# Patient Record
Sex: MALE | Race: WHITE | Employment: OTHER | URBAN - NONMETROPOLITAN AREA
[De-identification: names, ages, dates, MRNs, and addresses within clinical notes are randomized per-mention and may not be internally consistent; named-entity substitution may affect disease eponyms.]

---

## 2017-01-11 ENCOUNTER — OFFICE VISIT (OUTPATIENT)
Dept: FAMILY MEDICINE | Facility: OTHER | Age: 57
End: 2017-01-11
Payer: COMMERCIAL

## 2017-01-11 VITALS
HEART RATE: 80 BPM | SYSTOLIC BLOOD PRESSURE: 136 MMHG | TEMPERATURE: 96.9 F | HEIGHT: 68 IN | RESPIRATION RATE: 12 BRPM | WEIGHT: 227.7 LBS | OXYGEN SATURATION: 96 % | DIASTOLIC BLOOD PRESSURE: 84 MMHG | BODY MASS INDEX: 34.51 KG/M2

## 2017-01-11 DIAGNOSIS — E78.5 HYPERLIPIDEMIA LDL GOAL <130: Primary | ICD-10-CM

## 2017-01-11 LAB
CHOLEST SERPL-MCNC: 173 MG/DL
HDLC SERPL-MCNC: 36 MG/DL
LDLC SERPL CALC-MCNC: 100 MG/DL
NONHDLC SERPL-MCNC: 137 MG/DL
TRIGL SERPL-MCNC: 185 MG/DL

## 2017-01-11 PROCEDURE — 99213 OFFICE O/P EST LOW 20 MIN: CPT | Performed by: FAMILY MEDICINE

## 2017-01-11 PROCEDURE — 36415 COLL VENOUS BLD VENIPUNCTURE: CPT | Performed by: FAMILY MEDICINE

## 2017-01-11 PROCEDURE — 80061 LIPID PANEL: CPT | Performed by: FAMILY MEDICINE

## 2017-01-11 ASSESSMENT — PAIN SCALES - GENERAL: PAINLEVEL: NO PAIN (0)

## 2017-01-11 NOTE — NURSING NOTE
"Chief Complaint   Patient presents with     Recheck Medication     with labs       Initial /84 mmHg  Pulse 80  Temp(Src) 96.9  F (36.1  C) (Tympanic)  Resp 12  Ht 5' 8\" (1.727 m)  Wt 227 lb 11.2 oz (103.284 kg)  BMI 34.63 kg/m2  SpO2 96% Estimated body mass index is 34.63 kg/(m^2) as calculated from the following:    Height as of this encounter: 5' 8\" (1.727 m).    Weight as of this encounter: 227 lb 11.2 oz (103.284 kg).  BP completed using cuff size: large  Health Maintenance Due   Topic Date Due     HEPATITIS C SCREENING  09/28/1977       Ora Clark MA     1/11/2017    "

## 2017-01-11 NOTE — PROGRESS NOTES
SUBJECTIVE:                                                    Wenceslao Vasquez is a 56 year old male who presents to clinic today for the following health issues:      Medication Followup of labs    Taking Medication as prescribed: not applicable    Side Effects:  None    Medication Helping Symptoms:  not applicable       Hyperlipidemia Follow-Up, he tapered and stopped his statin 2-3 months ago      Rate your low fat/cholesterol diet?: fair    Taking statin?  No    Other lipid medications/supplements?:  none     Hypertension Follow-up, he tapered and stopped his ACEI 2-3 months ago      Outpatient blood pressures are not being checked.    Low Salt Diet: not monitoring salt       Medications Discontinued During This Encounter   Medication Reason     lisinopril (PRINIVIL,ZESTRIL) 10 MG tablet Stopped by Patient     simvastatin (ZOCOR) 20 MG tablet Stopped by Patient       Problem list and histories reviewed & adjusted, as indicated.  Additional history: as documented    Patient Active Problem List   Diagnosis     CHELSY (obstructive sleep apnea)     Hyperlipidemia LDL goal <130     Sciatica     Low back pain     Stenosis of lumbosacral spine     Advanced directives, counseling/discussion     Hypertension goal BP (blood pressure) < 140/90     Spinal stenosis in cervical region     Cervical spondylosis without myelopathy     Pneumonia     Past Surgical History   Procedure Laterality Date     Hc revise median n/carpal tunnel surg  1993     right     C nonspecific procedure       lumbar disc surgery     C nonspecific procedure       hammer toe surgery right foot     Dil urethra stric,male,subseq       C nonspecific procedure       nasal surgery     Hc removal heel spur, calcaneus  2/2005     Colonoscopy  06/03/09     Laparoscopic cholecystectomy  3/11/2013     Procedure: LAPAROSCOPIC CHOLECYSTECTOMY;  laparoscopic cholecystectomy;  Surgeon: Clinton Whittaker MD;  Location: [facility] OR     Cervical diskectomy  8/8/13      [facility]       Social History   Substance Use Topics     Smoking status: Never Smoker      Smokeless tobacco: Never Used     Alcohol Use: Yes      Comment: occasional     Family History   Problem Relation Age of Onset     Alcohol/Drug Brother      Prostate Cancer Brother      Arthritis Mother      CANCER Mother      Kidney - Stage 3     C.A.D. No family hx of      DIABETES No family hx of      HEART DISEASE Maternal Grandmother      HEART DISEASE Paternal Grandmother      Anesthesia Reaction No family hx of      Cancer - colorectal No family hx of          Current Outpatient Prescriptions   Medication Sig Dispense Refill     aspirin 81 MG tablet Take 1 tablet by mouth daily.       Multiple Vitamin (MULTI VITAMIN MENS PO) Take 1 tablet by mouth daily.       naproxen (NAPROSYN) 500 MG tablet Take 1 tablet (500 mg) by mouth 2 times daily (with meals) 30 tablet 1     Allergies   Allergen Reactions     Dust Mites Hives     Morphine Nausea and Vomiting     Recent Labs   Lab Test  07/21/16   0822  03/25/16   1405  05/18/15   1216  11/18/14   0935   02/22/13   1935   12/10/10   1113  05/12/10   0946   LDL  90   --   47  49   < >   --    < >   --   117   HDL  36*   --   35*  41   < >   --    < >   --   33*   TRIG  139   --   202*  140   < >   --    < >   --   148   ALT   --    --    --    --    --   45   --   33   --    CR   --   0.75  1.03   --    < >  0.93   < >   --   0.86   GFRESTIMATED   --   >90  Non  GFR Calc    75   --    < >  85   < >   --   >90   GFRESTBLACK   --   >90   GFR Calc    >90   GFR Calc     --    < >  >90   < >   --   >90   POTASSIUM   --   3.9  3.8   --    < >  3.8   < >   --   4.2   TSH   --    --    --    --    --    --    --    --   0.71    < > = values in this interval not displayed.      BP Readings from Last 3 Encounters:   01/11/17 136/84   10/19/16 134/78   07/21/16 118/70    Wt Readings from Last 3 Encounters:   01/11/17 227 lb 11.2 oz  "(103.284 kg)   10/19/16 230 lb (104.327 kg)   07/21/16 222 lb 14.4 oz (101.107 kg)                  Labs reviewed in EPIC  Problem list, Medication list, Allergies, and Medical/Social/Surgical histories reviewed in [facility] and updated as appropriate.    ROS:  Constitutional, HEENT, cardiovascular, pulmonary, gi and gu systems are negative, except as otherwise noted.    OBJECTIVE:                                                    /84 mmHg  Pulse 80  Temp(Src) 96.9  F (36.1  C) (Tympanic)  Resp 12  Ht 5' 8\" (1.727 m)  Wt 227 lb 11.2 oz (103.284 kg)  BMI 34.63 kg/m2  SpO2 96%  Body mass index is 34.63 kg/(m^2).  GENERAL: healthy, alert and no distress  NECK: no adenopathy, no asymmetry, masses, or scars and thyroid normal to palpation  RESP: lungs clear to auscultation - no rales, rhonchi or wheezes  CV: regular rate and rhythm, normal S1 S2, no S3 or S4, no murmur, click or rub, no peripheral edema and peripheral pulses strong  ABDOMEN: soft, nontender, no hepatosplenomegaly, no masses and bowel sounds normal    Diagnostic Test Results:  No results found for this or any previous visit (from the past 24 hour(s)).     ASSESSMENT/PLAN:                                                    Hyperlipidemia; Unknown control off statin   Plan:  Labs:   Lipid  The following changes are made - If LDL>190 will restart statin    Hypertension; controlled   Associated with the following complications:    None   Plan:  No changes in the patient's current treatment plan          SELF MONITORING:       - Please check blood pressure readings several times a month  Work on weight loss  Regular exercise    Christopher Arreola MD  [facility]    "

## 2017-01-22 ENCOUNTER — MYC MEDICAL ADVICE (OUTPATIENT)
Dept: FAMILY MEDICINE | Facility: OTHER | Age: 57
End: 2017-01-22

## 2017-01-22 DIAGNOSIS — Z01.00 VISION SCREEN WITHOUT ABNORMAL FINDINGS: Primary | ICD-10-CM

## 2017-03-22 ENCOUNTER — OFFICE VISIT (OUTPATIENT)
Dept: FAMILY MEDICINE | Facility: OTHER | Age: 57
End: 2017-03-22
Payer: COMMERCIAL

## 2017-03-22 ENCOUNTER — RADIANT APPOINTMENT (OUTPATIENT)
Dept: GENERAL RADIOLOGY | Facility: OTHER | Age: 57
End: 2017-03-22
Attending: PHYSICIAN ASSISTANT
Payer: COMMERCIAL

## 2017-03-22 VITALS
TEMPERATURE: 97.6 F | WEIGHT: 231.9 LBS | DIASTOLIC BLOOD PRESSURE: 80 MMHG | RESPIRATION RATE: 16 BRPM | SYSTOLIC BLOOD PRESSURE: 128 MMHG | BODY MASS INDEX: 35.26 KG/M2 | HEART RATE: 68 BPM | OXYGEN SATURATION: 96 %

## 2017-03-22 DIAGNOSIS — M25.532 LEFT WRIST PAIN: Primary | ICD-10-CM

## 2017-03-22 DIAGNOSIS — M25.532 LEFT WRIST PAIN: ICD-10-CM

## 2017-03-22 PROCEDURE — 73110 X-RAY EXAM OF WRIST: CPT | Mod: LT

## 2017-03-22 PROCEDURE — 99213 OFFICE O/P EST LOW 20 MIN: CPT | Performed by: PHYSICIAN ASSISTANT

## 2017-03-22 ASSESSMENT — PAIN SCALES - GENERAL: PAINLEVEL: MODERATE PAIN (5)

## 2017-03-22 NOTE — MR AVS SNAPSHOT
After Visit Summary   3/22/2017    Wenceslao Vasquez    MRN: [id number]           Patient Information     Date Of Birth          1960        Visit Information        Provider Department      3/22/2017 9:40 AM Thompson Crane PA-C [facility]        Today's Diagnoses     Left wrist pain    -  1       Follow-ups after your visit        Who to contact     If you have questions or need follow up information about today's clinic visit or your schedule please contact [facility] directly at [phone].  Normal or non-critical lab and imaging results will be communicated to you by Shop[employer]ially[employer], letter or phone within 4 business days after the clinic has received the results. If you do not hear from us within 7 days, please contact the clinic through Inn[employer] or phone. If you have a critical or abnormal lab result, we will notify you by phone as soon as possible.  Submit refill requests through [employer] or call your pharmacy and they will forward the refill request to us. Please allow 3 business days for your refill to be completed.          Additional Information About Your Visit        MyChart Information     [employer] gives you secure access to your electronic health record. If you see a primary care provider, you can also send messages to your care team and make appointments. If you have questions, please call your primary care clinic.  If you do not have a primary care provider, please call [phone] and they will assist you.        Care EveryWhere ID     This is your Care EveryWhere ID. This could be used by other organizations to access your [facility] medical records  [id number]        Your Vitals Were     Pulse Temperature Respirations Pulse Oximetry BMI (Body Mass Index)       68 97.6  F (36.4  C) (Oral) 16 96% 35.26 kg/m2        Blood Pressure from Last 3 Encounters:   03/22/17 128/80   01/11/17 136/84   10/19/16 134/78    Weight from Last 3 Encounters:   03/22/17 231 lb 14.4 oz  (105.2 kg)   01/11/17 227 lb 11.2 oz (103.3 kg)   10/19/16 230 lb (104.3 kg)               Primary Care Provider Office Phone # Fax #    Christopher Arreola [phone]       [facility] [address] [facility] [address]        Thank you!     Thank you for choosing [facility]  for your care. Our goal is always to provide you with excellent care. Hearing back from our patients is one way we can continue to improve our services. Please take a few minutes to complete the written survey that you may receive in the mail after your visit with us. Thank you!             Your Updated Medication List - Protect others around you: Learn how to safely use, store and throw away your medicines at www.disposemymeds.org.          This list is accurate as of: 3/22/17 10:23 AM.  Always use your most recent med list.                   Brand Name Dispense Instructions for use    aspirin 81 MG tablet      Take 1 tablet by mouth daily.       MULTI VITAMIN MENS PO      Take 1 tablet by mouth daily.

## 2017-03-22 NOTE — NURSING NOTE
"Chief Complaint   Patient presents with     Fall     fell in shower at home on 3/17/2017       Initial /80 (BP Location: Right arm, Patient Position: Chair, Cuff Size: Adult Large)  Pulse 68  Temp 97.6  F (36.4  C) (Oral)  Resp 16  Wt 231 lb 14.4 oz (105.2 kg)  SpO2 96%  BMI 35.26 kg/m2 Estimated body mass index is 35.26 kg/(m^2) as calculated from the following:    Height as of 1/11/17: 5' 8\" (1.727 m).    Weight as of this encounter: 231 lb 14.4 oz (105.2 kg).  Medication Reconciliation: complete     Michelle TELLO LPN      "

## 2017-03-22 NOTE — PROGRESS NOTES
SUBJECTIVE:                                                    Wenceslao Vasquez is a 56 year old male who presents to clinic today for the following health issues:      Concern - fell in his  Shower at home and injury to left wrist     Onset: happened on 3/17/2017    Description:   Fell in the shower and still has pain andswelling    Intensity: moderate    Progression of Symptoms:  same    Accompanying Signs & Symptoms:  Pain with ROM of the left wrist       Previous history of similar problem:   denies    Precipitating factors:   Worsened by: movement    Alleviating factors:  Improved by: keeping the wrist still.       Therapies Tried and outcome: OTC's not helping.    Problem list and histories reviewed & adjusted, as indicated.  Additional history: as documented    Patient Active Problem List   Diagnosis     CHELSY (obstructive sleep apnea)     Hyperlipidemia LDL goal <130     Sciatica     Low back pain     Stenosis of lumbosacral spine     Advanced directives, counseling/discussion     Hypertension goal BP (blood pressure) < 140/90     Spinal stenosis in cervical region     Cervical spondylosis without myelopathy     Pneumonia     Past Surgical History:   Procedure Laterality Date     C NONSPECIFIC PROCEDURE      lumbar disc surgery     C NONSPECIFIC PROCEDURE      hammer toe surgery right foot     C NONSPECIFIC PROCEDURE      nasal surgery     CERVICAL DISKECTOMY  8/8/13    [facility]     COLONOSCOPY  06/03/09     DIL URETHRA STRIC,MALE,SUBSEQ       HC REMOVAL HEEL SPUR, CALCANEUS  2/2005     HC REVISE MEDIAN N/CARPAL TUNNEL SURG  1993    right     LAPAROSCOPIC CHOLECYSTECTOMY  3/11/2013    Procedure: LAPAROSCOPIC CHOLECYSTECTOMY;  laparoscopic cholecystectomy;  Surgeon: Clinton Whittaker MD;  Location:  OR       Social History   Substance Use Topics     Smoking status: Never Smoker     Smokeless tobacco: Never Used     Alcohol use Yes      Comment: occasional     Family History   Problem Relation Age of  Onset     Alcohol/Drug Brother      Prostate Cancer Brother      Arthritis Mother      CANCER Mother      Kidney - Stage 3     HEART DISEASE Maternal Grandmother      HEART DISEASE Paternal Grandmother      C.A.D. No family hx of      DIABETES No family hx of      Anesthesia Reaction No family hx of      Cancer - colorectal No family hx of            Reviewed and updated as needed this visit by clinical staff  Tobacco  Allergies  Meds  Med Hx  Surg Hx  Fam Hx  Soc Hx      Reviewed and updated as needed this visit by Provider         ROS:  Constitutional, HEENT, cardiovascular, pulmonary, gi and gu systems are negative, except as otherwise noted.    OBJECTIVE:                                                    /80 (BP Location: Right arm, Patient Position: Chair, Cuff Size: Adult Large)  Pulse 68  Temp 97.6  F (36.4  C) (Oral)  Resp 16  Wt 231 lb 14.4 oz (105.2 kg)  SpO2 96%  BMI 35.26 kg/m2  Body mass index is 35.26 kg/(m^2).  GENERAL: healthy, alert and no distress  MS: pain over the distal radius  SKIN: no suspicious lesions or rashes  NEURO: Normal strength and tone, mentation intact and speech normal  PSYCH: mentation appears normal, affect normal/bright    Diagnostic Test Results:  X-ray: essentially negative for pathology today to my eye.  It will be overread by radiology.       ASSESSMENT/PLAN:                                                    1. Left wrist pain  Noted - sprain noted.  - XR Wrist Left G/E 3 Views; Future  Thompson Rivers PA-C  [facility]

## 2017-07-03 ENCOUNTER — OFFICE VISIT (OUTPATIENT)
Dept: FAMILY MEDICINE | Facility: OTHER | Age: 57
End: 2017-07-03
Payer: COMMERCIAL

## 2017-07-03 VITALS
HEART RATE: 82 BPM | RESPIRATION RATE: 12 BRPM | BODY MASS INDEX: 34.53 KG/M2 | WEIGHT: 227.1 LBS | SYSTOLIC BLOOD PRESSURE: 136 MMHG | DIASTOLIC BLOOD PRESSURE: 84 MMHG | OXYGEN SATURATION: 97 % | TEMPERATURE: 98 F

## 2017-07-03 DIAGNOSIS — E66.01 MORBID OBESITY DUE TO EXCESS CALORIES (H): ICD-10-CM

## 2017-07-03 DIAGNOSIS — Z11.59 NEED FOR HEPATITIS C SCREENING TEST: ICD-10-CM

## 2017-07-03 DIAGNOSIS — Z00.01 ENCOUNTER FOR ROUTINE ADULT MEDICAL EXAM WITH ABNORMAL FINDINGS: Primary | ICD-10-CM

## 2017-07-03 DIAGNOSIS — Z12.5 SPECIAL SCREENING FOR MALIGNANT NEOPLASM OF PROSTATE: ICD-10-CM

## 2017-07-03 DIAGNOSIS — R73.09 ELEVATED GLUCOSE: ICD-10-CM

## 2017-07-03 DIAGNOSIS — M17.12 PRIMARY OSTEOARTHRITIS OF LEFT KNEE: ICD-10-CM

## 2017-07-03 DIAGNOSIS — N52.9 VASCULOGENIC ERECTILE DYSFUNCTION, UNSPECIFIED VASCULOGENIC ERECTILE DYSFUNCTION TYPE: ICD-10-CM

## 2017-07-03 LAB
ANION GAP SERPL CALCULATED.3IONS-SCNC: 4 MMOL/L (ref 3–14)
BUN SERPL-MCNC: 19 MG/DL (ref 7–30)
CALCIUM SERPL-MCNC: 8.3 MG/DL (ref 8.5–10.1)
CHLORIDE SERPL-SCNC: 107 MMOL/L (ref 94–109)
CO2 SERPL-SCNC: 29 MMOL/L (ref 20–32)
CREAT SERPL-MCNC: 0.74 MG/DL (ref 0.66–1.25)
GFR SERPL CREATININE-BSD FRML MDRD: ABNORMAL ML/MIN/1.7M2
GLUCOSE SERPL-MCNC: 99 MG/DL (ref 70–99)
HCV AB SERPL QL IA: NORMAL
POTASSIUM SERPL-SCNC: 3.9 MMOL/L (ref 3.4–5.3)
PSA SERPL-ACNC: 2.47 UG/L (ref 0–4)
SODIUM SERPL-SCNC: 140 MMOL/L (ref 133–144)

## 2017-07-03 PROCEDURE — 36415 COLL VENOUS BLD VENIPUNCTURE: CPT | Performed by: FAMILY MEDICINE

## 2017-07-03 PROCEDURE — 86803 HEPATITIS C AB TEST: CPT | Performed by: FAMILY MEDICINE

## 2017-07-03 PROCEDURE — G0103 PSA SCREENING: HCPCS | Performed by: FAMILY MEDICINE

## 2017-07-03 PROCEDURE — 99396 PREV VISIT EST AGE 40-64: CPT | Performed by: FAMILY MEDICINE

## 2017-07-03 PROCEDURE — 80048 BASIC METABOLIC PNL TOTAL CA: CPT | Performed by: FAMILY MEDICINE

## 2017-07-03 PROCEDURE — 99214 OFFICE O/P EST MOD 30 MIN: CPT | Mod: 25 | Performed by: FAMILY MEDICINE

## 2017-07-03 RX ORDER — SILDENAFIL 100 MG/1
50-100 TABLET, FILM COATED ORAL DAILY PRN
Qty: 6 TABLET | Refills: 1 | Status: SHIPPED | OUTPATIENT
Start: 2017-07-03 | End: 2017-10-27

## 2017-07-03 ASSESSMENT — PAIN SCALES - GENERAL: PAINLEVEL: NO PAIN (0)

## 2017-07-03 NOTE — PROGRESS NOTES
SUBJECTIVE:   Wenceslao Vasquez is a 56 year old male who presents for Preventive Visit.  Are you in the first 12 months of your Medicare Part B coverage?  No    Healthy Habits:    Do you get at least three servings of calcium containing foods daily (dairy, green leafy vegetables, etc.)? yes    Amount of exercise or daily activities, outside of work: 7 day(s) per week    Problems taking medications regularly not applicable    Medication side effects: No    Have you had an eye exam in the past two years? yes    Do you see a dentist twice per year? yes    Do you have sleep apnea, excessive snoring or daytime drowsiness?yes    COGNITIVE SCREEN  1) Repeat 3 items (Banana, Sunrise, Chair)    2) Clock draw: ABNORMAL   3) 3 item recall: Recalls 1 object   Results: ABNORMAL clock, 1-2 items recalled: PROBABLE COGNITIVE IMPAIRMENT, **INFORM PROVIDER**    Mini-CogTM Copyright S Morenita. Licensed by the author for use in [facility]; reprinted with permission (kei@[facility]). All rights reserved.            Musculoskeletal problem/pain      Duration: 9 months    Description  Location: Left knee pain    Intensity:  moderate    Accompanying signs and symptoms: swelling    History  Previous similar problem: YES  Previous evaluation:  none    Precipitating or alleviating factors:  Trauma or overuse: YES- jumped off trailer in August 2016. Intermittent pain which is getting worse.  Aggravating factors include: standing, walking and overuse    Therapies tried and outcome: acetaminophen and NSAID -      Complaining of gradual development of poor erectile function over the last few years.  This has become progressively worse and has begun to effect sexual function and relationship. No pain or injuries.  He does not use nitrate or alpha blocker  medication.  He denies injuries.    Reviewed and updated as needed this visit by clinical staff  Tobacco  Allergies  Meds  Med Hx  Surg Hx  Fam Hx  Soc Hx        Reviewed and  updated as needed this visit by Provider        Social History   Substance Use Topics     Smoking status: Never Smoker     Smokeless tobacco: Never Used     Alcohol use Yes      Comment: occasional       The patient does not drink >3 drinks per day nor >7 drinks per week.    Today's PHQ-2 Score:   PHQ-2 ( 1999 Pfizer) 7/3/2017 7/21/2016   Q1: Little interest or pleasure in doing things 0 0   Q2: Feeling down, depressed or hopeless 0 0   PHQ-2 Score 0 0   Q1: Little interest or pleasure in doing things - -   Q2: Feeling down, depressed or hopeless - -   PHQ-2 Score - -       Do you feel safe in your environment - Yes    Do you have a Health Care Directive?: Yes: Advance Directive has been received and scanned.    Current providers sharing in care for this patient include:   Patient Care Team:  Christopher Arreola MD as PCP - General      Hearing impairment: No    Ability to successfully perform activities of daily living: Yes, no assistance needed     Fall risk:  Fallen 2 or more times in the past year?: No  Any fall with injury in the past year?: Yes  Timed Up and Go Test (>13.5 is fall risk; contact physician) : 9    Home safety:  none identified      The following health maintenance items are reviewed in Epic and correct as of today:  Health Maintenance   Topic Date Due     HEPATITIS C SCREENING  09/28/1977     ADVANCE DIRECTIVE PLANNING Q5 YRS  03/15/2017     INFLUENZA VACCINE (SYSTEM ASSIGNED)  09/01/2017     LIPID MONITORING Q1 YEAR  01/11/2018     COLONOSCOPY Q5 YR  07/14/2020     TETANUS IMMUNIZATION (SYSTEM ASSIGNED)  05/14/2024     Labs reviewed in EPIC  BP Readings from Last 3 Encounters:   07/03/17 136/84   03/22/17 128/80   01/11/17 136/84    Wt Readings from Last 3 Encounters:   07/03/17 227 lb 1.6 oz (103 kg)   03/22/17 231 lb 14.4 oz (105.2 kg)   01/11/17 227 lb 11.2 oz (103.3 kg)                  Patient Active Problem List   Diagnosis     CHELSY (obstructive sleep apnea)     Hyperlipidemia LDL goal <130      Sciatica     Low back pain     Stenosis of lumbosacral spine     Advanced directives, counseling/discussion     Hypertension goal BP (blood pressure) < 140/90     Spinal stenosis in cervical region     Cervical spondylosis without myelopathy     Pneumonia     Past Surgical History:   Procedure Laterality Date     C NONSPECIFIC PROCEDURE      lumbar disc surgery     C NONSPECIFIC PROCEDURE      hammer toe surgery right foot     C NONSPECIFIC PROCEDURE      nasal surgery     CERVICAL DISKECTOMY  8/8/13    [facility] [place]     COLONOSCOPY  06/03/09     DIL URETHRA STRIC,MALE,SUBSEQ       HC REMOVAL HEEL SPUR, CALCANEUS  2/2005     HC REVISE MEDIAN N/CARPAL TUNNEL SURG  1993    right     LAPAROSCOPIC CHOLECYSTECTOMY  3/11/2013    Procedure: LAPAROSCOPIC CHOLECYSTECTOMY;  laparoscopic cholecystectomy;  Surgeon: Clinton Whittaker MD;  Location: [facility] OR       Social History   Substance Use Topics     Smoking status: Never Smoker     Smokeless tobacco: Never Used     Alcohol use Yes      Comment: occasional     Family History   Problem Relation Age of Onset     Alcohol/Drug Brother      Prostate Cancer Brother      Arthritis Mother      CANCER Mother      Kidney - Stage 3     HEART DISEASE Maternal Grandmother      HEART DISEASE Paternal Grandmother      C.A.D. No family hx of      DIABETES No family hx of      Anesthesia Reaction No family hx of      Cancer - colorectal No family hx of      Colon Cancer No family hx of          Current Outpatient Prescriptions   Medication Sig Dispense Refill     sildenafil (VIAGRA) 100 MG cap/tab Take 0.5-1 tablets ( mg) by mouth daily as needed for erectile dysfunction Take 30 min to 4 hours before intercourse.  Never use with nitroglycerin, terazosin or doxazosin. 6 tablet 1     Multiple Vitamin (MULTI VITAMIN MENS PO) Take 1 tablet by mouth daily.       aspirin 81 MG tablet Take 1 tablet by mouth daily.       Allergies   Allergen Reactions     Dust Mites Hives     Morphine  "Nausea and Vomiting     Recent Labs   Lab Test  01/11/17   0855  07/21/16   0822  03/25/16   1405  05/18/15   1216   02/22/13   1935   12/10/10   1113  05/12/10   0946   LDL  100*  90   --   47   < >   --    < >   --   117   HDL  36*  36*   --   35*   < >   --    < >   --   33*   TRIG  185*  139   --   202*   < >   --    < >   --   148   ALT   --    --    --    --    --   45   --   33   --    CR   --    --   0.75  1.03   < >  0.93   < >   --   0.86   GFRESTIMATED   --    --   >90  Non  GFR Calc    75   < >  85   < >   --   >90   GFRESTBLACK   --    --   >90   GFR Calc    >90   GFR Calc     < >  >90   < >   --   >90   POTASSIUM   --    --   3.9  3.8   < >  3.8   < >   --   4.2   TSH   --    --    --    --    --    --    --    --   0.71    < > = values in this interval not displayed.          ROS:  C: NEGATIVE for fever, chills, change in weight  E/M: NEGATIVE for ear, mouth and throat problems  R: NEGATIVE for significant cough or SOB  CV: NEGATIVE for chest pain, palpitations or peripheral edema  : positive for erectile dysfunction for 3 years  MUSCULOSKELETAL: POSITIVE  for joint pain left knee  ROS otherwise negative    OBJECTIVE:   /84 (BP Location: Right arm, Patient Position: Chair, Cuff Size: Adult Large)  Pulse 82  Temp 98  F (36.7  C) (Temporal)  Resp 12  Wt 227 lb 1.6 oz (103 kg)  SpO2 97%  BMI 34.53 kg/m2 Estimated body mass index is 34.53 kg/(m^2) as calculated from the following:    Height as of 1/11/17: 5' 8\" (1.727 m).    Weight as of this encounter: 227 lb 1.6 oz (103 kg).  EXAM:   GENERAL: healthy, alert, no distress and obese  EYES: Eyes grossly normal to inspection, PERRL and conjunctivae and sclerae normal  HENT: ear canals and TM's normal, nose and mouth without ulcers or lesions  NECK: no adenopathy, no asymmetry, masses, or scars and thyroid normal to palpation  RESP: lungs clear to auscultation - no rales, rhonchi or wheezes  CV: " "regular rate and rhythm, normal S1 S2, no S3 or S4, no murmur, click or rub, no peripheral edema and peripheral pulses strong  ABDOMEN: soft, nontender, no hepatosplenomegaly, no masses and bowel sounds normal  MS: Knee exam: left positive for moderate crepitations, some mild tenderness medial femoral condyl and pain on range of motion, no effusion is present, no pseudolaxity noted.  SKIN: no suspicious lesions or rashes  NEURO: Normal strength and tone, mentation intact and speech normal  PSYCH: mentation appears normal, affect normal/bright    ASSESSMENT / PLAN:       ICD-10-CM    1. Encounter for routine adult medical exam with abnormal findings Z00.01    2. Special screening for malignant neoplasm of prostate Z12.5 PSA, screen   3. Elevated glucose R73.09 Basic metabolic panel   4. Vasculogenic erectile dysfunction, unspecified vasculogenic erectile dysfunction type N52.9 sildenafil (VIAGRA) 100 MG cap/tab     OFFICE/OUTPT VISIT,EST,LEVL III   5. Primary osteoarthritis of left knee M17.12 ORTHOPEDICS ADULT REFERRAL     OFFICE/OUTPT VISIT,EST,LEVL III   6. Morbid obesity due to excess calories (H) E66.01    7. Need for hepatitis C screening test Z11.59 Hepatitis C Screen Reflex to HCV RNA Quant and Genotype       End of Life Planning:  Patient currently has an advanced directive: Yes.  Practitioner is supportive of decision.    COUNSELING:  Reviewed preventive health counseling, as reflected in patient instructions       Regular exercise       Healthy diet/nutrition       Hepatitis C screening       Prostate cancer screening    BP Screening:   Last 3 BP Readings:    BP Readings from Last 3 Encounters:   07/03/17 136/84   03/22/17 128/80   01/11/17 136/84       The following was recommended to the patient:  Re-screen BP within a year and recommended lifestyle modifications    Estimated body mass index is 34.53 kg/(m^2) as calculated from the following:    Height as of 1/11/17: 5' 8\" (1.727 m).    Weight as of this " encounter: 227 lb 1.6 oz (103 kg).  Weight management plan: Discussed healthy diet and exercise guidelines and patient will follow up in 6 months in clinic to re-evaluate.   reports that he has never smoked. He has never used smokeless tobacco.      Appropriate preventive services were discussed with this patient, including applicable screening as appropriate for cardiovascular disease, diabetes, osteopenia/osteoporosis, and glaucoma.  As appropriate for age/gender, discussed screening for colorectal cancer, prostate cancer, breast cancer, and cervical cancer. Checklist reviewing preventive services available has been given to the patient.    Reviewed patients plan of care and provided an AVS. The Basic Care Plan (routine screening as documented in Health Maintenance) for Wenceslao meets the Care Plan requirement. This Care Plan has been established and reviewed with the Patient.    Counseling Resources:  ATP IV Guidelines  Pooled Cohorts Equation Calculator  Breast Cancer Risk Calculator  FRAX Risk Assessment  ICSI Preventive Guidelines  Dietary Guidelines for Americans, 2010  USDA's MyPlate  ASA Prophylaxis  Lung CA Screening    Christopher Arreola MD  [facility]

## 2017-07-03 NOTE — MR AVS SNAPSHOT
After Visit Summary   7/3/2017    Wenceslao Vasquez    MRN: [id number]           Patient Information     Date Of Birth          1960        Visit Information        Provider Department      7/3/2017 8:40 AM Christopher Arreola MD [facility]        Today's Diagnoses     Encounter for routine adult medical exam with abnormal findings    -  1    Need for hepatitis C screening test        Special screening for malignant neoplasm of prostate        Elevated glucose        Vasculogenic erectile dysfunction, unspecified vasculogenic erectile dysfunction type        Primary osteoarthritis of left knee          Care Instructions      Preventive Health Recommendations:       Male Ages 65 and over    Yearly exam:             See your health care provider every year in order to  o   Review health changes.   o   Discuss preventive care.    o   Review your medicines if your doctor has prescribed any.    Talk with your health care provider about whether you should have a test to screen for prostate cancer (PSA).    Every 3 years, have a diabetes test (fasting glucose). If you are at risk for diabetes, you should have this test more often.    Every 5 years, have a cholesterol test. Have this test more often if you are at risk for high cholesterol or heart disease.     Every 10 years, have a colonoscopy. Or, have a yearly FIT test (stool test). These exams will check for colon cancer.    Talk to with your health care provider about screening for Abdominal Aortic Aneurysm if you have a family history of AAA or have a history of smoking.  Shots:     Get a flu shot each year.     Get a tetanus shot every 10 years.     Talk to your doctor about your pneumonia vaccines. There are now two you should receive - Pneumovax (PPSV 23) and Prevnar (PCV 13).    Talk to your doctor about a shingles vaccine.     Talk to your doctor about the hepatitis B vaccine.  Nutrition:     Eat at least 5 servings of fruits and vegetables  each day.     Eat whole-grain bread, whole-wheat pasta and brown rice instead of white grains and rice.     Talk to your doctor about Calcium and Vitamin D.   Lifestyle    Exercise for at least 150 minutes a week (30 minutes a day, 5 days a week). This will help you control your weight and prevent disease.     Limit alcohol to one drink per day.     No smoking.     Wear sunscreen to prevent skin cancer.     See your dentist every six months for an exam and cleaning.     See your eye doctor every 1 to 2 years to screen for conditions such as glaucoma, macular degeneration and cataracts.          Follow-ups after your visit        Additional Services     ORTHOPEDICS ADULT REFERRAL       Your provider has referred you to: FM: [facility] [phone]   [email or web address]    Please be aware that coverage of these services is subject to the terms and limitations of your health insurance plan.  Call member services at your health plan with any benefit or coverage questions.      Please bring the following to your appointment:    >>   Any x-rays, CTs or MRIs which have been performed.  Contact the facility where they were done to arrange for  prior to your scheduled appointment.    >>   List of current medications   >>   This referral request   >>   Any documents/labs given to you for this referral                  Who to contact     If you have questions or need follow up information about today's clinic visit or your schedule please contact [facility] directly at [phone].  Normal or non-critical lab and imaging results will be communicated to you by MyChart, letter or phone within 4 business days after the clinic has received the results. If you do not hear from us within 7 days, please contact the clinic through MyChart or phone. If you have a critical or abnormal lab result, we will notify you by phone as soon as  possible.  Submit refill requests through Adiana or call your pharmacy and they will forward the refill request to us. Please allow 3 business days for your refill to be completed.          Additional Information About Your Visit        Adiana Information     Adiana gives you secure access to your electronic health record. If you see a primary care provider, you can also send messages to your care team and make appointments. If you have questions, please call your primary care clinic.  If you do not have a primary care provider, please call [phone] and they will assist you.        Care EveryWhere ID     This is your Care EveryWhere ID. This could be used by other organizations to access your [place] medical records  [id number]        Your Vitals Were     Pulse Temperature Respirations Pulse Oximetry BMI (Body Mass Index)       82 98  F (36.7  C) (Temporal) 12 97% 34.53 kg/m2        Blood Pressure from Last 3 Encounters:   07/03/17 136/84   03/22/17 128/80   01/11/17 136/84    Weight from Last 3 Encounters:   07/03/17 227 lb 1.6 oz (103 kg)   03/22/17 231 lb 14.4 oz (105.2 kg)   01/11/17 227 lb 11.2 oz (103.3 kg)              We Performed the Following     Basic metabolic panel     Hepatitis C Screen Reflex to HCV RNA Quant and Genotype     ORTHOPEDICS ADULT REFERRAL     PSA, screen          Today's Medication Changes          These changes are accurate as of: 7/3/17  9:52 AM.  If you have any questions, ask your nurse or doctor.               Start taking these medicines.        Dose/Directions    sildenafil 100 MG cap/tab   Commonly known as:  VIAGRA   Used for:  Vasculogenic erectile dysfunction, unspecified vasculogenic erectile dysfunction type   Started by:  Christopher Arreola MD        Dose:   mg   Take 0.5-1 tablets ( mg) by mouth daily as needed for erectile dysfunction Take 30 min to 4 hours before intercourse.  Never use with nitroglycerin, terazosin or doxazosin.   Quantity:  6 tablet    Refills:  1            Where to get your medicines      Some of these will need a paper prescription and others can be bought over the counter.  Ask your nurse if you have questions.     Bring a paper prescription for each of these medications     sildenafil 100 MG cap/tab                Primary Care Provider Office Phone # Fax #    Christopher Arreola [phone]       [facility] [address] [facility] [address]        Equal Access to Services     BAUTISTA LU : Hadii aad ku hadasho Soomaali, waaxda luqadaha, qaybta kaalmada adeegyada, waxay vikiin hayduken lin mullinsjuanaann moseley. So [facility] [phone].    ATENCIÓN: Si gaviota al, tiene a copeland disposición servicios gratuitos de asistencia lingüística. Llame al [phone].    We comply with applicable federal civil rights laws and [place] laws. We do not discriminate on the basis of race, color, national origin, age, disability sex, sexual orientation or gender identity.            Thank you!     Thank you for choosing [facility]  for your care. Our goal is always to provide you with excellent care. Hearing back from our patients is one way we can continue to improve our services. Please take a few minutes to complete the written survey that you may receive in the mail after your visit with us. Thank you!             Your Updated Medication List - Protect others around you: Learn how to safely use, store and throw away your medicines at www.disposemymeds.org.          This list is accurate as of: 7/3/17  9:52 AM.  Always use your most recent med list.                   Brand Name Dispense Instructions for use Diagnosis    aspirin 81 MG tablet      Take 1 tablet by mouth daily.    Low back pain, S/P lumbar discectomy       MULTI VITAMIN MENS PO      Take 1 tablet by mouth daily.    Low back pain, S/P lumbar discectomy       sildenafil 100 MG cap/tab    VIAGRA    6 tablet    Take 0.5-1 tablets ( mg) by mouth daily as needed  for erectile dysfunction Take 30 min to 4 hours before intercourse.  Never use with nitroglycerin, terazosin or doxazosin.    Vasculogenic erectile dysfunction, unspecified vasculogenic erectile dysfunction type

## 2017-07-11 ENCOUNTER — RADIANT APPOINTMENT (OUTPATIENT)
Dept: GENERAL RADIOLOGY | Facility: CLINIC | Age: 57
End: 2017-07-11
Attending: ORTHOPAEDIC SURGERY
Payer: COMMERCIAL

## 2017-07-11 ENCOUNTER — OFFICE VISIT (OUTPATIENT)
Dept: ORTHOPEDICS | Facility: CLINIC | Age: 57
End: 2017-07-11
Payer: COMMERCIAL

## 2017-07-11 VITALS — TEMPERATURE: 98.3 F | BODY MASS INDEX: 34.4 KG/M2 | HEIGHT: 68 IN | WEIGHT: 227 LBS

## 2017-07-11 DIAGNOSIS — M17.12 ARTHRITIS OF KNEE, LEFT: ICD-10-CM

## 2017-07-11 DIAGNOSIS — M25.562 LEFT KNEE PAIN, UNSPECIFIED CHRONICITY: Primary | ICD-10-CM

## 2017-07-11 DIAGNOSIS — M25.562 LEFT KNEE PAIN, UNSPECIFIED CHRONICITY: ICD-10-CM

## 2017-07-11 PROCEDURE — 20610 DRAIN/INJ JOINT/BURSA W/O US: CPT | Mod: LT | Performed by: ORTHOPAEDIC SURGERY

## 2017-07-11 PROCEDURE — 73564 X-RAY EXAM KNEE 4 OR MORE: CPT | Mod: TC

## 2017-07-11 PROCEDURE — 99214 OFFICE O/P EST MOD 30 MIN: CPT | Mod: 25 | Performed by: ORTHOPAEDIC SURGERY

## 2017-07-11 RX ORDER — BETAMETHASONE SODIUM PHOSPHATE AND BETAMETHASONE ACETATE 3; 3 MG/ML; MG/ML
12 INJECTION, SUSPENSION INTRA-ARTICULAR; INTRALESIONAL; INTRAMUSCULAR; SOFT TISSUE ONCE
Qty: 2 ML | Refills: 0 | COMMUNITY
Start: 2017-07-11 | End: 2017-10-27

## 2017-07-11 ASSESSMENT — PAIN SCALES - GENERAL: PAINLEVEL: NO PAIN (0)

## 2017-07-11 NOTE — NURSING NOTE
"Chief Complaint   Patient presents with     RECHECK     NEW ISSUE: Left knee pain.  Onset 10/2016.  Fell during deer hunting.         Initial Temp 98.3  F (36.8  C) (Temporal)  Ht 1.727 m (5' 8\")  Wt 103 kg (227 lb)  BMI 34.52 kg/m2 Estimated body mass index is 34.52 kg/(m^2) as calculated from the following:    Height as of this encounter: 1.727 m (5' 8\").    Weight as of this encounter: 103 kg (227 lb).  Medication Reconciliation: complete   JO De La Paz  "

## 2017-07-11 NOTE — MR AVS SNAPSHOT
"              After Visit Summary   7/11/2017    Wenceslao Vasquez    MRN: [id number]           Patient Information     Date Of Birth          1960        Visit Information        Provider Department      7/11/2017 1:50 PM Morris Abebe MD [facility]        Today's Diagnoses     Left knee pain, unspecified chronicity    -  1    Arthritis of knee, left           Follow-ups after your visit        Who to contact     If you have questions or need follow up information about today's clinic visit or your schedule please contact [facility] directly at [phone].  Normal or non-critical lab and imaging results will be communicated to you by [employer]hart, letter or phone within 4 business days after the clinic has received the results. If you do not hear from us within 7 days, please contact the clinic through [employer] or phone. If you have a critical or abnormal lab result, we will notify you by phone as soon as possible.  Submit refill requests through iFood or call your pharmacy and they will forward the refill request to us. Please allow 3 business days for your refill to be completed.          Additional Information About Your Visit        MyChart Information     iFood gives you secure access to your electronic health record. If you see a primary care provider, you can also send messages to your care team and make appointments. If you have questions, please call your primary care clinic.  If you do not have a primary care provider, please call [phone] and they will assist you.        Care EveryWhere ID     This is your Care EveryWhere ID. This could be used by other organizations to access your [place] medical records  [id number]        Your Vitals Were     Temperature Height BMI (Body Mass Index)             98.3  F (36.8  C) (Temporal) 1.727 m (5' 8\") 34.52 kg/m2          Blood Pressure from Last 3 Encounters:   07/03/17 136/84   03/22/17 128/80   01/11/17 136/84    " Weight from Last 3 Encounters:   07/11/17 103 kg (227 lb)   07/03/17 103 kg (227 lb 1.6 oz)   03/22/17 105.2 kg (231 lb 14.4 oz)              We Performed the Following     HC ARTHROCENTESIS ASPIR&/INJ MAJOR JT/BURSA W/US        Primary Care Provider Office Phone # Fax #    Christopher Arreola [phone]       [facility] [address] [facility] [address]        Equal Access to Services     BAUTISTA LU : Hadii aad ku hadasho Soomaali, waaxda luqadaha, qaybta kaalmada adeegyada, waxay idiin hayduken lin moseley. So [facility] [phone].    ATENCIÓN: Si habla español, tiene a copeland disposición servicios gratuitos de asistencia lingüística. Llame al [phone].    We comply with applicable federal civil rights laws and [place] laws. We do not discriminate on the basis of race, color, national origin, age, disability sex, sexual orientation or gender identity.            Thank you!     Thank you for choosing [facility]  for your care. Our goal is always to provide you with excellent care. Hearing back from our patients is one way we can continue to improve our services. Please take a few minutes to complete the written survey that you may receive in the mail after your visit with us. Thank you!             Your Updated Medication List - Protect others around you: Learn how to safely use, store and throw away your medicines at www.disposemymeds.org.          This list is accurate as of: 7/11/17  2:39 PM.  Always use your most recent med list.                   Brand Name Dispense Instructions for use Diagnosis    aspirin 81 MG tablet      Take 1 tablet by mouth daily.    Low back pain, S/P lumbar discectomy       MULTI VITAMIN MENS PO      Take 1 tablet by mouth daily.    Low back pain, S/P lumbar discectomy       sildenafil 100 MG cap/tab    VIAGRA    6 tablet    Take 0.5-1 tablets ( mg) by mouth daily as needed for erectile dysfunction Take 30 min to 4 hours before  intercourse.  Never use with nitroglycerin, terazosin or doxazosin.    Vasculogenic erectile dysfunction, unspecified vasculogenic erectile dysfunction type
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
## 2017-09-26 ENCOUNTER — ALLIED HEALTH/NURSE VISIT (OUTPATIENT)
Dept: FAMILY MEDICINE | Facility: OTHER | Age: 57
End: 2017-09-26
Payer: COMMERCIAL

## 2017-09-26 DIAGNOSIS — Z23 NEED FOR PROPHYLACTIC VACCINATION AND INOCULATION AGAINST INFLUENZA: Primary | ICD-10-CM

## 2017-09-26 PROCEDURE — G0008 ADMIN INFLUENZA VIRUS VAC: HCPCS

## 2017-09-26 PROCEDURE — 99207 ZZC NO CHARGE NURSE ONLY: CPT

## 2017-09-26 PROCEDURE — 90686 IIV4 VACC NO PRSV 0.5 ML IM: CPT

## 2017-09-26 NOTE — MR AVS SNAPSHOT
After Visit Summary   9/26/2017    Wenceslao Vasquez    MRN: [id number]           Patient Information     Date Of Birth          1960        Visit Information        Provider Department      9/26/2017 10:45 AM ASHLEY SINGH NURSE, [facility]        Today's Diagnoses     Need for prophylactic vaccination and inoculation against influenza    -  1       Follow-ups after your visit        Who to contact     If you have questions or need follow up information about today's clinic visit or your schedule please contact [facility] directly at [phone].  Normal or non-critical lab and imaging results will be communicated to you by Projectioneeringhart, letter or phone within 4 business days after the clinic has received the results. If you do not hear from us within 7 days, please contact the clinic through burrp!t or phone. If you have a critical or abnormal lab result, we will notify you by phone as soon as possible.  Submit refill requests through [employer] or call your pharmacy and they will forward the refill request to us. Please allow 3 business days for your refill to be completed.          Additional Information About Your Visit        MyChart Information     [employer] gives you secure access to your electronic health record. If you see a primary care provider, you can also send messages to your care team and make appointments. If you have questions, please call your primary care clinic.  If you do not have a primary care provider, please call [phone] and they will assist you.        Care EveryWhere ID     This is your Care EveryWhere ID. This could be used by other organizations to access your [facility] medical records  [id number]         Blood Pressure from Last 3 Encounters:   07/03/17 136/84   03/22/17 128/80   01/11/17 136/84    Weight from Last 3 Encounters:   07/11/17 227 lb (103 kg)   07/03/17 227 lb 1.6 oz (103 kg)   03/22/17 231 lb 14.4 oz (105.2 kg)              We  Performed the Following     ADMIN INFLUENZA (For MEDICARE Patients ONLY) []     FLU VAC, SPLIT VIRUS IM > 3 YO (QUADRIVALENT) [92955]        Primary Care Provider Office Phone # Fax #    Christopher Arreola [phone]       [address] [facility] [address]        Equal Access to Services     BAUTISTA LU : Hadii aad ku hadasho Soomaali, waaxda luqadaha, qaybta kaalmada adecorinneyada, waxxin mullinsjuanaann moseley. So [facility] [phone].    ATENCIÓN: Si habla español, tiene a copeland disposición servicios gratuitos de asistencia lingüística. Llame al [phone].    We comply with applicable federal civil rights laws and [place] laws. We do not discriminate on the basis of race, color, national origin, age, disability sex, sexual orientation or gender identity.            Thank you!     Thank you for choosing [facility]  for your care. Our goal is always to provide you with excellent care. Hearing back from our patients is one way we can continue to improve our services. Please take a few minutes to complete the written survey that you may receive in the mail after your visit with us. Thank you!             Your Updated Medication List - Protect others around you: Learn how to safely use, store and throw away your medicines at www.disposemymeds.org.          This list is accurate as of: 9/26/17 10:56 AM.  Always use your most recent med list.                   Brand Name Dispense Instructions for use Diagnosis    aspirin 81 MG tablet      Take 1 tablet by mouth daily.    Low back pain, S/P lumbar discectomy       betamethasone acet & sod phos 6 (3-3) MG/ML Susp injection    CELESTONE    2 mL    2 mLs (12 mg) by INTRA-ARTICULAR route once    Left knee pain, unspecified chronicity, Arthritis of knee, left       MULTI VITAMIN MENS PO      Take 1 tablet by mouth daily.    Low back pain, S/P lumbar discectomy       sildenafil 100 MG tablet    VIAGRA    6 tablet    Take 0.5-1 tablets  ( mg) by mouth daily as needed for erectile dysfunction Take 30 min to 4 hours before intercourse.  Never use with nitroglycerin, terazosin or doxazosin.    Vasculogenic erectile dysfunction, unspecified vasculogenic erectile dysfunction type

## 2017-09-26 NOTE — PROGRESS NOTES
Injectable Influenza Immunization Documentation    1.  Is the person to be vaccinated sick today?   No    2. Does the person to be vaccinated have an allergy to a component   of the vaccine?   No    3. Has the person to be vaccinated ever had a serious reaction   to influenza vaccine in the past?   No    4. Has the person to be vaccinated ever had Guillain-Barré syndrome?   No    Prior to injection verified patient identity using patient's name and date of birth.    Form completed by Ora Clark MA     9/26/2017

## 2017-10-27 ENCOUNTER — APPOINTMENT (OUTPATIENT)
Dept: GENERAL RADIOLOGY | Facility: CLINIC | Age: 57
End: 2017-10-27
Attending: FAMILY MEDICINE
Payer: COMMERCIAL

## 2017-10-27 ENCOUNTER — HOSPITAL ENCOUNTER (OUTPATIENT)
Facility: CLINIC | Age: 57
Setting detail: OBSERVATION
Discharge: HOME | End: 2017-10-28
Attending: FAMILY MEDICINE | Admitting: FAMILY MEDICINE
Payer: COMMERCIAL

## 2017-10-27 DIAGNOSIS — R07.9 CHEST PAIN, UNSPECIFIED TYPE: Primary | ICD-10-CM

## 2017-10-27 DIAGNOSIS — R55 SYNCOPE AND COLLAPSE: ICD-10-CM

## 2017-10-27 LAB
ALBUMIN SERPL-MCNC: 3.9 G/DL (ref 3.4–5)
ALP SERPL-CCNC: 86 U/L (ref 40–150)
ALT SERPL W P-5'-P-CCNC: 37 U/L (ref 0–70)
ANION GAP SERPL CALCULATED.3IONS-SCNC: 11 MMOL/L (ref 3–14)
APTT PPP: 31 SEC (ref 22–37)
AST SERPL W P-5'-P-CCNC: 18 U/L (ref 0–45)
BASOPHILS # BLD AUTO: 0 10E9/L (ref 0–0.2)
BASOPHILS NFR BLD AUTO: 0.2 %
BILIRUB SERPL-MCNC: 0.2 MG/DL (ref 0.2–1.3)
BUN SERPL-MCNC: 21 MG/DL (ref 7–30)
CALCIUM SERPL-MCNC: 8.5 MG/DL (ref 8.5–10.1)
CHLORIDE SERPL-SCNC: 107 MMOL/L (ref 94–109)
CO2 SERPL-SCNC: 23 MMOL/L (ref 20–32)
CREAT SERPL-MCNC: 0.74 MG/DL (ref 0.66–1.25)
D DIMER PPP FEU-MCNC: 0.3 UG/ML FEU (ref 0–0.5)
DIFFERENTIAL METHOD BLD: ABNORMAL
EOSINOPHIL # BLD AUTO: 0.1 10E9/L (ref 0–0.7)
EOSINOPHIL NFR BLD AUTO: 1.6 %
ERYTHROCYTE [DISTWIDTH] IN BLOOD BY AUTOMATED COUNT: 13.1 % (ref 10–15)
GFR SERPL CREATININE-BSD FRML MDRD: >90 ML/MIN/1.7M2
GLUCOSE BLDC GLUCOMTR-MCNC: 135 MG/DL (ref 70–99)
GLUCOSE SERPL-MCNC: 138 MG/DL (ref 70–99)
HCT VFR BLD AUTO: 41.6 % (ref 40–53)
HGB BLD-MCNC: 13.7 G/DL (ref 13.3–17.7)
IMM GRANULOCYTES # BLD: 0 10E9/L (ref 0–0.4)
IMM GRANULOCYTES NFR BLD: 0 %
INR PPP: 1 (ref 0.86–1.14)
LYMPHOCYTES # BLD AUTO: 1.6 10E9/L (ref 0.8–5.3)
LYMPHOCYTES NFR BLD AUTO: 31.6 %
MCH RBC QN AUTO: 33.3 PG (ref 26.5–33)
MCHC RBC AUTO-ENTMCNC: 32.9 G/DL (ref 31.5–36.5)
MCV RBC AUTO: 101 FL (ref 78–100)
MONOCYTES # BLD AUTO: 0.4 10E9/L (ref 0–1.3)
MONOCYTES NFR BLD AUTO: 7.7 %
NEUTROPHILS # BLD AUTO: 2.9 10E9/L (ref 1.6–8.3)
NEUTROPHILS NFR BLD AUTO: 58.9 %
PLATELET # BLD AUTO: 174 10E9/L (ref 150–450)
POTASSIUM SERPL-SCNC: 3.8 MMOL/L (ref 3.4–5.3)
PROT SERPL-MCNC: 7.3 G/DL (ref 6.8–8.8)
RBC # BLD AUTO: 4.12 10E12/L (ref 4.4–5.9)
SODIUM SERPL-SCNC: 141 MMOL/L (ref 133–144)
TROPONIN I SERPL-MCNC: <0.015 UG/L (ref 0–0.04)
TROPONIN I SERPL-MCNC: <0.015 UG/L (ref 0–0.04)
WBC # BLD AUTO: 4.9 10E9/L (ref 4–11)

## 2017-10-27 PROCEDURE — 00000146 ZZHCL STATISTIC GLUCOSE BY METER IP

## 2017-10-27 PROCEDURE — 85379 FIBRIN DEGRADATION QUANT: CPT | Performed by: FAMILY MEDICINE

## 2017-10-27 PROCEDURE — 84484 ASSAY OF TROPONIN QUANT: CPT | Performed by: FAMILY MEDICINE

## 2017-10-27 PROCEDURE — 93010 ELECTROCARDIOGRAM REPORT: CPT | Mod: Z6 | Performed by: FAMILY MEDICINE

## 2017-10-27 PROCEDURE — 99285 EMERGENCY DEPT VISIT HI MDM: CPT | Mod: 25

## 2017-10-27 PROCEDURE — 99285 EMERGENCY DEPT VISIT HI MDM: CPT | Mod: 25 | Performed by: FAMILY MEDICINE

## 2017-10-27 PROCEDURE — 25000132 ZZH RX MED GY IP 250 OP 250 PS 637: Performed by: FAMILY MEDICINE

## 2017-10-27 PROCEDURE — 99207 ZZC CDG-MDM COMPONENT: MEETS MODERATE - UP CODED: CPT | Performed by: FAMILY MEDICINE

## 2017-10-27 PROCEDURE — 96360 HYDRATION IV INFUSION INIT: CPT

## 2017-10-27 PROCEDURE — 93005 ELECTROCARDIOGRAM TRACING: CPT

## 2017-10-27 PROCEDURE — 71010 XR CHEST PORT 1 VW: CPT | Mod: TC

## 2017-10-27 PROCEDURE — 25000128 H RX IP 250 OP 636: Performed by: FAMILY MEDICINE

## 2017-10-27 PROCEDURE — 85730 THROMBOPLASTIN TIME PARTIAL: CPT | Performed by: FAMILY MEDICINE

## 2017-10-27 PROCEDURE — 99220 ZZC INITIAL OBSERVATION CARE,LEVL III: CPT | Performed by: FAMILY MEDICINE

## 2017-10-27 PROCEDURE — 85025 COMPLETE CBC W/AUTO DIFF WBC: CPT | Performed by: FAMILY MEDICINE

## 2017-10-27 PROCEDURE — 80053 COMPREHEN METABOLIC PANEL: CPT | Performed by: FAMILY MEDICINE

## 2017-10-27 PROCEDURE — 85610 PROTHROMBIN TIME: CPT | Performed by: FAMILY MEDICINE

## 2017-10-27 RX ORDER — ASPIRIN 81 MG/1
324 TABLET, CHEWABLE ORAL ONCE
Status: COMPLETED | OUTPATIENT
Start: 2017-10-27 | End: 2017-10-27

## 2017-10-27 RX ORDER — SODIUM CHLORIDE 9 MG/ML
1000 INJECTION, SOLUTION INTRAVENOUS CONTINUOUS
Status: DISCONTINUED | OUTPATIENT
Start: 2017-10-27 | End: 2017-10-28

## 2017-10-27 RX ADMIN — ASPIRIN 81 MG 324 MG: 81 TABLET ORAL at 20:07

## 2017-10-27 RX ADMIN — SODIUM CHLORIDE 1000 ML: 9 INJECTION, SOLUTION INTRAVENOUS at 20:07

## 2017-10-27 ASSESSMENT — ENCOUNTER SYMPTOMS
SHORTNESS OF BREATH: 1
DIZZINESS: 1

## 2017-10-27 NOTE — IP AVS SNAPSHOT
[address] [facility]    [address] [facility]     [place] [address]    Phone:  [phone]                                       After Visit Summary   10/27/2017    Wenceslao Vasquez    MRN: [id number]           After Visit Summary Signature Page     I have received my discharge instructions, and my questions have been answered. I have discussed any challenges I see with this plan with the nurse or doctor.    ..........................................................................................................................................  Patient/Patient Representative Signature      ..........................................................................................................................................  Patient Representative Print Name and Relationship to Patient    ..................................................               ................................................  Date                                            Time    ..........................................................................................................................................  Reviewed by Signature/Title    ...................................................              ..............................................  Date                                                            Time

## 2017-10-27 NOTE — IP AVS SNAPSHOT
MRN:[id number]                      After Visit Summary   10/27/2017    Wenceslao Vasquez    MRN: [id number]           Thank you!     Thank you for choosing [facility] for your care. Our goal is always to provide you with excellent care. Hearing back from our patients is one way we can continue to improve our services. Please take a few minutes to complete the written survey that you may receive in the mail after you visit with us. Thank you!        Patient Information     Date Of Birth          1960        About your hospital stay     You were admitted on:  October 28, 2017 You last received care in the:  [address] [facility]    You were discharged on:  October 28, 2017        Reason for your hospital stay       Active Problems:    Syncope    Exertional chest pain                    Who to Call     For medical emergencies, please call 911.  For non-urgent questions about your medical care, please call your primary care provider or clinic, [phone]          Attending Provider     Provider Specialty    Carlito Gallagher MD Emergency Medicine    [place], Devonte Larry MD Family Practice       Primary Care Provider Office Phone # Fax #    Christopher Arreola [phone]      After Care Instructions     Activity       Your activity upon discharge: activity as tolerated            Diet       Follow this diet upon discharge: Orders Placed This Encounter      Combination Diet Regular Diet Adult; No Caffeine Diet                    Follow-up Appointments     Follow-up and recommended labs and tests       Follow up with primary care provider, Christopher Arreola, within 2-3 wks, for hospital follow- up. The following labs/tests are recommended: review holter results.                  Future tests that were ordered for you     Zio Patch Holter                 Further instructions from your care team       Call [phone] on Monday, October 30th to be scheduled for the  "cardiac (or Holter) monitor.     Unable to make an appointment with Dr. Arreola today. Somebody from his office will call you Monday to set up an appointment.     Pending Results     No orders found for last 3 day(s).            Statement of Approval     Ordered          10/28/17 1204  I have reviewed and agree with all the recommendations and orders detailed in this document.  EFFECTIVE NOW     Approved and electronically signed by:  Bryant Olmstead MD             Admission Information     Date & Time Provider Department Dept. Phone    10/27/2017 Devonte Hanley MD [address] [facility] [phone]      Your Vitals Were     Blood Pressure Pulse Temperature Respirations Height Weight    143/74 71 98  F (36.7  C) (Oral) 20 1.727 m (5' 8\") 100.6 kg (221 lb 12.5 oz)    Pulse Oximetry BMI (Body Mass Index)                95% 33.72 kg/m2          MyChart Information     [employer] gives you secure access to your electronic health record. If you see a primary care provider, you can also send messages to your care team and make appointments. If you have questions, please call your primary care clinic.  If you do not have a primary care provider, please call [phone] and they will assist you.        Care EveryWhere ID     This is your Care EveryWhere ID. This could be used by other organizations to access your [facility] medical records  [id number]        Equal Access to Services     BAUTISTA LU : Hadii ab coles Soandrew, waaxda luqadaha, qaybta kaalmada jimmy, srini moseley. So [facility] [phone].    ATENCIÓN: Si habla español, tiene a copeland disposición servicios gratuitos de asistencia lingüística. Llame al [phone].    We comply with applicable federal civil rights laws and [place] laws. We do not discriminate on the basis of race, color, national origin, age, disability, sex, sexual orientation, or gender identity.               Review of your " medicines      CONTINUE these medicines which have NOT CHANGED        Dose / Directions    MULTI VITAMIN MENS PO   Used for:  Low back pain, S/P lumbar discectomy        Dose:  1 tablet   Take 1 tablet by mouth daily.   Refills:  0                Protect others around you: Learn how to safely use, store and throw away your medicines at www.disposemymeds.org.             Medication List: This is a list of all your medications and when to take them. Check marks below indicate your daily home schedule. Keep this list as a reference.      Medications           Morning Afternoon Evening Bedtime As Needed    MULTI VITAMIN MENS PO   Take 1 tablet by mouth daily.   Next Dose Due:  None given in hospital, return to home routine.

## 2017-10-28 VITALS
HEIGHT: 68 IN | OXYGEN SATURATION: 95 % | DIASTOLIC BLOOD PRESSURE: 74 MMHG | HEART RATE: 71 BPM | TEMPERATURE: 98 F | RESPIRATION RATE: 20 BRPM | BODY MASS INDEX: 33.61 KG/M2 | WEIGHT: 221.78 LBS | SYSTOLIC BLOOD PRESSURE: 143 MMHG

## 2017-10-28 LAB
TROPONIN I SERPL-MCNC: <0.015 UG/L (ref 0–0.04)
TROPONIN I SERPL-MCNC: <0.015 UG/L (ref 0–0.04)

## 2017-10-28 PROCEDURE — 36415 COLL VENOUS BLD VENIPUNCTURE: CPT | Performed by: FAMILY MEDICINE

## 2017-10-28 PROCEDURE — 25000132 ZZH RX MED GY IP 250 OP 250 PS 637: Performed by: FAMILY MEDICINE

## 2017-10-28 PROCEDURE — G0378 HOSPITAL OBSERVATION PER HR: HCPCS

## 2017-10-28 PROCEDURE — 99217 ZZC OBSERVATION CARE DISCHARGE: CPT | Performed by: FAMILY MEDICINE

## 2017-10-28 PROCEDURE — 84484 ASSAY OF TROPONIN QUANT: CPT | Performed by: FAMILY MEDICINE

## 2017-10-28 RX ORDER — NITROGLYCERIN 0.4 MG/1
0.4 TABLET SUBLINGUAL EVERY 5 MIN PRN
Status: DISCONTINUED | OUTPATIENT
Start: 2017-10-28 | End: 2017-10-28 | Stop reason: HOSPADM

## 2017-10-28 RX ORDER — NALOXONE HYDROCHLORIDE 0.4 MG/ML
.1-.4 INJECTION, SOLUTION INTRAMUSCULAR; INTRAVENOUS; SUBCUTANEOUS
Status: DISCONTINUED | OUTPATIENT
Start: 2017-10-28 | End: 2017-10-28 | Stop reason: HOSPADM

## 2017-10-28 RX ORDER — ACETAMINOPHEN 325 MG/1
650 TABLET ORAL EVERY 4 HOURS PRN
Status: DISCONTINUED | OUTPATIENT
Start: 2017-10-28 | End: 2017-10-28 | Stop reason: HOSPADM

## 2017-10-28 RX ORDER — LIDOCAINE 40 MG/G
CREAM TOPICAL
Status: DISCONTINUED | OUTPATIENT
Start: 2017-10-28 | End: 2017-10-28 | Stop reason: HOSPADM

## 2017-10-28 RX ORDER — ASPIRIN 81 MG/1
81 TABLET ORAL DAILY
Status: DISCONTINUED | OUTPATIENT
Start: 2017-10-28 | End: 2017-10-28 | Stop reason: HOSPADM

## 2017-10-28 RX ORDER — ACETAMINOPHEN 650 MG/1
650 SUPPOSITORY RECTAL EVERY 4 HOURS PRN
Status: DISCONTINUED | OUTPATIENT
Start: 2017-10-28 | End: 2017-10-28 | Stop reason: HOSPADM

## 2017-10-28 RX ORDER — ALUMINA, MAGNESIA, AND SIMETHICONE 2400; 2400; 240 MG/30ML; MG/30ML; MG/30ML
15-30 SUSPENSION ORAL EVERY 4 HOURS PRN
Status: DISCONTINUED | OUTPATIENT
Start: 2017-10-28 | End: 2017-10-28 | Stop reason: HOSPADM

## 2017-10-28 RX ADMIN — ALUMINUM HYDROXIDE, MAGNESIUM HYDROXIDE, AND DIMETHICONE 30 ML: 400; 400; 40 SUSPENSION ORAL at 09:09

## 2017-10-28 RX ADMIN — ASPIRIN 81 MG: 81 TABLET, COATED ORAL at 09:09

## 2017-10-28 NOTE — PROGRESS NOTES
S-(situation): Patient discharged to home via wheelchair with wife    B-(background): Observation goals met yes    A-(assessment): patient ambulated in halls with stand-by assist and tolerated well. Has had no further syncope symptoms. Reported some left chest pain this morning after eating breakfast. Slightly improved after taking Mylanta. EKG unchanged. Did not worsen with activity. Telemetry has read sinus rhythm in the 60-70's.     R-(recommendations): Discharge instructions reviewed with patient. Listed belongings gathered and returned to patient.yes  Patient Education resolved: Yes  New medications-Pt. Has been educated about reason of use and side effects NA  Home and hospital acquired medications returned to patient NA  Medication Bin checked and emptied on discharge Yes

## 2017-10-28 NOTE — ED NOTES
Passed out after walking from garage, states he was feeling dizzy.  Once in the house he became diaphoretic and fell face first into the table.

## 2017-10-28 NOTE — ED NOTES
ED Nursing criteria listed below was addressed during verbal handoff:     Abnormal vitals: Yes  Abnormal results: Yes  Med Reconciliation completed: Yes  Meds given in ED: Yes  Any Overdue Meds: No  Core Measures: No  Isolation: No  Special needs: Yes  Skin assessment: Yes    Observation Patient  Education provided: Yes

## 2017-10-28 NOTE — PLAN OF CARE
Problem: Patient Care Overview  Goal: Plan of Care/Patient Progress Review  Patient has not had any dizziness or chest pain since admission. Blood pressure elevated (179/95) but other vitals stable. Troponins all negative. Telemetry has been SB with rates in the upper 40's-low 50's during the night and 60's to 70's while awake.

## 2017-10-28 NOTE — DISCHARGE INSTRUCTIONS
Call [phone] on Monday, October 30th to be scheduled for the cardiac (or Holter) monitor.     Unable to make an appointment with Dr. Arreola today. Somebody from his office will call you Monday to set up an appointment.

## 2017-10-28 NOTE — PROGRESS NOTES
S-(situation): Patient registered to Observation. Patient arrived to room 268 via cart from ED    B-(background): Syncopal episode at home resulting in fall without injury    A-(assessment): Pt is alert and oriented. Slow to respond to questions but answers appropriately. Denies any feelings of dizziness while ambulating. Denies chest pain. Tele applied. SB with rates in the low 50's.     R-(recommendations): Orders and observation goals reviewed with patient
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
## 2017-10-28 NOTE — DISCHARGE SUMMARY
[facility]    Discharge Summary  Hospitalist    Date of Admission:  10/27/2017  Date of Discharge:  10/28/2017  Discharging Provider: Bryant Olmstead      Discharge Diagnoses     Syncope    Exertional chest pain    * No resolved hospital problems. *      History of Present Illness   Wenceslao Vasquez is an 57 year old male  who presented with:    Copied from admission H&P:   Wenceslao Vasquez is a 57 year old male who presents with a syncopal episode that occurred at home. His walking into the garage when he felt shaky , broken out in a sweat  and felt somewhat dizzy. He said in a chair and passed out. Family bound him somewhere between 2 to 10 minutes later and he was diaphoretic. At that time denied being nauseated or vomiting. Family described him as being quite pale and fed him some food and brought him to the ER. He denied any chest pressure, chest pain. Although does have a history over the past months of having some exertional chest tightness which relieves with rest. He said no prior history of heart problems, diabetes, seizures. Evaluation in the emergency department is showing normal vital signs, normal lab work with no acute changes on his EKG and chest x-ray and d-dimer being negative. This point is not clear what caused the single episode with the differential being quite large. This would include possibly a vagel episode, seizure, tachy arrhythmia, hypoglycemia. With the episodes of exertional chest discomfort consider also some type of cardiac event although EKG and initial troponin state are normal. Patient will be registered observation to continue monitoring on telemetry. Will complete serial troponin studies. If negative, can likely go home with outpatient stress testing.    Hospital Course   Wenceslao Vasquez was admitted on 10/27/2017. He was monitored by telemetry which was unremarkable. He had no recurrence of his symptoms. Troponins negative. After listening to his story I  "think it consistent with vasovagal syncope. He had a very thorough evaluation the ED which included chest x-ray, electrolytes, kidney function, d-dimer, troponins, EKG. We discussed the benign nature of this. May been triggered by a missed meal. I would also like to evaluate for arrhythmia, and I ordered a 14 day monitor to be set up early next week and then follow up with his primary to review results. That said, I think it much less likely. Certainly has symptoms of diaphoresis, nausea, pale in appearance, preceding a syncopal event is consistent with vasovagal. I will leave it to his primary to decide if he would like to repeat the stress testing, but at this time I don't see that as necessary.    Bryant Olmstead    Significant Results and Procedures   No procedures performed during this admission    Pending Results     Unresulted Labs Ordered in the Past 30 Days of this Admission     No orders found for last 61 day(s).             Primary Care Physician   Christopher Arreola    Physical Exam   221 lbs 12.52 oz  /74  Pulse 71  Temp 98  F (36.7  C) (Oral)  Resp 20  Ht 1.727 m (5' 8\")  Wt 100.6 kg (221 lb 12.5 oz)  SpO2 95%  BMI 33.72 kg/m2    Well-appearing. Heart regular. Lungs clear and unlabored. Extremities without edema. Neurologically intact.    Discharge Disposition   Discharged to home  Condition at discharge: Stable    Consultations This Hospital Stay   None       Time Spent on this Encounter   I, Bryant Olmstead, personally saw the patient today and spent less than or equal to 30 minutes discharging this patient.    Discharge Orders     Zio Patch Holter     Reason for your hospital stay   Active Problems:   Syncope   Exertional chest pain       Follow-up and recommended labs and tests   Follow up with primary care provider, Christopher Arreola, within 2-3 wks, for hospital follow- up. The following labs/tests are recommended: review holter results.     Activity   Your activity upon discharge: activity " as tolerated     Diet   Follow this diet upon discharge: Orders Placed This Encounter     Combination Diet Regular Diet Adult; No Caffeine Diet         Discharge Medications   Current Discharge Medication List      CONTINUE these medications which have NOT CHANGED    Details   Multiple Vitamin (MULTI VITAMIN MENS PO) Take 1 tablet by mouth daily.    Associated Diagnoses: Low back pain; S/P lumbar discectomy           Allergies   Allergies   Allergen Reactions     Dust Mites Hives     Morphine Nausea and Vomiting     Data   Most Recent 3 CBC's:  Recent Labs   Lab Test  10/27/17   1950  03/25/16   1405  09/13/13   1417   WBC  4.9  7.7  9.5   HGB  13.7  14.9  13.4   MCV  101*  100  101*   PLT  174  180  196      Most Recent 3 BMP's:  Recent Labs   Lab Test  10/27/17   1950  07/03/17   0949  03/25/16   1405   NA  141  140  140   POTASSIUM  3.8  3.9  3.9   CHLORIDE  107  107  107   CO2  23  29  23   BUN  21  19  20   CR  0.74  0.74  0.75   ANIONGAP  11  4  10   BETY  8.5  8.3*  9.0   GLC  138*  99  126*     Most Recent 2 LFT's:  Recent Labs   Lab Test  10/27/17   1950  02/22/13   1935   AST  18  34   ALT  37  45   ALKPHOS  86  54   BILITOTAL  0.2  0.5     Most Recent INR's and Anticoagulation Dosing History:  Anticoagulation Dose History     Recent Dosing and Labs Latest Ref Rng & Units 8/5/2013 10/27/2017    INR 0.86 - 1.14 1.07 1.00        Most Recent 3 Troponin's:  Recent Labs   Lab Test  10/28/17   0600  10/28/17   0200  10/27/17   2208   TROPI  <0.015  <0.015  <0.015     Most Recent Cholesterol Panel:  Recent Labs   Lab Test  01/11/17   0855   CHOL  173   LDL  100*   HDL  36*   TRIG  185*     Most Recent 6 Bacteria Isolates From Any Culture (See EPIC Reports for Culture Details):  Recent Labs   Lab Test  09/02/13   0815  09/02/13   0600  09/02/13   0550   CULT  Normal janet  No growth after 6 days  No growth after 6 days     Most Recent TSH, T4 and A1c Labs:  Recent Labs   Lab Test  05/12/10   0946   TSH  0.71

## 2017-10-31 ENCOUNTER — HOSPITAL ENCOUNTER (OUTPATIENT)
Dept: CARDIOLOGY | Facility: CLINIC | Age: 57
Discharge: HOME | End: 2017-10-31
Attending: FAMILY MEDICINE | Admitting: FAMILY MEDICINE
Payer: COMMERCIAL

## 2017-10-31 DIAGNOSIS — R55 SYNCOPE AND COLLAPSE: ICD-10-CM

## 2017-10-31 PROCEDURE — 0298T ZZC EXT ECG > 48HR TO 21 DAY REVIEW AND INTERPRETATN: CPT | Performed by: INTERNAL MEDICINE

## 2017-10-31 PROCEDURE — 0296T ZIO PATCH HOLTER: CPT | Performed by: FAMILY MEDICINE

## 2017-11-09 ENCOUNTER — OFFICE VISIT (OUTPATIENT)
Dept: FAMILY MEDICINE | Facility: OTHER | Age: 57
End: 2017-11-09
Payer: COMMERCIAL

## 2017-11-09 VITALS
BODY MASS INDEX: 35.25 KG/M2 | TEMPERATURE: 98.6 F | WEIGHT: 231.8 LBS | DIASTOLIC BLOOD PRESSURE: 84 MMHG | HEART RATE: 88 BPM | RESPIRATION RATE: 20 BRPM | SYSTOLIC BLOOD PRESSURE: 143 MMHG | OXYGEN SATURATION: 97 %

## 2017-11-09 DIAGNOSIS — R55 SYNCOPE, UNSPECIFIED SYNCOPE TYPE: Primary | ICD-10-CM

## 2017-11-09 LAB — TSH SERPL DL<=0.005 MIU/L-ACNC: 0.7 MU/L (ref 0.4–4)

## 2017-11-09 PROCEDURE — 36415 COLL VENOUS BLD VENIPUNCTURE: CPT | Performed by: FAMILY MEDICINE

## 2017-11-09 PROCEDURE — 84443 ASSAY THYROID STIM HORMONE: CPT | Performed by: FAMILY MEDICINE

## 2017-11-09 PROCEDURE — 99214 OFFICE O/P EST MOD 30 MIN: CPT | Performed by: FAMILY MEDICINE

## 2017-11-09 ASSESSMENT — PAIN SCALES - GENERAL: PAINLEVEL: NO PAIN (0)

## 2017-11-09 NOTE — NURSING NOTE
"Chief Complaint   Patient presents with     RECHECK       Initial /84 (BP Location: Right arm, Patient Position: Chair, Cuff Size: Adult Large)  Pulse 88  Temp 98.6  F (37  C) (Temporal)  Resp 20  Wt 231 lb 12.8 oz (105.1 kg)  SpO2 97%  BMI 35.25 kg/m2 Estimated body mass index is 35.25 kg/(m^2) as calculated from the following:    Height as of 10/28/17: 5' 8\" (1.727 m).    Weight as of this encounter: 231 lb 12.8 oz (105.1 kg).  Medication Reconciliation: complete     Jalyn Mckinney MA 11/9/2017  4:31 PM          "

## 2017-11-09 NOTE — PROGRESS NOTES
SUBJECTIVE:   Wenceslao Vasquez is a 57 year old male who presents to clinic today for the following health issues:            Hospital Follow-up Visit:    Hospital/Nursing Home/IP Rehab Facility: [facility]  Date of Admission: 10/27/2017  Date of Discharge: 10/28/2017  Reason(s) for Admission: Syncope            Problems taking medications regularly:  None       Medication changes since discharge: None       Problems adhering to non-medication therapy:  None    Summary of hospitalization:  [facility] discharge summary reviewed  Diagnostic Tests/Treatments reviewed.  Follow up needed: Currently wearing Zio Patch for another week.  Other Healthcare Providers Involved in Patient s Care:         None  Update since discharge: stable. No recurrent syncopal episode    Post Discharge Medication Reconciliation: discharge medications reconciled, continue medications without change.  Plan of care communicated with patient            Problem list and histories reviewed & adjusted, as indicated.  Additional history: as documented    Patient Active Problem List   Diagnosis     CHELSY (obstructive sleep apnea)     Hyperlipidemia LDL goal <130     Sciatica     Low back pain     Stenosis of lumbosacral spine     Advanced directives, counseling/discussion     Hypertension goal BP (blood pressure) < 140/90     Spinal stenosis in cervical region     Cervical spondylosis without myelopathy     Pneumonia     Morbid obesity due to excess calories (H)     Syncope     Exertional chest pain     Past Surgical History:   Procedure Laterality Date     C NONSPECIFIC PROCEDURE      lumbar disc surgery     C NONSPECIFIC PROCEDURE      hammer toe surgery right foot     C NONSPECIFIC PROCEDURE      nasal surgery     CERVICAL DISKECTOMY  8/8/13    [facility]     COLONOSCOPY  06/03/09     DIL URETHRA STRIC,MALE,SUBSEQ       HC REMOVAL HEEL SPUR, CALCANEUS  2/2005     HC REVISE MEDIAN N/CARPAL TUNNEL SURG  1993    right      LAPAROSCOPIC CHOLECYSTECTOMY  3/11/2013    Procedure: LAPAROSCOPIC CHOLECYSTECTOMY;  laparoscopic cholecystectomy;  Surgeon: Clinton Whittaker MD;  Location: [facility] OR       Social History   Substance Use Topics     Smoking status: Never Smoker     Smokeless tobacco: Never Used     Alcohol use Yes      Comment: occasional     Family History   Problem Relation Age of Onset     Alcohol/Drug Brother      Prostate Cancer Brother      Arthritis Mother      CANCER Mother      Kidney - Stage 3     HEART DISEASE Maternal Grandmother      HEART DISEASE Paternal Grandmother      C.A.D. No family hx of      DIABETES No family hx of      Anesthesia Reaction No family hx of      Cancer - colorectal No family hx of      Colon Cancer No family hx of          Current Outpatient Prescriptions   Medication Sig Dispense Refill     Multiple Vitamin (MULTI VITAMIN MENS PO) Take 1 tablet by mouth daily.       Allergies   Allergen Reactions     Dust Mites Hives     Morphine Nausea and Vomiting     Recent Labs   Lab Test  10/27/17   1950  07/03/17   0949  01/11/17   0855  07/21/16   0822   05/18/15   1216   02/22/13   1935   12/10/10   1113  05/12/10   0946   LDL   --    --   100*  90   --   47   < >   --    < >   --   117   HDL   --    --   36*  36*   --   35*   < >   --    < >   --   33*   TRIG   --    --   185*  139   --   202*   < >   --    < >   --   148   ALT  37   --    --    --    --    --    --   45   --   33   --    CR  0.74  0.74   --    --    < >  1.03   < >  0.93   < >   --   0.86   GFRESTIMATED  >90  >90  Non African American GFR Calc     --    --    < >  75   < >  85   < >   --   >90   GFRESTBLACK  >90  >90  African American GFR Calc     --    --    < >  >90   GFR Calc     < >  >90   < >   --   >90   POTASSIUM  3.8  3.9   --    --    < >  3.8   < >  3.8   < >   --   4.2   TSH   --    --    --    --    --    --    --    --    --    --   0.71    < > = values in this interval not displayed.      BP Readings from  Last 3 Encounters:   17 143/84   10/28/17 143/74   17 136/84    Wt Readings from Last 3 Encounters:   17 231 lb 12.8 oz (105.1 kg)   10/28/17 221 lb 12.5 oz (100.6 kg)   17 227 lb (103 kg)                  Results for orders placed or performed during the hospital encounter of 10/31/17   Zio Patch Holter    Narrative    [facility]  [address] [place] [id number]  [phone]  10/31/2017      Patient:  Wenceslao Caceres  Chart: [id number]  :  1960  Age:  57 year old  Sex:  male       Procedure:  ZioPatch Monitor.        Technician performing hook-up:  Marquita Brennan           Prior NM Stress test 2014 10:44 AM ,WENCESLAO CACERES  53 years  Male. 1960        Indication/Clinical History: Chest pain     Impression     I    Exercise Test       1.  The patient exercised to a adequate cardiac workload with                  Average functional aerobic capacity demonstrated.       2. The patient experienced no chest pain during the test.       3. EKG report done under separate cover.     II    Myocardial Perfusion Scintigraphy  1.  Myocardial perfusion imaging using single isotope technique  demonstrated no evidence for ischemia.   2. Gated images demonstrated mild global hypokinesis.  The left  ventricular systolic function is calculated at 47%.  3. No prior study is available for comparison      Reviewed and updated as needed this visit by clinical staffTobacco  Allergies  Meds  Problems  Med Hx  Surg Hx  Fam Hx  Soc Hx        Reviewed and updated as needed this visit by Provider  Allergies  Meds  Problems         ROS:  C: NEGATIVE for fever, chills, change in weight  E/M: NEGATIVE for ear, mouth and throat problems  R: NEGATIVE for significant cough or SOB  CV: NEGATIVE for chest pain, palpitations or peripheral edema  ROS otherwise negative    OBJECTIVE:     /84 (BP Location: Right arm, Patient Position: Chair, Cuff  Size: Adult Large)  Pulse 88  Temp 98.6  F (37  C) (Temporal)  Resp 20  Wt 231 lb 12.8 oz (105.1 kg)  SpO2 97%  BMI 35.25 kg/m2  Body mass index is 35.25 kg/(m^2).  GENERAL: healthy, alert and no distress  NECK: no adenopathy, no asymmetry, masses, or scars and thyroid normal to palpation  RESP: lungs clear to auscultation - no rales, rhonchi or wheezes  CV: regular rate and rhythm, normal S1 S2, no S3 or S4, no murmur, click or rub, no peripheral edema and peripheral pulses strong  ABDOMEN: soft, nontender, no hepatosplenomegaly, no masses and bowel sounds normal  MS: no gross musculoskeletal defects noted, no edema    Diagnostic Test Results:  No results found for this or any previous visit (from the past 24 hour(s)).    ASSESSMENT/PLAN:       1. Syncope, unspecified syncope type  Isolated syncopal episode with overnight observation. Non diagnostic work up to date. Felt most likely vasovagal.  Currently wearing Zio Patch. Thyroid function not checked in ED. Will check TSH and await Zio Patch. Will then consider stress ECHO or Lexiscan stress test.    - TSH with free T4 reflex    FUTURE APPOINTMENTS:       - Follow-up visit in 1 month pending lab and Holter  SELF MONITORING:       - Please check blood pressure readings daily  Work on weight loss  Regular exercise    Christopher Arreola MD  [facility]

## 2017-11-09 NOTE — MR AVS SNAPSHOT
After Visit Summary   11/9/2017    Wenceslao Vasquez    MRN: [id number]           Patient Information     Date Of Birth          1960        Visit Information        Provider Department      11/9/2017 4:10 PM Christopher Arreola MD [facility]        Today's Diagnoses     Syncope, unspecified syncope type    -  1       Follow-ups after your visit        Who to contact     If you have questions or need follow up information about today's clinic visit or your schedule please contact [facility] directly at [phone].  Normal or non-critical lab and imaging results will be communicated to you by MyChart, letter or phone within 4 business days after the clinic has received the results. If you do not hear from us within 7 days, please contact the clinic through Definicaret or phone. If you have a critical or abnormal lab result, we will notify you by phone as soon as possible.  Submit refill requests through Pinguo or call your pharmacy and they will forward the refill request to us. Please allow 3 business days for your refill to be completed.          Additional Information About Your Visit        MyChart Information     Pinguo gives you secure access to your electronic health record. If you see a primary care provider, you can also send messages to your care team and make appointments. If you have questions, please call your primary care clinic.  If you do not have a primary care provider, please call [phone] and they will assist you.        Care EveryWhere ID     This is your Care EveryWhere ID. This could be used by other organizations to access your [facility] medical records  [id number]        Your Vitals Were     Pulse Temperature Respirations Pulse Oximetry BMI (Body Mass Index)       88 98.6  F (37  C) (Temporal) 20 97% 35.25 kg/m2        Blood Pressure from Last 3 Encounters:   11/09/17 143/84   10/28/17 143/74   07/03/17 136/84    Weight from Last 3 Encounters:    11/09/17 231 lb 12.8 oz (105.1 kg)   10/28/17 221 lb 12.5 oz (100.6 kg)   07/11/17 227 lb (103 kg)              We Performed the Following     TSH with free T4 reflex        Primary Care Provider Office Phone # Fax #    Christopher Arreola [phone]       [address] [facility] [address]        Equal Access to Services     BAUTISTA LU : Hadii aad ku hadasho Soomaali, waaxda luqadaha, qaybta kaalmada adeegyada, waxay idiin hayaan adeeg kharash lakassandran loreeli. So [facility] [phone].    ATENCIÓN: Si habla esprosalio, tiene a copeland disposición servicios gratuitos de asistencia lingüística. Llame al [phone].    We comply with applicable federal civil rights laws and [place] laws. We do not discriminate on the basis of race, color, national origin, age, disability, sex, sexual orientation, or gender identity.            Thank you!     Thank you for choosing [facility]  for your care. Our goal is always to provide you with excellent care. Hearing back from our patients is one way we can continue to improve our services. Please take a few minutes to complete the written survey that you may receive in the mail after your visit with us. Thank you!             Your Updated Medication List - Protect others around you: Learn how to safely use, store and throw away your medicines at www.disposemymeds.org.          This list is accurate as of: 11/9/17  5:50 PM.  Always use your most recent med list.                   Brand Name Dispense Instructions for use Diagnosis    MULTI VITAMIN MENS PO      Take 1 tablet by mouth daily.    Low back pain, S/P lumbar discectomy

## 2017-12-13 ENCOUNTER — TELEPHONE (OUTPATIENT)
Dept: FAMILY MEDICINE | Facility: OTHER | Age: 57
End: 2017-12-13

## 2017-12-13 NOTE — TELEPHONE ENCOUNTER
Reason for Call:  Other call back    Detailed comments: patient is returning a call from Dr. Parikh regarding a heart monitor. Please advise     Phone Number Patient can be reached at: Home number on file [phone] (home)    Best Time: any    Can we leave a detailed message on this number? YES    Call taken on 12/13/2017 at 4:25 PM by Sherine Jacobo

## 2017-12-27 ENCOUNTER — OFFICE VISIT (OUTPATIENT)
Dept: FAMILY MEDICINE | Facility: OTHER | Age: 57
End: 2017-12-27
Payer: COMMERCIAL

## 2017-12-27 VITALS
WEIGHT: 230 LBS | OXYGEN SATURATION: 96 % | SYSTOLIC BLOOD PRESSURE: 138 MMHG | DIASTOLIC BLOOD PRESSURE: 82 MMHG | RESPIRATION RATE: 16 BRPM | TEMPERATURE: 98 F | BODY MASS INDEX: 34.97 KG/M2 | HEART RATE: 80 BPM

## 2017-12-27 DIAGNOSIS — M54.50 ACUTE RIGHT-SIDED LOW BACK PAIN WITHOUT SCIATICA: Primary | ICD-10-CM

## 2017-12-27 DIAGNOSIS — M48.07 STENOSIS OF LUMBOSACRAL SPINE: ICD-10-CM

## 2017-12-27 PROCEDURE — 99213 OFFICE O/P EST LOW 20 MIN: CPT | Performed by: FAMILY MEDICINE

## 2017-12-27 ASSESSMENT — PAIN SCALES - GENERAL: PAINLEVEL: NO PAIN (0)

## 2017-12-27 NOTE — NURSING NOTE
"Chief Complaint   Patient presents with     Back Pain     3 weeks       Initial /82 (BP Location: Left arm, Patient Position: Chair, Cuff Size: Adult Large)  Pulse 80  Temp 98  F (36.7  C) (Oral)  Resp 16  Wt 230 lb (104.3 kg)  SpO2 96%  BMI 34.97 kg/m2 Estimated body mass index is 34.97 kg/(m^2) as calculated from the following:    Height as of 10/28/17: 5' 8\" (1.727 m).    Weight as of this encounter: 230 lb (104.3 kg).  Medication Reconciliation: complete     Michelle TELLO LPN      "

## 2017-12-27 NOTE — PROGRESS NOTES
SUBJECTIVE:   Wenceslao Vasquez is a 57 year old male who presents to clinic today for the following health issues:      Back Pain       Duration: 3 weeks        Specific cause: none    Description:   Location of pain: middle of back right  Character of pain: sharp and burning  Pain radiation:left leg pain  New numbness or weakness in legs, not attributed to pain:  no     Intensity: Currently 8/10    History:   Pain interferes with job: No  History of back problems: previous spinal stenosis -  Any previous MRI or X-rays: Yes- at [facility].  Date years ago  Sees a specialist for back pain:  No  Therapies tried without relief: none    Alleviating factors:   Improved by: walking      Precipitating factors:  Worsened by: Lying Flat    Functional and Psychosocial Screen (Izabela STarT Back):      Most recent score:    IZABELA START BACK TOTAL SCORE 12/27/2017   Total Score (all 9) 6       He does not have pain at this time. All symptoms resolved after 3 weeks. He has known multilevel CTL DDD with prior lumbar and cervical injections. He has no prior history of kidney stones. No rash noted during the episode of right flank pain.        Accompanying Signs & Symptoms:  Risk of Fracture:  Recent history of trauma or blunt force  Risk of Cauda Equina:  None  Risk of Infection:  None  Risk of Cancer:  None  Risk of Ankylosing Spondylitis:  Onset at age <35, male, AND morning back stiffness. no           Problem list and histories reviewed & adjusted, as indicated.  Additional history: We also reviewed his ZioPatch Holter results. Isolated PVC and asymptomatic brief episodes of SVT. He has had no recurrent episodes since ED visit 2 months ago.    Patient Active Problem List   Diagnosis     CHELSY (obstructive sleep apnea)     Hyperlipidemia LDL goal <130     Sciatica     Low back pain     Stenosis of lumbosacral spine     Advanced directives, counseling/discussion     Hypertension goal BP (blood pressure) < 140/90     Spinal stenosis in  cervical region     Cervical spondylosis without myelopathy     Pneumonia     Morbid obesity due to excess calories (H)     Syncope     Exertional chest pain     Past Surgical History:   Procedure Laterality Date     C NONSPECIFIC PROCEDURE      lumbar disc surgery     C NONSPECIFIC PROCEDURE      hammer toe surgery right foot     C NONSPECIFIC PROCEDURE      nasal surgery     CERVICAL DISKECTOMY  8/8/13    [facility]     COLONOSCOPY  06/03/09     DIL URETHRA STRIC,MALE,SUBSEQ       HC REMOVAL HEEL SPUR, CALCANEUS  2/2005     HC REVISE MEDIAN N/CARPAL TUNNEL SURG  1993    right     LAPAROSCOPIC CHOLECYSTECTOMY  3/11/2013    Procedure: LAPAROSCOPIC CHOLECYSTECTOMY;  laparoscopic cholecystectomy;  Surgeon: Clinton Whittaker MD;  Location: [facility] OR       Social History   Substance Use Topics     Smoking status: Never Smoker     Smokeless tobacco: Never Used     Alcohol use Yes      Comment: occasional     Family History   Problem Relation Age of Onset     Alcohol/Drug Brother      Prostate Cancer Brother      Arthritis Mother      CANCER Mother      Kidney - Stage 3     HEART DISEASE Maternal Grandmother      HEART DISEASE Paternal Grandmother      C.A.D. No family hx of      DIABETES No family hx of      Anesthesia Reaction No family hx of      Cancer - colorectal No family hx of      Colon Cancer No family hx of          Current Outpatient Prescriptions   Medication Sig Dispense Refill     Multiple Vitamin (MULTI VITAMIN MENS PO) Take 1 tablet by mouth daily.       Allergies   Allergen Reactions     Dust Mites Hives     Morphine Nausea and Vomiting     Recent Labs   Lab Test  11/09/17   1709  10/27/17   1950  07/03/17   0949  01/11/17   0855  07/21/16   0822   05/18/15   1216   02/22/13   1935   12/10/10   1113  05/12/10   0946   LDL   --    --    --   100*  90   --   47   < >   --    < >   --   117   HDL   --    --    --   36*  36*   --   35*   < >   --    < >   --   33*   TRIG   --    --    --   185*  139    --   202*   < >   --    < >   --   148   ALT   --   37   --    --    --    --    --    --   45   --   33   --    CR   --   0.74  0.74   --    --    < >  1.03   < >  0.93   < >   --   0.86   GFRESTIMATED   --   >90  >90  Non African American GFR Calc     --    --    < >  75   < >  85   < >   --   >90   GFRESTBLACK   --   >90  >90  African American GFR Calc     --    --    < >  >90   GFR Calc     < >  >90   < >   --   >90   POTASSIUM   --   3.8  3.9   --    --    < >  3.8   < >  3.8   < >   --   4.2   TSH  0.70   --    --    --    --    --    --    --    --    --    --   0.71    < > = values in this interval not displayed.      BP Readings from Last 3 Encounters:   12/27/17 138/82   11/09/17 143/84   10/28/17 143/74    Wt Readings from Last 3 Encounters:   12/27/17 230 lb (104.3 kg)   11/09/17 231 lb 12.8 oz (105.1 kg)   10/28/17 221 lb 12.5 oz (100.6 kg)                          Reviewed and updated as needed this visit by clinical staffTobacco  Allergies  Meds  Problems  Med Hx  Surg Hx  Fam Hx  Soc Hx        Reviewed and updated as needed this visit by Provider  Allergies  Meds  Problems         ROS:  C: NEGATIVE for fever, chills, change in weight  E/M: NEGATIVE for ear, mouth and throat problems  R: NEGATIVE for significant cough or SOB  CV: NEGATIVE for chest pain, palpitations or peripheral edema  MUSCULOSKELETAL: NEGATIVE for back pain at this time.  ROS otherwise negative    OBJECTIVE:     /82 (BP Location: Left arm, Patient Position: Chair, Cuff Size: Adult Large)  Pulse 80  Temp 98  F (36.7  C) (Oral)  Resp 16  Wt 230 lb (104.3 kg)  SpO2 96%  BMI 34.97 kg/m2  Body mass index is 34.97 kg/(m^2).  GENERAL: alert, no distress and over weight  NECK: no adenopathy, no asymmetry, masses, or scars and thyroid normal to palpation  RESP: lungs clear to auscultation - no rales, rhonchi or wheezes  CV: regular rate and rhythm, normal S1 S2, no S3 or S4, no murmur, click or rub, no  peripheral edema and peripheral pulses strong  ABDOMEN: soft, nontender, no hepatosplenomegaly, no masses and bowel sounds normal  MS: no gross musculoskeletal defects noted, no edema  SKIN: no suspicious lesions or rashes  BACK: no CVA tenderness, no paralumbar tenderness    Diagnostic Test Results:  none     ASSESSMENT/PLAN:     1. Acute right-sided low back pain without sciatica  Acute self limited and now resolved episode 1 month ago with known lumbar spinal stenosis. Possibly due to kidney stone but no prior history and nothing on CT abdomen 1 years ago. Possible shingles but no subsequent rash.       When You Have Low Back Pain    Caring for Your Back  You are not alone.    Low back pain is very common. Nearly half of all adults have low back pain in any given year. The good news is that back pain is rarely a danger to your health. Most people can manage their back pain on their own and about half of them start feeling better within 2 weeks. In 9 out of 10 cases, low back pain goes away or no longer limits daily activity within 6 weeks.     Your outlook is good!    Your symptoms tell us that your low back pain is most likely not a danger to you. Most of the time we do not know the exact cause of low back pain, even if you see a doctor or have an MRI. However, treatment can still work without knowing the cause of the pain. Less than 1 in 100 people need surgery for their back pain.     What can I do about my low back pain?     There are three things you can do to ease low back pain and help it go away.    Use heat or cold packs.    Take medicine as directed.    Use positions, movements and exercises.     Using heat or cold packs    Try cold packs or gentle heat to ease your pain. Use whichever gives you the most relief. Apply the cold pack or heat for 15 minutes at a time, as often as needed.    Taking medicine      If your doctor has prescribed medicine, be sure to follow the directions.    If you take  over-the-counter medicine, read and follow the directions.    Talk to your doctor if you have any questions.     Using positions, movements and exercises    Research tells us that moving your joints and muscles can help you recover from back pain. Such activity should be simple and gentle. Use the positions in the photos as well as walking to help relieve your pain. Try taking a short walk every 3 to 4 hours during the day. Walk for a few minutes inside your home or take longer walks outside, on a treadmill or at a mall. Slowly increase the amount of time you walk. Expect discomfort when you begin, but it should lessen as your back starts to heal. When your back feels better, walk daily to keep your back and body healthy.    Finding a comfortable position    When your back pain is new, certain positions will ease your pain. Gently try each of the positions below until you find one that is helpful. Once you find a position of comfort, use it as often as you like when you are resting. You will recover faster if you combine rest with activity.         Lie on your back with your legs bent. You can do this by placing a pillow under your knees. Or you may lie on the floor and rest your lower legs on the seat of a chair.       Lie on your side with your knees bent, and place a pillow between your knees.       Lie on your stomach over pillows.      When should I call my doctor?    Your back pain should improve over the first couple of weeks. As it improves, you should be able to return to your normal activities. But call your doctor if:    You have a sudden change in your ability to control your bladder or bowels.    You feel tingling in your groin or legs.    The pain spreads down your leg and into your foot.    Your toes, feet or leg muscles feel weak.    You feel generally unwell or sick.    Your pain does not get better or gets worse.      If you are deaf or hard of hearing, please let us know. We provide many free  services including sign language interpreters,oral interpreters, TTYs, telephone amplifiers, note takers and written materials.    For informational purposes only. Not to replace the advice of your health care provider. Copyright   2013 [facility]. All rights reserved. Manga Corta [id number] - Rev 06/14.    2. Stenosis of lumbosacral spine  As above. If recurrent symptoms then consider additional PT after evaluation for other potential causes for unilateral back pain.      FUTURE APPOINTMENTS:       - Follow-up visit in 6 months as needed.  Work on weight loss  Regular exercise    Christopher Arreola MD  [facility]

## 2017-12-27 NOTE — MR AVS SNAPSHOT
After Visit Summary   12/27/2017    Wenceslao Vasquez    MRN: [id number]           Patient Information     Date Of Birth          1960        Visit Information        Provider Department      12/27/2017 10:20 AM Christopher Arreola MD [facility]        Today's Diagnoses     Acute right-sided low back pain without sciatica    -  1    Stenosis of lumbosacral spine           Follow-ups after your visit        Who to contact     If you have questions or need follow up information about today's clinic visit or your schedule please contact [facility] directly at [phone].  Normal or non-critical lab and imaging results will be communicated to you by [employer], letter or phone within 4 business days after the clinic has received the results. If you do not hear from us within 7 days, please contact the clinic through [employer] or phone. If you have a critical or abnormal lab result, we will notify you by phone as soon as possible.  Submit refill requests through [employer] or call your pharmacy and they will forward the refill request to us. Please allow 3 business days for your refill to be completed.          Additional Information About Your Visit        MyChart Information     [employer] gives you secure access to your electronic health record. If you see a primary care provider, you can also send messages to your care team and make appointments. If you have questions, please call your primary care clinic.  If you do not have a primary care provider, please call [phone] and they will assist you.        Care EveryWhere ID     This is your Care EveryWhere ID. This could be used by other organizations to access your [place] medical records  [id number]        Your Vitals Were     Pulse Temperature Respirations Pulse Oximetry BMI (Body Mass Index)       80 98  F (36.7  C) (Oral) 16 96% 34.97 kg/m2        Blood Pressure from Last 3 Encounters:   12/27/17 138/82   11/09/17 143/84    10/28/17 143/74    Weight from Last 3 Encounters:   12/27/17 230 lb (104.3 kg)   11/09/17 231 lb 12.8 oz (105.1 kg)   10/28/17 221 lb 12.5 oz (100.6 kg)              Today, you had the following     No orders found for display       Primary Care Provider Office Phone # Fax #    Christopher Arreola [phone]       [address] [facility] [address]        Equal Access to Services     BAUTISTA LU : Hadii aad ku hadasho Soomaali, waaxda luqadaha, qaybta kaalmada adeegyada, waxay idiin hayaan adeeg nabor moseley. So [facility] [phone].    ATENCIÓN: Si habla español, tiene a copeland disposición servicios gratuitos de asistencia lingüística. [facility] [phone].    We comply with applicable federal civil rights laws and [place] laws. We do not discriminate on the basis of race, color, national origin, age, disability, sex, sexual orientation, or gender identity.            Thank you!     Thank you for choosing [facility]  for your care. Our goal is always to provide you with excellent care. Hearing back from our patients is one way we can continue to improve our services. Please take a few minutes to complete the written survey that you may receive in the mail after your visit with us. Thank you!             Your Updated Medication List - Protect others around you: Learn how to safely use, store and throw away your medicines at www.disposemymeds.org.          This list is accurate as of: 12/27/17  1:32 PM.  Always use your most recent med list.                   Brand Name Dispense Instructions for use Diagnosis    MULTI VITAMIN MENS PO      Take 1 tablet by mouth daily.    Low back pain, S/P lumbar discectomy

## 2018-01-03 ENCOUNTER — TELEPHONE (OUTPATIENT)
Dept: FAMILY MEDICINE | Facility: OTHER | Age: 58
End: 2018-01-03

## 2018-01-03 DIAGNOSIS — J10.1 INFLUENZA A: Primary | ICD-10-CM

## 2018-01-03 RX ORDER — OSELTAMIVIR PHOSPHATE 75 MG/1
75 CAPSULE ORAL 2 TIMES DAILY
Qty: 10 CAPSULE | Refills: 0 | Status: SHIPPED | OUTPATIENT
Start: 2018-01-03 | End: 2018-01-08

## 2018-01-08 ENCOUNTER — HOSPITAL ENCOUNTER (EMERGENCY)
Facility: CLINIC | Age: 58
Discharge: HOME | End: 2018-01-08
Attending: FAMILY MEDICINE | Admitting: FAMILY MEDICINE
Payer: COMMERCIAL

## 2018-01-08 ENCOUNTER — HOSPITAL ENCOUNTER (EMERGENCY)
Facility: CLINIC | Age: 58
Discharge: HOME | End: 2018-01-08
Attending: NURSE PRACTITIONER | Admitting: NURSE PRACTITIONER
Payer: COMMERCIAL

## 2018-01-08 ENCOUNTER — APPOINTMENT (OUTPATIENT)
Dept: GENERAL RADIOLOGY | Facility: CLINIC | Age: 58
End: 2018-01-08
Attending: NURSE PRACTITIONER
Payer: COMMERCIAL

## 2018-01-08 ENCOUNTER — APPOINTMENT (OUTPATIENT)
Dept: GENERAL RADIOLOGY | Facility: CLINIC | Age: 58
End: 2018-01-08
Attending: FAMILY MEDICINE
Payer: COMMERCIAL

## 2018-01-08 VITALS
RESPIRATION RATE: 16 BRPM | OXYGEN SATURATION: 96 % | HEIGHT: 69 IN | HEART RATE: 100 BPM | SYSTOLIC BLOOD PRESSURE: 119 MMHG | TEMPERATURE: 99.2 F | WEIGHT: 220 LBS | DIASTOLIC BLOOD PRESSURE: 68 MMHG | BODY MASS INDEX: 32.58 KG/M2

## 2018-01-08 VITALS
HEART RATE: 96 BPM | SYSTOLIC BLOOD PRESSURE: 133 MMHG | OXYGEN SATURATION: 95 % | TEMPERATURE: 97.2 F | RESPIRATION RATE: 16 BRPM | DIASTOLIC BLOOD PRESSURE: 61 MMHG

## 2018-01-08 DIAGNOSIS — J11.1 INFLUENZA-LIKE ILLNESS: ICD-10-CM

## 2018-01-08 LAB
ALBUMIN SERPL-MCNC: 3.4 G/DL (ref 3.4–5)
ALBUMIN SERPL-MCNC: 3.8 G/DL (ref 3.4–5)
ALBUMIN UR-MCNC: NEGATIVE MG/DL
ALP SERPL-CCNC: 66 U/L (ref 40–150)
ALP SERPL-CCNC: 77 U/L (ref 40–150)
ALT SERPL W P-5'-P-CCNC: 31 U/L (ref 0–70)
ALT SERPL W P-5'-P-CCNC: 35 U/L (ref 0–70)
ANION GAP SERPL CALCULATED.3IONS-SCNC: 10 MMOL/L (ref 3–14)
ANION GAP SERPL CALCULATED.3IONS-SCNC: 8 MMOL/L (ref 3–14)
APPEARANCE UR: ABNORMAL
AST SERPL W P-5'-P-CCNC: 16 U/L (ref 0–45)
AST SERPL W P-5'-P-CCNC: 20 U/L (ref 0–45)
BASE DEFICIT BLDV-SCNC: 1.8 MMOL/L
BASOPHILS # BLD AUTO: 0 10E9/L (ref 0–0.2)
BASOPHILS # BLD AUTO: 0 10E9/L (ref 0–0.2)
BASOPHILS NFR BLD AUTO: 0.1 %
BASOPHILS NFR BLD AUTO: 0.2 %
BILIRUB SERPL-MCNC: 0.3 MG/DL (ref 0.2–1.3)
BILIRUB SERPL-MCNC: 0.4 MG/DL (ref 0.2–1.3)
BILIRUB UR QL STRIP: NEGATIVE
BUN SERPL-MCNC: 12 MG/DL (ref 7–30)
BUN SERPL-MCNC: 15 MG/DL (ref 7–30)
CALCIUM SERPL-MCNC: 8.1 MG/DL (ref 8.5–10.1)
CALCIUM SERPL-MCNC: 8.4 MG/DL (ref 8.5–10.1)
CHLORIDE SERPL-SCNC: 110 MMOL/L (ref 94–109)
CHLORIDE SERPL-SCNC: 111 MMOL/L (ref 94–109)
CO2 SERPL-SCNC: 22 MMOL/L (ref 20–32)
CO2 SERPL-SCNC: 23 MMOL/L (ref 20–32)
COLOR UR AUTO: YELLOW
CREAT SERPL-MCNC: 0.69 MG/DL (ref 0.66–1.25)
CREAT SERPL-MCNC: 0.74 MG/DL (ref 0.66–1.25)
CRP SERPL-MCNC: 5.5 MG/L (ref 0–8)
DIFFERENTIAL METHOD BLD: ABNORMAL
DIFFERENTIAL METHOD BLD: ABNORMAL
EOSINOPHIL # BLD AUTO: 0 10E9/L (ref 0–0.7)
EOSINOPHIL # BLD AUTO: 0.2 10E9/L (ref 0–0.7)
EOSINOPHIL NFR BLD AUTO: 0.3 %
EOSINOPHIL NFR BLD AUTO: 4 %
ERYTHROCYTE [DISTWIDTH] IN BLOOD BY AUTOMATED COUNT: 13 % (ref 10–15)
ERYTHROCYTE [DISTWIDTH] IN BLOOD BY AUTOMATED COUNT: 13.2 % (ref 10–15)
FLUAV+FLUBV AG SPEC QL: NEGATIVE
FLUAV+FLUBV AG SPEC QL: NEGATIVE
GFR SERPL CREATININE-BSD FRML MDRD: >90 ML/MIN/1.7M2
GFR SERPL CREATININE-BSD FRML MDRD: >90 ML/MIN/1.7M2
GLUCOSE SERPL-MCNC: 112 MG/DL (ref 70–99)
GLUCOSE SERPL-MCNC: 95 MG/DL (ref 70–99)
GLUCOSE UR STRIP-MCNC: NEGATIVE MG/DL
HCO3 BLDV-SCNC: 22 MMOL/L (ref 21–28)
HCT VFR BLD AUTO: 38.2 % (ref 40–53)
HCT VFR BLD AUTO: 40.3 % (ref 40–53)
HGB BLD-MCNC: 13 G/DL (ref 13.3–17.7)
HGB BLD-MCNC: 14.2 G/DL (ref 13.3–17.7)
HGB UR QL STRIP: NEGATIVE
IMM GRANULOCYTES # BLD: 0 10E9/L (ref 0–0.4)
IMM GRANULOCYTES NFR BLD: 0.2 %
KETONES UR STRIP-MCNC: NEGATIVE MG/DL
LACTATE BLD-SCNC: 2.7 MMOL/L (ref 0.7–2)
LACTATE BLD-SCNC: 3.3 MMOL/L (ref 0.7–2)
LEUKOCYTE ESTERASE UR QL STRIP: NEGATIVE
LYMPHOCYTES # BLD AUTO: 1.2 10E9/L (ref 0.8–5.3)
LYMPHOCYTES # BLD AUTO: 1.7 10E9/L (ref 0.8–5.3)
LYMPHOCYTES NFR BLD AUTO: 12.2 %
LYMPHOCYTES NFR BLD AUTO: 40.3 %
MCH RBC QN AUTO: 34.8 PG (ref 26.5–33)
MCH RBC QN AUTO: 35.1 PG (ref 26.5–33)
MCHC RBC AUTO-ENTMCNC: 34 G/DL (ref 31.5–36.5)
MCHC RBC AUTO-ENTMCNC: 35.2 G/DL (ref 31.5–36.5)
MCV RBC AUTO: 100 FL (ref 78–100)
MCV RBC AUTO: 102 FL (ref 78–100)
MONOCYTES # BLD AUTO: 0.3 10E9/L (ref 0–1.3)
MONOCYTES # BLD AUTO: 0.6 10E9/L (ref 0–1.3)
MONOCYTES NFR BLD AUTO: 5.9 %
MONOCYTES NFR BLD AUTO: 8 %
MUCOUS THREADS #/AREA URNS LPF: PRESENT /LPF
NEUTROPHILS # BLD AUTO: 2 10E9/L (ref 1.6–8.3)
NEUTROPHILS # BLD AUTO: 8.2 10E9/L (ref 1.6–8.3)
NEUTROPHILS NFR BLD AUTO: 47.3 %
NEUTROPHILS NFR BLD AUTO: 81.5 %
NITRATE UR QL: NEGATIVE
NT-PROBNP SERPL-MCNC: 19 PG/ML (ref 0–900)
O2/TOTAL GAS SETTING VFR VENT: 21 %
PCO2 BLDV: 34 MM HG (ref 40–50)
PH BLDV: 7.42 PH (ref 7.32–7.43)
PH UR STRIP: 5 PH (ref 5–7)
PLATELET # BLD AUTO: 175 10E9/L (ref 150–450)
PLATELET # BLD AUTO: 177 10E9/L (ref 150–450)
PO2 BLDV: 46 MM HG (ref 25–47)
POTASSIUM SERPL-SCNC: 3.4 MMOL/L (ref 3.4–5.3)
POTASSIUM SERPL-SCNC: 3.5 MMOL/L (ref 3.4–5.3)
PROT SERPL-MCNC: 6.5 G/DL (ref 6.8–8.8)
PROT SERPL-MCNC: 7.4 G/DL (ref 6.8–8.8)
RBC # BLD AUTO: 3.74 10E12/L (ref 4.4–5.9)
RBC # BLD AUTO: 4.04 10E12/L (ref 4.4–5.9)
RBC #/AREA URNS AUTO: <1 /HPF (ref 0–2)
SODIUM SERPL-SCNC: 142 MMOL/L (ref 133–144)
SODIUM SERPL-SCNC: 142 MMOL/L (ref 133–144)
SOURCE: ABNORMAL
SP GR UR STRIP: 1.02 (ref 1–1.03)
SPECIMEN SOURCE: NORMAL
SQUAMOUS #/AREA URNS AUTO: <1 /HPF (ref 0–1)
TROPONIN I SERPL-MCNC: <0.015 UG/L (ref 0–0.04)
UROBILINOGEN UR STRIP-MCNC: 0 MG/DL (ref 0–2)
WBC # BLD AUTO: 10.1 10E9/L (ref 4–11)
WBC # BLD AUTO: 4.2 10E9/L (ref 4–11)
WBC #/AREA URNS AUTO: 1 /HPF (ref 0–2)

## 2018-01-08 PROCEDURE — 85025 COMPLETE CBC W/AUTO DIFF WBC: CPT | Performed by: NURSE PRACTITIONER

## 2018-01-08 PROCEDURE — 94640 AIRWAY INHALATION TREATMENT: CPT

## 2018-01-08 PROCEDURE — 80053 COMPREHEN METABOLIC PANEL: CPT | Performed by: NURSE PRACTITIONER

## 2018-01-08 PROCEDURE — 83605 ASSAY OF LACTIC ACID: CPT | Performed by: NURSE PRACTITIONER

## 2018-01-08 PROCEDURE — 94640 AIRWAY INHALATION TREATMENT: CPT | Performed by: NURSE PRACTITIONER

## 2018-01-08 PROCEDURE — 99284 EMERGENCY DEPT VISIT MOD MDM: CPT | Mod: 25 | Performed by: NURSE PRACTITIONER

## 2018-01-08 PROCEDURE — 81001 URINALYSIS AUTO W/SCOPE: CPT | Performed by: NURSE PRACTITIONER

## 2018-01-08 PROCEDURE — 71046 X-RAY EXAM CHEST 2 VIEWS: CPT | Mod: 77,TC

## 2018-01-08 PROCEDURE — 25000128 H RX IP 250 OP 636: Performed by: NURSE PRACTITIONER

## 2018-01-08 PROCEDURE — 96374 THER/PROPH/DIAG INJ IV PUSH: CPT | Performed by: NURSE PRACTITIONER

## 2018-01-08 PROCEDURE — 96361 HYDRATE IV INFUSION ADD-ON: CPT | Performed by: NURSE PRACTITIONER

## 2018-01-08 PROCEDURE — 25000125 ZZHC RX 250: Performed by: NURSE PRACTITIONER

## 2018-01-08 PROCEDURE — 40000275 ZZH STATISTIC RCP TIME EA 10 MIN

## 2018-01-08 PROCEDURE — 86140 C-REACTIVE PROTEIN: CPT | Performed by: NURSE PRACTITIONER

## 2018-01-08 PROCEDURE — 99284 EMERGENCY DEPT VISIT MOD MDM: CPT | Mod: Z6 | Performed by: FAMILY MEDICINE

## 2018-01-08 PROCEDURE — 99285 EMERGENCY DEPT VISIT HI MDM: CPT | Mod: Z6 | Performed by: NURSE PRACTITIONER

## 2018-01-08 PROCEDURE — 36415 COLL VENOUS BLD VENIPUNCTURE: CPT | Performed by: NURSE PRACTITIONER

## 2018-01-08 RX ORDER — IPRATROPIUM BROMIDE AND ALBUTEROL SULFATE 2.5; .5 MG/3ML; MG/3ML
3 SOLUTION RESPIRATORY (INHALATION) ONCE
Status: COMPLETED | OUTPATIENT
Start: 2018-01-08 | End: 2018-01-08

## 2018-01-08 RX ORDER — KETOROLAC TROMETHAMINE 30 MG/ML
30 INJECTION, SOLUTION INTRAMUSCULAR; INTRAVENOUS ONCE
Status: COMPLETED | OUTPATIENT
Start: 2018-01-08 | End: 2018-01-08

## 2018-01-08 RX ORDER — IPRATROPIUM BROMIDE AND ALBUTEROL SULFATE 2.5; .5 MG/3ML; MG/3ML
3 SOLUTION RESPIRATORY (INHALATION)
Status: DISCONTINUED | OUTPATIENT
Start: 2018-01-08 | End: 2018-01-08 | Stop reason: HOSPADM

## 2018-01-08 RX ORDER — CODEINE PHOSPHATE AND GUAIFENESIN 10; 100 MG/5ML; MG/5ML
1 SOLUTION ORAL EVERY 4 HOURS PRN
Qty: 120 ML | Refills: 0 | Status: SHIPPED | OUTPATIENT
Start: 2018-01-08 | End: 2018-03-05

## 2018-01-08 RX ORDER — OSELTAMIVIR PHOSPHATE 75 MG/1
75 CAPSULE ORAL 2 TIMES DAILY
Qty: 10 CAPSULE | Refills: 0 | Status: SHIPPED | OUTPATIENT
Start: 2018-01-08 | End: 2018-01-13

## 2018-01-08 RX ORDER — SODIUM CHLORIDE 9 MG/ML
1000 INJECTION, SOLUTION INTRAVENOUS CONTINUOUS
Status: DISCONTINUED | OUTPATIENT
Start: 2018-01-08 | End: 2018-01-08 | Stop reason: HOSPADM

## 2018-01-08 RX ORDER — ACETAMINOPHEN 325 MG/1
975 TABLET ORAL ONCE
Status: COMPLETED | OUTPATIENT
Start: 2018-01-08 | End: 2018-01-08

## 2018-01-08 RX ORDER — ALBUTEROL SULFATE 90 UG/1
2 AEROSOL, METERED RESPIRATORY (INHALATION) EVERY 4 HOURS PRN
Qty: 1 INHALER | Refills: 0 | Status: SHIPPED | OUTPATIENT
Start: 2018-01-08 | End: 2018-03-05

## 2018-01-08 RX ADMIN — ACETAMINOPHEN 975 MG: 325 TABLET ORAL at 05:38

## 2018-01-08 RX ADMIN — SODIUM CHLORIDE 1000 ML: 9 INJECTION, SOLUTION INTRAVENOUS at 13:44

## 2018-01-08 RX ADMIN — KETOROLAC TROMETHAMINE 30 MG: 30 INJECTION, SOLUTION INTRAMUSCULAR at 13:44

## 2018-01-08 RX ADMIN — SODIUM CHLORIDE 1000 ML: 9 INJECTION, SOLUTION INTRAVENOUS at 15:11

## 2018-01-08 RX ADMIN — SODIUM CHLORIDE 1000 ML: 9 INJECTION, SOLUTION INTRAVENOUS at 05:56

## 2018-01-08 RX ADMIN — IPRATROPIUM BROMIDE AND ALBUTEROL SULFATE 3 ML: .5; 3 SOLUTION RESPIRATORY (INHALATION) at 06:13

## 2018-01-08 RX ADMIN — IPRATROPIUM BROMIDE AND ALBUTEROL SULFATE 3 ML: .5; 3 SOLUTION RESPIRATORY (INHALATION) at 05:38

## 2018-01-08 RX ADMIN — IPRATROPIUM BROMIDE AND ALBUTEROL SULFATE 3 ML: .5; 3 SOLUTION RESPIRATORY (INHALATION) at 13:44

## 2018-01-08 RX ADMIN — IPRATROPIUM BROMIDE AND ALBUTEROL SULFATE 3 ML: .5; 3 SOLUTION RESPIRATORY (INHALATION) at 06:34

## 2018-01-08 RX ADMIN — IPRATROPIUM BROMIDE AND ALBUTEROL SULFATE 3 ML: .5; 3 SOLUTION RESPIRATORY (INHALATION) at 15:30

## 2018-01-08 ASSESSMENT — ENCOUNTER SYMPTOMS
VOMITING: 0
COUGH: 1
CHEST TIGHTNESS: 1
DIARRHEA: 0
SHORTNESS OF BREATH: 1

## 2018-01-08 NOTE — DISCHARGE INSTRUCTIONS
Influenza (Adult)    Influenza is also called the flu. It is a viral illness that affects the air passages of your lungs. It is different from the common cold. The flu can easily be passed from one to person to another. It may be spread through the air by coughing and sneezing. Or it can be spread by touching the sick person and then touching your own eyes, nose, or mouth.  The flu starts 1 to 3 days after you are exposed to the flu virus. It may last for 1 to 2 weeks but many people feel tired or fatigued for many weeks afterward. You usually don t need to take antibiotics unless you have a complication. This might be an ear or sinus infection or pneumonia.  Symptoms of the flu may be mild or severe. They can include extreme tiredness (wanting to stay in bed all day), chills, fevers, muscle aches, soreness with eye movement, headache, and a dry, hacking cough.  Home care  Follow these guidelines when caring for yourself at home:    Avoid being around cigarette smoke, whether yours or other people s.    Acetaminophen or ibuprofen will help ease your fever, muscle aches, and headache. Don t give aspirin to anyone younger than 18 who has the flu. Aspirin can harm the liver.    Nausea and loss of appetite are common with the flu. Eat light meals. Drink 6 to 8 glasses of liquids every day. Good choices are water, sport drinks, soft drinks without caffeine, juices, tea, and soup. Extra fluids will also help loosen secretions in your nose and lungs.    Over-the-counter cold medicines will not make the flu go away faster. But the medicines may help with coughing, sore throat, and congestion in your nose and sinuses. Don t use a decongestant if you have high blood pressure.    Stay home until your fever has been gone for at least 24 hours without using medicine to reduce fever.  Follow-up care  Follow up with your healthcare provider, or as advised, if you are not getting better over the next week.  If you are age 65 or  older, talk with your provider about getting a pneumococcal vaccine every 5 years. You should also get this vaccine if you have chronic asthma or COPD. All adults should get a flu vaccine every fall. Ask your provider about this.  When to seek medical advice  Call your healthcare provider right away if any of these occur:    Cough with lots of colored mucus (sputum) or blood in your mucus    Chest pain, shortness of breath, wheezing, or trouble breathing    Severe headache, or face, neck, or ear pain    New rash with fever    Fever of 100.4 F (38 C) or higher, or as directed by your healthcare provider    Confusion, behavior change, or seizure    Severe weakness or dizziness    You get a new fever or cough after getting better for a few days  Date Last Reviewed: 1/1/2017 2000-2017 [employer]. [address] [place] [address], [place] [address]. All rights reserved. This information is not intended as a substitute for professional medical care. Always follow your healthcare professional's instructions.

## 2018-01-08 NOTE — ED AVS SNAPSHOT
[facility] Emergency Department    [address] [place] [address]    Phone:  [phone]    Fax:  [phone]                                       Wenceslao Vasquez   MRN: [id number]    Department:  [facility] Emergency Department   Date of Visit:  1/8/2018           After Visit Summary Signature Page     I have received my discharge instructions, and my questions have been answered. I have discussed any challenges I see with this plan with the nurse or doctor.    ..........................................................................................................................................  Patient/Patient Representative Signature      ..........................................................................................................................................  Patient Representative Print Name and Relationship to Patient    ..................................................               ................................................  Date                                            Time    ..........................................................................................................................................  Reviewed by Signature/Title    ...................................................              ..............................................  Date                                                            Time
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
## 2018-01-08 NOTE — ED NOTES
CAPRI around 0800 this am from ED. Diagnosed with influenza and had three nebs. States after going home felt like his lungs were fillling up.

## 2018-01-08 NOTE — ED AVS SNAPSHOT
[facility] Emergency Department    [address]     [place] [address]    Phone:  [phone]    Fax:  [phone]                                       Wenceslao Vasquez   MRN: [id number]    Department:  [facility] Emergency Department   Date of Visit:  1/8/2018           Patient Information     Date Of Birth          1960        Your diagnoses for this visit were:     Influenza-like illness        You were seen by Katy Villeda APRN CNP.      Follow-up Information     Follow up with Christopher Arreola MD In 1 week.    Specialty:  Family Practice    Contact information:    [address] [facility] [address]  [phone]          Follow up with [facility] Emergency Department.    Specialty:  EMERGENCY MEDICINE    Why:  If symptoms worsen    Contact information:    [address]   [place] [address] [phone]    Additional information:    From [address]: Exit at [address] on south side of [place]. Turn right on [address]. Turn left at stoplight on [address]. [facility] will be in view two blocks ahead        Discharge Instructions         Influenza (Adult)    Influenza is also called the flu. It is a viral illness that affects the air passages of your lungs. It is different from the common cold. The flu can easily be passed from one to person to another. It may be spread through the air by coughing and sneezing. Or it can be spread by touching the sick person and then touching your own eyes, nose, or mouth.  The flu starts 1 to 3 days after you are exposed to the flu virus. It may last for 1 to 2 weeks but many people feel tired or fatigued for many weeks afterward. You usually don t need to take antibiotics unless you have a complication. This might be an ear or sinus infection or pneumonia.  Symptoms of the flu may be mild or severe. They can include extreme tiredness (wanting to stay in bed all day), chills, fevers, muscle aches,  soreness with eye movement, headache, and a dry, hacking cough.  Home care  Follow these guidelines when caring for yourself at home:    Avoid being around cigarette smoke, whether yours or other people s.    Acetaminophen or ibuprofen will help ease your fever, muscle aches, and headache. Don t give aspirin to anyone younger than 18 who has the flu. Aspirin can harm the liver.    Nausea and loss of appetite are common with the flu. Eat light meals. Drink 6 to 8 glasses of liquids every day. Good choices are water, sport drinks, soft drinks without caffeine, juices, tea, and soup. Extra fluids will also help loosen secretions in your nose and lungs.    Over-the-counter cold medicines will not make the flu go away faster. But the medicines may help with coughing, sore throat, and congestion in your nose and sinuses. Don t use a decongestant if you have high blood pressure.    Stay home until your fever has been gone for at least 24 hours without using medicine to reduce fever.  Follow-up care  Follow up with your healthcare provider, or as advised, if you are not getting better over the next week.  If you are age 65 or older, talk with your provider about getting a pneumococcal vaccine every 5 years. You should also get this vaccine if you have chronic asthma or COPD. All adults should get a flu vaccine every fall. Ask your provider about this.  When to seek medical advice  Call your healthcare provider right away if any of these occur:    Cough with lots of colored mucus (sputum) or blood in your mucus    Chest pain, shortness of breath, wheezing, or trouble breathing    Severe headache, or face, neck, or ear pain    New rash with fever    Fever of 100.4 F (38 C) or higher, or as directed by your healthcare provider    Confusion, behavior change, or seizure    Severe weakness or dizziness    You get a new fever or cough after getting better for a few days  Date Last Reviewed: 1/1/2017 2000-2017 [employer]. [address] [place] [address]. All rights reserved. This information is not intended as a substitute for professional medical care. Always follow your healthcare professional's instructions.          Future Appointments        Provider Department Dept Phone Center    1/12/2018 1:00 PM ANNEL Rivera CNP [facility] [phone] [facility]      24 Hour Appointment Hotline       To make an appointment at any [facility], call [phone]). If you don't have a family doctor or clinic, we will help you find one. [facility] are conveniently located to serve the needs of you and your family.             Review of your medicines      Our records show that you are taking the medicines listed below. If these are incorrect, please call your family doctor or clinic.        Dose / Directions Last dose taken    albuterol 108 (90 BASE) MCG/ACT Inhaler   Commonly known as:  PROAIR HFA   Dose:  2 puff   Quantity:  1 Inhaler        Inhale 2 puffs into the lungs every 4 hours as needed for shortness of breath / dyspnea   Refills:  0        guaiFENesin-codeine 100-10 MG/5ML Soln solution   Commonly known as:  ROBITUSSIN AC   Dose:  1 tsp.   Quantity:  120 mL        Take 5 mLs by mouth every 4 hours as needed for cough   Refills:  0        MULTI VITAMIN MENS PO   Dose:  1 tablet        Take 1 tablet by mouth daily.   Refills:  0        oseltamivir 75 MG capsule   Commonly known as:  TAMIFLU   Dose:  75 mg   Quantity:  10 capsule        Take 1 capsule (75 mg) by mouth 2 times daily for 5 days   Refills:  0                Prescriptions were sent or printed at these locations (1 Prescription)                   [facility] [place], [place] - [address]    [address] , [facility] [address]    Telephone:  [phone]   Fax:  [phone]   Hours:                  E-Prescribed (1 of 1)         oseltamivir (TAMIFLU) 75 MG capsule                 Procedures and tests performed during your visit     Procedure/Test Number of Times Performed    CBC with platelets differential 1    CRP inflammation 1    Comprehensive metabolic panel 1    Lactic acid whole blood 2    Peripheral IV catheter 1    UA with Microscopic 1    XR Chest 2 Views 1      Orders Needing Specimen Collection     None      Pending Results     No orders found from 1/6/2018 to 1/9/2018.            Pending Culture Results     No orders found from 1/6/2018 to 1/9/2018.            Pending Results Instructions     If you had any lab results that were not finalized at the time of your Discharge, you can call the ED Lab Result RN at [phone]. You will be contacted by this team for any positive Lab results or changes in treatment. The nurses are available 7 days a week from 10A to 6:30P.  You can leave a message 24 hours per day and they will return your call.        Thank you for choosing [facility]       Thank you for choosing [facility] for your care. Our goal is always to provide you with excellent care. Hearing back from our patients is one way we can continue to improve our services. Please take a few minutes to complete the written survey that you may receive in the mail after you visit with us. Thank you!        [employer] Information     [employer] gives you secure access to your electronic health record. If you see a primary care provider, you can also send messages to your care team and make appointments. If you have questions, please call your primary care clinic.  If you do not have a primary care provider, please call [phone] and they will assist you.        Care EveryWhere ID     This is your Care EveryWhere ID. This could be used by other organizations to access your [facility] medical records  [id number]        Equal Access to Services     [facility] ALY : Riana Fountain, selma medeiros, srini sandoval. So [facility]  [phone].    ATENCIÓN: Si habla español, tiene a copeland disposición servicios gratuitos de asistencia lingüística. Llame al [phone].    We comply with applicable federal civil rights laws and [place] laws. We do not discriminate on the basis of race, color, national origin, age, disability, sex, sexual orientation, or gender identity.            After Visit Summary       This is your record. Keep this with you and show to your community pharmacist(s) and doctor(s) at your next visit.

## 2018-01-08 NOTE — ED AVS SNAPSHOT
[facility] Emergency Department    [address] [place] [address]    Phone:  [phone]    Fax:  [phone]                                       Wenceslao Vasquez   MRN: [id number]    Department:  [facility] Emergency Department   Date of Visit:  1/8/2018           Patient Information     Date Of Birth          1960        Your diagnoses for this visit were:     Influenza-like illness        You were seen by Abhay Hilario MD.      Follow-up Information     Follow up with Christopher Arreola MD. Schedule an appointment as soon as possible for a visit in 3 days.    Specialty:  Family Practice    Why:  If not improving.    Contact information:    [address] [facility] [address] [phone]        Discharge References/Attachments     (ADULT), INFLUENZA (ENGLISH)    COLDS, ADULT SELF-CARE FOR (ENGLISH)      24 Hour Appointment Hotline       To make an appointment at any [facility] clinic, call [phone]). If you don't have a family doctor or clinic, we will help you find one. [facility] clinics are conveniently located to serve the needs of you and your family.             Review of your medicines      START taking        Dose / Directions Last dose taken    albuterol 108 (90 BASE) MCG/ACT Inhaler   Commonly known as:  PROAIR HFA   Dose:  2 puff   Quantity:  1 Inhaler        Inhale 2 puffs into the lungs every 4 hours as needed for shortness of breath / dyspnea   Refills:  0        guaiFENesin-codeine 100-10 MG/5ML Soln solution   Commonly known as:  ROBITUSSIN AC   Dose:  1 tsp.   Quantity:  120 mL        Take 5 mLs by mouth every 4 hours as needed for cough   Refills:  0          Our records show that you are taking the medicines listed below. If these are incorrect, please call your family doctor or clinic.        Dose / Directions Last dose taken    MULTI VITAMIN MENS PO   Dose:  1 tablet        Take 1 tablet by mouth daily.   Refills:  0        oseltamivir  75 MG capsule   Commonly known as:  TAMIFLU   Dose:  75 mg   Quantity:  10 capsule        Take 1 capsule (75 mg) by mouth 2 times daily   Refills:  0                Prescriptions were sent or printed at these locations (2 Prescriptions)                   [facility]   [address] [address]    Telephone:  [phone]   Fax:  [phone]   Hours:                  Printed at Department/Unit printer (1 of 2)         guaiFENesin-codeine (ROBITUSSIN AC) 100-10 MG/5ML SOLN solution                 These medications are not ready yet because we are checking if your insurance will help you pay for them. Call your pharmacy to confirm that your medication is ready for pickup. It may take up to 24 hours for them to receive the prescription. If the prescription is not ready within 3 business days, please contact your clinic or your provider (1 of 2)         albuterol (PROAIR HFA) 108 (90 BASE) MCG/ACT Inhaler                Procedures and tests performed during your visit     Blood gas venous    CBC with platelets differential    Comprehensive metabolic panel    Draw and hold blood cultures    EKG 12-lead, tracing only    Influenza A/B antigen    Nt probnp inpatient (BNP)    Peripheral IV catheter    Troponin I    XR Chest 2 Views      Orders Needing Specimen Collection     None      Pending Results     No orders found from 1/6/2018 to 1/9/2018.            Pending Culture Results     No orders found from 1/6/2018 to 1/9/2018.            Pending Results Instructions     If you had any lab results that were not finalized at the time of your Discharge, you can call the ED Lab Result RN at [phone]. You will be contacted by this team for any positive Lab results or changes in treatment. The nurses are available 7 days a week from 10A to 6:30P.  You can leave a message 24 hours per day and they will return your call.        Thank you for choosing [facility]       Thank you for  choosing [facility] for your care. Our goal is always to provide you with excellent care. Hearing back from our patients is one way we can continue to improve our services. Please take a few minutes to complete the written survey that you may receive in the mail after you visit with us. Thank you!        [employer] Information     [employer] gives you secure access to your electronic health record. If you see a primary care provider, you can also send messages to your care team and make appointments. If you have questions, please call your primary care clinic.  If you do not have a primary care provider, please call [phone] and they will assist you.        Care EveryWhere ID     This is your Care EveryWhere ID. This could be used by other organizations to access your [facility] medical records  [id number]        Equal Access to Services     [employer] [facility] : Riana Fountain, selma medeiros, leda marquez, srini moseley. So [facility] [phone].    ATENCIÓN: Si habla español, tiene a copeland disposición servicios gratuitos de asistencia lingüística. Llame al [phone].    We comply with applicable federal civil rights laws and [place] laws. We do not discriminate on the basis of race, color, national origin, age, disability, sex, sexual orientation, or gender identity.            After Visit Summary       This is your record. Keep this with you and show to your community pharmacist(s) and doctor(s) at your next visit.

## 2018-01-08 NOTE — ED AVS SNAPSHOT
[facility] Emergency Department    [address] [place] [address]    Phone:  [phone]    Fax:  [phone]                                       Wenceslao Vasquez   MRN: [id number]    Department:  [facility] Emergency Department   Date of Visit:  1/8/2018           After Visit Summary Signature Page     I have received my discharge instructions, and my questions have been answered. I have discussed any challenges I see with this plan with the nurse or doctor.    ..........................................................................................................................................  Patient/Patient Representative Signature      ..........................................................................................................................................  Patient Representative Print Name and Relationship to Patient    ..................................................               ................................................  Date                                            Time    ..........................................................................................................................................  Reviewed by Signature/Title    ...................................................              ..............................................  Date                                                            Time
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
## 2018-02-27 ENCOUNTER — TELEPHONE (OUTPATIENT)
Dept: FAMILY MEDICINE | Facility: OTHER | Age: 58
End: 2018-02-27

## 2018-02-27 NOTE — TELEPHONE ENCOUNTER
Please call patient to triage symptoms.  Please give low back care instructions. If red flag symptoms then OK for workin  Only on Monday or Thursday. If no red flags then OK for next available   .  Electronically signed by Christopher Arreola MD

## 2018-02-27 NOTE — TELEPHONE ENCOUNTER
Reason for Call:  Same Day Appointment, Requested Provider:  Christopher Arreola M.D.    PCP: Christopher Arreola    Reason for visit: back pain, right side, describes as a stabbing pain.     Duration of symptoms: about 3 weeks.  Concerned is may be a disc issue.     Have you been treated for this in the past? Yes    Additional comments: patient asks if Dr Arreola can see him soon, on a Monday or a Thursday. Is aware Dr Arreola is out today.     Can we leave a detailed message on this number? YES    Phone number patient can be reached at: Home number on file [phone] (home)    Best Time: any    Call taken on 2/27/2018 at 10:03 AM by Magalie Ghotra

## 2018-02-28 NOTE — TELEPHONE ENCOUNTER
Wenceslao Vasquez is a 57 year old male who calls back about concerns of back pain.  Pt states that he has had this burning pain along the right side of his lower spine for the past 3 weeks.  Pt is concerned that he might have a slipped disc, as he notices the pain worsen with twisting, bending, walking and lifting.  States that he has also noticed that he is urinating more frequently.  Denies any radiating pain, numbness/tingling/weakness in his legs, loss of bowel/bladder control, dysuria, blood in urine, dizziness, abdominal fullness, nausea/vomiting, diarrhea, fever, constipation, rash or any chronic diseases like diabetes.  Pt is wondering if he can schedule an appt.    NURSING ASSESSMENT:  Description:  Back pain  Onset/duration:  3 weeks  Precip. factors:  NA  Associated symptoms:  See above   Improves/worsens symptoms:  No improvment  Pain scale (0-10)   5/10  LMP/preg/breast feeding:  N/A  Last exam/Treatment:  12/27/18  Allergies:   Allergies   Allergen Reactions     Dust Mites Hives     Morphine Nausea and Vomiting     NURSING PLAN: Nursing advice to patient see below    RECOMMENDED DISPOSITION:  Per provider's instruction below, pt was scheduled for Dr. Sanchez appt on Monday.  Advised pt if he begins to experience new/worsening symptoms to call clinic back.  Discuss OTC medication, ice and heat.  Pt verbalizes understanding and agrees to plan.  Will comply with recommendation: Yes  If further questions/concerns or if symptoms do not improve, worsen or new symptoms develop, call your PCP or [facility] as soon as possible.      Guideline used: Back Pain  Telephone Triage Protocols for Nurses, Fifth Edition, Nereida Peterson RN

## 2018-02-28 NOTE — TELEPHONE ENCOUNTER
Telephone call attempted to pt, no answer.  Left message for pt to call clinic back.    Ace Peterson RN, BSN

## 2018-03-05 ENCOUNTER — OFFICE VISIT (OUTPATIENT)
Dept: FAMILY MEDICINE | Facility: OTHER | Age: 58
End: 2018-03-05
Payer: COMMERCIAL

## 2018-03-05 VITALS
TEMPERATURE: 98.9 F | OXYGEN SATURATION: 96 % | SYSTOLIC BLOOD PRESSURE: 140 MMHG | DIASTOLIC BLOOD PRESSURE: 82 MMHG | WEIGHT: 229.9 LBS | BODY MASS INDEX: 33.95 KG/M2 | RESPIRATION RATE: 16 BRPM | HEART RATE: 79 BPM

## 2018-03-05 DIAGNOSIS — S39.012A STRAIN OF LUMBAR REGION, INITIAL ENCOUNTER: Primary | ICD-10-CM

## 2018-03-05 PROCEDURE — 99213 OFFICE O/P EST LOW 20 MIN: CPT | Performed by: FAMILY MEDICINE

## 2018-03-05 RX ORDER — MELOXICAM 7.5 MG/1
7.5 TABLET ORAL DAILY
Qty: 30 TABLET | Refills: 1 | Status: SHIPPED | OUTPATIENT
Start: 2018-03-05 | End: 2018-07-16

## 2018-03-05 RX ORDER — METHOCARBAMOL 750 MG/1
750 TABLET, FILM COATED ORAL AT BEDTIME
Qty: 60 TABLET | Refills: 1 | Status: SHIPPED | OUTPATIENT
Start: 2018-03-05 | End: 2018-07-16

## 2018-03-05 ASSESSMENT — PAIN SCALES - GENERAL: PAINLEVEL: NO PAIN (0)

## 2018-03-05 NOTE — PROGRESS NOTES
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    Description:   Location of pain: low back center and off to the  right  Character of pain: stabbing and burning  Pain radiation:none  New numbness or weakness in legs, not attributed to pain:  no     Intensity: Currently 0/10, At its worst 5/10    History:   Pain interferes with job: Not applicable,   History of back problems: previous herniated disc lumbar disc in half  Any previous MRI or X-rays: None  Sees a specialist for back pain:  No  Therapies tried without relief: none    Alleviating factors:   Improved by: acetaminophen (Tylenol)  Little relief.     Precipitating factors:  Worsened by: Lifting, Bending and Sitting    Functional and Psychosocial Screen (Izabela STarT Back):      Not performed today            Accompanying Signs & Symptoms:  Risk of Fracture:  None  Risk of Cauda Equina:  None  Risk of Infection:  None  Risk of Cancer:  None  Risk of Ankylosing Spondylitis:  Onset at age <35, male, AND morning back stiffness. no           Problem list and histories reviewed & adjusted, as indicated.  Additional history: patient with prior history of lumbar laminectomy and cervical diskectomy.      Patient Active Problem List   Diagnosis     CHELSY (obstructive sleep apnea)     Hyperlipidemia LDL goal <130     Sciatica     Low back pain     Stenosis of lumbosacral spine     Advanced directives, counseling/discussion     Hypertension goal BP (blood pressure) < 140/90     Spinal stenosis in cervical region     Cervical spondylosis without myelopathy     Pneumonia     Morbid obesity due to excess calories (H)     Syncope     Exertional chest pain     Past Surgical History:   Procedure Laterality Date     C NONSPECIFIC PROCEDURE      lumbar disc surgery     C  NONSPECIFIC PROCEDURE      hammer toe surgery right foot     C NONSPECIFIC PROCEDURE      nasal surgery     CERVICAL DISKECTOMY  8/8/13    [facility]     COLONOSCOPY  06/03/09     DIL URETHRA STRIC,MALE,SUBSEQ       HC REMOVAL HEEL SPUR, CALCANEUS  2/2005     HC REVISE MEDIAN N/CARPAL TUNNEL SURG  1993    right     HEAD & NECK SURGERY  2013    Plate in neck     LAPAROSCOPIC CHOLECYSTECTOMY  3/11/2013    Procedure: LAPAROSCOPIC CHOLECYSTECTOMY;  laparoscopic cholecystectomy;  Surgeon: Clinton Whittaker MD;  Location: [facility] OR       Social History   Substance Use Topics     Smoking status: Never Smoker     Smokeless tobacco: Never Used     Alcohol use Yes      Comment: occasional     Family History   Problem Relation Age of Onset     Alcohol/Drug Brother      Prostate Cancer Brother      Arthritis Mother      CANCER Mother      Kidney - Stage 3     HEART DISEASE Maternal Grandmother      HEART DISEASE Paternal Grandmother      C.A.D. No family hx of      DIABETES No family hx of      Anesthesia Reaction No family hx of      Cancer - colorectal No family hx of      Colon Cancer No family hx of          Current Outpatient Prescriptions   Medication Sig Dispense Refill     Multiple Vitamin (MULTI VITAMIN MENS PO) Take 1 tablet by mouth daily.       Allergies   Allergen Reactions     Dust Mites Hives     Morphine Nausea and Vomiting     Recent Labs   Lab Test  01/08/18   1505  01/08/18   0530  11/09/17   1709  10/27/17   1950   01/11/17   0855  07/21/16   0822   05/18/15   1216   05/12/10   0946   LDL   --    --    --    --    --   100*  90   --   47   < >  117   HDL   --    --    --    --    --   36*  36*   --   35*   < >  33*   TRIG   --    --    --    --    --   185*  139   --   202*   < >  148   ALT  31  35   --   37   --    --    --    --    --    < >   --    CR  0.69  0.74   --   0.74   < >   --    --    < >  1.03   < >  0.86   GFRESTIMATED  >90  >90   --   >90   < >   --    --    < >  75   < >  >90    GFRESTBLACK  >90  >90   --   >90   < >   --    --    < >  >90   GFR Calc     < >  >90   POTASSIUM  3.4  3.5   --   3.8   < >   --    --    < >  3.8   < >  4.2   TSH   --    --   0.70   --    --    --    --    --    --    --   0.71    < > = values in this interval not displayed.      BP Readings from Last 3 Encounters:   03/05/18 140/82   01/08/18 119/68   01/08/18 133/61    Wt Readings from Last 3 Encounters:   03/05/18 229 lb 14.4 oz (104.3 kg)   01/08/18 220 lb (99.8 kg)   12/27/17 230 lb (104.3 kg)                  Labs reviewed in EPIC    Reviewed and updated as needed this visit by clinical staff  Tobacco  Allergies  Meds  Med Hx  Surg Hx  Fam Hx  Soc Hx      Reviewed and updated as needed this visit by Provider  Meds         ROS:  CONSTITUTIONAL: NEGATIVE for fever, chills, change in weight  ENT/MOUTH: NEGATIVE for ear, mouth and throat problems  RESP: NEGATIVE for significant cough or SOB  CV: NEGATIVE for chest pain, palpitations or peripheral edema  MUSCULOSKELETAL: POSITIVE  for back pain acute thoracolumbar and NEGATIVE for neck pain, paresthesias and radicular pain   ROS otherwise negative    OBJECTIVE:     /82 (BP Location: Right arm, Patient Position: Chair, Cuff Size: Adult Large)  Pulse 79  Temp 98.9  F (37.2  C) (Temporal)  Resp 16  Wt 229 lb 14.4 oz (104.3 kg)  SpO2 96%  BMI 33.95 kg/m2  Body mass index is 33.95 kg/(m^2).  GENERAL: alert, no distress and over weight  Comprehensive back pain exam:  Tenderness of right thoracolumbar paraspinal, Pain limits the following motions: flexion, extension, bilateral rotation and side glide, Lower extremity strength functional and equal on both sides, Lower extremity reflexes within normal limits bilaterally, Lower extremity sensation normal and equal on both sides and Straight leg raise negative bilaterally    Diagnostic Test Results:  none     ASSESSMENT/PLAN:     1. Strain of lumbar region, initial encounter  Acute  muscular strain without lumbar radiculitis. When You Have Low Back Pain    Caring for Your Back  You are not alone.    Low back pain is very common. Nearly half of all adults have low back pain in any given year. The good news is that back pain is rarely a danger to your health. Most people can manage their back pain on their own and about half of them start feeling better within 2 weeks. In 9 out of 10 cases, low back pain goes away or no longer limits daily activity within 6 weeks.     Your outlook is good!    Your symptoms tell us that your low back pain is most likely not a danger to you. Most of the time we do not know the exact cause of low back pain, even if you see a doctor or have an MRI. However, treatment can still work without knowing the cause of the pain. Less than 1 in 100 people need surgery for their back pain.     What can I do about my low back pain?     There are three things you can do to ease low back pain and help it go away.    Use heat or cold packs.    Take medicine as directed.    Use positions, movements and exercises.     Using heat or cold packs    Try cold packs or gentle heat to ease your pain. Use whichever gives you the most relief. Apply the cold pack or heat for 15 minutes at a time, as often as needed.    Taking medicine      If your doctor has prescribed medicine, be sure to follow the directions.    If you take over-the-counter medicine, read and follow the directions.    Talk to your doctor if you have any questions.     Using positions, movements and exercises    Research tells us that moving your joints and muscles can help you recover from back pain. Such activity should be simple and gentle. Use the positions in the photos as well as walking to help relieve your pain. Try taking a short walk every 3 to 4 hours during the day. Walk for a few minutes inside your home or take longer walks outside, on a treadmill or at a mall. Slowly increase the amount of time you walk.  Expect discomfort when you begin, but it should lessen as your back starts to heal. When your back feels better, walk daily to keep your back and body healthy.    Finding a comfortable position    When your back pain is new, certain positions will ease your pain. Gently try each of the positions below until you find one that is helpful. Once you find a position of comfort, use it as often as you like when you are resting. You will recover faster if you combine rest with activity.         Lie on your back with your legs bent. You can do this by placing a pillow under your knees. Or you may lie on the floor and rest your lower legs on the seat of a chair.       Lie on your side with your knees bent, and place a pillow between your knees.       Lie on your stomach over pillows.      When should I call my doctor?    Your back pain should improve over the first couple of weeks. As it improves, you should be able to return to your normal activities. But call your doctor if:    You have a sudden change in your ability to control your bladder or bowels.    You feel tingling in your groin or legs.    The pain spreads down your leg and into your foot.    Your toes, feet or leg muscles feel weak.    You feel generally unwell or sick.    Your pain does not get better or gets worse.      If you are deaf or hard of hearing, please let us know. We provide many free services including sign language interpreters,oral interpreters, TTYs, telephone amplifiers, note takers and written materials.    For informational purposes only. Not to replace the advice of your health care provider. Copyright   2013 [employer]. All rights reserved. [employer] [id number] - Rev 06/14.   Will refer to PT. Start Ice or heat. Renal function normal so will add Mobic once daily he can take with Tylenol. Start Robaxin at bedtime.   - PHYSICAL THERAPY REFERRAL  - methocarbamol (ROBAXIN) 750 MG tablet; Take 1 tablet (750 mg) by mouth At Bedtime And  "3 times daily as needed.  Dispense: 60 tablet; Refill: 1  - meloxicam (MOBIC) 7.5 MG tablet; Take 1 tablet (7.5 mg) by mouth daily  Dispense: 30 tablet; Refill: 1    FUTURE APPOINTMENTS:       - Follow-up visit in 1 month if not improved.   BP Screening:   Last 3 BP Readings:    BP Readings from Last 3 Encounters:   03/05/18 140/82   01/08/18 119/68   01/08/18 133/61       The following was recommended to the patient:  Re-screen within 4 weeks and recommend lifestyle modifications  BMI:   Estimated body mass index is 33.95 kg/(m^2) as calculated from the following:    Height as of 1/8/18: 5' 9\" (1.753 m).    Weight as of this encounter: 229 lb 14.4 oz (104.3 kg).   Weight management plan: Discussed healthy diet and exercise guidelines and patient will follow up in 1 month in clinic to re-evaluate.      Christopher Arreola MD  [facility]  "

## 2018-03-05 NOTE — PATIENT INSTRUCTIONS
Back Sprain or Strain    Injury to the muscles (strain) or ligaments (sprain) around the spine can be troubling. Injury may occur after a sudden forceful twisting or bending force such as in a car accident, after a simple awkward movement, or after lifting something heavy with poor body positioning. In any case, muscle spasm is often present and adds to the pain.  Thankfully, most people feel better in 1 to 2 weeks, and most of the rest in 1 to 2 months. Most people can remain active. Unless you had a forceful or traumatic physical injury such as a car accident or fall, X-rays may not be ordered for the first evaluation of a back sprain or strain. If pain continues and does not respond to medical treatment, your healthcare provider may then order X-rays and other tests.  Home care  The following guidelines will help you care for your injury at home:    When in bed, try to find a comfortable position. A firm mattress is best. Try lying flat on your back with pillows under your knees. You can also try lying on your side with your knees bent up toward your chest and a pillow between your knees.    Don't sit for long periods. Try not to take long car rides or take other trips that have you sitting for a long time. This puts more stress on the lower back than standing or walking.    During the first 24 to 72 hours after an injury or flare-up, apply an ice pack to the painful area for 20 minutes. Then remove it for 20 minutes. Do this for 60 to 90 minutes, or several times a day. This will reduce swelling and pain. Be sure to wrap the ice pack in a thin towel or plastic to protect your skin.    You can start with ice, then switch to heat. Heat from a hot shower, hot bath, or heating pad reduces pain and works well for muscle spasms. Put heat on the painful area for 20 minutes, then remove for 20 minutes. Do this for 60 to 90 minutes, or several times a day. Do not use a heating pad while sleeping. It can burn the skin.     You can alternate the ice and heat. Talk with your healthcare provider to find out the best treatment or therapy for your back pain.    Therapeutic massage will help relax the back muscles without stretching them.    Be aware of safe lifting methods. Do not lift anything over 15 pounds until all of the pain is gone.  Medicines  Talk to your healthcare provider before using medicines, especially if you have other health problems or are taking other medicines.    You may use acetaminophen or ibuprofen to control pain, unless another pain medicine was prescribed. If you have chronic conditions like diabetes, liver or kidney disease, stomach ulcers, or gastrointestinal bleeding, or are taking blood-thinner medicines, talk with your doctor before taking any medicines.    Be careful if you are given prescription medicines, narcotics, or medicine for muscle spasm. They can cause drowsiness, and affect your coordination, reflexes, and judgment. Do not drive or operate heavy machinery when taking these types of medicines. Only take pain medicine as prescribed by your healthcare provider.  Follow-up care  Follow up with your healthcare provider, or as advised. You may need physical therapy or more tests if your symptoms get worse.  If you had X-rays your healthcare provider may be checking for any broken bones, breaks, or fractures. Bruises and sprains can sometimes hurt as much as a fracture. These injuries can take time to heal completely. If your symptoms don t improve or they get worse, talk with your healthcare provider. You may need a repeat X-ray or other tests.  Call 911  Call for emergency care if any of the following occur:    Trouble breathing    Confused    Very drowsy or trouble awakening    Fainting or loss of consciousness    Rapid or very slow heart rate    Loss of bowel or bladder control  When to seek medical advice  Call your healthcare provider right away if any of the following occur:    Pain gets worse  or spreads to your arms or legs    Weakness or numbness in one or both arms or legs    Numbness in the groin or genital area  Date Last Reviewed: 6/1/2016 2000-2017 [employer]. [address] [place] [address], [place] [address]. All rights reserved. This information is not intended as a substitute for professional medical care. Always follow your healthcare professional's instructions.    When You Have Low Back Pain    Caring for Your Back  You are not alone.    Low back pain is very common. Nearly half of all adults have low back pain in any given year. The good news is that back pain is rarely a danger to your health. Most people can manage their back pain on their own and about half of them start feeling better within 2 weeks. In 9 out of 10 cases, low back pain goes away or no longer limits daily activity within 6 weeks.     Your outlook is good!    Your symptoms tell us that your low back pain is most likely not a danger to you. Most of the time we do not know the exact cause of low back pain, even if you see a doctor or have an MRI. However, treatment can still work without knowing the cause of the pain. Less than 1 in 100 people need surgery for their back pain.     What can I do about my low back pain?     There are three things you can do to ease low back pain and help it go away.    Use heat or cold packs.    Take medicine as directed.    Use positions, movements and exercises.     Using heat or cold packs    Try cold packs or gentle heat to ease your pain. Use whichever gives you the most relief. Apply the cold pack or heat for 15 minutes at a time, as often as needed.    Taking medicine      If your doctor has prescribed medicine, be sure to follow the directions.    If you take over-the-counter medicine, read and follow the directions.    Talk to your doctor if you have any questions.     Using positions, movements and exercises    Research tells us that moving your joints and muscles can help  you recover from back pain. Such activity should be simple and gentle. Use the positions in the photos as well as walking to help relieve your pain. Try taking a short walk every 3 to 4 hours during the day. Walk for a few minutes inside your home or take longer walks outside, on a treadmill or at a mall. Slowly increase the amount of time you walk. Expect discomfort when you begin, but it should lessen as your back starts to heal. When your back feels better, walk daily to keep your back and body healthy.    Finding a comfortable position    When your back pain is new, certain positions will ease your pain. Gently try each of the positions below until you find one that is helpful. Once you find a position of comfort, use it as often as you like when you are resting. You will recover faster if you combine rest with activity.         Lie on your back with your legs bent. You can do this by placing a pillow under your knees. Or you may lie on the floor and rest your lower legs on the seat of a chair.       Lie on your side with your knees bent, and place a pillow between your knees.       Lie on your stomach over pillows.      When should I call my doctor?    Your back pain should improve over the first couple of weeks. As it improves, you should be able to return to your normal activities. But call your doctor if:    You have a sudden change in your ability to control your bladder or bowels.    You feel tingling in your groin or legs.    The pain spreads down your leg and into your foot.    Your toes, feet or leg muscles feel weak.    You feel generally unwell or sick.    Your pain does not get better or gets worse.      If you are deaf or hard of hearing, please let us know. We provide many free services including sign language interpreters,oral interpreters, TTYs, telephone amplifiers, note takers and written materials.    For informational purposes only. Not to replace the advice of your health care provider.  Copyright   2013 [facility]. All rights reserved. [employer] [id number]   Rev 06/14.

## 2018-03-05 NOTE — NURSING NOTE
"Chief Complaint   Patient presents with     Back Pain       Initial /82 (BP Location: Right arm, Patient Position: Chair, Cuff Size: Adult Large)  Pulse 79  Temp 98.9  F (37.2  C) (Temporal)  Resp 16  Wt 229 lb 14.4 oz (104.3 kg)  SpO2 96%  BMI 33.95 kg/m2 Estimated body mass index is 33.95 kg/(m^2) as calculated from the following:    Height as of 1/8/18: 5' 9\" (1.753 m).    Weight as of this encounter: 229 lb 14.4 oz (104.3 kg).  Medication Reconciliation: complete     Jalyn Mckinney MA 3/5/2018  4:35 PM          "

## 2018-03-05 NOTE — MR AVS SNAPSHOT
After Visit Summary   3/5/2018    Wenceslao Vasquez    MRN: [id number]           Patient Information     Date Of Birth          1960        Visit Information        Provider Department      3/5/2018 4:10 PM Christopher Arreola MD [facility]        Today's Diagnoses     Strain of lumbar region, initial encounter    -  1      Care Instructions      Back Sprain or Strain    Injury to the muscles (strain) or ligaments (sprain) around the spine can be troubling. Injury may occur after a sudden forceful twisting or bending force such as in a car accident, after a simple awkward movement, or after lifting something heavy with poor body positioning. In any case, muscle spasm is often present and adds to the pain.  Thankfully, most people feel better in 1 to 2 weeks, and most of the rest in 1 to 2 months. Most people can remain active. Unless you had a forceful or traumatic physical injury such as a car accident or fall, X-rays may not be ordered for the first evaluation of a back sprain or strain. If pain continues and does not respond to medical treatment, your healthcare provider may then order X-rays and other tests.  Home care  The following guidelines will help you care for your injury at home:    When in bed, try to find a comfortable position. A firm mattress is best. Try lying flat on your back with pillows under your knees. You can also try lying on your side with your knees bent up toward your chest and a pillow between your knees.    Don't sit for long periods. Try not to take long car rides or take other trips that have you sitting for a long time. This puts more stress on the lower back than standing or walking.    During the first 24 to 72 hours after an injury or flare-up, apply an ice pack to the painful area for 20 minutes. Then remove it for 20 minutes. Do this for 60 to 90 minutes, or several times a day. This will reduce swelling and pain. Be sure to wrap the ice pack in a thin  towel or plastic to protect your skin.    You can start with ice, then switch to heat. Heat from a hot shower, hot bath, or heating pad reduces pain and works well for muscle spasms. Put heat on the painful area for 20 minutes, then remove for 20 minutes. Do this for 60 to 90 minutes, or several times a day. Do not use a heating pad while sleeping. It can burn the skin.    You can alternate the ice and heat. Talk with your healthcare provider to find out the best treatment or therapy for your back pain.    Therapeutic massage will help relax the back muscles without stretching them.    Be aware of safe lifting methods. Do not lift anything over 15 pounds until all of the pain is gone.  Medicines  Talk to your healthcare provider before using medicines, especially if you have other health problems or are taking other medicines.    You may use acetaminophen or ibuprofen to control pain, unless another pain medicine was prescribed. If you have chronic conditions like diabetes, liver or kidney disease, stomach ulcers, or gastrointestinal bleeding, or are taking blood-thinner medicines, talk with your doctor before taking any medicines.    Be careful if you are given prescription medicines, narcotics, or medicine for muscle spasm. They can cause drowsiness, and affect your coordination, reflexes, and judgment. Do not drive or operate heavy machinery when taking these types of medicines. Only take pain medicine as prescribed by your healthcare provider.  Follow-up care  Follow up with your healthcare provider, or as advised. You may need physical therapy or more tests if your symptoms get worse.  If you had X-rays your healthcare provider may be checking for any broken bones, breaks, or fractures. Bruises and sprains can sometimes hurt as much as a fracture. These injuries can take time to heal completely. If your symptoms don t improve or they get worse, talk with your healthcare provider. You may need a repeat X-ray or  other tests.  Call 911  Call for emergency care if any of the following occur:    Trouble breathing    Confused    Very drowsy or trouble awakening    Fainting or loss of consciousness    Rapid or very slow heart rate    Loss of bowel or bladder control  When to seek medical advice  Call your healthcare provider right away if any of the following occur:    Pain gets worse or spreads to your arms or legs    Weakness or numbness in one or both arms or legs    Numbness in the groin or genital area  Date Last Reviewed: 6/1/2016 2000-2017 [employer]. [address] [place] [address]. All rights reserved. This information is not intended as a substitute for professional medical care. Always follow your healthcare professional's instructions.    When You Have Low Back Pain    Caring for Your Back  You are not alone.    Low back pain is very common. Nearly half of all adults have low back pain in any given year. The good news is that back pain is rarely a danger to your health. Most people can manage their back pain on their own and about half of them start feeling better within 2 weeks. In 9 out of 10 cases, low back pain goes away or no longer limits daily activity within 6 weeks.     Your outlook is good!    Your symptoms tell us that your low back pain is most likely not a danger to you. Most of the time we do not know the exact cause of low back pain, even if you see a doctor or have an MRI. However, treatment can still work without knowing the cause of the pain. Less than 1 in 100 people need surgery for their back pain.     What can I do about my low back pain?     There are three things you can do to ease low back pain and help it go away.    Use heat or cold packs.    Take medicine as directed.    Use positions, movements and exercises.     Using heat or cold packs    Try cold packs or gentle heat to ease your pain. Use whichever gives you the most relief. Apply the cold pack or heat for  15 minutes at a time, as often as needed.    Taking medicine      If your doctor has prescribed medicine, be sure to follow the directions.    If you take over-the-counter medicine, read and follow the directions.    Talk to your doctor if you have any questions.     Using positions, movements and exercises    Research tells us that moving your joints and muscles can help you recover from back pain. Such activity should be simple and gentle. Use the positions in the photos as well as walking to help relieve your pain. Try taking a short walk every 3 to 4 hours during the day. Walk for a few minutes inside your home or take longer walks outside, on a treadmill or at a mall. Slowly increase the amount of time you walk. Expect discomfort when you begin, but it should lessen as your back starts to heal. When your back feels better, walk daily to keep your back and body healthy.    Finding a comfortable position    When your back pain is new, certain positions will ease your pain. Gently try each of the positions below until you find one that is helpful. Once you find a position of comfort, use it as often as you like when you are resting. You will recover faster if you combine rest with activity.         Lie on your back with your legs bent. You can do this by placing a pillow under your knees. Or you may lie on the floor and rest your lower legs on the seat of a chair.       Lie on your side with your knees bent, and place a pillow between your knees.       Lie on your stomach over pillows.      When should I call my doctor?    Your back pain should improve over the first couple of weeks. As it improves, you should be able to return to your normal activities. But call your doctor if:    You have a sudden change in your ability to control your bladder or bowels.    You feel tingling in your groin or legs.    The pain spreads down your leg and into your foot.    Your toes, feet or leg muscles feel weak.    You feel  generally unwell or sick.    Your pain does not get better or gets worse.      If you are deaf or hard of hearing, please let us know. We provide many free services including sign language interpreters,oral interpreters, TTYs, telephone amplifiers, note takers and written materials.    For informational purposes only. Not to replace the advice of your health care provider. Copyright   2013 [facility]. All rights reserved. [employer] [id number] - Rev 06/14.                Follow-ups after your visit        Additional Services     PHYSICAL THERAPY REFERRAL       *This order will print in the [facility] Central Scheduling Office*    [facility] provides Physical Therapy evaluation and treatment and many specialty services across the [facility] system.  If requesting a specialty program, please choose from the list below.    Call one number to schedule at any [facility] location   [phone].    Treatment: Evaluation & Treatment  Special Instructions/Modalities:   Special Programs: None    Please be aware that coverage of these services is subject to the terms and limitations of your health insurance plan.  Call member services at your health plan with any benefit or coverage questions.      **Note to Provider** To refer patients to therapy outside of the location list, change the order class to External Referral in the order composer.                  Follow-up notes from your care team     Return in about 1 month (around 4/5/2018), or if symptoms worsen or fail to improve.      Who to contact     If you have questions or need follow up information about today's clinic visit or your schedule please contact [facility] directly at [phone].  Normal or non-critical lab and imaging results will be communicated to you by MyChart, letter or phone within 4 business days after the clinic has received the results. If you do not hear  from us within 7 days, please contact the clinic through [employer] or phone. If you have a critical or abnormal lab result, we will notify you by phone as soon as possible.  Submit refill requests through [employer] or call your pharmacy and they will forward the refill request to us. Please allow 3 business days for your refill to be completed.          Additional Information About Your Visit        [facility] Information     [employer] gives you secure access to your electronic health record. If you see a primary care provider, you can also send messages to your care team and make appointments. If you have questions, please call your primary care clinic.  If you do not have a primary care provider, please call [phone] and they will assist you.        Care EveryWhere ID     This is your Care EveryWhere ID. This could be used by other organizations to access your [place] medical records  [id number]        Your Vitals Were     Pulse Temperature Respirations Pulse Oximetry BMI (Body Mass Index)       79 98.9  F (37.2  C) (Temporal) 16 96% 33.95 kg/m2        Blood Pressure from Last 3 Encounters:   03/05/18 140/82   01/08/18 119/68   01/08/18 133/61    Weight from Last 3 Encounters:   03/05/18 229 lb 14.4 oz (104.3 kg)   01/08/18 220 lb (99.8 kg)   12/27/17 230 lb (104.3 kg)              We Performed the Following     PHYSICAL THERAPY REFERRAL          Today's Medication Changes          These changes are accurate as of 3/5/18  4:54 PM.  If you have any questions, ask your nurse or doctor.               Start taking these medicines.        Dose/Directions    meloxicam 7.5 MG tablet   Commonly known as:  MOBIC   Used for:  Strain of lumbar region, initial encounter   Started by:  Christopher Arreola MD        Dose:  7.5 mg   Take 1 tablet (7.5 mg) by mouth daily   Quantity:  30 tablet   Refills:  1       methocarbamol 750 MG tablet   Commonly known as:  ROBAXIN   Used for:  Strain of lumbar region, initial encounter   Started  by:  Christopher Arreola MD        Dose:  750 mg   Take 1 tablet (750 mg) by mouth At Bedtime And 3 times daily as needed.   Quantity:  60 tablet   Refills:  1            Where to get your medicines      These medications were sent to [facility] - [place], [place] - [address] [place] [address]  [address] [place] [address]    Hours:  M-F 8:30-6:30; Sat 9-4; closed Sunday Phone:  [phone]     meloxicam 7.5 MG tablet    methocarbamol 750 MG tablet                Primary Care Provider Office Phone # Fax #    Christopher Arreola [phone]       [address] [facility] [address]        Equal Access to Services     BAUTISTA LU : Riana bacono Soandrew, waaxda mala, qaybta kaalmada linyada, srini moseley. So [facility] [phone].    ATENCIÓN: Si habla español, tiene a copeland disposición servicios gratuitos de asistencia lingüística. Llame al [phone].    We comply with applicable federal civil rights laws and [place] laws. We do not discriminate on the basis of race, color, national origin, age, disability, sex, sexual orientation, or gender identity.            Thank you!     Thank you for choosing [facility]  for your care. Our goal is always to provide you with excellent care. Hearing back from our patients is one way we can continue to improve our services. Please take a few minutes to complete the written survey that you may receive in the mail after your visit with us. Thank you!             Your Updated Medication List - Protect others around you: Learn how to safely use, store and throw away your medicines at www.disposemymeds.org.          This list is accurate as of 3/5/18  4:54 PM.  Always use your most recent med list.                   Brand Name Dispense Instructions for use Diagnosis    meloxicam 7.5 MG tablet    MOBIC    30 tablet    Take 1 tablet (7.5 mg) by mouth daily    Strain of lumbar region, initial encounter       methocarbamol 750 MG  tablet    ROBAXIN    60 tablet    Take 1 tablet (750 mg) by mouth At Bedtime And 3 times daily as needed.    Strain of lumbar region, initial encounter       MULTI VITAMIN MENS PO      Take 1 tablet by mouth daily.    Low back pain, S/P lumbar discectomy

## 2018-04-25 ENCOUNTER — TELEPHONE (OUTPATIENT)
Dept: FAMILY MEDICINE | Facility: OTHER | Age: 58
End: 2018-04-25

## 2018-04-25 NOTE — TELEPHONE ENCOUNTER
"Wenceslao Vasquez is a 57 year old male who calls with concerns of headaches.    NURSING ASSESSMENT:  Description:  The patient calls today with complaints of daily headaches.  He states that the headache is present when he wakes up in the morning and will not subside until he has tylenol. Pain level varies from mild to severe throughout the day.  The headaches vary from the back of his head to behind his eyes. He states that these headaches have been going on for 3 weeks. Denies confusion or speech concerns but has noticed some changes in his visual clarity.  He feels it has been a gradual decline in his ability to see distance clearly.      He has noticed chronic sinus congestion (does have allergies), and some slight SOB with exertion.  He feels \"run down\" and feels he has never fully regained his strength since he had influenza in January.  He also notes that he has been having to get up to urinate multiple times throughout the night and this has been getting increasingly worse of the past 6 months. Does also note some slight burning, denies odor.      He has been using OTC tylenol extra strength (1,000 mg total daily).  He is otherwise only on a multivitamin at this time.     Last glucose was 95 on 1/8/18     BP Readings from Last 6 Encounters:   03/05/18 140/82   01/08/18 119/68   01/08/18 133/61   12/27/17 138/82   11/09/17 143/84   10/28/17 143/74       Last exam/Treatment:  3/5/18    Allergies:   Allergies   Allergen Reactions     Dust Mites Hives     Morphine Nausea and Vomiting         NURSING PLAN: Routed to provider Yes    RECOMMENDED DISPOSITION:  Routing to provider for appointment work-in for sometime in the next 3-5 days.  He is not available on Friday but could do any day next week.     Will comply with recommendation: Yes  If further questions/concerns or if symptoms do not improve, worsen or new symptoms develop, call your PCP or [facility] as soon as possible.      Guideline " used:  Telephone Triage Protocols for Nurses, 5th Edition, Nereida Graf, RN

## 2018-04-25 NOTE — TELEPHONE ENCOUNTER
OK caleb weinstein Dr. Only visit next week.. Please call patient  to schedule time.  Electronically signed by Christopher Arreola MD

## 2018-04-25 NOTE — TELEPHONE ENCOUNTER
I called to see if he can come in for Dr. Arreola open appointment this morning.  I have attempted to contact this patient by phone with the following results: left message to return my call on answering machine.  Ora Eric MA     4/25/2018

## 2018-04-25 NOTE — TELEPHONE ENCOUNTER
Patient is calling back. Please call him back when you are able.  Thank you,  Jamee Arreola   for [facility]

## 2018-04-25 NOTE — TELEPHONE ENCOUNTER
Reason for Call:  Same Day Appointment, Requested Provider:  Christopher Arreola M.D.    PCP: Christopher Arreola    Reason for visit: wakes up with headaches & not feeling right due to them.  Also wakes up 2 times a night to go to the bathroom    Duration of symptoms: approximately 1 month    Have you been treated for this in the past? No    Additional comments: first available is not for about a month out, cannot wait that long.  Work on Fridays and any other days will work.    Can we leave a detailed message on this number? YES    Phone number patient can be reached at: Home number on file [phone] (home)    Best Time:     Call taken on 4/25/2018 at 9:21 AM by Ekaterina Jacques

## 2018-04-30 ENCOUNTER — OFFICE VISIT (OUTPATIENT)
Dept: FAMILY MEDICINE | Facility: OTHER | Age: 58
End: 2018-04-30
Payer: COMMERCIAL

## 2018-04-30 VITALS
DIASTOLIC BLOOD PRESSURE: 78 MMHG | BODY MASS INDEX: 34.52 KG/M2 | OXYGEN SATURATION: 96 % | TEMPERATURE: 97.4 F | SYSTOLIC BLOOD PRESSURE: 134 MMHG | HEIGHT: 69 IN | HEART RATE: 92 BPM | WEIGHT: 233.1 LBS | RESPIRATION RATE: 16 BRPM

## 2018-04-30 DIAGNOSIS — M47.812 CERVICAL SPONDYLOSIS WITHOUT MYELOPATHY: ICD-10-CM

## 2018-04-30 DIAGNOSIS — N40.1 BENIGN PROSTATIC HYPERPLASIA WITH NOCTURIA: ICD-10-CM

## 2018-04-30 DIAGNOSIS — R35.1 BENIGN PROSTATIC HYPERPLASIA WITH NOCTURIA: ICD-10-CM

## 2018-04-30 DIAGNOSIS — J30.1 ACUTE SEASONAL ALLERGIC RHINITIS DUE TO POLLEN: ICD-10-CM

## 2018-04-30 DIAGNOSIS — R35.1 NOCTURIA: Primary | ICD-10-CM

## 2018-04-30 LAB
ALBUMIN UR-MCNC: NEGATIVE MG/DL
APPEARANCE UR: CLEAR
BILIRUB UR QL STRIP: NEGATIVE
COLOR UR AUTO: YELLOW
GLUCOSE UR STRIP-MCNC: NEGATIVE MG/DL
HBA1C MFR BLD: 5.3 % (ref 0–6.4)
HGB UR QL STRIP: ABNORMAL
HYALINE CASTS #/AREA URNS LPF: ABNORMAL /LPF
KETONES UR STRIP-MCNC: NEGATIVE MG/DL
LEUKOCYTE ESTERASE UR QL STRIP: NEGATIVE
MUCOUS THREADS #/AREA URNS LPF: PRESENT /LPF
NITRATE UR QL: NEGATIVE
PH UR STRIP: 6 PH (ref 5–7)
PSA SERPL-ACNC: 2.81 UG/L (ref 0–4)
RBC #/AREA URNS AUTO: ABNORMAL /HPF
SOURCE: ABNORMAL
SP GR UR STRIP: 1.02 (ref 1–1.03)
UROBILINOGEN UR STRIP-ACNC: 0.2 EU/DL (ref 0.2–1)
WBC #/AREA URNS AUTO: ABNORMAL /HPF

## 2018-04-30 PROCEDURE — 36415 COLL VENOUS BLD VENIPUNCTURE: CPT | Performed by: FAMILY MEDICINE

## 2018-04-30 PROCEDURE — 81001 URINALYSIS AUTO W/SCOPE: CPT | Performed by: FAMILY MEDICINE

## 2018-04-30 PROCEDURE — 99214 OFFICE O/P EST MOD 30 MIN: CPT | Performed by: FAMILY MEDICINE

## 2018-04-30 PROCEDURE — G0103 PSA SCREENING: HCPCS | Performed by: FAMILY MEDICINE

## 2018-04-30 PROCEDURE — 83036 HEMOGLOBIN GLYCOSYLATED A1C: CPT | Performed by: FAMILY MEDICINE

## 2018-04-30 RX ORDER — CETIRIZINE HYDROCHLORIDE 10 MG/1
10 TABLET ORAL EVERY EVENING
Qty: 30 TABLET | Refills: 1 | COMMUNITY
Start: 2018-04-30 | End: 2018-07-16

## 2018-04-30 ASSESSMENT — PAIN SCALES - GENERAL: PAINLEVEL: NO PAIN (0)

## 2018-04-30 NOTE — Clinical Note
Please call patient to schedule appointment with Urology, Dr. Moore. Electronically signed by Christopher Arreola MD

## 2018-04-30 NOTE — MR AVS SNAPSHOT
After Visit Summary   4/30/2018    Wenceslao Vasquez    MRN: [id number]           Patient Information     Date Of Birth          1960        Visit Information        Provider Department      4/30/2018 1:10 PM Christopher Arreola MD [facility]        Today's Diagnoses     Nocturia    -  1    Benign prostatic hyperplasia with nocturia        Acute seasonal allergic rhinitis due to pollen           Follow-ups after your visit        Your next 10 appointments already scheduled     Jun 07, 2018  8:20 AM CDT   PHYSICAL with Christopher Arreola MD   [facility] ([facility])    [address] [facility] [address] [phone]              Who to contact     If you have questions or need follow up information about today's clinic visit or your schedule please contact [facility] directly at [phone].  Normal or non-critical lab and imaging results will be communicated to you by MyChart, letter or phone within 4 business days after the clinic has received the results. If you do not hear from us within 7 days, please contact the clinic through MyChart or phone. If you have a critical or abnormal lab result, we will notify you by phone as soon as possible.  Submit refill requests through [employer] or call your pharmacy and they will forward the refill request to us. Please allow 3 business days for your refill to be completed.          Additional Information About Your Visit        MyChart Information     [employer] gives you secure access to your electronic health record. If you see a primary care provider, you can also send messages to your care team and make appointments. If you have questions, please call your primary care clinic.  If you do not have a primary care provider, please call [phone] and they will assist you.        Care EveryWhere ID     This is your Care EveryWhere ID. This could be used by other organizations to access your [facility]  "records  [id number]        Your Vitals Were     Pulse Temperature Respirations Height Pulse Oximetry BMI (Body Mass Index)    92 97.4  F (36.3  C) (Tympanic) 16 5' 9\" (1.753 m) 96% 34.42 kg/m2       Blood Pressure from Last 3 Encounters:   04/30/18 134/78   03/05/18 140/82   01/08/18 119/68    Weight from Last 3 Encounters:   04/30/18 233 lb 1.6 oz (105.7 kg)   03/05/18 229 lb 14.4 oz (104.3 kg)   01/08/18 220 lb (99.8 kg)              We Performed the Following     *UA reflex to Microscopic and Culture ([place] [facility] (except [place] and [place])     Hemoglobin A1c     PSA, screen     Urine Microscopic          Today's Medication Changes          These changes are accurate as of 4/30/18  1:47 PM.  If you have any questions, ask your nurse or doctor.               Start taking these medicines.        Dose/Directions    cetirizine 10 MG tablet   Commonly known as:  zyrTEC   Used for:  Acute seasonal allergic rhinitis due to pollen   Started by:  Christopher Arreola MD        Dose:  10 mg   Take 1 tablet (10 mg) by mouth every evening   Quantity:  30 tablet   Refills:  1            Where to get your medicines      Some of these will need a paper prescription and others can be bought over the counter.  Ask your nurse if you have questions.     You don't need a prescription for these medications     cetirizine 10 MG tablet                Primary Care Provider Office Phone # Fax #    Christopher Arreola [phone]       [address] [place] [address]        Equal Access to Services     BAUTISTA LU AH: Hadsadaf coles Soandrew, waaxda luqadaha, qaybta kaalmada adeegyada, srini idiin hayaan adecorinne moseley. So [facility] [phone].    ATENCIÓN: Si habla español, tiene a copeland disposición servicios gratuitos de asistencia lingüística. Llame al [phone].    We comply with applicable federal civil rights laws and [place] laws. We do not discriminate on the basis of race, color, national " origin, age, disability, sex, sexual orientation, or gender identity.            Thank you!     Thank you for choosing [facility]  for your care. Our goal is always to provide you with excellent care. Hearing back from our patients is one way we can continue to improve our services. Please take a few minutes to complete the written survey that you may receive in the mail after your visit with us. Thank you!             Your Updated Medication List - Protect others around you: Learn how to safely use, store and throw away your medicines at www.disposemymeds.org.          This list is accurate as of 4/30/18  1:47 PM.  Always use your most recent med list.                   Brand Name Dispense Instructions for use Diagnosis    cetirizine 10 MG tablet    zyrTEC    30 tablet    Take 1 tablet (10 mg) by mouth every evening    Acute seasonal allergic rhinitis due to pollen       meloxicam 7.5 MG tablet    MOBIC    30 tablet    Take 1 tablet (7.5 mg) by mouth daily    Strain of lumbar region, initial encounter       methocarbamol 750 MG tablet    ROBAXIN    60 tablet    Take 1 tablet (750 mg) by mouth At Bedtime And 3 times daily as needed.    Strain of lumbar region, initial encounter       MULTI VITAMIN MENS PO      Take 1 tablet by mouth daily.    Low back pain, S/P lumbar discectomy

## 2018-04-30 NOTE — NURSING NOTE
"Chief Complaint   Patient presents with     Headache     1 month     Urinary Problem     waking at night to urinate       Initial /78 (BP Location: Right arm, Patient Position: Chair, Cuff Size: Adult Regular)  Pulse 92  Temp 97.4  F (36.3  C) (Tympanic)  Resp 16  Ht 5' 9\" (1.753 m)  Wt 233 lb 1.6 oz (105.7 kg)  SpO2 96%  BMI 34.42 kg/m2 Estimated body mass index is 34.42 kg/(m^2) as calculated from the following:    Height as of this encounter: 5' 9\" (1.753 m).    Weight as of this encounter: 233 lb 1.6 oz (105.7 kg).  Medication Reconciliation: complete   Health Maintenance Due   Topic Date Due     HIV SCREEN (SYSTEM ASSIGNED)  09/28/1977     Ora Eric MA     4/30/2018      "

## 2018-04-30 NOTE — PROGRESS NOTES
SUBJECTIVE:   Wenceslao Vasquez is a 57 year old male who presents to clinic today for the following health issues:      Headache  Onset: 1 month    Description:   Location: bilateral in the occipital area   Character: throbbing pain  Frequency:  Once daily   Duration:  5 minutes     Intensity: mild    Progression of Symptoms:  same    Accompanying Signs & Symptoms:  Stiff neck: YES  Neck or upper back pain: YES- has middle back pain   Fever: no  Sinus pressure: YES  Nausea or vomiting: no  Dizziness: no  Numbness: YES- hands  Weakness: YES  Visual changes: YES- some    History:   Head trauma: no  Family history of migraines: YES  Previous tests for headaches: no  Neurologist evaluations: no  Able to do daily activities: YES  Wake with a headaches: YES  Do headaches wake you up: YES  Daily pain medication use: YES- tylenol over the counter   Work/school stressors/changes: no    Precipitating factors:   Does light make it worse: YES  Does sound make it worse: YES    Alleviating factors:  Does sleep help: YES    Therapies Tried and outcome: Tylenol- helps sometimes     MR CERVICAL SPINE WITHOUT CONTRAST 3/13/2014 8:35 AM      HISTORY: Cervicalgia.     TECHNIQUE: Multiplanar multisequence images were obtained through the  cervical spine without contrast.     COMPARISON: 1/4/2012.     FINDINGS: During the interval, the patient has undergone anterior  discectomy and fusion procedure from C3-C5 with anterior plate and  screws. Posterior alignment is normal. There is no evidence for  craniovertebral or cervicomedullary junction abnormality. The cervical  cord is normal in morphology and signal characteristics. Bone marrow  signal intensity is normal. Disc spaces are otherwise preserved.     C2-C3: Minimal central disc bulge. No stenosis.     C3-C4: This level has been fused. There is no significant stenosis.     C4-C5: This level has been fused. There is no significant stenosis.     C5-C6: Broad-based minimal disc bulge or  disc protrusion is present  causing some very mild central canal stenosis but no neural foraminal  stenosis.     C6-C7: Minimal disc bulge. No stenosis.     C7-T1: Normal.     Paraspinal soft tissues: Unremarkable as visualized.     IMPRESSION  IMPRESSION:  1. Interval C3-C5 anterior discectomy and fusion.  2. Mild central canal stenosis at C5-C6 due to some broad-based disc  bulging or disc protrusion.     DENAE CASTELLANOS MD      Genitourinary - Male  Onset: December 2017    Description:   Dysuria (painful urination): YES- some burning   Hematuria (blood in urine): no   Frequency: YES  Are you urinating at night : YES  Hesitancy (delay in urine): no   Retention (unable to empty): YES  Decrease in urinary flow: no   Incontinence: no     Progression of Symptoms:  worsening    Accompanying Signs & Symptoms:  Fever: no   Back/Flank pain: YES- mid back pain  Urethral discharge: no   Testicle lumps/masses/pain: no  Nausea and/or vomiting: no   Abdominal pain: YES- last week on lower left side     History:   History of frequent UTI's: no   History of kidney stones: no   History of hernias: no   Personal or Family history of Prostate problems: no  Sexually active: YES    Precipitating factors:   none    Alleviating factors:  none        Problem list and histories reviewed & adjusted, as indicated.  Additional history: He has history of BPH with urethral stricture with prior dilation X 2. He notes initial dysuria with void and frequent double voiding and nocturia. He was tried on Flomax in past without improvement.   He has prior C3-5 anterior fusion. Stable bilateral hand numbness with certain activities the occipital headaches seem to be better than last week. He does have sinus pressure and vertigo occasionally with head movement.    Patient Active Problem List   Diagnosis     CHELSY (obstructive sleep apnea)     Hyperlipidemia LDL goal <130     Sciatica     Low back pain     Stenosis of lumbosacral spine     Advanced  directives, counseling/discussion     Hypertension goal BP (blood pressure) < 140/90     Spinal stenosis in cervical region     Cervical spondylosis without myelopathy     Pneumonia     Morbid obesity due to excess calories (H)     Syncope     Exertional chest pain     Past Surgical History:   Procedure Laterality Date     C NONSPECIFIC PROCEDURE      lumbar disc surgery     C NONSPECIFIC PROCEDURE      hammer toe surgery right foot     C NONSPECIFIC PROCEDURE      nasal surgery     CERVICAL DISKECTOMY  8/8/13    [facility]     COLONOSCOPY  06/03/09     DIL URETHRA STRIC,MALE,SUBSEQ       HC REMOVAL HEEL SPUR, CALCANEUS  2/2005     HC REVISE MEDIAN N/CARPAL TUNNEL SURG  1993    right     HEAD & NECK SURGERY  2013    Plate in neck     LAPAROSCOPIC CHOLECYSTECTOMY  3/11/2013    Procedure: LAPAROSCOPIC CHOLECYSTECTOMY;  laparoscopic cholecystectomy;  Surgeon: Clinton Whittaker MD;  Location: [facility] OR       Social History   Substance Use Topics     Smoking status: Never Smoker     Smokeless tobacco: Never Used     Alcohol use Yes      Comment: occasional     Family History   Problem Relation Age of Onset     Alcohol/Drug Brother      Prostate Cancer Brother      Arthritis Mother      CANCER Mother      Kidney - Stage 3     HEART DISEASE Maternal Grandmother      HEART DISEASE Paternal Grandmother      C.A.D. No family hx of      DIABETES No family hx of      Anesthesia Reaction No family hx of      Cancer - colorectal No family hx of      Colon Cancer No family hx of          Current Outpatient Prescriptions   Medication Sig Dispense Refill     meloxicam (MOBIC) 7.5 MG tablet Take 1 tablet (7.5 mg) by mouth daily 30 tablet 1     methocarbamol (ROBAXIN) 750 MG tablet Take 1 tablet (750 mg) by mouth At Bedtime And 3 times daily as needed. 60 tablet 1     Multiple Vitamin (MULTI VITAMIN MENS PO) Take 1 tablet by mouth daily.       Allergies   Allergen Reactions     Dust Mites Hives     Morphine Nausea and Vomiting      Recent Labs   Lab Test  01/08/18   1505  01/08/18   0530  11/09/17   1709  10/27/17   1950   01/11/17   0855  07/21/16   0822   05/18/15   1216   05/12/10   0946   LDL   --    --    --    --    --   100*  90   --   47   < >  117   HDL   --    --    --    --    --   36*  36*   --   35*   < >  33*   TRIG   --    --    --    --    --   185*  139   --   202*   < >  148   ALT  31  35   --   37   --    --    --    --    --    < >   --    CR  0.69  0.74   --   0.74   < >   --    --    < >  1.03   < >  0.86   GFRESTIMATED  >90  >90   --   >90   < >   --    --    < >  75   < >  >90   GFRESTBLACK  >90  >90   --   >90   < >   --    --    < >  >90   GFR Calc     < >  >90   POTASSIUM  3.4  3.5   --   3.8   < >   --    --    < >  3.8   < >  4.2   TSH   --    --   0.70   --    --    --    --    --    --    --   0.71    < > = values in this interval not displayed.      BP Readings from Last 3 Encounters:   04/30/18 134/78   03/05/18 140/82   01/08/18 119/68    Wt Readings from Last 3 Encounters:   04/30/18 233 lb 1.6 oz (105.7 kg)   03/05/18 229 lb 14.4 oz (104.3 kg)   01/08/18 220 lb (99.8 kg)                  Labs reviewed in EPIC    Reviewed and updated as needed this visit by clinical staff  Tobacco  Allergies  Meds  Problems  Med Hx  Surg Hx  Fam Hx  Soc Hx        Reviewed and updated as needed this visit by Provider  Allergies  Meds  Problems         ROS:  CONSTITUTIONAL: NEGATIVE for fever, chills, change in weight  ENT/MOUTH: POSITIVE for nasal congestion, postnasal drainage, rhinorrhea-clear and vertigo and NEGATIVE for fever, rhinorrhea-purulent and sinus pressure  RESP: NEGATIVE for significant cough or SOB  CV: NEGATIVE for chest pain, palpitations or peripheral edema  : positive for, dysuria, erectile dysfunction, nocturia x 3 and retention  MUSCULOSKELETAL: POSITIVE  for neck pain, radicular pain occasionally to arms and occipital headaches and NEGATIVE for muscle cramps,nocturnal and  "myalgia  NEURO: POSITIVE for HX headaches-musculoskelatal, numbness or tingling occasionally into bilateral arms and vertigo and NEGATIVE for HX headaches-migraine, involuntary movements, gait disturbance and syncope  ROS otherwise negative    OBJECTIVE:     /78 (BP Location: Right arm, Patient Position: Chair, Cuff Size: Adult Regular)  Pulse 92  Temp 97.4  F (36.3  C) (Tympanic)  Resp 16  Ht 5' 9\" (1.753 m)  Wt 233 lb 1.6 oz (105.7 kg)  SpO2 96%  BMI 34.42 kg/m2  Body mass index is 34.42 kg/(m^2).  GENERAL: alert, no distress and over weight  NECK: no adenopathy, no asymmetry, masses, or scars, thyroid normal to palpation and tender bilateral C2-3  RESP: lungs clear to auscultation - no rales, rhonchi or wheezes  CV: regular rate and rhythm, normal S1 S2, no S3 or S4, no murmur, click or rub, no peripheral edema and peripheral pulses strong  ABDOMEN: soft, nontender, no hepatosplenomegaly, no masses and bowel sounds normal   (male): testicles normal without atrophy or masses, no hernias and penis normal without urethral discharge  RECTAL (male): normal sphincter tone, no rectal masses, prostate normal size, smooth, nontender without nodules or masses  MS: no gross musculoskeletal defects noted, no edema    Diagnostic Test Results:  Results for orders placed or performed in visit on 04/30/18 (from the past 24 hour(s))   *UA reflex to Microscopic and Culture ([place] [facility] (except [place] and [place])   Result Value Ref Range    Color Urine Yellow     Appearance Urine Clear     Glucose Urine Negative NEG^Negative mg/dL    Bilirubin Urine Negative NEG^Negative    Ketones Urine Negative NEG^Negative mg/dL    Specific Gravity Urine 1.025 1.003 - 1.035    Blood Urine Trace (A) NEG^Negative    pH Urine 6.0 5.0 - 7.0 pH    Protein Albumin Urine Negative NEG^Negative mg/dL    Urobilinogen Urine 0.2 0.2 - 1.0 EU/dL    Nitrite Urine Negative NEG^Negative    Leukocyte Esterase Urine Negative " NEG^Negative    Source Midstream Urine    Urine Microscopic   Result Value Ref Range    WBC Urine 0 - 5 OTO5^0 - 5 /HPF    RBC Urine O - 2 OTO2^O - 2 /HPF    Hyaline Casts O - 2 OTO2^O - 2 /LPF    Mucous Urine Present (A) NEG^Negative /LPF   PSA, screen   Result Value Ref Range    PSA 2.81 0 - 4 ug/L   Hemoglobin A1c   Result Value Ref Range    Hemoglobin A1C 5.3 0 - 6.4 %       ASSESSMENT/PLAN:     1. Nocturia  Progressive nocturia and frequency with known history or urethral stricture with prior dilation X 2. PSA stable and prostate exam mildly enlarged without nodularity. No glucosuria and normal A1C. No evidence for UTI. Will refer to Dr. Moore to consider repeat cystoscopy and dilation.   - *UA reflex to Microscopic and Culture ([place] and [facility] (except [place] and [place])  - Urine Microscopic  - Hemoglobin A1c    2. Benign prostatic hyperplasia with nocturia  Progressive nocturia and frequency with known history or urethral stricture with prior dilation X 2. PSA stable and prostate exam mildly enlarged without nodularity. No glucosuria and normal A1C. No evidence for UTI. Will refer to Dr. Moore to consider repeat cystoscopy and dilation.     - PSA, screen  - UROLOGY ADULT REFERRAL    3. Acute seasonal allergic rhinitis due to pollen  Acute seasonal rhinitis with sinus congestion and middle ear effusion leading to episodic vertigo. Start antihistamine.   - cetirizine (ZYRTEC) 10 MG tablet; Take 1 tablet (10 mg) by mouth every evening  Dispense: 30 tablet; Refill: 1    4. Cervical spondylosis without myelopathy  Chronic status post C3-5 anterior fusion. Persistent intermittent bilateral radiculitis now with intermittent C2 symptoms with Occipital headaches. These seem to have improved over the last week. Consider PT, warm pack neck 15-20 minutes 2-3 times day. The current medical regimen is effective;  continue present plan and medications.     CONSULTATION/REFERRAL to Urology    Work on  weight loss  Regular exercise    Christopher Arreola MD  [facility]

## 2018-05-14 ENCOUNTER — TRANSFERRED RECORDS (OUTPATIENT)
Dept: HEALTH INFORMATION MANAGEMENT | Facility: CLINIC | Age: 58
End: 2018-05-14

## 2018-06-07 ENCOUNTER — OFFICE VISIT (OUTPATIENT)
Dept: FAMILY MEDICINE | Facility: OTHER | Age: 58
End: 2018-06-07
Payer: COMMERCIAL

## 2018-06-07 VITALS
HEART RATE: 79 BPM | SYSTOLIC BLOOD PRESSURE: 130 MMHG | TEMPERATURE: 98.2 F | WEIGHT: 228.2 LBS | BODY MASS INDEX: 33.7 KG/M2 | OXYGEN SATURATION: 96 % | RESPIRATION RATE: 16 BRPM | DIASTOLIC BLOOD PRESSURE: 76 MMHG

## 2018-06-07 DIAGNOSIS — E78.5 HYPERLIPIDEMIA LDL GOAL <100: ICD-10-CM

## 2018-06-07 DIAGNOSIS — Z00.00 ENCOUNTER FOR ROUTINE ADULT HEALTH EXAMINATION WITHOUT ABNORMAL FINDINGS: Primary | ICD-10-CM

## 2018-06-07 DIAGNOSIS — I10 HYPERTENSION GOAL BP (BLOOD PRESSURE) < 140/90: ICD-10-CM

## 2018-06-07 LAB
CHOLEST SERPL-MCNC: 148 MG/DL
HDLC SERPL-MCNC: 31 MG/DL
LDLC SERPL CALC-MCNC: 77 MG/DL
NONHDLC SERPL-MCNC: 117 MG/DL
TRIGL SERPL-MCNC: 198 MG/DL

## 2018-06-07 PROCEDURE — 36415 COLL VENOUS BLD VENIPUNCTURE: CPT | Performed by: FAMILY MEDICINE

## 2018-06-07 PROCEDURE — 80061 LIPID PANEL: CPT | Performed by: FAMILY MEDICINE

## 2018-06-07 PROCEDURE — 99396 PREV VISIT EST AGE 40-64: CPT | Performed by: FAMILY MEDICINE

## 2018-06-07 RX ORDER — TAMSULOSIN HYDROCHLORIDE 0.4 MG/1
CAPSULE ORAL
Refills: 2 | Status: ON HOLD | COMMUNITY
Start: 2018-05-14 | End: 2018-12-10

## 2018-06-07 ASSESSMENT — PAIN SCALES - GENERAL: PAINLEVEL: NO PAIN (0)

## 2018-06-07 NOTE — PROGRESS NOTES
SUBJECTIVE:   CC: Wenceslao Vasquez is an 57 year old male who presents for preventative health visit.     Physical   Annual:     Getting at least 3 servings of Calcium per day::  Yes    Bi-annual eye exam::  Yes    Dental care twice a year::  Yes    Sleep apnea or symptoms of sleep apnea::  Sleep apnea    Diet::  Regular (no restrictions)    Frequency of exercise::  None    Taking medications regularly::  Yes    Medication side effects::  Not applicable    Additional concerns today::  No              Today's PHQ-2 Score:   PHQ-2 ( 1999 [employer]) 6/5/2018   Q1: Little interest or pleasure in doing things 0   Q2: Feeling down, depressed or hopeless 0   PHQ-2 Score 0   Q1: Little interest or pleasure in doing things Not at all   Q2: Feeling down, depressed or hopeless Not at all   PHQ-2 Score 0       Abuse: Current or Past(Physical, Sexual or Emotional)- No  Do you feel safe in your environment - Yes    Social History   Substance Use Topics     Smoking status: Never Smoker     Smokeless tobacco: Never Used     Alcohol use Yes      Comment: occasional     Alcohol Use 6/5/2018   If you drink alcohol do you typically have greater than 3 drinks per day OR greater than 7 drinks per week? No       Last PSA:   PSA   Date Value Ref Range Status   04/30/2018 2.81 0 - 4 ug/L Final     Comment:     Assay Method:  Chemiluminescence using Siemens Vista analyzer       Reviewed orders with patient. Reviewed health maintenance and updated orders accordingly - Yes  Labs reviewed in EPIC  BP Readings from Last 3 Encounters:   06/07/18 130/76   04/30/18 134/78   03/05/18 140/82    Wt Readings from Last 3 Encounters:   06/07/18 228 lb 3.2 oz (103.5 kg)   04/30/18 233 lb 1.6 oz (105.7 kg)   03/05/18 229 lb 14.4 oz (104.3 kg)                  Patient Active Problem List   Diagnosis     CHELSY (obstructive sleep apnea)     Hyperlipidemia LDL goal <130     Sciatica     Low back pain     Stenosis of lumbosacral spine     Advanced directives,  counseling/discussion     Hypertension goal BP (blood pressure) < 140/90     Spinal stenosis in cervical region     Cervical spondylosis without myelopathy     Pneumonia     Morbid obesity due to excess calories (H)     Syncope     Exertional chest pain     Past Surgical History:   Procedure Laterality Date     C NONSPECIFIC PROCEDURE      lumbar disc surgery     C NONSPECIFIC PROCEDURE      hammer toe surgery right foot     C NONSPECIFIC PROCEDURE      nasal surgery     CERVICAL DISKECTOMY  8/8/13    [facility]     COLONOSCOPY  06/03/09     DIL URETHRA STRIC,MALE,SUBSEQ       HC REMOVAL HEEL SPUR, CALCANEUS  2/2005     HC REVISE MEDIAN N/CARPAL TUNNEL SURG  1993    right     HEAD & NECK SURGERY  2013    Plate in neck     LAPAROSCOPIC CHOLECYSTECTOMY  3/11/2013    Procedure: LAPAROSCOPIC CHOLECYSTECTOMY;  laparoscopic cholecystectomy;  Surgeon: Clinton Whittaker MD;  Location: [facility] OR       Social History   Substance Use Topics     Smoking status: Never Smoker     Smokeless tobacco: Never Used     Alcohol use Yes      Comment: occasional     Family History   Problem Relation Age of Onset     Alcohol/Drug Brother      Prostate Cancer Brother      Arthritis Mother      CANCER Mother      Kidney - Stage 3     HEART DISEASE Maternal Grandmother      HEART DISEASE Paternal Grandmother      C.A.D. No family hx of      DIABETES No family hx of      Anesthesia Reaction No family hx of      Cancer - colorectal No family hx of      Colon Cancer No family hx of          Current Outpatient Prescriptions   Medication Sig Dispense Refill     cetirizine (ZYRTEC) 10 MG tablet Take 1 tablet (10 mg) by mouth every evening 30 tablet 1     meloxicam (MOBIC) 7.5 MG tablet Take 1 tablet (7.5 mg) by mouth daily 30 tablet 1     methocarbamol (ROBAXIN) 750 MG tablet Take 1 tablet (750 mg) by mouth At Bedtime And 3 times daily as needed. 60 tablet 1     Multiple Vitamin (MULTI VITAMIN MENS PO) Take 1 tablet by mouth daily.        tamsulosin (FLOMAX) 0.4 MG capsule TAKE 1 CAPSULE BY MOUTH EVERY DAY  2     Allergies   Allergen Reactions     Dust Mites Hives     Morphine Nausea and Vomiting     Recent Labs   Lab Test  04/30/18   1347  01/08/18   1505  01/08/18   0530  11/09/17   1709  10/27/17   1950   01/11/17   0855  07/21/16   0822   05/18/15   1216   05/12/10   0946   A1C  5.3   --    --    --    --    --    --    --    --    --    --    --    LDL   --    --    --    --    --    --   100*  90   --   47   < >  117   HDL   --    --    --    --    --    --   36*  36*   --   35*   < >  33*   TRIG   --    --    --    --    --    --   185*  139   --   202*   < >  148   ALT   --   31  35   --   37   --    --    --    --    --    < >   --    CR   --   0.69  0.74   --   0.74   < >   --    --    < >  1.03   < >  0.86   GFRESTIMATED   --   >90  >90   --   >90   < >   --    --    < >  75   < >  >90   GFRESTBLACK   --   >90  >90   --   >90   < >   --    --    < >  >90   GFR Calc     < >  >90   POTASSIUM   --   3.4  3.5   --   3.8   < >   --    --    < >  3.8   < >  4.2   TSH   --    --    --   0.70   --    --    --    --    --    --    --   0.71    < > = values in this interval not displayed.        Reviewed and updated as needed this visit by clinical staff  Tobacco  Allergies  Meds  Problems  Med Hx  Surg Hx  Fam Hx  Soc Hx          Reviewed and updated as needed this visit by Provider  Allergies  Meds  Problems            Review of Systems  CONSTITUTIONAL: NEGATIVE for fever, chills, change in weight  INTEGUMENTARY/SKIN: NEGATIVE for worrisome rashes, moles or lesions  EYES: NEGATIVE for vision changes or irritation  ENT: NEGATIVE for ear, mouth and throat problems  RESP: NEGATIVE for significant cough or SOB  CV: NEGATIVE for chest pain, palpitations or peripheral edema  GI: NEGATIVE for nausea, abdominal pain, heartburn, or change in bowel habits   male: nocturia x 3  MUSCULOSKELETAL: NEGATIVE for significant arthralgias  or myalgia  NEURO: NEGATIVE for weakness, dizziness or paresthesias  PSYCHIATRIC: NEGATIVE for changes in mood or affect    OBJECTIVE:   /76  Pulse 79  Temp 98.2  F (36.8  C) (Temporal)  Resp 16  Wt 228 lb 3.2 oz (103.5 kg)  SpO2 96%  BMI 33.7 kg/m2    Physical Exam  GENERAL: alert, no distress and over weight  EYES: Eyes grossly normal to inspection, PERRL and conjunctivae and sclerae normal  HENT: ear canals and TM's normal, nose and mouth without ulcers or lesions  NECK: no adenopathy, no asymmetry, masses, or scars and thyroid normal to palpation  RESP: lungs clear to auscultation - no rales, rhonchi or wheezes  CV: regular rate and rhythm, normal S1 S2, no S3 or S4, no murmur, click or rub, no peripheral edema and peripheral pulses strong  ABDOMEN: soft, nontender, no hepatosplenomegaly, no masses and bowel sounds normal  MS: no gross musculoskeletal defects noted, no edema  SKIN: no suspicious lesions or rashes  NEURO: Normal strength and tone, mentation intact and speech normal  PSYCH: mentation appears normal, affect normal/bright    ASSESSMENT/PLAN:       ICD-10-CM    1. Encounter for routine adult health examination without abnormal findings Z00.00    2. Hyperlipidemia LDL goal <100 E78.5 Lipid panel reflex to direct LDL Fasting   3. Hypertension goal BP (blood pressure) < 140/90 I10        COUNSELING:   Reviewed preventive health counseling, as reflected in patient instructions       Regular exercise       Healthy diet/nutrition       Vision screening       Immunizations    Vaccine: Zoster recommended        BP Screening:   Last 3 BP Readings:    BP Readings from Last 3 Encounters:   06/07/18 130/76   04/30/18 134/78   03/05/18 140/82       The following was recommended to the patient:  Re-screen BP within a year and recommended lifestyle modifications     reports that he has never smoked. He has never used smokeless tobacco.    Estimated body mass index is 33.7 kg/(m^2) as calculated from the  "following:    Height as of 4/30/18: 5' 9\" (1.753 m).    Weight as of this encounter: 228 lb 3.2 oz (103.5 kg).   Weight management plan: Discussed healthy diet and exercise guidelines and patient will follow up in 12 months in clinic to re-evaluate.    Counseling Resources:  ATP IV Guidelines  Pooled Cohorts Equation Calculator  FRAX Risk Assessment  ICSI Preventive Guidelines  Dietary Guidelines for Americans, 2010  USDA's MyPlate  ASA Prophylaxis  Lung CA Screening    Christopher Arreola MD  [facility]    The 10-year ASCVD risk score (Leydi FAROOQ Jr, et al., 2013) is: 8.4%    Values used to calculate the score:      Age: 57 years      Sex: Male      Is Non- : No      Diabetic: No      Tobacco smoker: No      Systolic Blood Pressure: 130 mmHg      Is BP treated: No      HDL Cholesterol: 36 mg/dL      Total Cholesterol: 173 mg/dL    "

## 2018-06-14 ENCOUNTER — TRANSFERRED RECORDS (OUTPATIENT)
Dept: HEALTH INFORMATION MANAGEMENT | Facility: CLINIC | Age: 58
End: 2018-06-14

## 2018-07-16 ENCOUNTER — OFFICE VISIT (OUTPATIENT)
Dept: FAMILY MEDICINE | Facility: OTHER | Age: 58
End: 2018-07-16
Payer: COMMERCIAL

## 2018-07-16 VITALS
RESPIRATION RATE: 16 BRPM | OXYGEN SATURATION: 96 % | DIASTOLIC BLOOD PRESSURE: 80 MMHG | TEMPERATURE: 97.1 F | SYSTOLIC BLOOD PRESSURE: 134 MMHG | HEART RATE: 78 BPM | BODY MASS INDEX: 33.02 KG/M2 | WEIGHT: 223.6 LBS

## 2018-07-16 DIAGNOSIS — Z01.818 PREOP GENERAL PHYSICAL EXAM: Primary | ICD-10-CM

## 2018-07-16 DIAGNOSIS — N13.8 BENIGN PROSTATIC HYPERPLASIA WITH URINARY OBSTRUCTION: ICD-10-CM

## 2018-07-16 DIAGNOSIS — N40.1 BENIGN PROSTATIC HYPERPLASIA WITH URINARY OBSTRUCTION: ICD-10-CM

## 2018-07-16 PROCEDURE — 99214 OFFICE O/P EST MOD 30 MIN: CPT | Performed by: FAMILY MEDICINE

## 2018-07-16 ASSESSMENT — PAIN SCALES - GENERAL: PAINLEVEL: NO PAIN (0)

## 2018-07-16 NOTE — MR AVS SNAPSHOT
After Visit Summary   7/16/2018    Wenceslao Vasquez    MRN: [id number]           Patient Information     Date Of Birth          1960        Visit Information        Provider Department      7/16/2018 9:40 AM Christopher Arreola MD [facility]        Today's Diagnoses     Preop general physical exam    -  1    Benign prostatic hyperplasia with urinary obstruction          Care Instructions      Before Your Surgery      Call your surgeon if there is any change in your health. This includes signs of a cold or flu (such as a sore throat, runny nose, cough, rash or fever).    Do not smoke, drink alcohol or take over the counter medicine (unless your surgeon or primary care doctor tells you to) for the 24 hours before and after surgery.    If you take prescribed drugs: Follow your doctor s orders about which medicines to take and which to stop until after surgery.    Eating and drinking prior to surgery: follow the instructions from your surgeon    Take a shower or bath the night before surgery. Use the soap your surgeon gave you to gently clean your skin. If you do not have soap from your surgeon, use your regular soap. Do not shave or scrub the surgery site.  Wear clean pajamas and have clean sheets on your bed.           Follow-ups after your visit        Your next 10 appointments already scheduled     Jul 23, 2018   Procedure with Dakota Moore MD   [facility] ([facility])    [address] [place] [address] [phone]           From [address]: Exit at [address] on [place] side of [place]. Turn right on [address]. Turn left at stoplight on [address]. [facility] will be in view two blocks ahead              Who to contact     If you have questions or need follow up information about today's clinic visit or your schedule please contact [facility] directly at [phone].  Normal or non-critical lab  and imaging results will be communicated to you by MyChart, letter or phone within 4 business days after the clinic has received the results. If you do not hear from us within 7 days, please contact the clinic through [employer] or phone. If you have a critical or abnormal lab result, we will notify you by phone as soon as possible.  Submit refill requests through [employer] or call your pharmacy and they will forward the refill request to us. Please allow 3 business days for your refill to be completed.          Additional Information About Your Visit        [employer] Information     [employer] gives you secure access to your electronic health record. If you see a primary care provider, you can also send messages to your care team and make appointments. If you have questions, please call your primary care clinic.  If you do not have a primary care provider, please call [phone] and they will assist you.        Care EveryWhere ID     This is your Care EveryWhere ID. This could be used by other organizations to access your [place] medical records  [id number]        Your Vitals Were     Pulse Temperature Respirations Pulse Oximetry BMI (Body Mass Index)       78 97.1  F (36.2  C) (Tympanic) 16 96% 33.02 kg/m2        Blood Pressure from Last 3 Encounters:   07/16/18 134/80   06/07/18 130/76   04/30/18 134/78    Weight from Last 3 Encounters:   07/16/18 223 lb 9.6 oz (101.4 kg)   06/07/18 228 lb 3.2 oz (103.5 kg)   04/30/18 233 lb 1.6 oz (105.7 kg)              Today, you had the following     No orders found for display       Primary Care Provider Office Phone # Fax #    Christopher Arreola [phone]       [address] [facility] [address]        Equal Access to Services     [facility] : Hadii ab [place], wapearlda luqadaha, qaybta kaalmada jimmy, srini moseley. So [facility] [phone].    ATENCIÓN: Si habla español, tiene a copeland disposición servicios gratuitos de  asistencia lingüística. Rhett al [phone].    We comply with applicable federal civil rights laws and [place] laws. We do not discriminate on the basis of race, color, national origin, age, disability, sex, sexual orientation, or gender identity.            Thank you!     Thank you for choosing [facility]  for your care. Our goal is always to provide you with excellent care. Hearing back from our patients is one way we can continue to improve our services. Please take a few minutes to complete the written survey that you may receive in the mail after your visit with us. Thank you!             Your Updated Medication List - Protect others around you: Learn how to safely use, store and throw away your medicines at www.disposemymeds.org.          This list is accurate as of 7/16/18 10:24 AM.  Always use your most recent med list.                   Brand Name Dispense Instructions for use Diagnosis    MULTI VITAMIN MENS PO      Take 1 tablet by mouth daily.    Low back pain, S/P lumbar discectomy       tamsulosin 0.4 MG capsule    FLOMAX     TAKE 1 CAPSULE BY MOUTH EVERY DAY
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
## 2018-07-22 ENCOUNTER — ANESTHESIA EVENT (OUTPATIENT)
Dept: SURGERY | Facility: CLINIC | Age: 58
End: 2018-07-22
Payer: COMMERCIAL

## 2018-07-23 ENCOUNTER — SURGERY (OUTPATIENT)
Age: 58
End: 2018-07-23

## 2018-07-23 ENCOUNTER — HOSPITAL ENCOUNTER (OUTPATIENT)
Facility: CLINIC | Age: 58
Discharge: HOME | End: 2018-07-23
Attending: UROLOGY | Admitting: UROLOGY
Payer: COMMERCIAL

## 2018-07-23 ENCOUNTER — ANESTHESIA (OUTPATIENT)
Dept: SURGERY | Facility: CLINIC | Age: 58
End: 2018-07-23
Payer: COMMERCIAL

## 2018-07-23 VITALS
SYSTOLIC BLOOD PRESSURE: 152 MMHG | OXYGEN SATURATION: 97 % | TEMPERATURE: 97.9 F | DIASTOLIC BLOOD PRESSURE: 85 MMHG | RESPIRATION RATE: 14 BRPM | HEART RATE: 58 BPM

## 2018-07-23 DIAGNOSIS — N35.912 BULBOUS URETHRAL STRICTURE: Primary | ICD-10-CM

## 2018-07-23 PROCEDURE — 36000050 ZZH SURGERY LEVEL 2 1ST 30 MIN: Performed by: UROLOGY

## 2018-07-23 PROCEDURE — 40000306 ZZH STATISTIC PRE PROC ASSESS II: Performed by: UROLOGY

## 2018-07-23 PROCEDURE — 71000014 ZZH RECOVERY PHASE 1 LEVEL 2 FIRST HR: Performed by: UROLOGY

## 2018-07-23 PROCEDURE — 36000052 ZZH SURGERY LEVEL 2 EA 15 ADDTL MIN: Performed by: UROLOGY

## 2018-07-23 PROCEDURE — 71000027 ZZH RECOVERY PHASE 2 EACH 15 MINS: Performed by: UROLOGY

## 2018-07-23 PROCEDURE — 25000125 ZZHC RX 250: Performed by: NURSE ANESTHETIST, CERTIFIED REGISTERED

## 2018-07-23 PROCEDURE — 25000128 H RX IP 250 OP 636: Performed by: NURSE ANESTHETIST, CERTIFIED REGISTERED

## 2018-07-23 PROCEDURE — 37000008 ZZH ANESTHESIA TECHNICAL FEE, 1ST 30 MIN: Performed by: UROLOGY

## 2018-07-23 PROCEDURE — 27210794 ZZH OR GENERAL SUPPLY STERILE: Performed by: UROLOGY

## 2018-07-23 PROCEDURE — 25000128 H RX IP 250 OP 636: Performed by: UROLOGY

## 2018-07-23 PROCEDURE — 25000125 ZZHC RX 250: Performed by: UROLOGY

## 2018-07-23 PROCEDURE — 37000009 ZZH ANESTHESIA TECHNICAL FEE, EACH ADDTL 15 MIN: Performed by: UROLOGY

## 2018-07-23 PROCEDURE — C1769 GUIDE WIRE: HCPCS | Performed by: UROLOGY

## 2018-07-23 PROCEDURE — 25000566 ZZH SEVOFLURANE, EA 15 MIN: Performed by: UROLOGY

## 2018-07-23 RX ORDER — ONDANSETRON 2 MG/ML
4 INJECTION INTRAMUSCULAR; INTRAVENOUS EVERY 30 MIN PRN
Status: CANCELLED | OUTPATIENT
Start: 2018-07-23

## 2018-07-23 RX ORDER — ONDANSETRON 4 MG/1
4 TABLET, ORALLY DISINTEGRATING ORAL EVERY 30 MIN PRN
Status: CANCELLED | OUTPATIENT
Start: 2018-07-23

## 2018-07-23 RX ORDER — NALOXONE HYDROCHLORIDE 0.4 MG/ML
.1-.4 INJECTION, SOLUTION INTRAMUSCULAR; INTRAVENOUS; SUBCUTANEOUS
Status: CANCELLED | OUTPATIENT
Start: 2018-07-23 | End: 2018-07-24

## 2018-07-23 RX ORDER — DEXAMETHASONE SODIUM PHOSPHATE 10 MG/ML
INJECTION, SOLUTION INTRAMUSCULAR; INTRAVENOUS PRN
Status: DISCONTINUED | OUTPATIENT
Start: 2018-07-23 | End: 2018-07-23

## 2018-07-23 RX ORDER — FENTANYL CITRATE 50 UG/ML
INJECTION, SOLUTION INTRAMUSCULAR; INTRAVENOUS PRN
Status: DISCONTINUED | OUTPATIENT
Start: 2018-07-23 | End: 2018-07-23

## 2018-07-23 RX ORDER — CEFAZOLIN SODIUM 2 G/100ML
2 INJECTION, SOLUTION INTRAVENOUS
Status: COMPLETED | OUTPATIENT
Start: 2018-07-23 | End: 2018-07-23

## 2018-07-23 RX ORDER — LIDOCAINE HYDROCHLORIDE 20 MG/ML
INJECTION, SOLUTION INFILTRATION; PERINEURAL PRN
Status: DISCONTINUED | OUTPATIENT
Start: 2018-07-23 | End: 2018-07-23

## 2018-07-23 RX ORDER — ONDANSETRON 2 MG/ML
INJECTION INTRAMUSCULAR; INTRAVENOUS PRN
Status: DISCONTINUED | OUTPATIENT
Start: 2018-07-23 | End: 2018-07-23

## 2018-07-23 RX ORDER — CEPHALEXIN 500 MG/1
500 CAPSULE ORAL 3 TIMES DAILY
Qty: 15 CAPSULE | Refills: 0 | Status: SHIPPED | OUTPATIENT
Start: 2018-07-23 | End: 2019-02-06

## 2018-07-23 RX ORDER — SODIUM CHLORIDE, SODIUM LACTATE, POTASSIUM CHLORIDE, CALCIUM CHLORIDE 600; 310; 30; 20 MG/100ML; MG/100ML; MG/100ML; MG/100ML
INJECTION, SOLUTION INTRAVENOUS CONTINUOUS
Status: DISCONTINUED | OUTPATIENT
Start: 2018-07-23 | End: 2018-07-23 | Stop reason: HOSPADM

## 2018-07-23 RX ORDER — LIDOCAINE 40 MG/G
CREAM TOPICAL
Status: DISCONTINUED | OUTPATIENT
Start: 2018-07-23 | End: 2018-07-23 | Stop reason: HOSPADM

## 2018-07-23 RX ORDER — PROPOFOL 10 MG/ML
INJECTION, EMULSION INTRAVENOUS PRN
Status: DISCONTINUED | OUTPATIENT
Start: 2018-07-23 | End: 2018-07-23

## 2018-07-23 RX ORDER — MEPERIDINE HYDROCHLORIDE 50 MG/ML
12.5 INJECTION INTRAMUSCULAR; INTRAVENOUS; SUBCUTANEOUS
Status: CANCELLED | OUTPATIENT
Start: 2018-07-23

## 2018-07-23 RX ORDER — SODIUM CHLORIDE, SODIUM LACTATE, POTASSIUM CHLORIDE, CALCIUM CHLORIDE 600; 310; 30; 20 MG/100ML; MG/100ML; MG/100ML; MG/100ML
INJECTION, SOLUTION INTRAVENOUS CONTINUOUS
Status: CANCELLED | OUTPATIENT
Start: 2018-07-23

## 2018-07-23 RX ORDER — FENTANYL CITRATE 50 UG/ML
25-50 INJECTION, SOLUTION INTRAMUSCULAR; INTRAVENOUS
Status: CANCELLED | OUTPATIENT
Start: 2018-07-23

## 2018-07-23 RX ORDER — FENTANYL CITRATE 50 UG/ML
25-50 INJECTION, SOLUTION INTRAMUSCULAR; INTRAVENOUS EVERY 5 MIN PRN
Status: CANCELLED | OUTPATIENT
Start: 2018-07-23

## 2018-07-23 RX ORDER — DIMENHYDRINATE 50 MG/ML
25 INJECTION, SOLUTION INTRAMUSCULAR; INTRAVENOUS
Status: CANCELLED | OUTPATIENT
Start: 2018-07-23

## 2018-07-23 RX ADMIN — LIDOCAINE HYDROCHLORIDE 40 MG: 20 INJECTION, SOLUTION INFILTRATION; PERINEURAL at 09:01

## 2018-07-23 RX ADMIN — DEXAMETHASONE SODIUM PHOSPHATE 10 MG: 10 INJECTION, SOLUTION INTRAMUSCULAR; INTRAVENOUS at 09:14

## 2018-07-23 RX ADMIN — LIDOCAINE HYDROCHLORIDE 1 ML: 10 INJECTION, SOLUTION EPIDURAL; INFILTRATION; INTRACAUDAL; PERINEURAL at 08:09

## 2018-07-23 RX ADMIN — ONDANSETRON 4 MG: 2 INJECTION INTRAMUSCULAR; INTRAVENOUS at 09:16

## 2018-07-23 RX ADMIN — LIDOCAINE HYDROCHLORIDE 10 ML: 20 JELLY TOPICAL at 09:23

## 2018-07-23 RX ADMIN — PROPOFOL 200 MG: 10 INJECTION, EMULSION INTRAVENOUS at 09:01

## 2018-07-23 RX ADMIN — FENTANYL CITRATE 25 MCG: 50 INJECTION, SOLUTION INTRAMUSCULAR; INTRAVENOUS at 09:19

## 2018-07-23 RX ADMIN — FENTANYL CITRATE 25 MCG: 50 INJECTION, SOLUTION INTRAMUSCULAR; INTRAVENOUS at 09:00

## 2018-07-23 RX ADMIN — SODIUM CHLORIDE, POTASSIUM CHLORIDE, SODIUM LACTATE AND CALCIUM CHLORIDE: 600; 310; 30; 20 INJECTION, SOLUTION INTRAVENOUS at 08:09

## 2018-07-23 RX ADMIN — CEFAZOLIN SODIUM 2 G: 2 INJECTION, SOLUTION INTRAVENOUS at 08:55

## 2018-07-23 NOTE — ANESTHESIA CARE TRANSFER NOTE
Patient: Wenceslao Vasquez    Procedure(s):  cystosdcopy with urethral dilation and catheter placement - Wound Class: II-Clean Contaminated    Diagnosis: poor stream  Diagnosis Additional Information: No value filed.    Anesthesia Type:   General, LMA     Note:  Airway :Nasal Cannula  Patient transferred to:PACU  Comments: To PACU after LMA D/Cd.  Dentition intact/atraumatic.  Report to RN VSS Respiratory status stable.Handoff Report: Identifed the Patient, Identified the Reponsible Provider, Reviewed the pertinent medical history, Discussed the surgical course, Reviewed Intra-OP anesthesia mangement and issues during anesthesia, Set expectations for post-procedure period and Allowed opportunity for questions and acknowledgement of understanding      Vitals: (Last set prior to Anesthesia Care Transfer)    CRNA VITALS  7/23/2018 0902 - 7/23/2018 0935      7/23/2018             Pulse: 54    SpO2: 99 %                Electronically Signed By: ANNEL Ferrer CRNA  July 23, 2018  9:35 AM

## 2018-07-23 NOTE — BRIEF OP NOTE
[facility] Urology Brief Operative Note    Pre-operative diagnosis: poor stream   Post-operative diagnosis: Same, bulbous urethral stricture.   Procedure: Procedure(s):  COMBINED CYSTOSCOPY, DILATE URETHRA   Surgeon: Dakota Moore MD, MD   Assistant(s): none   Anesthesia: LMA   Estimated blood loss: Minimal   Total IV fluids: (See anesthesia record)   Blood transfusion: No transfusion was given during surgery   Total urine output: (See anesthesia record)   Drains: Murrell catheter   Specimens: None   Implants: None   Findings: See dictation.   Complications: None   Condition: Stable   Comments: See dictated operative report for full details.    Dakota Moore MD

## 2018-07-23 NOTE — ANESTHESIA PREPROCEDURE EVALUATION
Anesthesia Evaluation     .             ROS/MED HX    ENT/Pulmonary:     (+)sleep apnea, , recent URI . .    Neurologic:  - neg neurologic ROS     Cardiovascular:     (+) Dyslipidemia, hypertension--angina--. : . . fainting (syncope). :. .       METS/Exercise Tolerance:     Hematologic:  - neg hematologic  ROS       Musculoskeletal:   (+) , , other musculoskeletal- Cervical and lumbar stenosis with cervical and lumbar discectomies      GI/Hepatic:     (+) cholecystitis/cholelithiasis (lap matthew 3/11/13),       Renal/Genitourinary:     (+) Other Renal/ Genitourinary,       Endo:     (+) Obesity, .      Psychiatric:  - neg psychiatric ROS       Infectious Disease:  - neg infectious disease ROS       Malignancy:      - no malignancy   Other:    - neg other ROS                 Physical Exam  Normal systems: cardiovascular and pulmonary    Airway   Mallampati: III  TM distance: >3 FB  Neck ROM: full    Dental   (+) chipped    Cardiovascular   Rhythm and rate: regular and normal      Pulmonary    breath sounds clear to auscultation                    Anesthesia Plan      History & Physical Review  History and physical reviewed and following examination; no interval change.    ASA Status:  3 .    NPO Status:  > 8 hours    Plan for General and LMA with Intravenous and Propofol induction. Maintenance will be Inhalation and Balanced.    PONV prophylaxis:  Ondansetron (or other 5HT-3) and Dexamethasone or Solumedrol  Additional equipment: Videolaryngoscope      Postoperative Care  Postoperative pain management:  IV analgesics and Oral pain medications.      Consents  Anesthetic plan, risks, benefits and alternatives discussed with:  Patient..                          .

## 2018-07-23 NOTE — OP NOTE
Procedure Date: 2018      DATE OF PROCEDURE: 2018      NAME OF PROCEDURE:  Cystoscopy, urethral dilation.      PREOPERATIVE DIAGNOSIS:  Poor stream.      POSTOPERATIVE DIAGNOSES:   1.  Poor stream.   2.  Bulbous urethral stricture.      OPERATIVE NOTE:  Informed consent was obtained from the patient.  He was brought to the operating room, given laryngeal mask anesthesia, placed in lithotomy position.  Sterile prep and drape were applied and surgical timeout was taken.  Cystoscope was introduced into the urethra.  This was advanced to the bulbous urethra where a fairly tight stricture was seen.  Sensor guidewire was passed through this and up into the bladder, and the scope was removed.  The urethra was dilated to 26-Kyrgyz using Aguirre sounds over the guidewire.  The scope was again inserted and the bladder was inspected and revealed a minimally obstructing prostate gland.  The interior of the bladder was lightly trabeculated.  No bladder tumors or stones were seen.  Scope was removed leaving the guidewire in place and then an 18 Kyrgyz Councill catheter was placed over the wire into the bladder.  Guidewire was removed, balloon was inflated with 10 mL sterile water and the procedure was terminated at that point.  He tolerated the procedure well.  There were no complications.  Minimal blood loss.  We will leave the Murrell catheter in place and he will return to clinic Thursday this week for trial of voiding.         DAKOTA MCCULLOUGH MD             D: 2018   T: 2018   MT: PRASHANTH      Name:     ROSA CACERES   MRN:      [id number]        Account:        [id number]   :      1960           Procedure Date: 2018      Document: [id number]       cc: Dakota Mccullough MD

## 2018-07-23 NOTE — DISCHARGE INSTRUCTIONS
Discharge Instructions Following Cystoscopy  Dr Dakota Moore    Following your cysto today, you may experience:     1. Small amounts of bleeding   2. A mild burning sensation, especially with voiding for a day or two.    To relieve these symptoms:     1. Sit in a warm (not hot) tub, or apply a warm, moist washcloth to the area for 15-20 minutes as often as needed.  You may do this several times during the day.   2. Drink lots of water and other liquids.   3. Rest.    If you have any heavy bleeding, please call our office at [phone].      Follow up Appointment: Thursday 7/26 at 10:50 am in the [facility] with Dr. Moore  Same-Day Surgery   Adult Discharge Orders & Instructions     For 24 hours after surgery    1. Get plenty of rest.  A responsible adult must stay with you for at least 24 hours after you leave the hospital.   2. Do not drive or use heavy equipment.  If you have weakness or tingling, don't drive or use heavy equipment until this feeling goes away.  3. Do not drink alcohol.  4. Avoid strenuous or risky activities.  Ask for help when climbing stairs.   5. You may feel lightheaded.  If so, sit for a few minutes before standing.  Have someone help you get up.   6. You may have a slight fever. Call the doctor if your fever is over 100 F (37.7 C) (taken under the tongue) or lasts longer than 24 hours.  7. You may have a dry mouth, a sore throat, muscle aches or trouble sleeping.  These should go away after 24 hours.  8. Do not make important or legal decisions.  Based on the surgery/procedure that you had today, we do not expect that you will have any problems.  However, we want you to know what to do if you have pain, nausea, bleeding,or infection:  To control pain:  Take medicines your physician has prescribed or or over-the counter medicine he or she advises.  Ice packs and periods of rest are often helpful.  For surgery on an arm or leg, raise it on a pillow to ease swelling.   If your pain is not managed with the above methods, contact your physician.  To control nausea:  Take anti-nausea medicine approved by your physician.  Drink clear liquids such as apple juice, ginger ale, broth or 7-Up. Be sure to drink enough fluids.  Move to a regular diet as you feel able.  Rest may also help.  Bleeding:  You may see a little blood on your dressing, about the size of a quarter in the first 24 hours.  If you see this, there is no reason to be alarmed.  However, if this continues to increase in size, apply pressure if able, and notify your physician.  Infection: Please contact your physician if you have any of the following signs:  redness, swelling, heat, increasing pain or foul-smelling drainage at your surgery site, fever or chills,     Call your doctor for any of the followin.  It has been over 8 to 10 hours since surgery and you are still not able to urinate (pass water).    2.  Headache for over 24 hours.    3.  Numbness, tingling or weakness in your legs the day after surgery (if you had spinal anesthesia).    Nurse advice line: [phone]

## 2018-07-23 NOTE — ANESTHESIA POSTPROCEDURE EVALUATION
Patient: Wenceslao Vasquez    Procedure(s):  cystosdcopy with urethral dilation and catheter placement - Wound Class: II-Clean Contaminated    Diagnosis:poor stream  Diagnosis Additional Information: No value filed.    Anesthesia Type:  General, LMA    Note:  Anesthesia Post Evaluation    Patient location during evaluation: Phase 2  Patient participation: Able to fully participate in evaluation  Level of consciousness: awake and alert  Pain management: satisfactory to patient  Airway patency: patent  Cardiovascular status: stable  Respiratory status: room air  Hydration status: balanced  PONV: none     Anesthetic complications: None          Last vitals:  Vitals:    07/23/18 1030 07/23/18 1045 07/23/18 1100   BP: (!) 144/95 (!) 152/93 152/85   Pulse: 58 56 58   Resp: 14 14 14   Temp:      SpO2: 97% 97% 97%         Electronically Signed By: ANNEL Ferrer CRNA  July 23, 2018  12:23 PM

## 2018-07-23 NOTE — IP AVS SNAPSHOT
[facility]    [address]     [place] [address]    Phone:  [phone]                                       After Visit Summary   7/23/2018    Wenceslao Vasquez    MRN: [id number]           After Visit Summary Signature Page     I have received my discharge instructions, and my questions have been answered. I have discussed any challenges I see with this plan with the nurse or doctor.    ..........................................................................................................................................  Patient/Patient Representative Signature      ..........................................................................................................................................  Patient Representative Print Name and Relationship to Patient    ..................................................               ................................................  Date                                            Time    ..........................................................................................................................................  Reviewed by Signature/Title    ...................................................              ..............................................  Date                                                            Time

## 2018-07-23 NOTE — IP AVS SNAPSHOT
MRN:[id number]                      After Visit Summary   7/23/2018    Wenceslao Vasquez    MRN: [id number]           Thank you!     Thank you for choosing [facility] for your care. Our goal is always to provide you with excellent care. Hearing back from our patients is one way we can continue to improve our services. Please take a few minutes to complete the written survey that you may receive in the mail after you visit with us. Thank you!        Patient Information     Date Of Birth          1960        About your hospital stay     You were admitted on:  July 23, 2018 You last received care in the:  [facility]    You were discharged on:  July 23, 2018       Who to Call     For medical emergencies, please call 911.  For non-urgent questions about your medical care, please call your primary care provider or clinic, [phone]  For questions related to your surgery, please call your surgery clinic        Attending Provider     Provider Dakota Martinez MD Urology       Primary Care Provider Office Phone # Fax #    Christopher Arreola [phone]      After Care Instructions     Diet Instructions       Resume pre procedure diet            Discharge Instructions       Patient to arrange for follow up appointment 7/26 for catheter removal.  [phone] to schedule.            Encourage fluids       Encourage fluids at home to keep urine clear to light pink                  Further instructions from your care team                      Discharge Instructions Following Cystoscopy  Dr Dakota Moore    Following your cysto today, you may experience:     1. Small amounts of bleeding   2. A mild burning sensation, especially with voiding for a day or two.    To relieve these symptoms:     1. Sit in a warm (not hot) tub, or apply a warm, moist washcloth to the area for 15-20 minutes as often as needed.  You may do this several times during the day.   2. Drink  lots of water and other liquids.   3. Rest.    If you have any heavy bleeding, please call our office at [phone].      Follow up Appointment: Thursday 7/26 at 10:50 am in the [facility] with Dr. Moore  Same-Day Surgery   Adult Discharge Orders & Instructions     For 24 hours after surgery    1. Get plenty of rest.  A responsible adult must stay with you for at least 24 hours after you leave the hospital.   2. Do not drive or use heavy equipment.  If you have weakness or tingling, don't drive or use heavy equipment until this feeling goes away.  3. Do not drink alcohol.  4. Avoid strenuous or risky activities.  Ask for help when climbing stairs.   5. You may feel lightheaded.  If so, sit for a few minutes before standing.  Have someone help you get up.   6. You may have a slight fever. Call the doctor if your fever is over 100 F (37.7 C) (taken under the tongue) or lasts longer than 24 hours.  7. You may have a dry mouth, a sore throat, muscle aches or trouble sleeping.  These should go away after 24 hours.  8. Do not make important or legal decisions.  Based on the surgery/procedure that you had today, we do not expect that you will have any problems.  However, we want you to know what to do if you have pain, nausea, bleeding,or infection:  To control pain:  Take medicines your physician has prescribed or or over-the counter medicine he or she advises.  Ice packs and periods of rest are often helpful.  For surgery on an arm or leg, raise it on a pillow to ease swelling.  If your pain is not managed with the above methods, contact your physician.  To control nausea:  Take anti-nausea medicine approved by your physician.  Drink clear liquids such as apple juice, ginger ale, broth or 7-Up. Be sure to drink enough fluids.  Move to a regular diet as you feel able.  Rest may also help.  Bleeding:  You may see a little blood on your dressing, about the size of a quarter in the first 24 hours.  If you see this,  there is no reason to be alarmed.  However, if this continues to increase in size, apply pressure if able, and notify your physician.  Infection: Please contact your physician if you have any of the following signs:  redness, swelling, heat, increasing pain or foul-smelling drainage at your surgery site, fever or chills,     Call your doctor for any of the followin.  It has been over 8 to 10 hours since surgery and you are still not able to urinate (pass water).    2.  Headache for over 24 hours.    3.  Numbness, tingling or weakness in your legs the day after surgery (if you had spinal anesthesia).    Nurse advice line: [phone]      Pending Results     No orders found from 2018 to 2018.            Admission Information     Date & Time Provider Department Dept. Phone    2018 Dakota Moore MD [facility] Phase [phone]      Your Vitals Were     Blood Pressure Pulse Temperature Respirations Pulse Oximetry       147/92 51 97.9  F (36.6  C) 14 97%       MyChart Information     [facility] gives you secure access to your electronic health record. If you see a primary care provider, you can also send messages to your care team and make appointments. If you have questions, please call your primary care clinic.  If you do not have a primary care provider, please call [phone] and they will assist you.        Care EveryWhere ID     This is your Care EveryWhere ID. This could be used by other organizations to access your [place] medical records  [id number]        Equal Access to Services     BAUTISTA LU : Hadii ab bacono Sogalileaali, waaxda luqadaha, qaybta kaalmada jimmy, srini reddy . So [facility] [phone].    ATENCIÓN: Si habla español, tiene a cpoeland disposición servicios gratuitos de asistencia lingüística. Llame al [phone].    We comply with applicable federal civil rights laws and [place] laws. We do not discriminate on the basis  of race, color, national origin, age, disability, sex, sexual orientation, or gender identity.               Review of your medicines      START taking        Dose / Directions    cephALEXin 500 MG capsule   Commonly known as:  KEFLEX   Used for:  Bulbous urethral stricture   Notes to Patient:  This is an antibiotic.  Take all of this medication.        Dose:  500 mg   Take 1 capsule (500 mg) by mouth 3 times daily for 5 days   Quantity:  15 capsule   Refills:  0         CONTINUE these medicines which have NOT CHANGED        Dose / Directions    MULTI VITAMIN MENS PO   Used for:  Low back pain, S/P lumbar discectomy        Dose:  1 tablet   Take 1 tablet by mouth daily.   Refills:  0       tamsulosin 0.4 MG capsule   Commonly known as:  FLOMAX        TAKE 1 CAPSULE BY MOUTH EVERY DAY   Refills:  2            Where to get your medicines      Some of these will need a paper prescription and others can be bought over the counter. Ask your nurse if you have questions.     Bring a paper prescription for each of these medications     cephALEXin 500 MG capsule                Protect others around you: Learn how to safely use, store and throw away your medicines at www.disposemymeds.org.        ANTIBIOTIC INSTRUCTION     You've Been Prescribed an Antibiotic - Now What?  Your healthcare team thinks that you or your loved one might have an infection. Some infections can be treated with antibiotics, which are powerful, life-saving drugs. Like all medications, antibiotics have side effects and should only be used when necessary. There are some important things you should know about your antibiotic treatment.      Your healthcare team may run tests before you start taking an antibiotic.    Your team may take samples (e.g., from your blood, urine or other areas) to run tests to look for bacteria. These test can be important to determine if you need an antibiotic at all and, if you do, which antibiotic will work best.      Within  a few days, your healthcare team might change or even stop your antibiotic.    Your team may start you on an antibiotic while they are working to find out what is making you sick.    Your team might change your antibiotic because test results show that a different antibiotic would be better to treat your infection.    In some cases, once your team has more information, they learn that you do not need an antibiotic at all. They may find out that you don't have an infection, or that the antibiotic you're taking won't work against your infection. For example, an infection caused by a virus can't be treated with antibiotics. Staying on an antibiotic when you don't need it is more likely to be harmful than helpful.      You may experience side effects from your antibiotic.    Like all medications, antibiotics have side effects. Some of these can be serious.    Let you healthcare team know if you have any known allergies when you are admitted to the hospital.    One significant side effect of nearly all antibiotics is the risk of severe and sometimes deadly diarrhea caused by Clostridium difficile (C. Difficile). This occurs when a person takes antibiotics because some good germs are destroyed. Antibiotic use allows C. diificile to take over, putting patients at high risk for this serious infection.    As a patient or caregiver, it is important to understand your or your loved one's antibiotic treatment. It is especially important for caregivers to speak up when patients can't speak for themselves. Here are some important questions to ask your healthcare team.    What infection is this antibiotic treating and how do you know I have that infection?    What side effects might occur from this antibiotic?    How long will I need to take this antibiotic?    Is it safe to take this antibiotic with other medications or supplements (e.g., vitamins) that I am taking?     Are there any special directions I need to know about taking  this antibiotic? For example, should I take it with food?    How will I be monitored to know whether my infection is responding to the antibiotic?    What tests may help to make sure the right antibiotic is prescribed for me?      Information provided by:  www.cdc.gov/getsmart  U.S. Department of Health and Human Services  Centers for disease Control and Prevention  National Center for Emerging and Zoonotic Infectious Diseases  Division of Healthcare Quality Promotion             Medication List: This is a list of all your medications and when to take them. Check marks below indicate your daily home schedule. Keep this list as a reference.      Medications           Morning Afternoon Evening Bedtime As Needed    cephALEXin 500 MG capsule   Commonly known as:  KEFLEX   Take 1 capsule (500 mg) by mouth 3 times daily for 5 days   Notes to Patient:  This is an antibiotic.  Take all of this medication.                                MULTI VITAMIN MENS PO   Take 1 tablet by mouth daily.                                tamsulosin 0.4 MG capsule   Commonly known as:  FLOMAX   TAKE 1 CAPSULE BY MOUTH EVERY DAY                                          More Information        Discharge Instructions: Caring for Your Indwelling Urinary Catheter  You have been discharged with an indwelling urinary catheter (also called a Murrell catheter). A catheter is a thin, flexible tube. An indwelling urinary catheter has two parts. The first part is a tube that drains urine from your bladder. The second part is a bag or other device that collects the urine.  The most important thing to remember is that you want to prevent infection. Always wash your hands before handling your catheter bag or tubing.  Draining the bedside bag    Wash your hands with soap and clean, running water or use an alcohol-based hand  that contains at least 60% alcohol.    Hold the drainage tube over a toilet or measuring container.    Unclamp the tube and let  the bag drain.    Don t touch the tip of the drainage tube or let it touch the toilet or container.  Cleaning the drainage tube    When the bag is empty, clean the tip of the drainage tube with an alcohol wipe.    Clamp the tube.    Reinsert the tube into the pocket on the drainage bag.  Cleaning your skin and tubing    Clean the skin near the catheter with soap and water.    Wash your genital area from front to back.    Wash the catheter tubing. Always wash the catheter in the direction away from your body.    You will be told when and how to change your bag and tubing.    Don t try to remove the catheter by yourself.    You may shower with the catheter in place.  Emptying a leg bag    Wash your hands.    Remove the stopper on the bag.    Drain the bag into the toilet or a measuring container. Don t let the tip of the drainage tube touch anything, including your fingers.    Clean the tip of the drainage tube with alcohol.    Replace the stopper.  Follow-up care  Make a follow-up appointment as directed by your healthcare provider  When to call your healthcare provider  Call your healthcare provider right away if you have any of the following:    Chills or fever above 100.4 F (38 C)    Leakage around the catheter insertion site    Increased spasms (uncontrollable twitching) in your legs, abdomen, or bladder. Occasional mild spasms are normal.    Burning in the urinary tract, penis, or genital area    Nausea and vomiting    Aching in the lower back    Cloudy or bloody (pink or red) urine, sediment or mucus in the urine, or foul-smelling urine   Date Last Reviewed: 1/1/2017 2000-2017 [employer]. [address] [place], [place] [id number]. All rights reserved. This information is not intended as a substitute for professional medical care. Always follow your healthcare professional's instructions.                Murrell Catheter Care    A Murrell catheter is a rubber tube that is placed through the urethra  (opening where urine comes out) and into the bladder. This helps drain urine from the bladder. There is a small balloon on the end of the tube that is inflated after insertion. This keeps the catheter from sliding out of the bladder.  A Murrell catheter is used to treat urinary retention (unable to pass urine). It is also used when there is incontinence (loss of bladder control).  Home care    Finish taking any prescribed antibiotic even if you are feeling better before then.    It is important to keep bacteria from getting into the collection bag. Do not disconnect the catheter from the collection bag.    Use a leg band to secure the drainage tube, so it does not pull on the catheter. Drain the collection bag when it becomes full using the drain spout at the bottom of the bag.    Do not try to pull or remove your catheter. This will injure your urethra. It must be removed by your healthcare provider or nurse.  Follow-up care  Follow up with your healthcare provider as advised for repeat urine testing and catheter removal or replacement.  When to seek medical advice  Call your healthcare provider right away if any of these occur:    Fever of 100.4 F (38 C) or higher, or as directed by your healthcare provider    Bladder pain or fullness    Abdominal swelling, nausea or vomiting, or back pain    Blood or urine leakage around the catheter    Bloody urine coming from the catheter (if a new symptom)    Catheter falls out    Catheter stops draining for 6 hours    Weakness, dizziness, or fainting  Date Last Reviewed: 10/1/2016    0802-5667 [employer]. [address] [place] [address], [place] [address]. All rights reserved. This information is not intended as a substitute for professional medical care. Always follow your healthcare professional's instructions.

## 2018-07-26 ENCOUNTER — TRANSFERRED RECORDS (OUTPATIENT)
Dept: HEALTH INFORMATION MANAGEMENT | Facility: CLINIC | Age: 58
End: 2018-07-26

## 2018-10-15 ENCOUNTER — TRANSFERRED RECORDS (OUTPATIENT)
Dept: HEALTH INFORMATION MANAGEMENT | Facility: CLINIC | Age: 58
End: 2018-10-15

## 2018-10-16 ENCOUNTER — ALLIED HEALTH/NURSE VISIT (OUTPATIENT)
Dept: FAMILY MEDICINE | Facility: OTHER | Age: 58
End: 2018-10-16
Payer: COMMERCIAL

## 2018-10-16 DIAGNOSIS — Z23 NEED FOR PROPHYLACTIC VACCINATION AND INOCULATION AGAINST INFLUENZA: Primary | ICD-10-CM

## 2018-10-16 PROCEDURE — 90682 RIV4 VACC RECOMBINANT DNA IM: CPT

## 2018-10-16 PROCEDURE — G0008 ADMIN INFLUENZA VIRUS VAC: HCPCS

## 2018-10-16 NOTE — PROGRESS NOTES
Injectable Influenza Immunization Documentation    1.  Is the person to be vaccinated sick today?   No    2. Does the person to be vaccinated have an allergy to a component   of the vaccine?   No  Egg Allergy Algorithm Link    3. Has the person to be vaccinated ever had a serious reaction   to influenza vaccine in the past?   No    4. Has the person to be vaccinated ever had Guillain-Barré syndrome?   No    Form completed by Ora Eric MA     10/16/2018  Prior to injection verified patient identity using patient's name and date of birth.  Due to injection administration, patient instructed to remain in clinic for 15 minutes  afterwards, and to report any adverse reaction to me immediately.

## 2018-10-16 NOTE — MR AVS SNAPSHOT
After Visit Summary   10/16/2018    Wenceslao Vsaquez    MRN: [id number]           Patient Information     Date Of Birth          1960        Visit Information        Provider Department      10/16/2018 3:15 PM SANDRA SINGH MA [facility]        Today's Diagnoses     Need for prophylactic vaccination and inoculation against influenza    -  1       Follow-ups after your visit        Who to contact     If you have questions or need follow up information about today's clinic visit or your schedule please contact [facility] directly at [phone].  Normal or non-critical lab and imaging results will be communicated to you by [employer], letter or phone within 4 business days after the clinic has received the results. If you do not hear from us within 7 days, please contact the clinic through [employer] or phone. If you have a critical or abnormal lab result, we will notify you by phone as soon as possible.  Submit refill requests through [employer] or call your pharmacy and they will forward the refill request to us. Please allow 3 business days for your refill to be completed.          Additional Information About Your Visit        MyChart Information     [employer] gives you secure access to your electronic health record. If you see a primary care provider, you can also send messages to your care team and make appointments. If you have questions, please call your primary care clinic.  If you do not have a primary care provider, please call [phone] and they will assist you.        Care EveryWhere ID     This is your Care EveryWhere ID. This could be used by other organizations to access your [place] medical records  [id number]         Blood Pressure from Last 3 Encounters:   07/23/18 152/85   07/16/18 134/80   06/07/18 130/76    Weight from Last 3 Encounters:   07/16/18 223 lb 9.6 oz (101.4 kg)   06/07/18 228 lb 3.2 oz (103.5 kg)   04/30/18 233 lb 1.6 oz (105.7 kg)              We  Performed the Following     ADMIN INFLUENZA (For MEDICARE Patients ONLY) []     FLU VACCINE, (RIV4) RECOMBINANT HA  , IM (FluBlok, egg free) [70844]- >18 YRS ([facility] recommended  50-64 YRS)        Primary Care Provider Office Phone # Fax #    Christopher Arreola [phone]       [address] [facility] [address]        Equal Access to Services     BAUTISTA LU : Hadii aad ku hadasho Soomaali, waaxda luqadaha, qaybta kaalmada adeegyada, waxay idiin hayaan adecorinne mullinsjuanaann lamorro . So [facility] [phone].    ATENCIÓN: Si habla esprosalio, tiene a copeland disposición servicios gratuitos de asistencia lingüística. Llame al [phone].    We comply with applicable federal civil rights laws and [place] laws. We do not discriminate on the basis of race, color, national origin, age, disability, sex, sexual orientation, or gender identity.            Thank you!     Thank you for choosing [facility]  for your care. Our goal is always to provide you with excellent care. Hearing back from our patients is one way we can continue to improve our services. Please take a few minutes to complete the written survey that you may receive in the mail after your visit with us. Thank you!             Your Updated Medication List - Protect others around you: Learn how to safely use, store and throw away your medicines at www.disposemymeds.org.          This list is accurate as of 10/16/18  4:25 PM.  Always use your most recent med list.                   Brand Name Dispense Instructions for use Diagnosis    MULTI VITAMIN MENS PO      Take 1 tablet by mouth daily.    Low back pain, S/P lumbar discectomy       tamsulosin 0.4 MG capsule    FLOMAX     TAKE 1 CAPSULE BY MOUTH EVERY DAY

## 2018-11-26 ENCOUNTER — OFFICE VISIT (OUTPATIENT)
Dept: FAMILY MEDICINE | Facility: OTHER | Age: 58
End: 2018-11-26
Payer: COMMERCIAL

## 2018-11-26 VITALS
HEART RATE: 75 BPM | HEIGHT: 69 IN | TEMPERATURE: 97.9 F | BODY MASS INDEX: 33.46 KG/M2 | DIASTOLIC BLOOD PRESSURE: 80 MMHG | RESPIRATION RATE: 14 BRPM | WEIGHT: 225.9 LBS | SYSTOLIC BLOOD PRESSURE: 136 MMHG | OXYGEN SATURATION: 94 %

## 2018-11-26 DIAGNOSIS — Z01.818 PREOP GENERAL PHYSICAL EXAM: Primary | ICD-10-CM

## 2018-11-26 PROCEDURE — 99214 OFFICE O/P EST MOD 30 MIN: CPT | Performed by: INTERNAL MEDICINE

## 2018-11-26 ASSESSMENT — PAIN SCALES - GENERAL: PAINLEVEL: NO PAIN (0)
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
## 2018-12-09 ENCOUNTER — ANESTHESIA EVENT (OUTPATIENT)
Dept: SURGERY | Facility: CLINIC | Age: 58
End: 2018-12-09
Payer: COMMERCIAL

## 2018-12-10 ENCOUNTER — ANESTHESIA (OUTPATIENT)
Dept: SURGERY | Facility: CLINIC | Age: 58
End: 2018-12-10
Payer: COMMERCIAL

## 2018-12-10 ENCOUNTER — HOSPITAL ENCOUNTER (OUTPATIENT)
Facility: CLINIC | Age: 58
Discharge: HOME | End: 2018-12-10
Attending: UROLOGY | Admitting: UROLOGY
Payer: COMMERCIAL

## 2018-12-10 VITALS
SYSTOLIC BLOOD PRESSURE: 147 MMHG | OXYGEN SATURATION: 97 % | TEMPERATURE: 97.7 F | DIASTOLIC BLOOD PRESSURE: 86 MMHG | RESPIRATION RATE: 16 BRPM

## 2018-12-10 DIAGNOSIS — N35.812 OTHER URETHRAL BULBOUS STRICTURE, MALE: Primary | ICD-10-CM

## 2018-12-10 PROCEDURE — 25000566 ZZH SEVOFLURANE, EA 15 MIN: Performed by: UROLOGY

## 2018-12-10 PROCEDURE — 36000054 ZZH SURGERY LEVEL 2 W FLUORO 1ST 30 MIN: Performed by: UROLOGY

## 2018-12-10 PROCEDURE — 25000125 ZZHC RX 250: Performed by: NURSE ANESTHETIST, CERTIFIED REGISTERED

## 2018-12-10 PROCEDURE — 71000014 ZZH RECOVERY PHASE 1 LEVEL 2 FIRST HR: Performed by: UROLOGY

## 2018-12-10 PROCEDURE — 36000052 ZZH SURGERY LEVEL 2 EA 15 ADDTL MIN: Performed by: UROLOGY

## 2018-12-10 PROCEDURE — 37000008 ZZH ANESTHESIA TECHNICAL FEE, 1ST 30 MIN: Performed by: UROLOGY

## 2018-12-10 PROCEDURE — 25000128 H RX IP 250 OP 636: Performed by: NURSE ANESTHETIST, CERTIFIED REGISTERED

## 2018-12-10 PROCEDURE — 40000306 ZZH STATISTIC PRE PROC ASSESS II: Performed by: UROLOGY

## 2018-12-10 PROCEDURE — 71000027 ZZH RECOVERY PHASE 2 EACH 15 MINS: Performed by: UROLOGY

## 2018-12-10 PROCEDURE — 25000128 H RX IP 250 OP 636: Performed by: UROLOGY

## 2018-12-10 PROCEDURE — 37000009 ZZH ANESTHESIA TECHNICAL FEE, EACH ADDTL 15 MIN: Performed by: UROLOGY

## 2018-12-10 RX ORDER — OXYCODONE HYDROCHLORIDE 5 MG/1
5 TABLET ORAL ONCE
Status: DISCONTINUED | OUTPATIENT
Start: 2018-12-10 | End: 2018-12-10 | Stop reason: HOSPADM

## 2018-12-10 RX ORDER — SODIUM CHLORIDE, SODIUM LACTATE, POTASSIUM CHLORIDE, CALCIUM CHLORIDE 600; 310; 30; 20 MG/100ML; MG/100ML; MG/100ML; MG/100ML
INJECTION, SOLUTION INTRAVENOUS CONTINUOUS
Status: DISCONTINUED | OUTPATIENT
Start: 2018-12-10 | End: 2018-12-10 | Stop reason: HOSPADM

## 2018-12-10 RX ORDER — MEPERIDINE HYDROCHLORIDE 25 MG/ML
12.5 INJECTION INTRAMUSCULAR; INTRAVENOUS; SUBCUTANEOUS
Status: DISCONTINUED | OUTPATIENT
Start: 2018-12-10 | End: 2018-12-10 | Stop reason: HOSPADM

## 2018-12-10 RX ORDER — CEPHALEXIN 500 MG/1
500 CAPSULE ORAL 3 TIMES DAILY
Qty: 15 CAPSULE | Refills: 0 | Status: SHIPPED | OUTPATIENT
Start: 2018-12-10 | End: 2019-02-06

## 2018-12-10 RX ORDER — OXYCODONE HYDROCHLORIDE 5 MG/1
5 TABLET ORAL EVERY 6 HOURS PRN
Qty: 12 TABLET | Refills: 0 | Status: SHIPPED | OUTPATIENT
Start: 2018-12-10 | End: 2019-02-06

## 2018-12-10 RX ORDER — ONDANSETRON 4 MG/1
4 TABLET, ORALLY DISINTEGRATING ORAL EVERY 30 MIN PRN
Status: DISCONTINUED | OUTPATIENT
Start: 2018-12-10 | End: 2018-12-10 | Stop reason: HOSPADM

## 2018-12-10 RX ORDER — ACETAMINOPHEN 500 MG
500-1000 TABLET ORAL PRN
COMMUNITY
End: 2019-03-06

## 2018-12-10 RX ORDER — FENTANYL CITRATE 50 UG/ML
25-50 INJECTION, SOLUTION INTRAMUSCULAR; INTRAVENOUS
Status: DISCONTINUED | OUTPATIENT
Start: 2018-12-10 | End: 2018-12-10 | Stop reason: HOSPADM

## 2018-12-10 RX ORDER — FENTANYL CITRATE 50 UG/ML
INJECTION, SOLUTION INTRAMUSCULAR; INTRAVENOUS PRN
Status: DISCONTINUED | OUTPATIENT
Start: 2018-12-10 | End: 2018-12-10

## 2018-12-10 RX ORDER — METOPROLOL TARTRATE 1 MG/ML
1-2 INJECTION, SOLUTION INTRAVENOUS EVERY 5 MIN PRN
Status: DISCONTINUED | OUTPATIENT
Start: 2018-12-10 | End: 2018-12-10 | Stop reason: HOSPADM

## 2018-12-10 RX ORDER — CEFAZOLIN SODIUM 2 G/100ML
2 INJECTION, SOLUTION INTRAVENOUS
Status: COMPLETED | OUTPATIENT
Start: 2018-12-10 | End: 2018-12-10

## 2018-12-10 RX ORDER — DEXAMETHASONE SODIUM PHOSPHATE 10 MG/ML
INJECTION, SOLUTION INTRAMUSCULAR; INTRAVENOUS PRN
Status: DISCONTINUED | OUTPATIENT
Start: 2018-12-10 | End: 2018-12-10

## 2018-12-10 RX ORDER — LIDOCAINE HYDROCHLORIDE 20 MG/ML
INJECTION, SOLUTION INFILTRATION; PERINEURAL PRN
Status: DISCONTINUED | OUTPATIENT
Start: 2018-12-10 | End: 2018-12-10

## 2018-12-10 RX ORDER — ONDANSETRON 2 MG/ML
INJECTION INTRAMUSCULAR; INTRAVENOUS PRN
Status: DISCONTINUED | OUTPATIENT
Start: 2018-12-10 | End: 2018-12-10

## 2018-12-10 RX ORDER — LIDOCAINE 40 MG/G
CREAM TOPICAL
Status: DISCONTINUED | OUTPATIENT
Start: 2018-12-10 | End: 2018-12-10 | Stop reason: HOSPADM

## 2018-12-10 RX ORDER — ONDANSETRON 2 MG/ML
4 INJECTION INTRAMUSCULAR; INTRAVENOUS EVERY 30 MIN PRN
Status: DISCONTINUED | OUTPATIENT
Start: 2018-12-10 | End: 2018-12-10 | Stop reason: HOSPADM

## 2018-12-10 RX ORDER — PROPOFOL 10 MG/ML
INJECTION, EMULSION INTRAVENOUS PRN
Status: DISCONTINUED | OUTPATIENT
Start: 2018-12-10 | End: 2018-12-10

## 2018-12-10 RX ORDER — NALOXONE HYDROCHLORIDE 0.4 MG/ML
.1-.4 INJECTION, SOLUTION INTRAMUSCULAR; INTRAVENOUS; SUBCUTANEOUS
Status: DISCONTINUED | OUTPATIENT
Start: 2018-12-10 | End: 2018-12-10 | Stop reason: HOSPADM

## 2018-12-10 RX ADMIN — FENTANYL CITRATE 50 MCG: 50 INJECTION INTRAMUSCULAR; INTRAVENOUS at 10:12

## 2018-12-10 RX ADMIN — PROPOFOL 150 MG: 10 INJECTION, EMULSION INTRAVENOUS at 09:21

## 2018-12-10 RX ADMIN — SODIUM CHLORIDE, POTASSIUM CHLORIDE, SODIUM LACTATE AND CALCIUM CHLORIDE: 600; 310; 30; 20 INJECTION, SOLUTION INTRAVENOUS at 08:37

## 2018-12-10 RX ADMIN — LIDOCAINE HYDROCHLORIDE 1 ML: 10 INJECTION, SOLUTION EPIDURAL; INFILTRATION; INTRACAUDAL at 08:37

## 2018-12-10 RX ADMIN — MIDAZOLAM 1 MG: 1 INJECTION INTRAMUSCULAR; INTRAVENOUS at 09:10

## 2018-12-10 RX ADMIN — FENTANYL CITRATE 50 MCG: 50 INJECTION INTRAMUSCULAR; INTRAVENOUS at 10:23

## 2018-12-10 RX ADMIN — ONDANSETRON 4 MG: 2 INJECTION INTRAMUSCULAR; INTRAVENOUS at 09:29

## 2018-12-10 RX ADMIN — SODIUM CHLORIDE, POTASSIUM CHLORIDE, SODIUM LACTATE AND CALCIUM CHLORIDE: 600; 310; 30; 20 INJECTION, SOLUTION INTRAVENOUS at 09:33

## 2018-12-10 RX ADMIN — LIDOCAINE HYDROCHLORIDE 100 MG: 20 INJECTION, SOLUTION INFILTRATION; PERINEURAL at 09:21

## 2018-12-10 RX ADMIN — CEFAZOLIN SODIUM 2 G: 2 INJECTION, SOLUTION INTRAVENOUS at 09:25

## 2018-12-10 RX ADMIN — DEXAMETHASONE SODIUM PHOSPHATE 10 MG: 10 INJECTION, SOLUTION INTRAMUSCULAR; INTRAVENOUS at 09:29

## 2018-12-10 RX ADMIN — FENTANYL CITRATE 50 MCG: 50 INJECTION, SOLUTION INTRAMUSCULAR; INTRAVENOUS at 09:15

## 2018-12-10 NOTE — DISCHARGE INSTRUCTIONS
Discharge Instructions Following Cystocopy  Dr. Moore    Following your cystoscopy today, you may experience:  1. Small amounts of bleeding.  2. A mild burning sensation, especially with voiding for a day or two.    To relieve these symptoms:  1. Sit in a warm (not hot) tub, or apply a warm/moist washcloth to the area for 15-20 minutes as often as needed. You may do this several times during the day.   2. Drink lots of water and other liquids.   3. Rest.    If you have any heavy bleeding, please call our office at [phone]    [facility]   Adult Discharge Orders & Instructions     For 24 hours after surgery    1. Get plenty of rest.  A responsible adult must stay with you for at least 24 hours after you leave the hospital.   2. Do not drive or use heavy equipment.  If you have weakness or tingling, don't drive or use heavy equipment until this feeling goes away.  3. Do not drink alcohol.  4. Avoid strenuous or risky activities.  Ask for help when climbing stairs.   5. You may feel lightheaded.  IF so, sit for a few minutes before standing.  Have someone help you get up.   6. If you have nausea (feel sick to your stomach): Drink only clear liquids such as apple juice, ginger ale, broth or 7-Up.  Rest may also help.  Be sure to drink enough fluids.  Move to a regular diet as you feel able.  7. You may have a slight fever. Call the doctor if your fever is over 100 F (37.7 C) (taken under the tongue) or lasts longer than 24 hours.  8. You may have a dry mouth, a sore throat, muscle aches or trouble sleeping.  These should go away after 24 hours.  9. Do not make important or legal decisions.   Call your doctor for any of the followin.  Signs of infection (fever, growing tenderness at the surgery site, a large amount of drainage or bleeding, severe pain, foul-smelling drainage, redness, swelling).    2. It has been over 8 to 10 hours since surgery and you are still not able to urinate (pass  water).    3.  Headache for over 24 hours.

## 2018-12-10 NOTE — ANESTHESIA POSTPROCEDURE EVALUATION
Patient: Wenceslao Vasquez    Procedure(s):  CYSTOSCOPY, URETHRAL DILATION    Diagnosis:uretheral stricture  Diagnosis Additional Information: No value filed.    Anesthesia Type:  General, LMA    Note:  Anesthesia Post Evaluation    Patient location during evaluation: Phase 2 and Bedside  Patient participation: Able to fully participate in evaluation  Level of consciousness: awake and alert  Pain management: satisfactory to patient  Airway patency: patent  Cardiovascular status: stable  Respiratory status: spontaneous ventilation and room air  Hydration status: stable  PONV: none     Anesthetic complications: None    Comments: Appear to tolerate Gen well without anesthesia related problems / complications noted.  Pain level adequate per patient. No N  /  V .  No complaints per patient.  Will follow as needed.        Last vitals:  Vitals:    12/10/18 1034 12/10/18 1055 12/10/18 1100   BP: 137/90 152/89 147/86   Resp: 9 14 16   Temp: 96.6  F (35.9  C)  97.7  F (36.5  C)   SpO2: 95% 97% 97%         Electronically Signed By: ANNEL Lawson CRNA  December 10, 2018  2:03 PM

## 2018-12-10 NOTE — BRIEF OP NOTE
[facility]    Brief Operative Note    Pre-operative diagnosis: uretheral stricture  Post-operative diagnosis same  Procedure: Procedure(s):  CYSTOSCOPY, URETHRAL DILATION  Surgeon: Surgeon(s) and Role:     * Dakota Moore MD - Primary  Anesthesia: General   Estimated blood loss: Minimal  Drains: Murrell catheter  Specimens: * No specimens in log *  Findings:   see dictation.  Complications: none.  Implants: None    Dakota Moore MD  .
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
## 2018-12-10 NOTE — ANESTHESIA CARE TRANSFER NOTE
Patient: Wenceslao Vasquez    Procedure(s):  CYSTOSCOPY, URETHRAL DILATION    Diagnosis: uretheral stricture  Diagnosis Additional Information: No value filed.    Anesthesia Type:   General, LMA     Note:  Airway :Nasal Cannula  Patient transferred to:PACU  Handoff Report: Identifed the Patient, Identified the Reponsible Provider, Reviewed the pertinent medical history, Discussed the surgical course, Reviewed Intra-OP anesthesia mangement and issues during anesthesia, Set expectations for post-procedure period and Allowed opportunity for questions and acknowledgement of understanding      Vitals: (Last set prior to Anesthesia Care Transfer)    CRNA VITALS  12/10/2018 0920 - 12/10/2018 0956      12/10/2018             EKG:  Sinus bradycardia                Electronically Signed By: ANNEL Lawson CRNA  December 10, 2018  9:56 AM

## 2018-12-10 NOTE — OP NOTE
Procedure Date: 12/10/2018      NAME OF PROCEDURE:  Cystoscopy and urethral dilation.      PREOPERATIVE DIAGNOSIS:  Bulbous urethral stricture.      POSTOPERATIVE DIAGNOSIS:  Bulbous urethral stricture.      OPERATIVE NOTE:  Informed consent was obtained from the patient.  He was brought to the operating room, given laryngeal mask anesthesia, placed in lithotomy position.  Sterile prep and drape were applied and surgical timeout was taken.  A well lubricated 22-Martiniquais cystoscope was advanced into the urethra.  Bulbous urethral stricture was encountered.  I was able to negotiate the scope through this with minimal trauma.  Prostatic urethra was minimally obstructing and bladder showed mild trabeculation.  The scope was removed and then using a curved sound, the urethra was dilated up to 30 Martiniquais.  A 22-Martiniquais Murrell catheter was then advanced up to the bladder, balloon was inflated with 10 mL sterile water and the apparatus was placed to drainage.  He tolerated the procedure well, no complications, minimal blood loss.  He will be discharged home with a Murrell catheter in place and follow up in clinic on Thursday this week for catheter removal in the office.  We will discuss with him starting to do self-catheterization 1 to 2 weeks after the catheter has been removed to try to keep the strictured area open.         DAKOTA MCCULLOUGH MD             D: 12/10/2018   T: 12/10/2018   MT: REGINALDO      Name:     ROSA CACERES   MRN:      [id number]        Account:        [id number]   :      1960           Procedure Date: 12/10/2018      Document: [id number]       cc: Dakota Mccullough MD

## 2018-12-11 NOTE — PROGRESS NOTES
[facility]  Discharge Call Back  Wenceslao Vasquez  1960  MRN: [id number]  Home: [phone] (home)   PCP: Christopher Arreola    We are calling to see how you're doing since your surgery/procedure with us?   Comments: Doing good  Clinical Questions  1. Have you had time to look at your discharge instructions? Do you have any questions in regards to the instructions?   Comment: yes, no  2. Do you feel your pain is being controlled with the regimen the surgeon sent you home on? (ie: prescription medications, over the counter pain medications, ice packs)   Comments: yes  3. Have you noticed any drainage on your dressing? Do you know what to do if you have bleeding as a result of your procedure?   Comments: no  4. Have you had any nausea/vomiting? Do you know how to treat this?   Comment: no  5. Have you had any signs/symptoms of infection? (ie: fever, swelling, heat, drainage or redness) Do you know what to do if you have?   Comment: no  6. Do you have a follow up appointment made with your surgeon? Do you have a number to contact them at if you need it?   Comment: yes, yes  Retained Foreign Object (ETHEL, Hemovac, Penrose, Wound Packing, Vaginal Packing, Nasal Splints, Urethral Stents, Murrell Catheter)  1. Do you still have na in place?   2. If the item is still in place, can you review the plan for removal with me? na  Recognition  Is there anyone from your care team that you would like to recognize?   Comments: no  Improvement  Our goal is to be the best. Do you have any suggestions for things that we could improve on?   Comments: no  You may be randomly selected to fill out a [facility] survey. We would appreciate you taking the time to fill this out. It is important to us if you would answer all of the questions on the survey.

## 2018-12-13 ENCOUNTER — TRANSFERRED RECORDS (OUTPATIENT)
Dept: HEALTH INFORMATION MANAGEMENT | Facility: CLINIC | Age: 58
End: 2018-12-13

## 2019-01-03 ENCOUNTER — TELEPHONE (OUTPATIENT)
Dept: FAMILY MEDICINE | Facility: OTHER | Age: 59
End: 2019-01-03

## 2019-01-14 ENCOUNTER — ANCILLARY PROCEDURE (OUTPATIENT)
Dept: GENERAL RADIOLOGY | Facility: OTHER | Age: 59
End: 2019-01-14
Payer: COMMERCIAL

## 2019-01-14 ENCOUNTER — OFFICE VISIT (OUTPATIENT)
Dept: FAMILY MEDICINE | Facility: OTHER | Age: 59
End: 2019-01-14
Payer: COMMERCIAL

## 2019-01-14 VITALS
WEIGHT: 228.8 LBS | DIASTOLIC BLOOD PRESSURE: 80 MMHG | HEART RATE: 74 BPM | RESPIRATION RATE: 18 BRPM | TEMPERATURE: 98.1 F | BODY MASS INDEX: 33.79 KG/M2 | SYSTOLIC BLOOD PRESSURE: 132 MMHG | OXYGEN SATURATION: 96 %

## 2019-01-14 DIAGNOSIS — M54.41 ACUTE RIGHT-SIDED LOW BACK PAIN WITH RIGHT-SIDED SCIATICA: ICD-10-CM

## 2019-01-14 DIAGNOSIS — M54.41 ACUTE RIGHT-SIDED LOW BACK PAIN WITH RIGHT-SIDED SCIATICA: Primary | ICD-10-CM

## 2019-01-14 PROCEDURE — 72100 X-RAY EXAM L-S SPINE 2/3 VWS: CPT | Mod: FY

## 2019-01-14 PROCEDURE — 72070 X-RAY EXAM THORAC SPINE 2VWS: CPT | Mod: FY

## 2019-01-14 PROCEDURE — 99214 OFFICE O/P EST MOD 30 MIN: CPT | Performed by: FAMILY MEDICINE

## 2019-01-14 RX ORDER — GABAPENTIN 300 MG/1
CAPSULE ORAL
Qty: 90 CAPSULE | Refills: 0 | Status: SHIPPED | OUTPATIENT
Start: 2019-01-14 | End: 2019-03-06

## 2019-01-14 ASSESSMENT — PAIN SCALES - GENERAL: PAINLEVEL: NO PAIN (0)

## 2019-01-14 NOTE — PROGRESS NOTES
SUBJECTIVE:   Wenceslao Vasquez is a 58 year old male who presents to clinic today for the following health issues:        Back Pain       Duration: 6 months        Specific cause: lifting, turning/bending    Description:   Location of pain: low back right and middle of back right  Character of pain: stabbing, burning and intermittent  Pain radiation:radiates into the right leg to groin  New numbness or weakness in legs, not attributed to pain:  no     Intensity: Currently 0/10, At its worst 4/10    History:   Pain interferes with job: YES  History of back problems: lumber disc spilt in have a fused.   Any previous MRI or X-rays: None  Sees a specialist for back pain:  No  Therapies tried without relief: cold and heat    Alleviating factors:   Improved by: acetaminophen (Tylenol)      Precipitating factors:  Worsened by: Lifting, Bending, Sitting and Walking          Accompanying Signs & Symptoms:  Risk of Fracture:  None  Risk of Cauda Equina:  None  Risk of Infection:  None  Risk of Cancer:  None  Risk of Ankylosing Spondylitis:  Onset at age <35, male, AND morning back stiffness. no         Problem list and histories reviewed & adjusted, as indicated.  Additional history: Prior history of cervical and lumbar disc disease with diskectomy and prior lumbar fusion.     Patient Active Problem List   Diagnosis     CHELSY (obstructive sleep apnea)     Hyperlipidemia LDL goal <130     Sciatica     Low back pain     Stenosis of lumbosacral spine     Advanced directives, counseling/discussion     Hypertension goal BP (blood pressure) < 140/90     Spinal stenosis in cervical region     Cervical spondylosis without myelopathy     Pneumonia     Morbid obesity due to excess calories (H)     Syncope     Exertional chest pain     Past Surgical History:   Procedure Laterality Date     BACK SURGERY       C NONSPECIFIC PROCEDURE      lumbar disc surgery     C NONSPECIFIC PROCEDURE      hammer toe surgery right foot     C NONSPECIFIC  PROCEDURE      nasal surgery     CERVICAL DISKECTOMY  8/8/13    [facility]     COLONOSCOPY  06/03/09     CYSTOSCOPY, DILATE URETHRA, COMBINED N/A 7/23/2018    Procedure: COMBINED CYSTOSCOPY, DILATE URETHRA;  cystosdcopy with urethral dilation and catheter placement;  Surgeon: Dakota Moore MD;  [facility]     CYSTOSCOPY, DILATE URETHRA, COMBINED N/A 12/10/2018    Procedure: CYSTOSCOPY, URETHRAL DILATION;  Surgeon: Dakota Moore MD;  [facility]     DIL URETHRA STRIC,MALE,SUBSEQ       HC REMOVAL HEEL SPUR, CALCANEUS  2/2005     HC REVISE MEDIAN N/CARPAL TUNNEL SURG  1993    right     HEAD & NECK SURGERY  2013    Plate in neck     INSERT CATHETER BLADDER N/A 7/23/2018    Procedure: INSERT CATHETER BLADDER;;  Surgeon: Dakota Moore MD;  [facility]     LAPAROSCOPIC CHOLECYSTECTOMY  3/11/2013    Procedure: LAPAROSCOPIC CHOLECYSTECTOMY;  laparoscopic cholecystectomy;  Surgeon: Clinton Whittaker MD;  [facility]       Social History     Tobacco Use     Smoking status: Never Smoker     Smokeless tobacco: Never Used   Substance Use Topics     Alcohol use: Yes     Comment: very little     Family History   Problem Relation Age of Onset     Alcohol/Drug Brother      Prostate Cancer Brother      Arthritis Mother      Cancer Mother         Kidney - Stage 3     Other Cancer Mother         Kidney     Heart Disease Maternal Grandmother      Heart Disease Paternal Grandmother      C.A.D. No family hx of      Diabetes No family hx of      Anesthesia Reaction No family hx of         Hard to come out of     Cancer - colorectal No family hx of      Colon Cancer No family hx of          Current Outpatient Medications   Medication Sig Dispense Refill     Multiple Vitamin (MULTI VITAMIN MENS PO) Take 1 tablet by mouth daily.       acetaminophen (TYLENOL) 500 MG tablet Take 500-1,000 mg by mouth as needed for mild pain       Allergies   Allergen Reactions     Dust Mites Hives     Morphine  Nausea and Vomiting     Recent Labs   Lab Test 06/07/18  0835 04/30/18  1347 01/08/18  1505 01/08/18  0530 11/09/17  1709 10/27/17  1950  01/11/17  0855 07/21/16  0822   A1C  --  5.3  --   --   --   --   --   --   --    LDL 77  --   --   --   --   --   --  100* 90   HDL 31*  --   --   --   --   --   --  36* 36*   TRIG 198*  --   --   --   --   --   --  185* 139   ALT  --   --  31 35  --  37  --   --   --    CR  --   --  0.69 0.74  --  0.74   < >  --   --    GFRESTIMATED  --   --  >90 >90  --  >90   < >  --   --    GFRESTBLACK  --   --  >90 >90  --  >90   < >  --   --    POTASSIUM  --   --  3.4 3.5  --  3.8   < >  --   --    TSH  --   --   --   --  0.70  --   --   --   --     < > = values in this interval not displayed.      BP Readings from Last 3 Encounters:   01/14/19 132/80   12/10/18 147/86   11/26/18 136/80    Wt Readings from Last 3 Encounters:   01/14/19 103.8 kg (228 lb 12.8 oz)   11/26/18 102.5 kg (225 lb 14.4 oz)   07/16/18 101.4 kg (223 lb 9.6 oz)                  Labs reviewed in EPIC    Reviewed and updated as needed this visit by clinical staff  Tobacco  Allergies  Meds  Problems  Med Hx  Surg Hx  Fam Hx  Soc Hx        Reviewed and updated as needed this visit by Provider  Tobacco  Allergies  Meds  Problems  Med Hx  Surg Hx  Fam Hx         ROS:  CONSTITUTIONAL: NEGATIVE for fever, chills, change in weight  ENT/MOUTH: NEGATIVE for ear, mouth and throat problems  RESP: NEGATIVE for significant cough or SOB  CV: NEGATIVE for chest pain, palpitations or peripheral edema  MUSCULOSKELETAL: POSITIVE  for back pain acute over the last 3-6 months on chronic. Radiates to right buttock and groin.  ROS otherwise negative    OBJECTIVE:     /80 (BP Location: Right arm, Patient Position: Chair, Cuff Size: Adult Large)   Pulse 74   Temp 98.1  F (36.7  C) (Temporal)   Resp 18   Wt 103.8 kg (228 lb 12.8 oz)   SpO2 96%   BMI 33.79 kg/m    Body mass index is 33.79 kg/m .  GENERAL: alert, no  distress and over weight  NECK: no adenopathy, no asymmetry, masses, or scars and thyroid normal to palpation  RESP: lungs clear to auscultation - no rales, rhonchi or wheezes  CV: regular rate and rhythm, normal S1 S2, no S3 or S4, no murmur, click or rub, no peripheral edema and peripheral pulses strong  ABDOMEN: soft, nontender, no hepatosplenomegaly, no masses and bowel sounds normal  Comprehensive back pain exam: Stands with forward flexion at waist. Tenderness of T12-L1 midline, Pain limits the following motions: flexion, extension, rotation and lateral flexion, Lower extremity strength functional and equal on both sides, Lower extremity reflexes within normal limits bilaterally, Lower extremity sensation normal and equal on both sides and Straight leg raise negative bilaterally    Diagnostic Test Results:  Xray - Thoracic and Lumbar Spine: Multi level DJD with bridging osteophytes. No new compression fracture.    ASSESSMENT/PLAN:     1. Acute right-sided low back pain with right-sided sciatica  Acute on chronic thoracic and lumbar back pain with multilevel Degeneration consistent with lumbar spinal stenosis. Discussed options including PT, analgesia to include OTC NSAID and Tylenol, referral to Spine Care with further imaging. Patient declines PT as it is not covered by insurance. He would like to defer Spine Care referral at this time. Will start Neurontin and titrate to 300 mg TID over the next 7-10 days. When You Have Low Back Pain    Caring for Your Back  You are not alone.    Low back pain is very common. Nearly half of all adults have low back pain in any given year. The good news is that back pain is rarely a danger to your health. Most people can manage their back pain on their own and about half of them start feeling better within 2 weeks. In 9 out of 10 cases, low back pain goes away or no longer limits daily activity within 6 weeks.     Your outlook is good!    Your symptoms tell us that your low  back pain is most likely not a danger to you. Most of the time we do not know the exact cause of low back pain, even if you see a doctor or have an MRI. However, treatment can still work without knowing the cause of the pain. Less than 1 in 100 people need surgery for their back pain.     What can I do about my low back pain?     There are three things you can do to ease low back pain and help it go away.    Use heat or cold packs.    Take medicine as directed.    Use positions, movements and exercises.     Using heat or cold packs    Try cold packs or gentle heat to ease your pain. Use whichever gives you the most relief. Apply the cold pack or heat for 15 minutes at a time, as often as needed.    Taking medicine      If your doctor has prescribed medicine, be sure to follow the directions.    If you take over-the-counter medicine, read and follow the directions.    Talk to your doctor if you have any questions.     Using positions, movements and exercises    Research tells us that moving your joints and muscles can help you recover from back pain. Such activity should be simple and gentle. Use the positions in the photos as well as walking to help relieve your pain. Try taking a short walk every 3 to 4 hours during the day. Walk for a few minutes inside your home or take longer walks outside, on a treadmill or at a mall. Slowly increase the amount of time you walk. Expect discomfort when you begin, but it should lessen as your back starts to heal. When your back feels better, walk daily to keep your back and body healthy.    Finding a comfortable position    When your back pain is new, certain positions will ease your pain. Gently try each of the positions below until you find one that is helpful. Once you find a position of comfort, use it as often as you like when you are resting. You will recover faster if you combine rest with activity.         Lie on your back with your legs bent. You can do this by placing a  pillow under your knees. Or you may lie on the floor and rest your lower legs on the seat of a chair.       Lie on your side with your knees bent, and place a pillow between your knees.       Lie on your stomach over pillows.      When should I call my doctor?    Your back pain should improve over the first couple of weeks. As it improves, you should be able to return to your normal activities. But call your doctor if:    You have a sudden change in your ability to control your bladder or bowels.    You feel tingling in your groin or legs.    The pain spreads down your leg and into your foot.    Your toes, feet or leg muscles feel weak.    You feel generally unwell or sick.    Your pain does not get better or gets worse.      If you are deaf or hard of hearing, please let us know. We provide many free services including sign language interpreters,oral interpreters, TTYs, telephone amplifiers, note takers and written materials.    For informational purposes only. Not to replace the advice of your health care provider. Copyright   2013 [facility]. All rights reserved. [employer] 984028 - Rev 06/14.     If not improved with Home Therapy then consider referral to Spine Care..     - XR Lumbar Spine 2/3 Views; Future  - XR Thoracic Spine 2 Views; Future  - gabapentin (NEURONTIN) 300 MG capsule; Take 1 capsule (300 mg) by mouth daily for 3 days, THEN 1 capsule (300 mg) 2 times daily for 3 days, THEN 1 capsule (300 mg) 3 times daily for 3 days.  Dispense: 90 capsule; Refill: 0    Work on weight loss  Regular exercise    Christopher Arreola MD  [facility]

## 2019-02-06 ENCOUNTER — OFFICE VISIT (OUTPATIENT)
Dept: FAMILY MEDICINE | Facility: OTHER | Age: 59
End: 2019-02-06
Payer: COMMERCIAL

## 2019-02-06 VITALS
HEART RATE: 74 BPM | BODY MASS INDEX: 33.67 KG/M2 | OXYGEN SATURATION: 96 % | WEIGHT: 228 LBS | SYSTOLIC BLOOD PRESSURE: 124 MMHG | DIASTOLIC BLOOD PRESSURE: 80 MMHG | TEMPERATURE: 98.2 F | RESPIRATION RATE: 18 BRPM

## 2019-02-06 DIAGNOSIS — M48.07 STENOSIS OF LUMBOSACRAL SPINE: Primary | ICD-10-CM

## 2019-02-06 PROCEDURE — 99213 OFFICE O/P EST LOW 20 MIN: CPT | Performed by: FAMILY MEDICINE

## 2019-02-06 ASSESSMENT — PAIN SCALES - GENERAL: PAINLEVEL: NO PAIN (0)

## 2019-02-06 NOTE — PROGRESS NOTES
SUBJECTIVE:   Wenceslao Vasquez is a 58 year old male who presents to clinic today for the following health issues:        Back Pain Follow up      Duration: 7 months        Specific cause: lifting, turning/bending    Description:   Location of pain: middle of back right  Character of pain: stabbing and burning  Pain radiation:none  New numbness or weakness in legs, not attributed to pain:  no     Intensity: Currently 0/10, At its worst 4/10    History:   Pain interferes with job: No,   History of back problems: lumbar disc splint and was fused  Any previous MRI or X-rays: Yes- at [facility].  Date 01/14/2019  Sees a specialist for back pain:  No  Therapies tried without relief: acetaminophen (Tylenol), cold and heat    Alleviating factors:   Improved by: none      Precipitating factors:  Worsened by: Lifting, Bending and Sitting        Results for orders placed or performed in visit on 01/14/19   XR Thoracic Spine 2 Views    Narrative    XR LUMBAR SPINE 2-3 VIEWS, XR THORACIC SPINE 2 VW 1/14/2019 12:07 PM    COMPARISON: 3/24/2011    HISTORY: Acute right-sided low back pain.      Impression    IMPRESSION: Vertebral heights are preserved without evidence of  fracture in the thoracic or lumbar spine. No listhesis identified.  Multilevel bridging endplate spurs are seen throughout the thoracic  spine and to a lesser extent the lumbar spine.    SANJIV ESPINOSA MD            Problem list and histories reviewed & adjusted, as indicated.  Additional history:   1. Acute right-sided low back pain with right-sided sciatica  Acute on chronic thoracic and lumbar back pain with multilevel Degeneration consistent with lumbar spinal stenosis. Discussed options including PT, analgesia to include OTC NSAID and Tylenol, referral to Spine Care with further imaging. Patient declines PT as it is not covered by insurance. He would like to defer Spine Care referral at this time. Will start Neurontin and titrate to 300 mg TID over the next 7-10  days.    He did not tolerate Neurontin TID and is currently taking in only at bedtime. Pain is slightly improved.    Patient Active Problem List   Diagnosis     CHELSY (obstructive sleep apnea)     Hyperlipidemia LDL goal <130     Sciatica     Low back pain     Stenosis of lumbosacral spine     Advanced directives, counseling/discussion     Hypertension goal BP (blood pressure) < 140/90     Spinal stenosis in cervical region     Cervical spondylosis without myelopathy     Pneumonia     Morbid obesity due to excess calories (H)     Syncope     Exertional chest pain     Past Surgical History:   Procedure Laterality Date     BACK SURGERY       C NONSPECIFIC PROCEDURE      lumbar disc surgery     C NONSPECIFIC PROCEDURE      hammer toe surgery right foot     C NONSPECIFIC PROCEDURE      nasal surgery     CERVICAL DISKECTOMY  8/8/13    [facility]     COLONOSCOPY  06/03/09     CYSTOSCOPY, DILATE URETHRA, COMBINED N/A 7/23/2018    Procedure: COMBINED CYSTOSCOPY, DILATE URETHRA;  cystosdcopy with urethral dilation and catheter placement;  Surgeon: Dakota Moore MD;  [facility]     CYSTOSCOPY, DILATE URETHRA, COMBINED N/A 12/10/2018    Procedure: CYSTOSCOPY, URETHRAL DILATION;  Surgeon: Dakota Moore MD;  [facility]     DIL URETHRA STRIC,MALE,SUBSEQ       HC REMOVAL HEEL SPUR, CALCANEUS  2/2005     HC REVISE MEDIAN N/CARPAL TUNNEL SURG  1993    right     HEAD & NECK SURGERY  2013    Plate in neck     INSERT CATHETER BLADDER N/A 7/23/2018    Procedure: INSERT CATHETER BLADDER;;  Surgeon: Dakota Moore MD;  [facility]     LAPAROSCOPIC CHOLECYSTECTOMY  3/11/2013    Procedure: LAPAROSCOPIC CHOLECYSTECTOMY;  laparoscopic cholecystectomy;  Surgeon: Clinton Whittaker MD;  [facility]       Social History     Tobacco Use     Smoking status: Never Smoker     Smokeless tobacco: Never Used   Substance Use Topics     Alcohol use: Yes     Comment: very little     Family History   Problem  Relation Age of Onset     Alcohol/Drug Brother      Prostate Cancer Brother      Arthritis Mother      Cancer Mother         Kidney - Stage 3     Other Cancer Mother         Kidney     Heart Disease Maternal Grandmother      Heart Disease Paternal Grandmother      C.A.D. No family hx of      Diabetes No family hx of      Anesthesia Reaction No family hx of         Hard to come out of     Cancer - colorectal No family hx of      Colon Cancer No family hx of          Current Outpatient Medications   Medication Sig Dispense Refill     acetaminophen (TYLENOL) 500 MG tablet Take 500-1,000 mg by mouth as needed for mild pain       Multiple Vitamin (MULTI VITAMIN MENS PO) Take 1 tablet by mouth daily.       gabapentin (NEURONTIN) 300 MG capsule Take 1 capsule (300 mg) by mouth daily for 3 days, THEN 1 capsule (300 mg) 2 times daily for 3 days, THEN 1 capsule (300 mg) 3 times daily for 3 days. 90 capsule 0     Allergies   Allergen Reactions     Dust Mites Hives     Morphine Nausea and Vomiting     Recent Labs   Lab Test 06/07/18  0835 04/30/18  1347 01/08/18  1505 01/08/18  0530 11/09/17  1709 10/27/17  1950  01/11/17  0855 07/21/16  0822   A1C  --  5.3  --   --   --   --   --   --   --    LDL 77  --   --   --   --   --   --  100* 90   HDL 31*  --   --   --   --   --   --  36* 36*   TRIG 198*  --   --   --   --   --   --  185* 139   ALT  --   --  31 35  --  37  --   --   --    CR  --   --  0.69 0.74  --  0.74   < >  --   --    GFRESTIMATED  --   --  >90 >90  --  >90   < >  --   --    GFRESTBLACK  --   --  >90 >90  --  >90   < >  --   --    POTASSIUM  --   --  3.4 3.5  --  3.8   < >  --   --    TSH  --   --   --   --  0.70  --   --   --   --     < > = values in this interval not displayed.      BP Readings from Last 3 Encounters:   02/06/19 124/80   01/14/19 132/80   12/10/18 147/86    Wt Readings from Last 3 Encounters:   02/06/19 103.4 kg (228 lb)   01/14/19 103.8 kg (228 lb 12.8 oz)   11/26/18 102.5 kg (225 lb 14.4 oz)                   Labs reviewed in EPIC    Reviewed and updated as needed this visit by clinical staff  Tobacco  Allergies  Meds  Problems  Med Hx  Surg Hx  Fam Hx       Reviewed and updated as needed this visit by Provider  Tobacco  Allergies  Meds  Problems  Med Hx  Surg Hx  Fam Hx         ROS:  CONSTITUTIONAL: NEGATIVE for fever, chills, change in weight  ENT/MOUTH: NEGATIVE for ear, mouth and throat problems  RESP: NEGATIVE for significant cough or SOB  CV: NEGATIVE for chest pain, palpitations or peripheral edema  GI: NEGATIVE for nausea, abdominal pain, heartburn, or change in bowel habits and NEGATIVE for incontinence  : negative for dysuria, hematuria, decreased urinary stream, erectile dysfunction and hesitancy  ROS otherwise negative    OBJECTIVE:     /80 (BP Location: Left arm, Patient Position: Chair, Cuff Size: Adult Large)   Pulse 74   Temp 98.2  F (36.8  C) (Temporal)   Resp 18   Wt 103.4 kg (228 lb)   SpO2 96%   BMI 33.67 kg/m    Body mass index is 33.67 kg/m .  GENERAL: alert, no distress and over weight  NECK: no adenopathy, no asymmetry, masses, or scars and thyroid normal to palpation  RESP: lungs clear to auscultation - no rales, rhonchi or wheezes  CV: regular rate and rhythm, normal S1 S2, no S3 or S4, no murmur, click or rub, no peripheral edema and peripheral pulses strong  ABDOMEN: soft, nontender, no hepatosplenomegaly, no masses and bowel sounds normal  Comprehensive back pain exam: Stands with forward flexion at waist. Tenderness of T12-L1 midline, Pain limits the following motions: flexion, extension, rotation and lateral flexion, Lower extremity strength functional and equal on both sides, slight weakness of right great toe dorsiflexion,  Lower extremity reflexes within normal limits bilaterally, Lower extremity sensation normal and equal on both sides and Straight leg raise negative bilaterally      Diagnostic Test Results:  none     ASSESSMENT/PLAN:     1.  Stenosis of lumbosacral spine  Will initiate PT and refer to Spine Care for consideration for further imagining and [facility]  - SPINE SURGERY REFERRAL  - PHYSICAL THERAPY REFERRAL; Future  - Camphor-Menthol-Methyl Sal (HM SALONPAS PAIN RELIEF) 1.2-5.7-6.3 % PTCH; 1 patch to affected area every 12-18 hours.  Dispense: 40 patch; Refill: 1    FUTURE APPOINTMENTS:       - Return in about 3 months (around 5/6/2019) for as needed.     Christopher Arreola MD  [facility]

## 2019-02-21 ENCOUNTER — TELEPHONE (OUTPATIENT)
Dept: FAMILY MEDICINE | Facility: OTHER | Age: 59
End: 2019-02-21

## 2019-02-21 DIAGNOSIS — M48.07 STENOSIS OF LUMBOSACRAL SPINE: Primary | ICD-10-CM

## 2019-02-21 RX ORDER — MELOXICAM 15 MG/1
15 TABLET ORAL DAILY
Qty: 30 TABLET | Refills: 1 | Status: SHIPPED | OUTPATIENT
Start: 2019-02-21 | End: 2019-03-06

## 2019-02-21 NOTE — TELEPHONE ENCOUNTER
Please call patient.    Signed Prescriptions:                        Disp   Refills    meloxicam (MOBIC) 15 MG tablet             30 tab*1        Sig: Take 1 tablet (15 mg) by mouth daily  Authorizing Provider: CHRISTOPHER GLASS    Electronically signed by Christopher Glass MD

## 2019-02-21 NOTE — TELEPHONE ENCOUNTER
Reason for Call:  Medication or medication refill:    Do you use a [facility]?  Name of the pharmacy and phone number for the current request:  [facility] - [phone]    Name of the medication requested: pain med    Other request: Pt states he has been seeing Dr. Arreola for his back pain. He has a lot of back pain right now on lower left side, where he had surgery on it before. Can you send him a prescription?     Can we leave a detailed message on this number? YES    Phone number patient can be reached at: Home number on file [phone] (home) or cell number    Best Time: anytime    Call taken on 2/21/2019 at 10:32 AM by Elizabeth Waller

## 2019-02-21 NOTE — TELEPHONE ENCOUNTER
Patient called back, relayed message above.        Gracia Rodriguez ~ Patient Representative  [address] [address]  [email or web address]  [email or web address]  Office:  [phone]  Fax:  [phone]

## 2019-02-21 NOTE — TELEPHONE ENCOUNTER
Called and spoke to the patient. He said he has been dealing with a lot of back pain lately. He said the last few days he has had a new spot of pain, lower left side of his back. Patient said it is a constant burning type pain. He said the pain is in the same area where he had a previous lumbar procedure. Patient said the pain was a 10/10 this morning. Now it is an 8/10. Patient can't think of anything that triggered the pain. He said he was out shoveling but did not feel like he over did it. Patient has bee trying Tylenol, Ibuprofen, heat and ice. He said nothing seems to help. Patient said he has been trying to move around more to keep the area loose.     Patient said he is waiting to see the spine specialist (appointment scheduled for 3/4/19, earliest he could get in). He is wondering if there is any medication that Dr. Arreola can prescribe until he sees the spine doctor? He said he just needs something stronger than he has (OTC and Gabapentin).     Will route to provider to advise.     MARY Xiao, RN  [facility]

## 2019-02-21 NOTE — TELEPHONE ENCOUNTER
When patient calls back, please relay message below.     Attempted to call patient at [phone] (home), no answer. Left message for a return call when available.     MIKI XiaoN, RN  [facility]

## 2019-03-04 ENCOUNTER — OFFICE VISIT (OUTPATIENT)
Dept: NEUROSURGERY | Facility: CLINIC | Age: 59
End: 2019-03-04
Payer: COMMERCIAL

## 2019-03-04 ENCOUNTER — HOSPITAL ENCOUNTER (OUTPATIENT)
Dept: MRI IMAGING | Facility: CLINIC | Age: 59
End: 2019-03-04
Attending: PHYSICIAN ASSISTANT
Payer: COMMERCIAL

## 2019-03-04 ENCOUNTER — HOSPITAL ENCOUNTER (EMERGENCY)
Facility: CLINIC | Age: 59
Discharge: HOME | End: 2019-03-04
Attending: FAMILY MEDICINE | Admitting: FAMILY MEDICINE
Payer: COMMERCIAL

## 2019-03-04 VITALS
RESPIRATION RATE: 20 BRPM | HEART RATE: 72 BPM | TEMPERATURE: 97 F | OXYGEN SATURATION: 99 % | SYSTOLIC BLOOD PRESSURE: 137 MMHG | DIASTOLIC BLOOD PRESSURE: 78 MMHG

## 2019-03-04 VITALS — RESPIRATION RATE: 16 BRPM | DIASTOLIC BLOOD PRESSURE: 72 MMHG | SYSTOLIC BLOOD PRESSURE: 148 MMHG | TEMPERATURE: 97.7 F

## 2019-03-04 DIAGNOSIS — M54.16 LUMBAR RADICULOPATHY: ICD-10-CM

## 2019-03-04 DIAGNOSIS — M54.16 LUMBAR RADICULOPATHY: Primary | ICD-10-CM

## 2019-03-04 DIAGNOSIS — M54.42 ACUTE LEFT-SIDED LOW BACK PAIN WITH LEFT-SIDED SCIATICA: ICD-10-CM

## 2019-03-04 PROCEDURE — 25000128 H RX IP 250 OP 636: Performed by: FAMILY MEDICINE

## 2019-03-04 PROCEDURE — 96375 TX/PRO/DX INJ NEW DRUG ADDON: CPT | Performed by: FAMILY MEDICINE

## 2019-03-04 PROCEDURE — 72148 MRI LUMBAR SPINE W/O DYE: CPT

## 2019-03-04 PROCEDURE — 96374 THER/PROPH/DIAG INJ IV PUSH: CPT | Performed by: FAMILY MEDICINE

## 2019-03-04 PROCEDURE — 99285 EMERGENCY DEPT VISIT HI MDM: CPT | Mod: 25 | Performed by: FAMILY MEDICINE

## 2019-03-04 PROCEDURE — 99284 EMERGENCY DEPT VISIT MOD MDM: CPT | Mod: Z6 | Performed by: FAMILY MEDICINE

## 2019-03-04 PROCEDURE — 99204 OFFICE O/P NEW MOD 45 MIN: CPT | Performed by: PHYSICIAN ASSISTANT

## 2019-03-04 RX ORDER — METHOCARBAMOL 750 MG/1
750 TABLET, FILM COATED ORAL 4 TIMES DAILY PRN
Qty: 60 TABLET | Refills: 1 | Status: ON HOLD | OUTPATIENT
Start: 2019-03-04 | End: 2019-05-30

## 2019-03-04 RX ORDER — OXYCODONE HYDROCHLORIDE 5 MG/1
5-10 TABLET ORAL EVERY 6 HOURS PRN
Qty: 30 TABLET | Refills: 0 | Status: ON HOLD | OUTPATIENT
Start: 2019-03-04 | End: 2019-05-30

## 2019-03-04 RX ORDER — LORAZEPAM 2 MG/ML
1 INJECTION INTRAMUSCULAR ONCE
Status: COMPLETED | OUTPATIENT
Start: 2019-03-04 | End: 2019-03-04

## 2019-03-04 RX ORDER — OXYCODONE HYDROCHLORIDE 5 MG/1
5-10 TABLET ORAL
Qty: 6 TABLET | Refills: 0 | Status: SHIPPED | OUTPATIENT
Start: 2019-03-04 | End: 2019-03-04

## 2019-03-04 RX ADMIN — HYDROMORPHONE HYDROCHLORIDE 1 MG: 1 INJECTION, SOLUTION INTRAMUSCULAR; INTRAVENOUS; SUBCUTANEOUS at 02:54

## 2019-03-04 RX ADMIN — LORAZEPAM 1 MG: 2 INJECTION INTRAMUSCULAR; INTRAVENOUS at 02:54

## 2019-03-04 ASSESSMENT — PAIN SCALES - GENERAL: PAINLEVEL: WORST PAIN (10)
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
## 2019-03-04 NOTE — ED AVS SNAPSHOT
[facility] Emergency Department  [address] [place] [address]  Phone:  [phone]  Fax:  [phone]                                    Wenceslao Vasquez   MRN: [id number]    Department:  [facility] Emergency Department   Date of Visit:  3/4/2019           After Visit Summary Signature Page    I have received my discharge instructions, and my questions have been answered. I have discussed any challenges I see with this plan with the nurse or doctor.    ..........................................................................................................................................  Patient/Patient Representative Signature      ..........................................................................................................................................  Patient Representative Print Name and Relationship to Patient    ..................................................               ................................................  Date                                   Time    ..........................................................................................................................................  Reviewed by Signature/Title    ...................................................              ..............................................  Date                                               Time          22EPIC Rev 08/18

## 2019-03-04 NOTE — DISCHARGE INSTRUCTIONS
Keep your neurosurgical appointment this afternoon as scheduled.  Tylenol/ibuprofen if needed.  You may use the oxycodone sparingly if needed for severe pain.  It was nice visiting with both of you again this morning.  I wish you the very best and hope you find relief soon.    Thank you for choosing [facility]. We appreciate the opportunity to meet your urgent medical needs. Please let us know if we could have done anything to make your stay more satisfying.    After discharge, please closely monitor for any new or worsening symptoms. Return to the Emergency Department if you develop any acute worsening signs or symptoms.    If you received an opiate pain medication or sedative during your visit, please do not drive for at least 8 hours.     If you had lab work, cultures or imaging studies done during your stay, the final results may still be pending. We will call you if your plan of care needs to change. However, if you are not improving as expected, please follow up with your primary care provider or clinic.     Start any prescription medications that were prescribed to you and take them as directed.     Please see additional handouts that may be pertinent to your condition.
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
## 2019-03-05 ENCOUNTER — MYC MEDICAL ADVICE (OUTPATIENT)
Dept: NEUROSURGERY | Facility: CLINIC | Age: 59
End: 2019-03-05

## 2019-03-06 ENCOUNTER — HOSPITAL ENCOUNTER (INPATIENT)
Facility: CLINIC | Age: 59
LOS: 2 days | Discharge: HOME | DRG: 552 | End: 2019-03-08
Attending: EMERGENCY MEDICINE | Admitting: HOSPITALIST
Payer: COMMERCIAL

## 2019-03-06 ENCOUNTER — APPOINTMENT (OUTPATIENT)
Dept: CT IMAGING | Facility: CLINIC | Age: 59
DRG: 552 | End: 2019-03-06
Attending: EMERGENCY MEDICINE
Payer: COMMERCIAL

## 2019-03-06 DIAGNOSIS — M25.552 HIP PAIN, LEFT: Primary | ICD-10-CM

## 2019-03-06 DIAGNOSIS — M54.9 ACUTE BACK PAIN, UNSPECIFIED BACK LOCATION, UNSPECIFIED BACK PAIN LATERALITY: ICD-10-CM

## 2019-03-06 PROBLEM — M48.061 LUMBAR STENOSIS: Status: ACTIVE | Noted: 2019-03-06

## 2019-03-06 LAB
ALBUMIN UR-MCNC: 10 MG/DL
ANION GAP SERPL CALCULATED.3IONS-SCNC: 6 MMOL/L (ref 3–14)
APPEARANCE UR: CLEAR
BASOPHILS # BLD AUTO: 0 10E9/L (ref 0–0.2)
BASOPHILS NFR BLD AUTO: 0.1 %
BILIRUB UR QL STRIP: NEGATIVE
BUN SERPL-MCNC: 17 MG/DL (ref 7–30)
CALCIUM SERPL-MCNC: 9.1 MG/DL (ref 8.5–10.1)
CAOX CRY #/AREA URNS HPF: ABNORMAL /HPF
CHLORIDE SERPL-SCNC: 107 MMOL/L (ref 94–109)
CO2 SERPL-SCNC: 26 MMOL/L (ref 20–32)
COLOR UR AUTO: YELLOW
CREAT SERPL-MCNC: 0.88 MG/DL (ref 0.66–1.25)
DIFFERENTIAL METHOD BLD: ABNORMAL
EOSINOPHIL # BLD AUTO: 0 10E9/L (ref 0–0.7)
EOSINOPHIL NFR BLD AUTO: 0.6 %
ERYTHROCYTE [DISTWIDTH] IN BLOOD BY AUTOMATED COUNT: 13.5 % (ref 10–15)
GFR SERPL CREATININE-BSD FRML MDRD: >90 ML/MIN/{1.73_M2}
GLUCOSE SERPL-MCNC: 84 MG/DL (ref 70–99)
GLUCOSE UR STRIP-MCNC: NEGATIVE MG/DL
HCT VFR BLD AUTO: 41.4 % (ref 40–53)
HGB BLD-MCNC: 14.5 G/DL (ref 13.3–17.7)
HGB UR QL STRIP: NEGATIVE
IMM GRANULOCYTES # BLD: 0 10E9/L (ref 0–0.4)
IMM GRANULOCYTES NFR BLD: 0.1 %
INR PPP: 1.02 (ref 0.86–1.14)
KETONES UR STRIP-MCNC: NEGATIVE MG/DL
LEUKOCYTE ESTERASE UR QL STRIP: NEGATIVE
LYMPHOCYTES # BLD AUTO: 2.4 10E9/L (ref 0.8–5.3)
LYMPHOCYTES NFR BLD AUTO: 33.5 %
MCH RBC QN AUTO: 34.8 PG (ref 26.5–33)
MCHC RBC AUTO-ENTMCNC: 35 G/DL (ref 31.5–36.5)
MCV RBC AUTO: 99 FL (ref 78–100)
MONOCYTES # BLD AUTO: 0.4 10E9/L (ref 0–1.3)
MONOCYTES NFR BLD AUTO: 5.9 %
MUCOUS THREADS #/AREA URNS LPF: PRESENT /LPF
NEUTROPHILS # BLD AUTO: 4.3 10E9/L (ref 1.6–8.3)
NEUTROPHILS NFR BLD AUTO: 59.8 %
NITRATE UR QL: NEGATIVE
NRBC # BLD AUTO: 0 10*3/UL
NRBC BLD AUTO-RTO: 0 /100
PH UR STRIP: 5 PH (ref 5–7)
PLATELET # BLD AUTO: 199 10E9/L (ref 150–450)
POTASSIUM SERPL-SCNC: 3.6 MMOL/L (ref 3.4–5.3)
RBC # BLD AUTO: 4.17 10E12/L (ref 4.4–5.9)
RBC #/AREA URNS AUTO: 1 /HPF (ref 0–2)
SODIUM SERPL-SCNC: 139 MMOL/L (ref 133–144)
SOURCE: ABNORMAL
SP GR UR STRIP: 1.02 (ref 1–1.03)
SQUAMOUS #/AREA URNS AUTO: <1 /HPF (ref 0–1)
UROBILINOGEN UR STRIP-MCNC: NORMAL MG/DL (ref 0–2)
WBC # BLD AUTO: 7.1 10E9/L (ref 4–11)
WBC #/AREA URNS AUTO: 1 /HPF (ref 0–5)

## 2019-03-06 PROCEDURE — 80048 BASIC METABOLIC PNL TOTAL CA: CPT | Performed by: EMERGENCY MEDICINE

## 2019-03-06 PROCEDURE — 74177 CT ABD & PELVIS W/CONTRAST: CPT

## 2019-03-06 PROCEDURE — 99222 1ST HOSP IP/OBS MODERATE 55: CPT | Mod: AI | Performed by: HOSPITALIST

## 2019-03-06 PROCEDURE — 93010 ELECTROCARDIOGRAM REPORT: CPT | Performed by: INTERNAL MEDICINE

## 2019-03-06 PROCEDURE — 93005 ELECTROCARDIOGRAM TRACING: CPT

## 2019-03-06 PROCEDURE — 25000128 H RX IP 250 OP 636: Performed by: EMERGENCY MEDICINE

## 2019-03-06 PROCEDURE — 25800030 ZZH RX IP 258 OP 636: Performed by: HOSPITALIST

## 2019-03-06 PROCEDURE — 25000125 ZZHC RX 250: Performed by: EMERGENCY MEDICINE

## 2019-03-06 PROCEDURE — 96375 TX/PRO/DX INJ NEW DRUG ADDON: CPT

## 2019-03-06 PROCEDURE — 85025 COMPLETE CBC W/AUTO DIFF WBC: CPT | Performed by: EMERGENCY MEDICINE

## 2019-03-06 PROCEDURE — 99285 EMERGENCY DEPT VISIT HI MDM: CPT | Mod: 25

## 2019-03-06 PROCEDURE — 12000000 ZZH R&B MED SURG/OB

## 2019-03-06 PROCEDURE — 81001 URINALYSIS AUTO W/SCOPE: CPT | Performed by: EMERGENCY MEDICINE

## 2019-03-06 PROCEDURE — 96374 THER/PROPH/DIAG INJ IV PUSH: CPT | Mod: 59

## 2019-03-06 PROCEDURE — 25000132 ZZH RX MED GY IP 250 OP 250 PS 637: Performed by: HOSPITALIST

## 2019-03-06 PROCEDURE — 85610 PROTHROMBIN TIME: CPT | Performed by: EMERGENCY MEDICINE

## 2019-03-06 RX ORDER — PROCHLORPERAZINE 25 MG
25 SUPPOSITORY, RECTAL RECTAL EVERY 12 HOURS PRN
Status: DISCONTINUED | OUTPATIENT
Start: 2019-03-06 | End: 2019-03-08 | Stop reason: HOSPADM

## 2019-03-06 RX ORDER — IOPAMIDOL 755 MG/ML
109 INJECTION, SOLUTION INTRAVASCULAR ONCE
Status: COMPLETED | OUTPATIENT
Start: 2019-03-06 | End: 2019-03-06

## 2019-03-06 RX ORDER — ACETAMINOPHEN 650 MG/1
650 SUPPOSITORY RECTAL EVERY 4 HOURS PRN
Status: DISCONTINUED | OUTPATIENT
Start: 2019-03-06 | End: 2019-03-08 | Stop reason: HOSPADM

## 2019-03-06 RX ORDER — ONDANSETRON 2 MG/ML
4 INJECTION INTRAMUSCULAR; INTRAVENOUS EVERY 30 MIN PRN
Status: DISCONTINUED | OUTPATIENT
Start: 2019-03-06 | End: 2019-03-06

## 2019-03-06 RX ORDER — LIDOCAINE 4 G/G
2 PATCH TOPICAL
Status: DISCONTINUED | OUTPATIENT
Start: 2019-03-06 | End: 2019-03-08 | Stop reason: HOSPADM

## 2019-03-06 RX ORDER — HYDROMORPHONE HYDROCHLORIDE 1 MG/ML
0.2 INJECTION, SOLUTION INTRAMUSCULAR; INTRAVENOUS; SUBCUTANEOUS
Status: DISCONTINUED | OUTPATIENT
Start: 2019-03-06 | End: 2019-03-08 | Stop reason: HOSPADM

## 2019-03-06 RX ORDER — ONDANSETRON 4 MG/1
4 TABLET, ORALLY DISINTEGRATING ORAL EVERY 6 HOURS PRN
Status: DISCONTINUED | OUTPATIENT
Start: 2019-03-06 | End: 2019-03-08 | Stop reason: HOSPADM

## 2019-03-06 RX ORDER — NALOXONE HYDROCHLORIDE 0.4 MG/ML
.1-.4 INJECTION, SOLUTION INTRAMUSCULAR; INTRAVENOUS; SUBCUTANEOUS
Status: DISCONTINUED | OUTPATIENT
Start: 2019-03-06 | End: 2019-03-08 | Stop reason: HOSPADM

## 2019-03-06 RX ORDER — POLYETHYLENE GLYCOL 3350 17 G/17G
17 POWDER, FOR SOLUTION ORAL DAILY PRN
Status: DISCONTINUED | OUTPATIENT
Start: 2019-03-06 | End: 2019-03-08 | Stop reason: HOSPADM

## 2019-03-06 RX ORDER — AMOXICILLIN 250 MG
2 CAPSULE ORAL 2 TIMES DAILY PRN
Status: DISCONTINUED | OUTPATIENT
Start: 2019-03-06 | End: 2019-03-08 | Stop reason: HOSPADM

## 2019-03-06 RX ORDER — ONDANSETRON 2 MG/ML
4 INJECTION INTRAMUSCULAR; INTRAVENOUS EVERY 6 HOURS PRN
Status: DISCONTINUED | OUTPATIENT
Start: 2019-03-06 | End: 2019-03-08 | Stop reason: HOSPADM

## 2019-03-06 RX ORDER — MORPHINE SULFATE 4 MG/ML
4 INJECTION, SOLUTION INTRAMUSCULAR; INTRAVENOUS
Status: DISCONTINUED | OUTPATIENT
Start: 2019-03-06 | End: 2019-03-06

## 2019-03-06 RX ORDER — ACETAMINOPHEN 325 MG/1
650 TABLET ORAL EVERY 4 HOURS PRN
Status: DISCONTINUED | OUTPATIENT
Start: 2019-03-06 | End: 2019-03-08 | Stop reason: HOSPADM

## 2019-03-06 RX ORDER — AMOXICILLIN 250 MG
1 CAPSULE ORAL 2 TIMES DAILY PRN
Status: DISCONTINUED | OUTPATIENT
Start: 2019-03-06 | End: 2019-03-08 | Stop reason: HOSPADM

## 2019-03-06 RX ORDER — HYDROMORPHONE HYDROCHLORIDE 1 MG/ML
0.5 INJECTION, SOLUTION INTRAMUSCULAR; INTRAVENOUS; SUBCUTANEOUS ONCE
Status: COMPLETED | OUTPATIENT
Start: 2019-03-06 | End: 2019-03-06

## 2019-03-06 RX ORDER — BISACODYL 10 MG
10 SUPPOSITORY, RECTAL RECTAL DAILY PRN
Status: DISCONTINUED | OUTPATIENT
Start: 2019-03-06 | End: 2019-03-08 | Stop reason: HOSPADM

## 2019-03-06 RX ORDER — OXYCODONE AND ACETAMINOPHEN 5; 325 MG/1; MG/1
1-2 TABLET ORAL EVERY 4 HOURS PRN
Status: DISCONTINUED | OUTPATIENT
Start: 2019-03-06 | End: 2019-03-08 | Stop reason: HOSPADM

## 2019-03-06 RX ORDER — IBUPROFEN 600 MG/1
600 TABLET, FILM COATED ORAL EVERY 6 HOURS PRN
Status: DISCONTINUED | OUTPATIENT
Start: 2019-03-06 | End: 2019-03-07

## 2019-03-06 RX ORDER — SODIUM CHLORIDE 9 MG/ML
INJECTION, SOLUTION INTRAVENOUS CONTINUOUS
Status: DISCONTINUED | OUTPATIENT
Start: 2019-03-07 | End: 2019-03-08

## 2019-03-06 RX ORDER — LIDOCAINE 40 MG/G
CREAM TOPICAL
Status: DISCONTINUED | OUTPATIENT
Start: 2019-03-06 | End: 2019-03-08 | Stop reason: HOSPADM

## 2019-03-06 RX ORDER — PROCHLORPERAZINE MALEATE 5 MG
10 TABLET ORAL EVERY 6 HOURS PRN
Status: DISCONTINUED | OUTPATIENT
Start: 2019-03-06 | End: 2019-03-08 | Stop reason: HOSPADM

## 2019-03-06 RX ORDER — METHOCARBAMOL 750 MG/1
750 TABLET, FILM COATED ORAL 4 TIMES DAILY PRN
Status: DISCONTINUED | OUTPATIENT
Start: 2019-03-06 | End: 2019-03-08 | Stop reason: HOSPADM

## 2019-03-06 RX ADMIN — SODIUM CHLORIDE 73 ML: 9 INJECTION, SOLUTION INTRAVENOUS at 16:36

## 2019-03-06 RX ADMIN — LIDOCAINE 2 PATCH: 560 PATCH PERCUTANEOUS; TOPICAL; TRANSDERMAL at 20:13

## 2019-03-06 RX ADMIN — Medication 0.5 MG: at 15:45

## 2019-03-06 RX ADMIN — ONDANSETRON 4 MG: 2 INJECTION INTRAMUSCULAR; INTRAVENOUS at 15:42

## 2019-03-06 RX ADMIN — SODIUM CHLORIDE: 9 INJECTION, SOLUTION INTRAVENOUS at 23:40

## 2019-03-06 RX ADMIN — OXYCODONE HYDROCHLORIDE AND ACETAMINOPHEN 1 TABLET: 5; 325 TABLET ORAL at 19:07

## 2019-03-06 RX ADMIN — OXYCODONE HYDROCHLORIDE AND ACETAMINOPHEN 1 TABLET: 5; 325 TABLET ORAL at 23:39

## 2019-03-06 RX ADMIN — IOPAMIDOL 109 ML: 755 INJECTION, SOLUTION INTRAVENOUS at 16:37

## 2019-03-06 ASSESSMENT — ENCOUNTER SYMPTOMS
HEMATURIA: 0
BACK PAIN: 1
NUMBNESS: 0
DIFFICULTY URINATING: 0
BLOOD IN STOOL: 0
ARTHRALGIAS: 1
DYSURIA: 0

## 2019-03-06 ASSESSMENT — MIFFLIN-ST. JEOR: SCORE: 1764.73

## 2019-03-06 ASSESSMENT — ACTIVITIES OF DAILY LIVING (ADL): ADLS_ACUITY_SCORE: 12

## 2019-03-06 NOTE — ED PROVIDER NOTES
History     Chief Complaint:  Back Pain     HPI   Wenceslao Vasquez is a 58 year old male with a history of lumbosacral stenosis, sciatica, cervical spondyosis with myelopathy, lumbar disk surgery, and cervical discectomy who presents to the emergency department today with back pain. The patient states that he has had back pain for awhile. He was sent here for evaluation and possible surgical admit for a herniated disc. The patient has pain from his left groin to the back of his left hip and from his low back down through his left leg. The patient denies numbness or tingling, loss of bowel or bladder control. Has had nausea, vomiting, abdominal pain and loss of appetite.    Allergies:  Morphine     Medications:    Gabapentin   Mobic   Robaxin  Roxicodone      Past Medical History:    Allergic rhinitis   Burn   Contact dermatitis and other eczema   Cough   Hypertension   Migraine   Obstructive sleep apnea  Osteoarthrosis   Pneumonia   Stenosis of lumbosacral spine  Unstable angina  Hyperlipidemia  Sciatica   Cervical spondylosis with myelopathy   Syncope     Past Surgical History:    Back surgery   Lumbar disc surgery   hammer toe surgery right foot   Nasal surgery   Cervical diskectomy   Colonoscopy  Cystoscopy, dilate urethra, combined x  Removal of heel spur  Median carpal tunnel surgery right   Plate in neck   Insert bladder catheter  Laparoscopic cholecystectomy     Family History:    Alcohol/drug  Prostate cancer  Arthritis   Kidney cancer     Social History:  The patient was alone in the emergency department.  Smoking Status: Never smoker   Smokeless Tobacco: Never used   Alcohol Use: Yes rarely   Drug use: No   Marital Status:      Review of Systems   Gastrointestinal: Negative for blood in stool.   Genitourinary: Negative for difficulty urinating, dysuria, hematuria and urgency.   Musculoskeletal: Positive for arthralgias and back pain.   Neurological: Negative for numbness.   All other systems reviewed  "and are negative.    Physical Exam   First Vitals:  Patient Vitals for the past 24 hrs:   BP Temp Temp src Pulse Resp SpO2 Height Weight   03/06/19 1750 -- -- -- -- -- 95 % -- --   03/06/19 1740 -- -- -- -- -- 97 % -- --   03/06/19 1730 149/80 -- -- 63 -- 95 % -- --   03/06/19 1720 -- -- -- -- -- 96 % -- --   03/06/19 1710 144/85 -- -- 61 -- 96 % -- --   03/06/19 1700 -- -- -- 60 -- 95 % -- --   03/06/19 1650 -- -- -- -- -- 95 % -- --   03/06/19 1640 -- -- -- -- -- 95 % -- --   03/06/19 1620 -- -- -- -- -- 94 % -- --   03/06/19 1610 -- -- -- -- -- 94 % -- --   03/06/19 1600 138/87 -- -- 58 -- 93 % -- --   03/06/19 1226 144/81 98.2  F (36.8  C) Oral 65 16 95 % 1.727 m (5' 8\") 97.5 kg (215 lb)     Physical Exam  General: Resting comfortably on the gurney  Eyes:  The pupils are equal and round    Conjunctivae and sclerae are normal  ENT:    Moist mucous membranes  Neck:  Normal range of motion  CV:  Regular rate and rhythm    Skin warm and well perfused   Resp:  Lungs are clear    Non-labored    No rales    No wheezing   GI:  Abdomen is soft, there is no rigidity    No distension    No rebound tenderness     Mild LLQ pain  MS:  Tender on left low back; pain worsens when he lifts left leg off bed  Skin:  No rash or acute skin lesions noted  Neuro:   Awake, alert.      Speech is normal and fluent.    Face is symmetric.     Moves all extremities equally    SILT on bilateral LE  Psych: Normal affect.  Appropriate interactions.  Emergency Department Course   Imaging:  Radiology findings were communicated with the patient who voiced understanding of the findings.    CT abdomen Pelvis with contrast:   IMPRESSION: No acute process demonstrated within the abdomen and  pelvis.  Report per radiology     Laboratory:  Laboratory findings were communicated with the patient who voiced understanding of the findings.    CBC: WBC 7.1, HGB 14.5,      BMP: Creatinine 0.88 AWNL    INR: 1.02    UA: protein albumin urine 10, mucous " urine present, calcium oxalate moderate o/w WNL    Interventions:  1542: Zofran 4 mg IV  1545: Dilaudid 0.5 mg IV     Emergency Department Course:  Nursing notes and vitals reviewed.  The patient provided a urine sample here in the emergency department. This was sent for laboratory testing, findings above.  IV was inserted and blood was drawn for laboratory testing, results above.  The patient was sent for a CT abdomen pelvis while in the emergency department, results above.     5:15 PM: I spoke with LEVAR Mckinley of the neurology service regarding patient's presentation, findings, and plan of care.    1514: I performed an exam of the patient as documented above.     Findings and plan explained to the Patient who consents to admission. Discussed the patient with Dr. Prasad, who will admit the patient to a medical bed for further monitoring, evaluation, and treatment.    I personally reviewed the laboratory and imaging results with the Patient and answered all related questions prior to admission.    Impression & Plan    Medical Decision Making:  Wenceslao Vasquez is a 58 year old male who presented to the ED with back pain.  Patient has a nonfocal exam and doubt cauda equina.  Did just recently have an MRI of the lumbar spine and so do not think that this needs to be repeated.  He does have more left lower quadrant tenderness and some associated symptoms such as nausea, loss of appetite than I would expect with just back pain and so did obtain CT abdomen that did not show any acute abnormality.  Discussed patient with Marina Hawkins from neurosurgery who recommended admission for likely surgery. Will discuss with Dr. Jason in morning. Discussed with hospitalist for admission.     Diagnosis:    ICD-10-CM    1. Acute back pain, unspecified back location, unspecified back pain laterality M54.9        Disposition:  Admitted to medical bed.     Didier Rene  3/6/2019    EMERGENCY DEPARTMENT  Scribe Disclosure:  Didier ROQUE,  am serving as a scribe at 3:13 PM on 3/6/2019 to document services personally performed by Vianey Zheng, * based on my observations and the provider's statements to me.        Vianey Zheng MD  03/06/19 [id number]

## 2019-03-06 NOTE — PHARMACY-ADMISSION MEDICATION HISTORY
Admission medication history interview status for the 3/6/2019  admission is complete. See EPIC admission navigator for prior to admission medications     Medication history source reliability:Good    Actions taken by pharmacist (provider contacted, etc): Interviewed patient.      Additional medication history information not noted on PTA med list :None    Medication reconciliation/reorder completed by provider prior to medication history? No    Time spent in this activity: 10 minutes    Prior to Admission medications    Medication Sig Last Dose Taking? Auth Provider   Camphor-Menthol-Methyl Sal (HM SALONPAS PAIN RELIEF) 1.2-5.7-6.3 % PTCH 1 patch to affected area every 12-18 hours. 3/5/2019 at 2000 Yes Christopher Arreola MD   methocarbamol (ROBAXIN) 750 MG tablet Take 1 tablet (750 mg) by mouth 4 times daily as needed for muscle spasms 3/6/2019 at 0300 Yes Shalom Hawkins PA-C   Multiple Vitamin (MULTI VITAMIN MENS PO) Take 1 tablet by mouth daily. 3/6/2019 at 0600 Yes Reported, Patient   oxyCODONE (ROXICODONE) 5 MG tablet Take 1-2 tablets (5-10 mg) by mouth every 6 hours as needed for pain 3/6/2019 at 0600 Yes Shalom Hawkins PA-C       Haley Biswas, PharmD, BCPS

## 2019-03-06 NOTE — ED NOTES
"[facility]  ED Nurse Handoff Report    ED Chief complaint: Back Pain (Patient states back pain for awhile. Sent here for evaluation and possible admit for surgery for herniated disc. Pain from left groin to back of left hip and from low back down left leg. Denies numbness/tingling. Denies loss of bowel or bladder control.)      ED Diagnosis:   Final diagnoses:   Acute back pain, unspecified back location, unspecified back pain laterality       Code Status: Full Code    Allergies:   Allergies   Allergen Reactions     Dust Mites Hives     Morphine Nausea and Vomiting       Activity level - Baseline/Home:  Independent    Activity Level - Current:   Independent     Needed?: No    Isolation: No  Infection: Not Applicable  Bariatric?: Yes    Vital Signs:   Vitals:    03/06/19 1226   BP: 144/81   Pulse: 65   Resp: 16   Temp: 98.2  F (36.8  C)   TempSrc: Oral   SpO2: 95%   Weight: 97.5 kg (215 lb)   Height: 1.727 m (5' 8\")       Cardiac Rhythm: ,        Pain level: 0-10 Pain Scale: 2    Is this patient confused?: No   Does this patient have a guardian?  No         If yes, is there guardianship documents in the Epic \"Code/ACP\" activity?  N/A         Guardian Notified?  N/A  [place] - Suicide Severity Rating Scale Completed?  Yes  If yes, what color did the patient score?  White    Patient Report: Initial Complaint:  Back pain  Focused Assessment:  Back pain.  Abdominal pain for 6 months.  Worse since Sun.   Tests Performed:  Labs, UA, CT   Abnormal Results:   Abnormal Labs Reviewed   CBC WITH PLATELETS DIFFERENTIAL - Abnormal; Notable for the following components:       Result Value    RBC Count 4.17 (*)     MCH 34.8 (*)     All other components within normal limits   ROUTINE UA WITH MICROSCOPIC - Abnormal; Notable for the following components:    Protein Albumin Urine 10 (*)     Mucous Urine Present (*)     Calcium Oxalate Moderate (*)     All other components within normal limits     Treatments " provided:  IV.  Zofran, Dilaudid    Family Comments: Family with patient.     OBS brochure/video discussed/provided to patient/family: N/A              Name of person given brochure if not patient:                Relationship to patient:     ED Medications:   Medications   ondansetron (ZOFRAN) injection 4 mg (4 mg Intravenous Given 3/6/19 1542)   HYDROmorphone (PF) (DILAUDID) injection 0.5 mg (0.5 mg Intravenous Given 3/6/19 1545)   Saline Flush - CT (73 mLs Intravenous Given 3/6/19 1636)   iopamidol (ISOVUE-370) solution 109 mL (109 mLs Intravenous Given 3/6/19 1637)       Drips infusing?:  No    For the majority of the shift this patient was Green.   Interventions performed were monitor, assess.    Severe Sepsis OR Septic Shock Diagnosis Present: No    To be done/followed up on inpatient unit:      ED NURSE PHONE NUMBER: *[phone]

## 2019-03-06 NOTE — TELEPHONE ENCOUNTER
Called and spoke to patient's wife and informed her of the mychart message per Shalom ISAACS recommending that he report to [facility] ED. Wife verbalized understanding and states she will bring him in.   knee immobilizer

## 2019-03-07 ENCOUNTER — APPOINTMENT (OUTPATIENT)
Dept: GENERAL RADIOLOGY | Facility: CLINIC | Age: 59
DRG: 552 | End: 2019-03-07
Attending: NEUROLOGICAL SURGERY
Payer: COMMERCIAL

## 2019-03-07 PROCEDURE — 25800030 ZZH RX IP 258 OP 636: Performed by: HOSPITALIST

## 2019-03-07 PROCEDURE — 73502 X-RAY EXAM HIP UNI 2-3 VIEWS: CPT

## 2019-03-07 PROCEDURE — 12000000 ZZH R&B MED SURG/OB

## 2019-03-07 PROCEDURE — 99232 SBSQ HOSP IP/OBS MODERATE 35: CPT | Performed by: INTERNAL MEDICINE

## 2019-03-07 PROCEDURE — 25000132 ZZH RX MED GY IP 250 OP 250 PS 637: Performed by: HOSPITALIST

## 2019-03-07 RX ADMIN — SODIUM CHLORIDE: 9 INJECTION, SOLUTION INTRAVENOUS at 17:44

## 2019-03-07 RX ADMIN — LIDOCAINE 2 PATCH: 560 PATCH PERCUTANEOUS; TOPICAL; TRANSDERMAL at 20:21

## 2019-03-07 RX ADMIN — IBUPROFEN 600 MG: 600 TABLET ORAL at 09:22

## 2019-03-07 RX ADMIN — SENNOSIDES AND DOCUSATE SODIUM 1 TABLET: 8.6; 5 TABLET ORAL at 04:12

## 2019-03-07 RX ADMIN — OXYCODONE HYDROCHLORIDE AND ACETAMINOPHEN 1 TABLET: 5; 325 TABLET ORAL at 17:49

## 2019-03-07 RX ADMIN — OXYCODONE HYDROCHLORIDE AND ACETAMINOPHEN 2 TABLET: 5; 325 TABLET ORAL at 08:16

## 2019-03-07 RX ADMIN — SODIUM CHLORIDE: 9 INJECTION, SOLUTION INTRAVENOUS at 09:23

## 2019-03-07 RX ADMIN — SENNOSIDES AND DOCUSATE SODIUM 2 TABLET: 8.6; 5 TABLET ORAL at 18:46

## 2019-03-07 RX ADMIN — OXYCODONE HYDROCHLORIDE AND ACETAMINOPHEN 1 TABLET: 5; 325 TABLET ORAL at 04:12

## 2019-03-07 ASSESSMENT — ACTIVITIES OF DAILY LIVING (ADL)
ADLS_ACUITY_SCORE: 12
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
## 2019-03-07 NOTE — PROGRESS NOTES
RECEIVING UNIT ED HANDOFF REVIEW    ED Nurse Handoff Report was reviewed by: Vianey Sanabria on March 6, 2019 at 6:11 PM

## 2019-03-07 NOTE — PROGRESS NOTES
[facility]    Hospitalist Progress Note    Date of Service (when I saw the patient): 03/07/2019    Assessment & Plan   Wenceslao Vasquez is a 58 year old male who was admitted on 3/6/2019.  Assessment & Plan     Wenceslao Vasquez is a 58 year old male admitted on 3/6/2019.  Past history of BPH, CHELSY and chronic back pain due to lumbar stenosis who presents with progressive low back pain.     Severe lumbar spinal stenosis  Known history of lumbar stenosis with recent progression in pain with MRI 3/4 demonstrating progression of severe central stenosis of L1-L4.  Case was discussed with Shalom Guerrero of Neurosurgery by ED provider, and though Dr. Calvo needs to formally evaluate patient, he indicates there is high likelihood that they will proceed with surgical intervention and we will therefore admit to inpatient status. There are no red flag symptoms to indicate emergent surgery.  Will check pre-op EKG.  He is otherwise low risk for surgery.  Will trial lidocaine patch, have prn tylenol, ibuprofen, oxycodone and IV dilaudid for pain control.  PT and OT to evaulate, Neurosurgery to formally consult.  Will place him NPO at midnight pending Neurosurgery evaluation.  With ibuprofen there is high risk of bleeding perioperatively so ibuprofen was stopped today    Neurosurgery with Dr. Jason to discuss with patient regarding surgery today or tomorrow a.m.     BPH  Hx of urethral stricture  He is not currently on medication treatment and reports no longer performing self-catheterization.     CHELSY  He does not use CPAP at home.          Diet: Regular, NPO midnight  DVT Prophylaxis: Pneumatic Compression Devices  Murrell Catheter: in place, indication:    Code Status: Full        Disposition Plan     Expected discharge: 2 - 3 days, to home vs TCU pending post-op course.          Senia Koo MD  [phone] (P)      Interval History   Patient complains of lower back pain 6 out of 10 radiating to both hips    -Data  reviewed today: I reviewed all new labs and imaging results over the last 24 hours. I personally reviewed no images or EKG's today.    Physical Exam   Temp: 98  F (36.7  C) Temp src: Oral BP: 111/59 Pulse: 53   Resp: 16 SpO2: 95 % O2 Device: None (Room air)    Vitals:    03/06/19 1226   Weight: 97.5 kg (215 lb)     Vital Signs with Ranges  Temp:  [97.8  F (36.6  C)-98.1  F (36.7  C)] 98  F (36.7  C)  Pulse:  [53-63] 53  Resp:  [16] 16  BP: (111-149)/(59-87) 111/59  SpO2:  [93 %-97 %] 95 %  No intake/output data recorded.    Constitutional: Awake, alert, cooperative, no apparent distress  Respiratory: Clear to auscultation bilaterally, no crackles or wheezing  Cardiovascular: Regular rate and rhythm, normal S1 and S2, and no murmur noted  GI: Normal bowel sounds, soft, non-distended, non-tender  Skin/Integumen: No rashes, no cyanosis, no edema  Other:     Medications     sodium chloride 100 mL/hr at 03/07/19 0923       lidocaine  2 patch Transdermal Q24h    And     lidocaine   Transdermal Q24H    And     lidocaine   Transdermal Q8H     sodium chloride (PF)  3 mL Intracatheter Q8H       Data   Recent Labs   Lab 03/06/19  1540   WBC 7.1   HGB 14.5   MCV 99      INR 1.02      POTASSIUM 3.6   CHLORIDE 107   CO2 26   BUN 17   CR 0.88   ANIONGAP 6   BETY 9.1   GLC 84       Recent Results (from the past 24 hour(s))   CT Abdomen Pelvis w Contrast    Narrative    CT ABDOMEN AND PELVIS WITH CONTRAST 3/6/2019 4:39 PM     HISTORY: Left lower quadrant abdominal pain, nausea, no appetite, rule  out diverticulitis, stone.    COMPARISON: None.    TECHNIQUE: Volumetric helical acquisition of CT images from the lung  bases through the symphysis pubis after the administration of 106 mL  Isovue-370 intravenous contrast. Radiation dose for this scan was  reduced using automated exposure control, adjustment of the mA and/or  kV according to patient size, or iterative reconstruction technique.    FINDINGS: The liver, bilateral  kidneys and adrenal glands, pancreas,  and spleen demonstrate no worrisome focal lesion. Low-attenuation  subcentimeter liver lesion(s) compatible with benign cysts or other  benign lesions. These are/is stable for at least one year and can be  considered benign. Normal caliber aorta. No appendicitis. No  diverticulitis. No hydronephrosis or urolithiasis. Low-attenuation  subcentimeter renal lesion(s) compatible with benign cysts or other  benign lesions. These are/is stable for at least one year and can be  considered benign. There is no free fluid in the abdomen or pelvis. No  free air in the abdomen. Bone windows reveal no destructive lesions.   There are no abdominal or pelvic lymph nodes that are abnormal by size  criteria. The visualized lung bases are unremarkable. There are no  dilated loops of small bowel or colon.      Impression    IMPRESSION: No acute process demonstrated within the abdomen and  pelvis.    VERNEICE GRAY MD

## 2019-03-07 NOTE — PLAN OF CARE
PT: Orders received and chart reviewed. Holding PT eval this day, pt NPO with pending neurosurgery consult. Will r/s 3/8

## 2019-03-07 NOTE — H&P
[facility]    History and Physical - Hospitalist Service       Date of Admission:  3/6/2019    Assessment & Plan   Wenceslao Vasquez is a 58 year old male admitted on 3/6/2019.  Past history of BPH, CHELSY and chronic back pain due to lumbar stenosis who presents with progressive low back pain.    Severe lumbar spinal stenosis  Known history of lumbar stenosis with recent progression in pain with MRI 3/4 demonstrating progression of severe central stenosis of L1-L4.  Case was discussed with Shalom Guerrero of Neurosurgery by ED provider, and though Dr. Calvo needs to formally evaluate patient, he indicates there is high likelihood that they will proceed with surgical intervention and we will therefore admit to inpatient status. There are no red flag symptoms to indicate emergent surgery.  Will check pre-op EKG.  He is otherwise low risk for surgery.  Will trial lidocaine patch, have prn tylenol, ibuprofen, oxycodone and IV dilaudid for pain control.  PT and OT to evaulate, Neurosurgery to formally consult.  Will place him NPO at midnight pending Neurosurgery evaluation.    BPH  Hx of urethral stricture  He is not currently on medication treatment and reports no longer performing self-catheterization.    CHELSY  He does not use CPAP at home.       Diet: Regular, NPO midnight  DVT Prophylaxis: Pneumatic Compression Devices  Murrell Catheter: in place, indication:    Code Status: Full    Disposition Plan   Expected discharge: 2 - 3 days, to home vs TCU pending post-op course.  Entered: Tarik Prasad MD 03/06/2019, 6:01 PM     The patient's care was discussed with the Patient and Patient's Family.    Tarik Prasad MD  [facility]    ______________________________________________________________________    Chief Complaint   Back pain    History is obtained from the patient, his wife, chart review and discussion with ED provider.    History of Present Illness   Wenceslao Vasquez is a 58 year old male who  presents with back pain.  He has a long history of chronic back pain for which he underwent spinal surgery in 1996.  He reportedly did quite well following this but has subsequently had return of pain.  Reports lumbar spine pain has been progressive over the past 2 months.  This past Sunday, he reports no significant change, but chronic pain just became too much to bear and he therefore presented to the emergency room for evaluation.  He was subsequently referred to neurosurgery and had an MRI obtained on 3/4.  Reports pain will radiate to his left lower abdomen and groin in addition to the left hip.  He states the pain is constant, sharp in nature and positional, worse with lying on his left side and his back.  He denies any paresthesias of the lower extremities but does report left leg weakness which has been progressive as well.  He denies any fevers or chills or incontinence of the bowel or bladder.  He was recently prescribed oxycodone and Robaxin which have provided essentially no relief for his pain.  He has tried hot and cold packs without relief.  He has tried gabapentin without relief.  His remainder of review of systems is otherwise negative.    He is currently minimally active secondary to his pain, but prior to 2 months ago he denies any cardiorespiratory symptoms associated with activity.  He denies any symptoms of congestive heart failure.      Review of Systems    The 10 point Review of Systems is negative other than noted in the HPI or here.     Past Medical History    Lumbar spinal stenosis  Obstructive sleep apnea  BPH  History of urethral stricture    Past Surgical History   I have reviewed this patient's surgical history and updated it with pertinent information if needed.  Past Surgical History:   Procedure Laterality Date     BACK SURGERY       C NONSPECIFIC PROCEDURE      lumbar disc surgery     C NONSPECIFIC PROCEDURE      hammer toe surgery right foot     C NONSPECIFIC PROCEDURE      nasal  "surgery     CERVICAL DISKECTOMY  13    [facility]     COLONOSCOPY  09     CYSTOSCOPY, DILATE URETHRA, COMBINED N/A 2018    Procedure: COMBINED CYSTOSCOPY, DILATE URETHRA;  cystosdcopy with urethral dilation and catheter placement;  Surgeon: Dakota Moore MD;  [facility]     CYSTOSCOPY, DILATE URETHRA, COMBINED N/A 12/10/2018    Procedure: CYSTOSCOPY, URETHRAL DILATION;  Surgeon: Dakota Moore MD;  [facility]: [facility]     DIL URETHRA STRIC,MALE,SUBSEQ       HC REMOVAL HEEL SPUR, CALCANEUS  2005     HC REVISE MEDIAN N/CARPAL TUNNEL SURG      right     HEAD & NECK SURGERY      Plate in neck     INSERT CATHETER BLADDER N/A 2018    Procedure: INSERT CATHETER BLADDER;;  Surgeon: Dakota Moore MD;  [facility]     LAPAROSCOPIC CHOLECYSTECTOMY  3/11/2013    Procedure: LAPAROSCOPIC CHOLECYSTECTOMY;  laparoscopic cholecystectomy;  Surgeon: Clinton Whittaker MD;  Location: [facility]       Social History   Denies any history of tobacco, alcohol or illicit drug use.  He does not use any ambulatory aids.  He resides at home with his wife.    Family History   Mother had \"kidney cancer.\"    Prior to Admission Medications   Prior to Admission Medications   Prescriptions Last Dose Informant Patient Reported? Taking?   Camphor-Menthol-Methyl Sal (HM SALONPAS PAIN RELIEF) 1.2-5.7-6.3 % PTCH 3/5/2019 at 2000  No Yes   Si patch to affected area every 12-18 hours.   Multiple Vitamin (MULTI VITAMIN MENS PO) 3/6/2019 at 0600  Yes Yes   Sig: Take 1 tablet by mouth daily.   methocarbamol (ROBAXIN) 750 MG tablet 3/6/2019 at 0300  No Yes   Sig: Take 1 tablet (750 mg) by mouth 4 times daily as needed for muscle spasms   oxyCODONE (ROXICODONE) 5 MG tablet 3/6/2019 at 0600  No Yes   Sig: Take 1-2 tablets (5-10 mg) by mouth every 6 hours as needed for pain      Facility-Administered Medications: None     Allergies   Allergies   Allergen Reactions     Dust Mites Hives     Morphine " Nausea and Vomiting       Physical Exam   Vital Signs: Temp: 98.2  F (36.8  C) Temp src: Oral BP: 149/80 Pulse: 63   Resp: 16 SpO2: 95 % O2 Device: None (Room air)    Weight: 215 lbs 0 oz    General Appearance: Well-developed, well-nourished male in no acute distress  Eyes: Pupils equal, round and reactive to light, sclera anicteric  HEENT: Mucous membranes moist, no neck lymphadenopathy   Respiratory: lungs clear bilaterally without wheezes or crackles, no tachypnea  Cardiovascular: Regular rate and rhythm, normal S1/S2, no murmurs, rubs or gallops  GI: Abdomen soft, nontender, nondistended, normal bowel sounds  Lymph/Hematologic: No peripheral edema  Musculoskeletal: Tenderness palpation of mid thoracic spine, extremities warm and well-perfused  Neurologic: 5 of 5 strength in lower extremities, sensation to light touch intact in lower extremities, alert and appropriate, cranial nerves grossly intact  Psychiatric: Normal affect    Data   Data reviewed today: I reviewed all medications, new labs and imaging results over the last 24 hours. I personally reviewed no images or EKG's today.    Recent Labs   Lab 03/06/19  1540   WBC 7.1   HGB 14.5   MCV 99      INR 1.02      POTASSIUM 3.6   CHLORIDE 107   CO2 26   BUN 17   CR 0.88   ANIONGAP 6   BETY 9.1   GLC 84

## 2019-03-07 NOTE — PLAN OF CARE
A&O. CMS intact. Bowel sounds hypoactive, last BM 3/3, senna given. VSS. Up with SBA. C/o left sided back pain, decreased with 1 tab percocet and lidocaine patches in place.  NPO pending neurosurgery consult today.

## 2019-03-07 NOTE — PROVIDER NOTIFICATION
MD Notification    Notified Person: MD    Notified Person Name: Dr. Jason    Notification Date/Time: 3/7/19 1460    Notification Interaction: page MD, spoke with scrub nurse in OR    Purpose of Notification: patient and family wondering when they will see surgeon. Pt's family lives far away and would like to be present when MD rounds.     Orders Received: none    Comments: Dr. Jason will be in surgery past 1900. He will call the patient's room phone when done and discuss with patient plan for surgery and/or when he will round tomorrow.

## 2019-03-07 NOTE — PLAN OF CARE
VSS. RA. A&Ox4. Back pain, radiating to left hip and leg managed with percocet and ibuprofen (now discontinued). CMS intact. BS hypoactive, last BM Monday but patient states he has barely eating and does not want senna. Up SBA. NPO. Possible surgery today or tomorrow, awaiting Neurosurg recommendations.     [id number]: Tolerating regular diet. Percocet for pain. Took 2 senna, awaiting results. Awaiting call from surgeon tonight. No further changes.

## 2019-03-07 NOTE — PLAN OF CARE
A&O x4.  VSS on RA.  CMS intact.  Reports mild pain to lower back and hips, managed with Percocet and lidocaine patch, currently in place.  Regular diet, NPO at 0000.  Up with SBA.  Plan for neurosurgery consult in AM.  Continue to monitor.

## 2019-03-07 NOTE — PLAN OF CARE
PT and OT: orders rec'd. Pt to have neurosurgery consult this morning and is NPO for possible surgery. Will hold OT until consult and POC of established.

## 2019-03-08 VITALS
DIASTOLIC BLOOD PRESSURE: 83 MMHG | OXYGEN SATURATION: 95 % | SYSTOLIC BLOOD PRESSURE: 137 MMHG | HEART RATE: 66 BPM | TEMPERATURE: 98.2 F | HEIGHT: 68 IN | BODY MASS INDEX: 32.58 KG/M2 | WEIGHT: 215 LBS | RESPIRATION RATE: 16 BRPM

## 2019-03-08 LAB — INTERPRETATION ECG - MUSE: NORMAL

## 2019-03-08 PROCEDURE — 99238 HOSP IP/OBS DSCHRG MGMT 30/<: CPT | Performed by: INTERNAL MEDICINE

## 2019-03-08 PROCEDURE — 25800030 ZZH RX IP 258 OP 636: Performed by: HOSPITALIST

## 2019-03-08 PROCEDURE — 25000132 ZZH RX MED GY IP 250 OP 250 PS 637: Performed by: HOSPITALIST

## 2019-03-08 RX ADMIN — ACETAMINOPHEN 650 MG: 325 TABLET, FILM COATED ORAL at 12:00

## 2019-03-08 RX ADMIN — SODIUM CHLORIDE: 9 INJECTION, SOLUTION INTRAVENOUS at 03:44

## 2019-03-08 RX ADMIN — ACETAMINOPHEN 650 MG: 325 TABLET, FILM COATED ORAL at 18:05

## 2019-03-08 ASSESSMENT — ACTIVITIES OF DAILY LIVING (ADL)
ADLS_ACUITY_SCORE: 12

## 2019-03-08 NOTE — PLAN OF CARE
PT: Orders received and chart reviewed.  Continue to await results of Neurosurgical Consult prior to initiating PT Eval.  Will hold PT intervention for now.    PT: Patient disch without surgical intervention.

## 2019-03-08 NOTE — PLAN OF CARE
OT: Orders received and chart reviewed.  Awaiting results of neurosurgical consult prior to OT eval. Will hold OT eval at this time.

## 2019-03-08 NOTE — PROGRESS NOTES
"[facility]  Neurosurgery Progress Note  Tru Jason MD           Interval history: Pt seen and examined today. He is doing well. No narcotic pain meds and has radicular pain or complaint of neurogenic claudication.       Vital signs:   Blood pressure 137/83, pulse 66, temperature 98.2  F (36.8  C), temperature source Axillary, resp. rate 16, height 1.727 m (5' 8\"), weight 97.5 kg (215 lb), SpO2 95 %.       Date 03/08/19 0700 - 03/09/19 0659   Shift 6605-2134 5354-3832 6566-8687 24 Hour Total   INTAKE   P.O.  240  240   Shift Total(mL/kg)  240(2.46)  240(2.46)   OUTPUT   Shift Total(mL/kg)       Weight (kg) 97.52 97.52 97.52 97.52       Exam:   stable    Lab Results   Component Value Date    WBC 7.1 03/06/2019     Lab Results   Component Value Date    RBC 4.17 03/06/2019     Lab Results   Component Value Date    HGB 14.5 03/06/2019     Lab Results   Component Value Date    HCT 41.4 03/06/2019     No components found for: MCT  Lab Results   Component Value Date    MCV 99 03/06/2019     Lab Results   Component Value Date    MCH 34.8 03/06/2019     Lab Results   Component Value Date    MCHC 35.0 03/06/2019     Lab Results   Component Value Date    RDW 13.5 03/06/2019     Lab Results   Component Value Date     03/06/2019       Last Basic Metabolic Panel:  Lab Results   Component Value Date     03/06/2019      Lab Results   Component Value Date    POTASSIUM 3.6 03/06/2019     Lab Results   Component Value Date    CHLORIDE 107 03/06/2019     Lab Results   Component Value Date    BETY 9.1 03/06/2019     Lab Results   Component Value Date    CO2 26 03/06/2019     Lab Results   Component Value Date    BUN 17 03/06/2019     Lab Results   Component Value Date    CR 0.88 03/06/2019     Lab Results   Component Value Date    GLC 84 03/06/2019       Lab Results   Component Value Date    INR 1.02 03/06/2019       Imaging: Xray left not concerning from acute findings    Active Problems:    Lumbar " stenosis      Plan:  I discussed with the patient and his wife that although his MRI has significant issues, his symptoms of left low back and left hip pain do not warrant surgical intervention. They have agreed and I recommended that we proceed with EMG/NCV or LLE and left hip evaluation at [facility]. They may follow up with Dr. Calvo's office in [place] after the evaluations      Tru Jason MD, MS, FAANS

## 2019-03-08 NOTE — PROGRESS NOTES
[facility]    Medicine Progress Note - Hospitalist Service    Date of Admission:  3/6/2019  Assessment & Plan   Wenceslao Vasquez is a 58 year-old male with past medical history of BPH, CHELSY and chronic back pain due to lumbar stenosis who presents with progressive low back pain. Admitted 3/6/2019.    Severe lumbar spinal stenosis  Known history of lumbar stenosis with recent progression in pain with MRI 3/4 demonstrating progression of severe central stenosis of L1-L4.  Admitted due to high likelihood of proceeding to surgery.   - Continue lidocaine patch, acetaminophen PRN, oxycodone-APAP PRN  - Neurosurgery consulted, awaiting further discussion with neurosurgery for surgical options   - Left hip XR with mild acetabular spurring, otherwise no fracture or dislocation  - Discontinue IVF    Perioperative risk assessment  EKG with sinus bradycardia and non-specific T-wave changes. Denies history of CAD/MI, CHF, DM, CKD, CVA. Prior to worsening back pain, was able to walk up 2 flight of stairs without chest pain or shortness of breath. Specific surgery being offered unknown so unable to assess surgery risk.  - No further testing recommended prior to any planned surgery    BPH  History of urethral stricture  He is not currently on medication treatment and no longer performing self-catheterization.     CHELSY  He does not use CPAP at home.    Diet: Regular Diet Adult    DVT Prophylaxis: Pneumatic Compression Devices  Murrell Catheter: in place, indication:    Code Status: Full Code      Disposition Plan   Expected discharge: Unclear, pending neurosurgery recommendations.   Entered: Pepe Madera MD 03/08/2019, 3:29 PM       The patient's care was discussed with the Patient and Patient's Family.    Pepe Madera MD  Hospitalist Service  [facility]    ______________________________________________________________________    Interval History   No acute events overnight. Continues to have pain  in left lower back into left hip and left lateral thigh. No new symptoms. Denies chest pain or shortness of breath.    Data reviewed today: I reviewed all medications, new labs and imaging results over the last 24 hours. I personally reviewed no images or EKG's today.    Physical Exam   Vital Signs: Temp: 98.8  F (37.1  C) Temp src: Oral BP: 125/66 Pulse: 73   Resp: 16 SpO2: 92 % O2 Device: None (Room air)    Weight: 215 lbs 0 oz    Constitutional: Well-appearing, NAD.  Respiratory: Clear to auscultation bilaterally, good air movement bilaterally.  Cardiovascular: RRR, no m/r/g. No peripheral edema.  GI: Soft, non-tender, non-distended. BS normoactive.  Skin/Integumen: Warm, dry  Other:     Data   Recent Labs   Lab 03/06/19  1540   WBC 7.1   HGB 14.5   MCV 99      INR 1.02      POTASSIUM 3.6   CHLORIDE 107   CO2 26   BUN 17   CR 0.88   ANIONGAP 6   BETY 9.1   GLC 84       Recent Results (from the past 24 hour(s))   XR Pelvis w Hip Left 1 View    Narrative    PELVIS WITH LEFT HIP LATERAL TWO VIEWS  3/7/2019 7:41 PM     HISTORY: left hip pain    COMPARISON: 12/2/2008 AP pelvis and left hip      Impression    IMPRESSION:  Mild acetabular spurring. No acute fracture or  dislocation. No lytic bone lesions identified.    TANK ABRAMS MD     Medications     sodium chloride 100 mL/hr at 03/08/19 0344       lidocaine  2 patch Transdermal Q24h    And     lidocaine   Transdermal Q24H    And     lidocaine   Transdermal Q8H     sodium chloride (PF)  3 mL Intracatheter Q8H

## 2019-03-08 NOTE — PLAN OF CARE
A&O x4. CMS intact. VSS. Up with SBA. Lidocaine patches on L hip and lower back, denies back pain overnight. On regular diet. Hip x-ray completed in evening. Plan to discuss options with surgeon later today.

## 2019-03-08 NOTE — PLAN OF CARE
CMS intact.  C/o left buttocks and upper leg pain.  Managed with tylenol.  Up independently in room.  Plan for consult with Dr Jason regarding surgery.

## 2019-03-08 NOTE — PROGRESS NOTES
[facility]    Neurosurgery Progress Note    Date of Service (when I saw the patient): 03/08/2019     Assessment & Plan   Wenceslao Vasquez is a 58 year old male who was admitted on 3/6/2019.He presented with low back that originated about a year ago. He states the pain has progressively gotten worse over the past 6 months. He says it radiates into his left hip and lateral thigh.  He was seen in clinic several days ago and a lumbar MRI was recommended. The pain became intractable and he was recommended to come in to the emergency room. He was admitted for pain control.  He describes severe pain that radiates into the left side of his low back, and into the lateral aspect of his left hip and thigh.  He has had epidural injections in the past, with no significant symptomatic improvement.  He does feel more comfortable now that he is on some IV pain medication. He denies paraesthesias, weakness, and bowel/bladder complaints. Dr. Jason to discuss options later today.      Active Problems:    Lumbar stenosis    Assessment: lumbar stenosis    Plan:   Continue pain control  Dr. Jason to discuss options.        I have discussed the following assessment and plan Dr. Jason who is in agreement with initial plan and will follow up with further consultation recommendations.    Luz Maria Santa, CNP  [facility]  [address] [address]    Tel [phone]  Pager [phone]      Interval History   Stable. Pain improving with medication.    Physical Exam   Temp: 98.8  F (37.1  C) Temp src: Oral BP: 125/66 Pulse: 73   Resp: 16 SpO2: 92 % O2 Device: None (Room air)    Vitals:    03/06/19 1226   Weight: 215 lb (97.5 kg)     Vital Signs with Ranges  Temp:  [97.7  F (36.5  C)-98.8  F (37.1  C)] 98.8  F (37.1  C)  Pulse:  [55-73] 73  Resp:  [16-18] 16  BP: (110-136)/(60-80) 125/66  SpO2:  [92 %-97 %] 92 %  I/O last 3 completed shifts:  In: 7131  "[P.O.:840; I.V.:1418]  Out: -      , Blood pressure 125/66, pulse 73, temperature 98.8  F (37.1  C), temperature source Oral, resp. rate 16, height 5' 8\" (1.727 m), weight 215 lb (97.5 kg), SpO2 92 %.  215 lbs 0 oz  HEENT:  Normocephalic.  PERRLA.  EOM s intact.    Neck:  Supple, non-tender, without lymphadenopathy.  Heart:  No peripheral edema  Lungs:  No SOB  Abdomen:  Soft, non-tender, non-distended.   Skin:  Warm and dry, good capillary refill.  Extremities:  Good radial and dorsalis pedis pulses bilaterally, no edema, cyanosis or clubbing.    NEUROLOGICAL EXAMINATION:   Mental status:  Alert and Oriented x 3, speech is fluent.  Cranial nerves:  II-XII intact.   Motor:  Strength is 5/5 throughout the upper and lower extremities  Sensation:  intact  Reflexes:  Negative Babinski.  Negative Clonus.    Gait:  Stable. Walking independently in room.    Lumbar examination reveals no tenderness of the spine or paraspinous muscles.  Hip height is symmetrical. Negative SI joint, sciatic notch or greater trochanteric tenderness to palpation bilaterally.  Straight leg raise is negative bilaterally.    Medications     sodium chloride 100 mL/hr at 03/08/19 0344       lidocaine  2 patch Transdermal Q24h    And     lidocaine   Transdermal Q24H    And     lidocaine   Transdermal Q8H     sodium chloride (PF)  3 mL Intracatheter Q8H       Data     CBC RESULTS:   Recent Labs   Lab Test 03/06/19  1540   WBC 7.1   RBC 4.17*   HGB 14.5   HCT 41.4   MCV 99   MCH 34.8*   MCHC 35.0   RDW 13.5        Basic Metabolic Panel:  Lab Results   Component Value Date     03/06/2019      Lab Results   Component Value Date    POTASSIUM 3.6 03/06/2019     Lab Results   Component Value Date    CHLORIDE 107 03/06/2019     Lab Results   Component Value Date    BETY 9.1 03/06/2019     Lab Results   Component Value Date    CO2 26 03/06/2019     Lab Results   Component Value Date    BUN 17 03/06/2019     Lab Results   Component Value Date    CR " 0.88 03/06/2019     Lab Results   Component Value Date    GLC 84 03/06/2019     INR:  Lab Results   Component Value Date    INR 1.02 03/06/2019    INR 1.00 10/27/2017    INR 1.07 08/05/2013

## 2019-03-08 NOTE — DISCHARGE SUMMARY
[facility]  Hospitalist Discharge Summary       Date of Admission:  3/6/2019  Date of Discharge:  3/8/2019  Discharging Provider: Pepe Madera MD      Discharge Diagnoses   Severe lumbar spinal stenosis  Mild acetabular spurring   BPH  History of urethral stricture  CHELSY    Follow-ups Needed After Discharge   Follow-up Appointments     Follow-up and recommended labs and tests       Follow up with primary care provider, Christopher Arreola, as needed.  No   follow up labs or test are needed. Follow up with [facility]   for evaluation of left hip and with Dr. Calvo of neurosurgery for follow-up   of back pain.             Hospital Course   Wenceslao Vasquez is a 58 year-old male with past medical history of BPH, CHELSY and chronic back pain due to lumbar stenosis who presents with progressive low back pain. Admitted 3/6/2019.    Severe lumbar spinal stenosis  Mild acetabular spurring   Known history of lumbar stenosis with recent progression in pain with MRI 3/4 demonstrating progression of severe central stenosis of L1-L4.  Admitted as was expected to have surgical intervention. Ultimately after evaluation by neurosurgery and discussion with family, no surgery pursued. Left hip XR with mild acetabular spurring, otherwise no fracture or dislocation Neurosurgery recommended outpatient follow-up including follow-up with orthopedic surgery for left hip evaluation.     BPH  History of urethral stricture  He is not currently on medication treatment and no longer performing self-catheterization.     CHELSY  He does not use CPAP at home.      Consultations This Hospital Stay   NEUROSURGERY IP CONSULT  PHYSICAL THERAPY ADULT IP CONSULT  OCCUPATIONAL THERAPY ADULT IP CONSULT    Code Status   Full Code    Time Spent on this Encounter   I, Pepe Madera, personally saw the patient today and spent less than or equal to 30 minutes discharging this patient.       Pepe Madera MD  [facility]  Hospital  ______________________________________________________________________    Physical Exam   Vital Signs: Temp: 98.2  F (36.8  C) Temp src: Axillary BP: 137/83 Pulse: 66   Resp: 16 SpO2: 95 % O2 Device: None (Room air)    Weight: 215 lbs 0 oz    Constitutional: Well-appearing, NAD  Respiratory: Clear to auscultation bilaterally, good air movement bilaterally  Cardiovascular: RRR, no m/r/g. No peripheral edema.  GI: Soft, non-tender, non-distended.   Skin/Integumen: Warm, dry  Other:        Primary Care Physician   Christopher Arreola    Discharge Disposition   Discharged to home  Condition at discharge: Stable      Discharge Orders      Orthopedics Adult Referral      Reason for your hospital stay    You were hospitalized for left hip and back pain.     Follow-up and recommended labs and tests     Follow up with primary care provider, Christopher Arreola, as needed.  No follow up labs or test are needed. Follow up with [facility] for evaluation of left hip and with Dr. Calvo of neurosurgery for follow-up of back pain.     Activity    Your activity upon discharge: activity as tolerated     Full Code    Per previous documentation     Diet    Follow this diet upon discharge: Orders Placed This Encounter      Regular Diet Adult     Discharge Medications   Current Discharge Medication List      CONTINUE these medications which have NOT CHANGED    Details   Camphor-Menthol-Methyl Sal (HM SALONPAS PAIN RELIEF) 1.2-5.7-6.3 % PTCH 1 patch to affected area every 12-18 hours.  Qty: 40 patch, Refills: 1    Associated Diagnoses: Stenosis of lumbosacral spine      methocarbamol (ROBAXIN) 750 MG tablet Take 1 tablet (750 mg) by mouth 4 times daily as needed for muscle spasms  Qty: 60 tablet, Refills: 1    Associated Diagnoses: Lumbar radiculopathy      Multiple Vitamin (MULTI VITAMIN MENS PO) Take 1 tablet by mouth daily.    Associated Diagnoses: Low back pain; S/P lumbar discectomy      oxyCODONE (ROXICODONE) 5 MG tablet  Take 1-2 tablets (5-10 mg) by mouth every 6 hours as needed for pain  Qty: 30 tablet, Refills: 0    Associated Diagnoses: Lumbar radiculopathy             Significant Results and Procedures   Most Recent 3 CBC's:  Recent Labs   Lab Test 03/06/19  1540 01/08/18  1336 01/08/18  0530   WBC 7.1 10.1 4.2   HGB 14.5 13.0* 14.2   MCV 99 102* 100    177 175     Most Recent 3 BMP's:  Recent Labs   Lab Test 03/06/19  1540 01/08/18  1505 01/08/18  0530    142 142   POTASSIUM 3.6 3.4 3.5   CHLORIDE 107 111* 110*   CO2 26 23 22   BUN 17 12 15   CR 0.88 0.69 0.74   ANIONGAP 6 8 10   BETY 9.1 8.1* 8.4*   GLC 84 95 112*     Most Recent 2 LFT's:  Recent Labs   Lab Test 01/08/18  1505 01/08/18  0530   AST 16 20   ALT 31 35   ALKPHOS 66 77   BILITOTAL 0.3 0.4     Most Recent 3 INR's:  Recent Labs   Lab Test 03/06/19  1540 10/27/17  1950 08/05/13  0919   INR 1.02 1.00 1.07     Most Recent 3 Troponin's:  Recent Labs   Lab Test 01/08/18  0530 10/28/17  0600 10/28/17  0200   TROPI <0.015 <0.015 <0.015     Most Recent 3 BNP's:  Recent Labs   Lab Test 01/08/18  0530 09/02/13  0545   NTBNPI 19 40     Most Recent Cholesterol Panel:  Recent Labs   Lab Test 06/07/18  0835   CHOL 148   LDL 77   HDL 31*   TRIG 198*     Most Recent 6 Bacteria Isolates From Any Culture (See EPIC Reports for Culture Details):  Recent Labs   Lab Test 09/02/13  0815 09/02/13  0600 09/02/13  0550   CULT Normal janet No growth after 6 days No growth after 6 days     Most Recent TSH and T4:  Recent Labs   Lab Test 11/09/17  1709   TSH 0.70     Most Recent Hemoglobin A1c:  Recent Labs   Lab Test 04/30/18  1347   A1C 5.3     Most Recent Urinalysis:  Recent Labs   Lab Test 03/06/19  1532 04/30/18  1326   COLOR Yellow Yellow   APPEARANCE Clear Clear   URINEGLC Negative Negative   URINEBILI Negative Negative   URINEKETONE Negative Negative   SG 1.024 1.025   UBLD Negative Trace*   URINEPH 5.0 6.0   PROTEIN 10* Negative   UROBILINOGEN  --  0.2   NITRITE Negative  Negative   LEUKEST Negative Negative   RBCU 1 O - 2   WBCU 1 0 - 5     Most Recent ABG:No lab results found.  Most Recent ESR & CRP:  Recent Labs   Lab Test 01/08/18  1336   CRP 5.5   ,   Results for orders placed or performed during the hospital encounter of 03/06/19   CT Abdomen Pelvis w Contrast    Narrative    CT ABDOMEN AND PELVIS WITH CONTRAST 3/6/2019 4:39 PM     HISTORY: Left lower quadrant abdominal pain, nausea, no appetite, rule  out diverticulitis, stone.    COMPARISON: None.    TECHNIQUE: Volumetric helical acquisition of CT images from the lung  bases through the symphysis pubis after the administration of 106 mL  Isovue-370 intravenous contrast. Radiation dose for this scan was  reduced using automated exposure control, adjustment of the mA and/or  kV according to patient size, or iterative reconstruction technique.    FINDINGS: The liver, bilateral kidneys and adrenal glands, pancreas,  and spleen demonstrate no worrisome focal lesion. Low-attenuation  subcentimeter liver lesion(s) compatible with benign cysts or other  benign lesions. These are/is stable for at least one year and can be  considered benign. Normal caliber aorta. No appendicitis. No  diverticulitis. No hydronephrosis or urolithiasis. Low-attenuation  subcentimeter renal lesion(s) compatible with benign cysts or other  benign lesions. These are/is stable for at least one year and can be  considered benign. There is no free fluid in the abdomen or pelvis. No  free air in the abdomen. Bone windows reveal no destructive lesions.   There are no abdominal or pelvic lymph nodes that are abnormal by size  criteria. The visualized lung bases are unremarkable. There are no  dilated loops of small bowel or colon.      Impression    IMPRESSION: No acute process demonstrated within the abdomen and  pelvis.    VERENICE GRAY MD   XR Pelvis w Hip Left 1 View    Narrative    PELVIS WITH LEFT HIP LATERAL TWO VIEWS  3/7/2019 7:41 PM     HISTORY: left  hip pain    COMPARISON: 12/2/2008 AP pelvis and left hip      Impression    IMPRESSION:  Mild acetabular spurring. No acute fracture or  dislocation. No lytic bone lesions identified.    TANK ABRAMS MD       Allergies   Allergies   Allergen Reactions     Dust Mites Hives     Morphine Nausea and Vomiting

## 2019-03-11 ENCOUNTER — TELEPHONE (OUTPATIENT)
Dept: FAMILY MEDICINE | Facility: OTHER | Age: 59
End: 2019-03-11

## 2019-03-11 ENCOUNTER — TELEPHONE (OUTPATIENT)
Dept: NEUROSURGERY | Facility: CLINIC | Age: 59
End: 2019-03-11

## 2019-03-11 DIAGNOSIS — M54.16 LUMBAR RADICULOPATHY: ICD-10-CM

## 2019-03-11 DIAGNOSIS — M25.552 LEFT HIP PAIN: Primary | ICD-10-CM

## 2019-03-11 NOTE — TELEPHONE ENCOUNTER
REASON FOR CALL: Pt's spouse called stating that pt was seen in the ED by Dr. Jason. Dr. Jason had recommended EMG/NCV or LLE and left hip evaluation at [facility]. Pt's spouse is wondering if there are orders/referrals needed to get these done, and where pt can get EMG done at. Spouse states that it is okay to leave her a detailed msg.

## 2019-03-11 NOTE — TELEPHONE ENCOUNTER
Patient called to schedule an appointment for a hospital follow-up or appeared on a report showing that they were recently discharged from the hospital.    Patient was admitted to [facility]:  3/6/19  Discharged date: 3/8/19  Reason for hospital admission:  Chief Complaint: Hip Pain, Left, Acute Back Pain, Unspecified Back Location, Unspecified Back Pain Laterality  Does patient have future appointment scheduled with provider? No  Date of future appointment:        This information will be used to help the care team plan for the patients upcoming visit.  The triage RN may determine that a follow up call is necessary and reach out to the patient via the phone number listed in the chart.     Please route this message on routine priority to the Triage RN pool.

## 2019-03-11 NOTE — TELEPHONE ENCOUNTER
"Hospital/TCU/ED for chronic condition Discharge Protocol    \"Hi, my name is Tyra Adam, a registered nurse, and I am calling from [facility].  I am calling to follow up and see how things are going for you after your recent emergency visit/hospital/TCU stay.\"    Tell me how you are doing now that you are home?\" Still getting sore but doing better.\"      Discharge Instructions    \"Let's review your discharge instructions.  What is/are the follow-up recommendations?  Pt. Response: Follow up as needed, [facility], EMG test     \"Has an appointment with your primary care provider been scheduled?\"   as needed    \"When you see the provider, I would recommend that you bring your medications with you.\"    Medications    \"Tell me what changed about your medicines when you discharged?\"    Changes to chronic meds?    0-1    \"What questions do you have about your medications?\"    None     New diagnoses of heart failure, COPD, diabetes, or MI?    No              Medication reconciliation completed? Yes  Was MTM referral placed (*Make sure to put transitions as reason for referral)?   No    Call Summary    \"What questions or concerns do you have about your recent visit and your follow-up care?\"     none    \"If you have questions or things don't continue to improve, we encourage you contact us through the main clinic number (give number).  Even if the clinic is not open, triage nurses are available 24/7 to help you.     We would like you to know that our clinic has extended hours (provide information).  We also have urgent care (provide details on closest location and hours/contact info)\"      \"Thank you for your time and take care!\"    MARY Xiao, RN  [facility]  "

## 2019-03-12 NOTE — TELEPHONE ENCOUNTER
Wife returned call referrals placed and was provided the numbers to schedule.    EMG referral faxed to [phone]

## 2019-03-18 ENCOUNTER — TRANSFERRED RECORDS (OUTPATIENT)
Dept: HEALTH INFORMATION MANAGEMENT | Facility: CLINIC | Age: 59
End: 2019-03-18

## 2019-03-19 ENCOUNTER — TELEPHONE (OUTPATIENT)
Dept: ORTHOPEDICS | Facility: CLINIC | Age: 59
End: 2019-03-19

## 2019-03-19 NOTE — TELEPHONE ENCOUNTER
3/1919  This patient is scheduled with Dr. Reyes for this Thursday for Hip pain-which he does not see for. Could you call him and get him rescheduled with Dr. Jean Baptiste please? Thank you!   Tri     Called and left voicemail for patient to cancel appointment with Dr. Reyes and reschedule with Dr. Jean Baptiste.     [facility]   Procedure    Ortho/Sports Med/Ent/Eye   [facility] [place]   [phone]

## 2019-03-20 ENCOUNTER — OFFICE VISIT (OUTPATIENT)
Dept: ORTHOPEDICS | Facility: CLINIC | Age: 59
End: 2019-03-20
Payer: COMMERCIAL

## 2019-03-20 VITALS
HEART RATE: 71 BPM | HEIGHT: 68 IN | DIASTOLIC BLOOD PRESSURE: 84 MMHG | OXYGEN SATURATION: 97 % | WEIGHT: 215 LBS | SYSTOLIC BLOOD PRESSURE: 160 MMHG | BODY MASS INDEX: 32.58 KG/M2

## 2019-03-20 DIAGNOSIS — M48.061 SPINAL STENOSIS OF LUMBAR REGION, UNSPECIFIED WHETHER NEUROGENIC CLAUDICATION PRESENT: Primary | ICD-10-CM

## 2019-03-20 DIAGNOSIS — M16.12 PRIMARY OSTEOARTHRITIS OF LEFT HIP: ICD-10-CM

## 2019-03-20 PROCEDURE — 99213 OFFICE O/P EST LOW 20 MIN: CPT | Performed by: ORTHOPAEDIC SURGERY

## 2019-03-20 ASSESSMENT — MIFFLIN-ST. JEOR: SCORE: 1764.73
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
## 2019-04-04 ENCOUNTER — OFFICE VISIT (OUTPATIENT)
Dept: FAMILY MEDICINE | Facility: OTHER | Age: 59
End: 2019-04-04
Payer: COMMERCIAL

## 2019-04-04 ENCOUNTER — OFFICE VISIT (OUTPATIENT)
Dept: NEUROSURGERY | Facility: OTHER | Age: 59
End: 2019-04-04
Payer: COMMERCIAL

## 2019-04-04 ENCOUNTER — TELEPHONE (OUTPATIENT)
Dept: SURGERY | Facility: CLINIC | Age: 59
End: 2019-04-04

## 2019-04-04 VITALS
HEART RATE: 80 BPM | DIASTOLIC BLOOD PRESSURE: 82 MMHG | TEMPERATURE: 97.7 F | RESPIRATION RATE: 18 BRPM | WEIGHT: 231.5 LBS | SYSTOLIC BLOOD PRESSURE: 134 MMHG | OXYGEN SATURATION: 97 % | BODY MASS INDEX: 35.2 KG/M2

## 2019-04-04 VITALS
HEIGHT: 68 IN | TEMPERATURE: 98.4 F | WEIGHT: 220 LBS | SYSTOLIC BLOOD PRESSURE: 136 MMHG | BODY MASS INDEX: 33.34 KG/M2 | DIASTOLIC BLOOD PRESSURE: 84 MMHG

## 2019-04-04 DIAGNOSIS — M48.062 LUMBAR STENOSIS WITH NEUROGENIC CLAUDICATION: Primary | ICD-10-CM

## 2019-04-04 DIAGNOSIS — Z01.818 PREOP GENERAL PHYSICAL EXAM: Primary | ICD-10-CM

## 2019-04-04 DIAGNOSIS — M48.07 STENOSIS OF LUMBOSACRAL SPINE: ICD-10-CM

## 2019-04-04 PROCEDURE — 99213 OFFICE O/P EST LOW 20 MIN: CPT | Performed by: NEUROLOGICAL SURGERY

## 2019-04-04 PROCEDURE — 99214 OFFICE O/P EST MOD 30 MIN: CPT | Performed by: FAMILY MEDICINE

## 2019-04-04 ASSESSMENT — MIFFLIN-ST. JEOR: SCORE: 1787.41

## 2019-04-04 ASSESSMENT — PAIN SCALES - GENERAL: PAINLEVEL: NO PAIN (0)

## 2019-04-04 NOTE — TELEPHONE ENCOUNTER
Contacted patient to schedule [facility]  Date: 4/12/19  Time: 830   Dr. Rich    Instructed pt to have H&P and  for procedure.
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
## 2019-04-04 NOTE — PROGRESS NOTES
"Wenceslao Vasquez is a 58 year old male who presents for:  Chief Complaint   Patient presents with     Neurologic Problem     lumbar spinal stenosis         Initial Vitals:  /84   Temp 98.4  F (36.9  C) (Temporal)   Ht 5' 8\" (1.727 m)   Wt 220 lb (99.8 kg)   BMI 33.45 kg/m   Estimated body mass index is 33.45 kg/m  as calculated from the following:    Height as of this encounter: 5' 8\" (1.727 m).    Weight as of this encounter: 220 lb (99.8 kg).. Body surface area is 2.19 meters squared. BP completed using cuff size: regular  Data Unavailable        Nursing Comments:         Felix Mcfadden CMA  "

## 2019-04-04 NOTE — PATIENT INSTRUCTIONS
Today's Visit    1. Ordered a L 3-4 Epidural Steroid Injection. [facility] will call you within 48 hours to schedule this. If you don't here from them, please schedule by calling Operating Room scheduling team s phone number: [phone], option 2. If no decline in symptoms 2 weeks after injections, call our clinic and talk to a nurse.      Please call our clinic with any questions or concerns    Ritu MONTEMAYOR RN ([facility] Nurse)  [facility] -- [address] [address]  Phone:  [phone]   Fax:  [phone]

## 2019-04-11 ENCOUNTER — ANESTHESIA EVENT (OUTPATIENT)
Dept: SURGERY | Facility: CLINIC | Age: 59
End: 2019-04-11
Payer: COMMERCIAL
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
## 2019-04-12 ENCOUNTER — HOSPITAL ENCOUNTER (OUTPATIENT)
Dept: GENERAL RADIOLOGY | Facility: CLINIC | Age: 59
End: 2019-04-12
Attending: ANESTHESIOLOGY | Admitting: ANESTHESIOLOGY
Payer: COMMERCIAL

## 2019-04-12 ENCOUNTER — ANESTHESIA (OUTPATIENT)
Dept: SURGERY | Facility: CLINIC | Age: 59
End: 2019-04-12
Payer: COMMERCIAL

## 2019-04-12 ENCOUNTER — HOSPITAL ENCOUNTER (OUTPATIENT)
Facility: CLINIC | Age: 59
Discharge: HOME | End: 2019-04-12
Attending: ANESTHESIOLOGY | Admitting: ANESTHESIOLOGY
Payer: COMMERCIAL

## 2019-04-12 VITALS
RESPIRATION RATE: 16 BRPM | DIASTOLIC BLOOD PRESSURE: 89 MMHG | SYSTOLIC BLOOD PRESSURE: 144 MMHG | OXYGEN SATURATION: 96 % | HEART RATE: 57 BPM | TEMPERATURE: 97.5 F

## 2019-04-12 DIAGNOSIS — M54.50 CHRONIC LOW BACK PAIN: ICD-10-CM

## 2019-04-12 DIAGNOSIS — G89.29 CHRONIC LOW BACK PAIN: ICD-10-CM

## 2019-04-12 PROCEDURE — 25000125 ZZHC RX 250: Performed by: NURSE ANESTHETIST, CERTIFIED REGISTERED

## 2019-04-12 PROCEDURE — 25000128 H RX IP 250 OP 636: Performed by: ANESTHESIOLOGY

## 2019-04-12 PROCEDURE — 64483 NJX AA&/STRD TFRM EPI L/S 1: CPT | Performed by: ANESTHESIOLOGY

## 2019-04-12 PROCEDURE — 37000009 ZZH ANESTHESIA TECHNICAL FEE, EACH ADDTL 15 MIN: Performed by: ANESTHESIOLOGY

## 2019-04-12 PROCEDURE — 40000277 XR SURGERY CARM FLUORO LESS THAN 5 MIN W STILLS: Mod: TC

## 2019-04-12 PROCEDURE — 64483 NJX AA&/STRD TFRM EPI L/S 1: CPT | Mod: 50 | Performed by: ANESTHESIOLOGY

## 2019-04-12 PROCEDURE — 37000008 ZZH ANESTHESIA TECHNICAL FEE, 1ST 30 MIN: Performed by: ANESTHESIOLOGY

## 2019-04-12 PROCEDURE — 25000128 H RX IP 250 OP 636: Performed by: NURSE ANESTHETIST, CERTIFIED REGISTERED

## 2019-04-12 RX ORDER — IOPAMIDOL 612 MG/ML
INJECTION, SOLUTION INTRATHECAL PRN
Status: DISCONTINUED | OUTPATIENT
Start: 2019-04-12 | End: 2019-04-12 | Stop reason: HOSPADM

## 2019-04-12 RX ORDER — LIDOCAINE 40 MG/G
CREAM TOPICAL
Status: DISCONTINUED | OUTPATIENT
Start: 2019-04-12 | End: 2019-04-12 | Stop reason: HOSPADM

## 2019-04-12 RX ORDER — LIDOCAINE HYDROCHLORIDE 20 MG/ML
INJECTION, SOLUTION INFILTRATION; PERINEURAL PRN
Status: DISCONTINUED | OUTPATIENT
Start: 2019-04-12 | End: 2019-04-12

## 2019-04-12 RX ORDER — TRIAMCINOLONE ACETONIDE 40 MG/ML
INJECTION, SUSPENSION INTRA-ARTICULAR; INTRAMUSCULAR PRN
Status: DISCONTINUED | OUTPATIENT
Start: 2019-04-12 | End: 2019-04-12 | Stop reason: HOSPADM

## 2019-04-12 RX ORDER — BUPIVACAINE HYDROCHLORIDE 5 MG/ML
INJECTION, SOLUTION PERINEURAL PRN
Status: DISCONTINUED | OUTPATIENT
Start: 2019-04-12 | End: 2019-04-12 | Stop reason: HOSPADM

## 2019-04-12 RX ORDER — PROPOFOL 10 MG/ML
INJECTION, EMULSION INTRAVENOUS PRN
Status: DISCONTINUED | OUTPATIENT
Start: 2019-04-12 | End: 2019-04-12

## 2019-04-12 RX ADMIN — LIDOCAINE HYDROCHLORIDE 40 MG: 20 INJECTION, SOLUTION INFILTRATION; PERINEURAL at 08:25

## 2019-04-12 RX ADMIN — PROPOFOL 50 MG: 10 INJECTION, EMULSION INTRAVENOUS at 08:25

## 2019-04-12 RX ADMIN — PROPOFOL 30 MG: 10 INJECTION, EMULSION INTRAVENOUS at 08:31

## 2019-04-12 RX ADMIN — PROPOFOL 20 MG: 10 INJECTION, EMULSION INTRAVENOUS at 08:36

## 2019-04-12 NOTE — ANESTHESIA POSTPROCEDURE EVALUATION
Patient: Wenceslao Vasquez    Procedure(s):  Inject epidural transforaminal Lumbar 3-4 bilateral    Diagnosis:chronic low back pain  Diagnosis Additional Information: No value filed.    Anesthesia Type:  MAC    Note:  Anesthesia Post Evaluation    Patient location during evaluation: Phase 2  Patient participation: Able to fully participate in evaluation  Level of consciousness: awake and alert  Pain management: adequate  Airway patency: patent  Cardiovascular status: blood pressure returned to baseline  Respiratory status: spontaneous ventilation and room air  Hydration status: acceptable  PONV: none       Comments: Patient was pleased with his anesthetic today. He is resting without complaint. No anesthesia concerns.         Last vitals:  Vitals:    04/12/19 0752   BP: (!) 162/97   Resp: 16   Temp: 97.5  F (36.4  C)         Electronically Signed By: ANNEL Armijo CRNA  April 12, 2019  9:09 AM

## 2019-04-12 NOTE — ANESTHESIA CARE TRANSFER NOTE
Patient: Wenceslao Vasquez    Procedure(s):  Inject epidural transforaminal Lumbar 3-4 bilateral    Diagnosis: chronic low back pain  Diagnosis Additional Information: No value filed.    Anesthesia Type:   MAC     Note:  Airway :Room Air  Patient transferred to:Phase II  Handoff Report: Identifed the Patient, Identified the Reponsible Provider, Reviewed the pertinent medical history, Discussed the surgical course, Reviewed Intra-OP anesthesia mangement and issues during anesthesia, Set expectations for post-procedure period and Allowed opportunity for questions and acknowledgement of understanding      Vitals: (Last set prior to Anesthesia Care Transfer)    CRNA VITALS  4/12/2019 0821 - 4/12/2019 0910      4/12/2019             Resp Rate (observed):  18                Electronically Signed By: ANNEL Armijo CRNA  April 12, 2019  9:10 AM

## 2019-04-12 NOTE — DISCHARGE INSTRUCTIONS
Home Care Instructions                Procedure:  Epidural Steroid Injection or Joint injection    Activity:    Rest today    Do not work today    Resume normal activity tomorrow    Pain:    You may experience soreness at the injection site for one or two days    You may use an ice pack for 20 minutes every 2 hours for the first 24 hours    You may use a heating pad after the first 24 hours    You may use Tylenol  (acetaminophen) every 4 hours or other pain medicines as directed by your physician    Safety  Sedation medicine, if given may remain active for many hours.    It is important for the next 24 hours that you do not:    Drive a car    Operate machines or power tools    Consume alcohol, including beer    Sign any important papers or legal documents    You may experience numbness radiating into your legs or arms, (depending on the procedure location)  This numbness may last several hours.  Until the numb sensation returns to normal please use caution in walking, climbing stairs, stepping out of your vehicle, etc.    Common side effects of steroids:  Not everyone will experience corticosteroid side effects. If side effects are experienced they will gradually subside in the 7-10 day period following an injection.    Most common side effects include:    Flushed face and/or chest    Feeling of warmth, particularly in face but could be overall feeling of warmth    Increased blood sugar in diabetic patients    Menstrual irregularities may occur.  If taking hormone based birth control an alternate method of birth control is recommended    Sleep disturbances and/or mood swings are possible    Leg cramps    Please contact us if you have:  Severe pain   Fever more than 101.5 degrees Fahrenheit  Signs of infection (redness, swelling or drainage)      If you have questions during normal business hours (8am-5pm Monday-Friday) contact the [facility] at [phone]. If you need help after hours, we recommend that  you go to a hospital emergency room or dial 911.

## 2019-04-12 NOTE — OP NOTE
PRIMARY PROBLEM: Low back pain    PROCEDURE: Bilateral L3-4  Transforaminal Epidural Steroid Injectionswith fluoroscopic guidance and contrast.     PROCEDURE DETAILS: After written informed consent was obtained from the patient, the patient was escorted to the procedure room.  The patient was placed in the prone position.  A  time out  was conducted to verify patient identity, procedure to be performed, side, site, allergies and any special requirements.  The skin over the thoracolumbar region was prepped and draped in normal sterile fashion. Fluoroscopy was used to identify the neural foramen in AP view and the skin was anesthetized with 2 mL of 1% lidocaine with bicarbonate buffer.  Initially I was trying to do an interlaminar epidural injection and I placed a Touhy needle into his L3-4 interspace but after multiple attempts I was unable to get into the epidural interspace due to significant hypertrophy and calcifications around the interspace between the L3 and L4 segments I was unable to pass the needle both from the left and right side.  I then moved up to the L2-3 level and again there was no interspace between the L2 and L3 segments therefore the interlaminar approach was abandoned.  I decided to do the injection from the transforaminal approach.  A 22-gauge Quincke spinal needle was advanced through this location and advanced under fluoroscopic guidance towards the neural foramen.  The target zone was the 6 o clock position of the bilateral L3 pediclesx.   Prior to entering the foramen, the depth of the needle was gauged with a lateral view on fluoroscopy. While still in a lateral view, the needle was slowly advanced to avoid injury to the spinal nerve.  Then, in the oblique view (approximately 28 degrees), after negative aspiration, 1.5 mL of Omnipaque contrast dye was injected revealing epidural spread without evidence of intravascular or intrathecal spread.  Then a 2.5cc solution of 20 mg of Triamcinolone  in 2 mL of  Preservative-Free saline was slowly injected into the epidural space at each segment.  There is good flow medication particularly at the right L3-4 foramen with very good spread of medication centrally all the way up to the L2 level.  On the left side I had spread over the L3 and L4 segments.  After injection of the medication, as the needle tip was withdrawn, it was flushed with local anesthetic.   The patient was monitored with blood pressure and pulse oximetry machines with the assistance of an RN throughout the procedure.  The patient was alert and responsive to questions throughout the procedure.   The patient tolerated the procedure well and was observed in the post-procedural area.  The patient was dismissed without apparent complications.     DIAGNOSIS:  1.  Multilevel lumbar degenerative disc disease  2.  Lumbar central stenosis at L2-3 and L3-4 with a history of prior laminectomy at L4-5.    PLAN:  1. Performed bilateral L3-4  transforaminal epidural steroid injections.  I did try to complete this from an interlaminar approach but there was too much bony hypertrophy in this area between the L2-3 and L3-4 segments to pass the needle into his epidural space.  He had a laminectomy at L4-5 in the past therefore most likely this would have resulted in a wet tap if I would have tried to do the injection at the L4-5 level.  Therefore he completed this injection from the transforaminal approach and fortunately I did have very good spread into his epidural space.  2. The patient was instructed to follow-up per Dr. Rich's instructions.      Calvin Rich MD  Diplomate of the American Board of Anesthesiology, Pain Medicine

## 2019-04-29 ENCOUNTER — TELEPHONE (OUTPATIENT)
Dept: NEUROSURGERY | Facility: CLINIC | Age: 59
End: 2019-04-29

## 2019-04-29 NOTE — TELEPHONE ENCOUNTER
Left detailed message informing spouse that per LOV note on 4/4, Dr. Calvo recommended returning to discuss possible laminectomy if symptoms persist after injection. Requested a return call to schedule or if they have any other questions or concerns.

## 2019-04-29 NOTE — TELEPHONE ENCOUNTER
REASON FOR CALL: Pt's spouse called. She states that was done on 04/12/2019 had failed. It has not helped pt at all. Spouse states that pt is still feeling numbness in his legs. Call spouse back on mobile. She would like a call back quarter after 3 PM as that is when she will be off work.

## 2019-05-09 ENCOUNTER — OFFICE VISIT (OUTPATIENT)
Dept: NEUROSURGERY | Facility: CLINIC | Age: 59
End: 2019-05-09
Payer: COMMERCIAL

## 2019-05-09 VITALS
BODY MASS INDEX: 34.86 KG/M2 | DIASTOLIC BLOOD PRESSURE: 80 MMHG | SYSTOLIC BLOOD PRESSURE: 129 MMHG | HEIGHT: 68 IN | HEART RATE: 69 BPM | WEIGHT: 230 LBS

## 2019-05-09 DIAGNOSIS — M48.062 NEUROGENIC CLAUDICATION DUE TO LUMBAR SPINAL STENOSIS: Primary | ICD-10-CM

## 2019-05-09 PROCEDURE — 99213 OFFICE O/P EST LOW 20 MIN: CPT | Performed by: NEUROLOGICAL SURGERY

## 2019-05-09 ASSESSMENT — MIFFLIN-ST. JEOR: SCORE: 1832.77

## 2019-05-09 ASSESSMENT — PAIN SCALES - GENERAL: PAINLEVEL: MODERATE PAIN (5)

## 2019-05-09 NOTE — NURSING NOTE
"Wenceslao Vasquez is a 58 year old male who presents for:  Chief Complaint   Patient presents with     Neurologic Problem     discuss possible laminectomy, still having pain        Initial Vitals:  /80 (BP Location: Left arm, Patient Position: Chair, Cuff Size: Adult Large)   Pulse 69   Ht 1.727 m (5' 8\")   Wt 104.3 kg (230 lb)   BMI 34.97 kg/m   Estimated body mass index is 34.97 kg/m  as calculated from the following:    Height as of this encounter: 1.727 m (5' 8\").    Weight as of this encounter: 104.3 kg (230 lb).. Body surface area is 2.24 meters squared. BP completed using cuff size: large  Moderate Pain (5)        Nursing Comments:         Mary Campos    "

## 2019-05-09 NOTE — NURSING NOTE
Patient Education    Education included but not limited to:  - Surgical risks: blood clots, urinating difficulties, nerve damage, infection.  - Pre-operative physical with primary care physician within 30 days of surgical date.   - Pre-operative clearance from other pertaining specialties.   - Discontinue NSAIDS x 7 days prior to surgical date.   - Do not begin taking NSAIDs (Advil, Motrin, Ibuprofen, Nuprin, Diclofenac, Meloxicam, Aleve, Celebrex, Aspirin, etc.) until 6 weeks after surgery if you had a fusion. May cause bleeding and interfere with bone healing.    -May try Tylenol for pain.  -Smoking cessation ( non-smoker )  -Discussed being off work after surgery, short term disability, FMLA, etc.   -Forms to be completed    -Pre-op timeline: NPO, shower, medications    -Hospital stay: Checking in, surgery, recovery room, hospital room.    - Post operative pain management: narcotics, muscle relaxants, ice, etc.   -No driving while taking narcotics     -Post operative incision care:   Keep your incision clean and dry.   Okay to shower. No submerging in water until incision healed.   Watch for signs of infection and notify clinic if drainage or fever develops.   - Post operative activity limitations recommended until follow up appointment: no lifting > 10 pounds; limited bending, twisting, overhead reaching.  -If a brace is required per Dr. Calvo, [facility] will fit you for the brace in the hospital.  - Follow up appointments: 6 week post op, 3 months post op. Please call to schedule follow up appointment at [phone].   - Education book was also given to the patient for further review.      Patient verbalized understanding of above instructions. All questions were answered to the best of my ability and the patient's satisfaction. Patient advised to call with any additional questions or concerns.
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
## 2019-05-09 NOTE — PATIENT INSTRUCTIONS
Surgery scheduled at [facility] for L2-L4 Laminectomy    Pre-Operative:  -Surgical risks: blood clots in the leg or lung, problems urinating, nerve damage, drainage from the incision, infection, stiffness  - Pre-operative physical with primary care physician within 30 days of surgical date.   -Stop all foods and liquids 8 hours prior to surgery.  -Shower procedure: please shower with antibacterial soap the night before surgery and morning of surgery. Refer to information sheet in folder.   - Discontinue Aspirin, NSAIDs (Advil, Ibuprofen, Naproxen, Nuprin, Diclofenac, Meloxicam, Aleve, Celebrex) x 7 days prior to surgical date. After surgery, do not begin taking these medications until given clearance.  - May try Tylenol for pain.    Post-Operative:  -Hospital stay: likely same day procedure with discharge home day of surgery, may stay for 23 hour observation hospitalization for monitoring.  - Post operative pain  will require pain medications and muscle relaxants. You will receive medication upon discharge.  -Do NOT drive while taking narcotic pain medication.  -Post operative incision care-    -Watch for signs of infection: redness, swelling, warmth, drainage, and fever of 101 degrees or higher. Notify clinic [phone].   -Keep incision clean and dry. You may shower. No submerging incision in water such as pools, hot tubs, baths for at least 8 weeks or until incision is healed.   - Post operative activity limitations for 4-6 weeks after surgery: no lifting > 10 pounds, limited bending, twisting, or overhead reaching. You will be re-evaluated at your follow up appointments.   -If you are currently employed, you will need to be off work for recovery and healing. Please fax any FMLA/short term disability paperwork to [phone]. You may call our clinic when you'd like to return to work and we can provide a work letter.   - Follow up appointments: 6 week post op and 3 months post op. Please call  to schedule follow up appointment at [phone].
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
## 2019-05-17 ENCOUNTER — DOCUMENTATION ONLY (OUTPATIENT)
Dept: NEUROSURGERY | Facility: CLINIC | Age: 59
End: 2019-05-17

## 2019-05-17 NOTE — PROGRESS NOTES
5/17/2019    [employer] Forms: yes    Faxed [phone]      Type of form FMLA forms for wife.     Placed a copy in the bin and sent the original to medical records

## 2019-05-23 ENCOUNTER — OFFICE VISIT (OUTPATIENT)
Dept: FAMILY MEDICINE | Facility: OTHER | Age: 59
End: 2019-05-23
Payer: COMMERCIAL

## 2019-05-23 VITALS
OXYGEN SATURATION: 96 % | TEMPERATURE: 96.5 F | BODY MASS INDEX: 34.59 KG/M2 | HEIGHT: 68 IN | RESPIRATION RATE: 16 BRPM | WEIGHT: 228.2 LBS | SYSTOLIC BLOOD PRESSURE: 132 MMHG | HEART RATE: 80 BPM | DIASTOLIC BLOOD PRESSURE: 80 MMHG

## 2019-05-23 DIAGNOSIS — M48.062 SPINAL STENOSIS OF LUMBAR REGION WITH NEUROGENIC CLAUDICATION: ICD-10-CM

## 2019-05-23 DIAGNOSIS — Z01.818 PREOP GENERAL PHYSICAL EXAM: Primary | ICD-10-CM

## 2019-05-23 LAB
ANION GAP SERPL CALCULATED.3IONS-SCNC: 6 MMOL/L (ref 3–14)
BASOPHILS # BLD AUTO: 0 10E9/L (ref 0–0.2)
BASOPHILS NFR BLD AUTO: 0.2 %
BUN SERPL-MCNC: 18 MG/DL (ref 7–30)
CALCIUM SERPL-MCNC: 9.2 MG/DL (ref 8.5–10.1)
CHLORIDE SERPL-SCNC: 109 MMOL/L (ref 94–109)
CO2 SERPL-SCNC: 27 MMOL/L (ref 20–32)
CREAT SERPL-MCNC: 0.88 MG/DL (ref 0.66–1.25)
DIFFERENTIAL METHOD BLD: ABNORMAL
EOSINOPHIL # BLD AUTO: 0 10E9/L (ref 0–0.7)
EOSINOPHIL NFR BLD AUTO: 0.7 %
ERYTHROCYTE [DISTWIDTH] IN BLOOD BY AUTOMATED COUNT: 12.2 % (ref 10–15)
GFR SERPL CREATININE-BSD FRML MDRD: >90 ML/MIN/{1.73_M2}
GLUCOSE SERPL-MCNC: 91 MG/DL (ref 70–99)
HCT VFR BLD AUTO: 43.2 % (ref 40–53)
HGB BLD-MCNC: 14.4 G/DL (ref 13.3–17.7)
LYMPHOCYTES # BLD AUTO: 1.7 10E9/L (ref 0.8–5.3)
LYMPHOCYTES NFR BLD AUTO: 30.2 %
MCH RBC QN AUTO: 34.4 PG (ref 26.5–33)
MCHC RBC AUTO-ENTMCNC: 33.3 G/DL (ref 31.5–36.5)
MCV RBC AUTO: 103 FL (ref 78–100)
MONOCYTES # BLD AUTO: 0.4 10E9/L (ref 0–1.3)
MONOCYTES NFR BLD AUTO: 6.9 %
NEUTROPHILS # BLD AUTO: 3.4 10E9/L (ref 1.6–8.3)
NEUTROPHILS NFR BLD AUTO: 62 %
PLATELET # BLD AUTO: 193 10E9/L (ref 150–450)
POTASSIUM SERPL-SCNC: 4.2 MMOL/L (ref 3.4–5.3)
RBC # BLD AUTO: 4.19 10E12/L (ref 4.4–5.9)
SODIUM SERPL-SCNC: 142 MMOL/L (ref 133–144)
WBC # BLD AUTO: 5.5 10E9/L (ref 4–11)

## 2019-05-23 PROCEDURE — 85025 COMPLETE CBC W/AUTO DIFF WBC: CPT | Performed by: PHYSICIAN ASSISTANT

## 2019-05-23 PROCEDURE — 93000 ELECTROCARDIOGRAM COMPLETE: CPT | Performed by: PHYSICIAN ASSISTANT

## 2019-05-23 PROCEDURE — 36415 COLL VENOUS BLD VENIPUNCTURE: CPT | Performed by: PHYSICIAN ASSISTANT

## 2019-05-23 PROCEDURE — 80048 BASIC METABOLIC PNL TOTAL CA: CPT | Performed by: PHYSICIAN ASSISTANT

## 2019-05-23 PROCEDURE — 99214 OFFICE O/P EST MOD 30 MIN: CPT | Performed by: PHYSICIAN ASSISTANT

## 2019-05-23 ASSESSMENT — MIFFLIN-ST. JEOR: SCORE: 1816.67

## 2019-05-23 ASSESSMENT — PAIN SCALES - GENERAL: PAINLEVEL: NO PAIN (0)
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
## 2019-05-23 NOTE — NURSING NOTE
Health Maintenance Due   Topic Date Due     LIPID  1960     HIV SCREENING  09/28/1974     ZOSTER IMMUNIZATION (1 of 2) 09/28/2009     COLONOSCOPY  07/03/2015     PHQ-2  01/01/2019     Michelle TELLO LPN

## 2019-05-24 ENCOUNTER — TELEPHONE (OUTPATIENT)
Dept: NEUROSURGERY | Facility: CLINIC | Age: 59
End: 2019-05-24

## 2019-05-24 NOTE — TELEPHONE ENCOUNTER
Type of surgery: L2-4 laminecomies  Location of surgery: [facility] OR  Date and time of surgery: 05/29/2019 at 1:00pm  Surgeon: JENNYFER Calvo  Pre-Op Appt Date: 05/23/2019 MICAELA Houston M.D.  Post-Op Appt Date: 07/22/19   Packet sent out: Yes  Pre-cert/Authorization completed:  Yes  Date: 05/22/2019 CARMENZA

## 2019-05-29 ENCOUNTER — HOSPITAL ENCOUNTER (OUTPATIENT)
Dept: GENERAL RADIOLOGY | Facility: CLINIC | Age: 59
DRG: 519 | End: 2019-05-29
Attending: NEUROLOGICAL SURGERY | Admitting: NEUROLOGICAL SURGERY
Payer: COMMERCIAL

## 2019-05-29 ENCOUNTER — HOSPITAL ENCOUNTER (INPATIENT)
Facility: CLINIC | Age: 59
LOS: 2 days | Discharge: HOME HEALTH | DRG: 519 | End: 2019-06-01
Attending: NEUROLOGICAL SURGERY | Admitting: NEUROLOGICAL SURGERY
Payer: COMMERCIAL

## 2019-05-29 ENCOUNTER — ANESTHESIA (OUTPATIENT)
Dept: SURGERY | Facility: CLINIC | Age: 59
DRG: 519 | End: 2019-05-29
Payer: COMMERCIAL

## 2019-05-29 ENCOUNTER — ANESTHESIA EVENT (OUTPATIENT)
Dept: SURGERY | Facility: CLINIC | Age: 59
DRG: 519 | End: 2019-05-29
Payer: COMMERCIAL

## 2019-05-29 DIAGNOSIS — R29.818 NEUROGENIC CLAUDICATION: ICD-10-CM

## 2019-05-29 DIAGNOSIS — Z98.890 S/P LUMBAR LAMINECTOMY: Primary | ICD-10-CM

## 2019-05-29 PROCEDURE — 63047 LAM FACETEC & FORAMOT LUMBAR: CPT | Performed by: NEUROLOGICAL SURGERY

## 2019-05-29 PROCEDURE — 36000063 ZZH SURGERY LEVEL 4 EA 15 ADDTL MIN: Performed by: NEUROLOGICAL SURGERY

## 2019-05-29 PROCEDURE — 25000128 H RX IP 250 OP 636: Performed by: NURSE ANESTHETIST, CERTIFIED REGISTERED

## 2019-05-29 PROCEDURE — 63047 LAM FACETEC & FORAMOT LUMBAR: CPT | Mod: AS | Performed by: PHYSICIAN ASSISTANT

## 2019-05-29 PROCEDURE — 36000065 ZZH SURGERY LEVEL 4 W FLUORO 1ST 30 MIN: Performed by: NEUROLOGICAL SURGERY

## 2019-05-29 PROCEDURE — 25800030 ZZH RX IP 258 OP 636: Performed by: NURSE ANESTHETIST, CERTIFIED REGISTERED

## 2019-05-29 PROCEDURE — 25000125 ZZHC RX 250: Performed by: NURSE ANESTHETIST, CERTIFIED REGISTERED

## 2019-05-29 PROCEDURE — 00UT0JZ SUPPLEMENT SPINAL MENINGES WITH SYNTHETIC SUBSTITUTE, OPEN APPROACH: ICD-10-PCS | Performed by: NEUROLOGICAL SURGERY

## 2019-05-29 PROCEDURE — 71000014 ZZH RECOVERY PHASE 1 LEVEL 2 FIRST HR: Performed by: NEUROLOGICAL SURGERY

## 2019-05-29 PROCEDURE — 25000132 ZZH RX MED GY IP 250 OP 250 PS 637: Performed by: PHYSICIAN ASSISTANT

## 2019-05-29 PROCEDURE — 63048 LAM FACETEC &FORAMOT EA ADDL: CPT | Mod: AS | Performed by: PHYSICIAN ASSISTANT

## 2019-05-29 PROCEDURE — 25000125 ZZHC RX 250: Performed by: NEUROLOGICAL SURGERY

## 2019-05-29 PROCEDURE — 8E0WXBF COMPUTER ASSISTED PROCEDURE OF TRUNK REGION, WITH FLUOROSCOPY: ICD-10-PCS | Performed by: NEUROLOGICAL SURGERY

## 2019-05-29 PROCEDURE — 40000306 ZZH STATISTIC PRE PROC ASSESS II: Performed by: NEUROLOGICAL SURGERY

## 2019-05-29 PROCEDURE — 37000009 ZZH ANESTHESIA TECHNICAL FEE, EACH ADDTL 15 MIN: Performed by: NEUROLOGICAL SURGERY

## 2019-05-29 PROCEDURE — 25000566 ZZH SEVOFLURANE, EA 15 MIN: Performed by: NEUROLOGICAL SURGERY

## 2019-05-29 PROCEDURE — 25800030 ZZH RX IP 258 OP 636: Performed by: PHYSICIAN ASSISTANT

## 2019-05-29 PROCEDURE — C1894 INTRO/SHEATH, NON-LASER: HCPCS | Performed by: NEUROLOGICAL SURGERY

## 2019-05-29 PROCEDURE — 71000015 ZZH RECOVERY PHASE 1 LEVEL 2 EA ADDTL HR: Performed by: NEUROLOGICAL SURGERY

## 2019-05-29 PROCEDURE — 01NB0ZZ RELEASE LUMBAR NERVE, OPEN APPROACH: ICD-10-PCS | Performed by: NEUROLOGICAL SURGERY

## 2019-05-29 PROCEDURE — 27211024 ZZHC OR SUPPLY OTHER OPNP: Performed by: NEUROLOGICAL SURGERY

## 2019-05-29 PROCEDURE — 40000277 XR SURGERY CARM FLUORO LESS THAN 5 MIN W STILLS: Mod: TC

## 2019-05-29 PROCEDURE — 27110028 ZZH OR GENERAL SUPPLY NON-STERILE: Performed by: NEUROLOGICAL SURGERY

## 2019-05-29 PROCEDURE — 37000008 ZZH ANESTHESIA TECHNICAL FEE, 1ST 30 MIN: Performed by: NEUROLOGICAL SURGERY

## 2019-05-29 PROCEDURE — 25000125 ZZHC RX 250: Performed by: PHYSICIAN ASSISTANT

## 2019-05-29 PROCEDURE — 63048 LAM FACETEC &FORAMOT EA ADDL: CPT | Performed by: NEUROLOGICAL SURGERY

## 2019-05-29 PROCEDURE — 25000128 H RX IP 250 OP 636: Performed by: PHYSICIAN ASSISTANT

## 2019-05-29 PROCEDURE — 27210794 ZZH OR GENERAL SUPPLY STERILE: Performed by: NEUROLOGICAL SURGERY

## 2019-05-29 RX ORDER — HYDROMORPHONE HYDROCHLORIDE 1 MG/ML
.3-.5 INJECTION, SOLUTION INTRAMUSCULAR; INTRAVENOUS; SUBCUTANEOUS
Status: DISCONTINUED | OUTPATIENT
Start: 2019-05-29 | End: 2019-06-01 | Stop reason: HOSPADM

## 2019-05-29 RX ORDER — FENTANYL CITRATE 50 UG/ML
25-50 INJECTION, SOLUTION INTRAMUSCULAR; INTRAVENOUS
Status: DISCONTINUED | OUTPATIENT
Start: 2019-05-29 | End: 2019-05-29 | Stop reason: HOSPADM

## 2019-05-29 RX ORDER — SODIUM CHLORIDE, SODIUM LACTATE, POTASSIUM CHLORIDE, CALCIUM CHLORIDE 600; 310; 30; 20 MG/100ML; MG/100ML; MG/100ML; MG/100ML
INJECTION, SOLUTION INTRAVENOUS CONTINUOUS
Status: DISCONTINUED | OUTPATIENT
Start: 2019-05-29 | End: 2019-05-31

## 2019-05-29 RX ORDER — CEFAZOLIN SODIUM 2 G/100ML
2 INJECTION, SOLUTION INTRAVENOUS
Status: COMPLETED | OUTPATIENT
Start: 2019-05-29 | End: 2019-05-29

## 2019-05-29 RX ORDER — ONDANSETRON 2 MG/ML
4 INJECTION INTRAMUSCULAR; INTRAVENOUS EVERY 30 MIN PRN
Status: DISCONTINUED | OUTPATIENT
Start: 2019-05-29 | End: 2019-05-29 | Stop reason: HOSPADM

## 2019-05-29 RX ORDER — OXYCODONE HYDROCHLORIDE 5 MG/1
5-10 TABLET ORAL
Qty: 30 TABLET | Refills: 0 | Status: SHIPPED | OUTPATIENT
Start: 2019-05-29 | End: 2019-06-05

## 2019-05-29 RX ORDER — DEXAMETHASONE SODIUM PHOSPHATE 10 MG/ML
INJECTION, SOLUTION INTRAMUSCULAR; INTRAVENOUS PRN
Status: DISCONTINUED | OUTPATIENT
Start: 2019-05-29 | End: 2019-05-29

## 2019-05-29 RX ORDER — METOCLOPRAMIDE HYDROCHLORIDE 5 MG/ML
10 INJECTION INTRAMUSCULAR; INTRAVENOUS EVERY 6 HOURS PRN
Status: DISCONTINUED | OUTPATIENT
Start: 2019-05-29 | End: 2019-06-01 | Stop reason: HOSPADM

## 2019-05-29 RX ORDER — LIDOCAINE 40 MG/G
CREAM TOPICAL
Status: DISCONTINUED | OUTPATIENT
Start: 2019-05-29 | End: 2019-06-01 | Stop reason: HOSPADM

## 2019-05-29 RX ORDER — LIDOCAINE HYDROCHLORIDE AND EPINEPHRINE 10; 10 MG/ML; UG/ML
INJECTION, SOLUTION INFILTRATION; PERINEURAL PRN
Status: DISCONTINUED | OUTPATIENT
Start: 2019-05-29 | End: 2019-05-29 | Stop reason: HOSPADM

## 2019-05-29 RX ORDER — HYDROMORPHONE HYDROCHLORIDE 1 MG/ML
.3-.5 INJECTION, SOLUTION INTRAMUSCULAR; INTRAVENOUS; SUBCUTANEOUS
Status: DISCONTINUED | OUTPATIENT
Start: 2019-05-29 | End: 2019-05-29

## 2019-05-29 RX ORDER — METHOCARBAMOL 750 MG/1
750-1500 TABLET, FILM COATED ORAL EVERY 6 HOURS PRN
Qty: 60 TABLET | Refills: 1 | Status: ON HOLD | OUTPATIENT
Start: 2019-05-29 | End: 2019-06-12

## 2019-05-29 RX ORDER — PROCHLORPERAZINE MALEATE 5 MG
10 TABLET ORAL EVERY 6 HOURS PRN
Status: DISCONTINUED | OUTPATIENT
Start: 2019-05-29 | End: 2019-06-01 | Stop reason: HOSPADM

## 2019-05-29 RX ORDER — ONDANSETRON 2 MG/ML
4 INJECTION INTRAMUSCULAR; INTRAVENOUS EVERY 6 HOURS PRN
Status: DISCONTINUED | OUTPATIENT
Start: 2019-05-29 | End: 2019-06-01 | Stop reason: HOSPADM

## 2019-05-29 RX ORDER — METOCLOPRAMIDE 5 MG/1
10 TABLET ORAL EVERY 6 HOURS PRN
Status: DISCONTINUED | OUTPATIENT
Start: 2019-05-29 | End: 2019-06-01 | Stop reason: HOSPADM

## 2019-05-29 RX ORDER — EPHEDRINE SULFATE 50 MG/ML
INJECTION, SOLUTION INTRAMUSCULAR; INTRAVENOUS; SUBCUTANEOUS PRN
Status: DISCONTINUED | OUTPATIENT
Start: 2019-05-29 | End: 2019-05-29

## 2019-05-29 RX ORDER — LIDOCAINE HYDROCHLORIDE 20 MG/ML
INJECTION, SOLUTION INFILTRATION; PERINEURAL PRN
Status: DISCONTINUED | OUTPATIENT
Start: 2019-05-29 | End: 2019-05-29

## 2019-05-29 RX ORDER — LIDOCAINE 40 MG/G
CREAM TOPICAL
Status: DISCONTINUED | OUTPATIENT
Start: 2019-05-29 | End: 2019-05-31

## 2019-05-29 RX ORDER — SODIUM CHLORIDE 9 MG/ML
INJECTION, SOLUTION INTRAVENOUS CONTINUOUS
Status: DISCONTINUED | OUTPATIENT
Start: 2019-05-29 | End: 2019-05-31

## 2019-05-29 RX ORDER — PROPOFOL 10 MG/ML
INJECTION, EMULSION INTRAVENOUS PRN
Status: DISCONTINUED | OUTPATIENT
Start: 2019-05-29 | End: 2019-05-29

## 2019-05-29 RX ORDER — AMOXICILLIN 250 MG
1-2 CAPSULE ORAL 2 TIMES DAILY
Qty: 30 TABLET | Refills: 0 | Status: SHIPPED | OUTPATIENT
Start: 2019-05-29 | End: 2019-06-27

## 2019-05-29 RX ORDER — NALOXONE HYDROCHLORIDE 0.4 MG/ML
.1-.4 INJECTION, SOLUTION INTRAMUSCULAR; INTRAVENOUS; SUBCUTANEOUS
Status: DISCONTINUED | OUTPATIENT
Start: 2019-05-29 | End: 2019-05-31

## 2019-05-29 RX ORDER — ONDANSETRON 2 MG/ML
INJECTION INTRAMUSCULAR; INTRAVENOUS PRN
Status: DISCONTINUED | OUTPATIENT
Start: 2019-05-29 | End: 2019-05-29

## 2019-05-29 RX ORDER — HYDROMORPHONE HYDROCHLORIDE 1 MG/ML
.3-.5 INJECTION, SOLUTION INTRAMUSCULAR; INTRAVENOUS; SUBCUTANEOUS EVERY 10 MIN PRN
Status: DISCONTINUED | OUTPATIENT
Start: 2019-05-29 | End: 2019-05-29 | Stop reason: HOSPADM

## 2019-05-29 RX ORDER — FENTANYL CITRATE 50 UG/ML
INJECTION, SOLUTION INTRAMUSCULAR; INTRAVENOUS PRN
Status: DISCONTINUED | OUTPATIENT
Start: 2019-05-29 | End: 2019-05-29

## 2019-05-29 RX ORDER — CEFAZOLIN SODIUM 1 G/3ML
1 INJECTION, POWDER, FOR SOLUTION INTRAMUSCULAR; INTRAVENOUS SEE ADMIN INSTRUCTIONS
Status: DISCONTINUED | OUTPATIENT
Start: 2019-05-29 | End: 2019-05-29

## 2019-05-29 RX ORDER — GLYCOPYRROLATE 0.2 MG/ML
INJECTION, SOLUTION INTRAMUSCULAR; INTRAVENOUS PRN
Status: DISCONTINUED | OUTPATIENT
Start: 2019-05-29 | End: 2019-05-29

## 2019-05-29 RX ORDER — OXYCODONE HYDROCHLORIDE 5 MG/1
5-10 TABLET ORAL
Status: DISCONTINUED | OUTPATIENT
Start: 2019-05-29 | End: 2019-06-01 | Stop reason: HOSPADM

## 2019-05-29 RX ORDER — HYDROXYZINE HYDROCHLORIDE 25 MG/1
25 TABLET, FILM COATED ORAL EVERY 6 HOURS PRN
Status: DISCONTINUED | OUTPATIENT
Start: 2019-05-29 | End: 2019-06-01 | Stop reason: HOSPADM

## 2019-05-29 RX ORDER — SODIUM CHLORIDE, SODIUM LACTATE, POTASSIUM CHLORIDE, CALCIUM CHLORIDE 600; 310; 30; 20 MG/100ML; MG/100ML; MG/100ML; MG/100ML
INJECTION, SOLUTION INTRAVENOUS CONTINUOUS
Status: DISCONTINUED | OUTPATIENT
Start: 2019-05-29 | End: 2019-05-29 | Stop reason: HOSPADM

## 2019-05-29 RX ORDER — CALCIUM CARBONATE 500 MG/1
1000 TABLET, CHEWABLE ORAL 4 TIMES DAILY PRN
Status: DISCONTINUED | OUTPATIENT
Start: 2019-05-29 | End: 2019-06-01 | Stop reason: HOSPADM

## 2019-05-29 RX ORDER — ONDANSETRON 4 MG/1
4 TABLET, ORALLY DISINTEGRATING ORAL EVERY 6 HOURS PRN
Status: DISCONTINUED | OUTPATIENT
Start: 2019-05-29 | End: 2019-06-01 | Stop reason: HOSPADM

## 2019-05-29 RX ORDER — NALOXONE HYDROCHLORIDE 0.4 MG/ML
.1-.4 INJECTION, SOLUTION INTRAMUSCULAR; INTRAVENOUS; SUBCUTANEOUS
Status: DISCONTINUED | OUTPATIENT
Start: 2019-05-29 | End: 2019-06-01 | Stop reason: HOSPADM

## 2019-05-29 RX ORDER — MEPERIDINE HYDROCHLORIDE 25 MG/ML
12.5 INJECTION INTRAMUSCULAR; INTRAVENOUS; SUBCUTANEOUS
Status: DISCONTINUED | OUTPATIENT
Start: 2019-05-29 | End: 2019-05-29 | Stop reason: HOSPADM

## 2019-05-29 RX ORDER — ONDANSETRON 4 MG/1
4 TABLET, ORALLY DISINTEGRATING ORAL EVERY 30 MIN PRN
Status: DISCONTINUED | OUTPATIENT
Start: 2019-05-29 | End: 2019-05-29 | Stop reason: HOSPADM

## 2019-05-29 RX ORDER — NALOXONE HYDROCHLORIDE 0.4 MG/ML
.1-.4 INJECTION, SOLUTION INTRAMUSCULAR; INTRAVENOUS; SUBCUTANEOUS
Status: DISCONTINUED | OUTPATIENT
Start: 2019-05-29 | End: 2019-05-29 | Stop reason: HOSPADM

## 2019-05-29 RX ADMIN — LIDOCAINE HYDROCHLORIDE 100 MG: 20 INJECTION, SOLUTION INFILTRATION; PERINEURAL at 13:40

## 2019-05-29 RX ADMIN — ROCURONIUM BROMIDE 10 MG: 10 INJECTION INTRAVENOUS at 15:00

## 2019-05-29 RX ADMIN — SODIUM CHLORIDE, POTASSIUM CHLORIDE, SODIUM LACTATE AND CALCIUM CHLORIDE: 600; 310; 30; 20 INJECTION, SOLUTION INTRAVENOUS at 16:11

## 2019-05-29 RX ADMIN — PROPOFOL 50 MG: 10 INJECTION, EMULSION INTRAVENOUS at 15:32

## 2019-05-29 RX ADMIN — ROCURONIUM BROMIDE 40 MG: 10 INJECTION INTRAVENOUS at 13:40

## 2019-05-29 RX ADMIN — DEXAMETHASONE SODIUM PHOSPHATE 10 MG: 10 INJECTION, SOLUTION INTRAMUSCULAR; INTRAVENOUS at 13:43

## 2019-05-29 RX ADMIN — MIDAZOLAM 1 MG: 1 INJECTION INTRAMUSCULAR; INTRAVENOUS at 13:35

## 2019-05-29 RX ADMIN — SODIUM CHLORIDE: 9 INJECTION, SOLUTION INTRAVENOUS at 19:41

## 2019-05-29 RX ADMIN — SODIUM CHLORIDE, POTASSIUM CHLORIDE, SODIUM LACTATE AND CALCIUM CHLORIDE: 600; 310; 30; 20 INJECTION, SOLUTION INTRAVENOUS at 11:58

## 2019-05-29 RX ADMIN — Medication 10 MG: at 14:24

## 2019-05-29 RX ADMIN — FENTANYL CITRATE 50 MCG: 50 INJECTION, SOLUTION INTRAMUSCULAR; INTRAVENOUS at 14:08

## 2019-05-29 RX ADMIN — ONDANSETRON 4 MG: 2 INJECTION INTRAMUSCULAR; INTRAVENOUS at 13:40

## 2019-05-29 RX ADMIN — FENTANYL CITRATE 50 MCG: 50 INJECTION INTRAMUSCULAR; INTRAVENOUS at 17:03

## 2019-05-29 RX ADMIN — SUGAMMADEX 50 MG: 100 INJECTION, SOLUTION INTRAVENOUS at 15:34

## 2019-05-29 RX ADMIN — FENTANYL CITRATE 50 MCG: 50 INJECTION INTRAMUSCULAR; INTRAVENOUS at 16:29

## 2019-05-29 RX ADMIN — FENTANYL CITRATE 50 MCG: 50 INJECTION INTRAMUSCULAR; INTRAVENOUS at 17:13

## 2019-05-29 RX ADMIN — SUGAMMADEX 100 MG: 100 INJECTION, SOLUTION INTRAVENOUS at 15:32

## 2019-05-29 RX ADMIN — HYDROMORPHONE HYDROCHLORIDE 0.5 MG: 1 INJECTION, SOLUTION INTRAMUSCULAR; INTRAVENOUS; SUBCUTANEOUS at 16:31

## 2019-05-29 RX ADMIN — FENTANYL CITRATE 50 MCG: 50 INJECTION, SOLUTION INTRAMUSCULAR; INTRAVENOUS at 13:35

## 2019-05-29 RX ADMIN — PROPOFOL 50 MG: 10 INJECTION, EMULSION INTRAVENOUS at 15:37

## 2019-05-29 RX ADMIN — HYDROXYZINE HYDROCHLORIDE 25 MG: 25 TABLET ORAL at 17:05

## 2019-05-29 RX ADMIN — GLYCOPYRROLATE 0.2 MG: 0.2 INJECTION, SOLUTION INTRAMUSCULAR; INTRAVENOUS at 14:24

## 2019-05-29 RX ADMIN — SODIUM CHLORIDE, POTASSIUM CHLORIDE, SODIUM LACTATE AND CALCIUM CHLORIDE: 600; 310; 30; 20 INJECTION, SOLUTION INTRAVENOUS at 14:26

## 2019-05-29 RX ADMIN — OXYCODONE HYDROCHLORIDE 5 MG: 5 TABLET ORAL at 21:50

## 2019-05-29 RX ADMIN — Medication 5 MG: at 14:43

## 2019-05-29 RX ADMIN — CEFAZOLIN SODIUM 2 G: 2 INJECTION, SOLUTION INTRAVENOUS at 13:35

## 2019-05-29 RX ADMIN — ROCURONIUM BROMIDE 10 MG: 10 INJECTION INTRAVENOUS at 14:42

## 2019-05-29 RX ADMIN — PROPOFOL 50 MG: 10 INJECTION, EMULSION INTRAVENOUS at 15:41

## 2019-05-29 RX ADMIN — ROCURONIUM BROMIDE 10 MG: 10 INJECTION INTRAVENOUS at 14:04

## 2019-05-29 RX ADMIN — FAMOTIDINE 20 MG: 10 INJECTION, SOLUTION INTRAVENOUS at 20:21

## 2019-05-29 RX ADMIN — HYDROMORPHONE HYDROCHLORIDE 0.5 MG: 1 INJECTION, SOLUTION INTRAMUSCULAR; INTRAVENOUS; SUBCUTANEOUS at 17:11

## 2019-05-29 RX ADMIN — PROPOFOL 200 MG: 10 INJECTION, EMULSION INTRAVENOUS at 13:40

## 2019-05-29 RX ADMIN — PROPOFOL 60 MG: 10 INJECTION, EMULSION INTRAVENOUS at 15:22

## 2019-05-29 RX ADMIN — GLYCOPYRROLATE 0.1 MG: 0.2 INJECTION, SOLUTION INTRAMUSCULAR; INTRAVENOUS at 14:25

## 2019-05-29 RX ADMIN — HYDROXYZINE HYDROCHLORIDE 25 MG: 25 TABLET ORAL at 23:39

## 2019-05-29 RX ADMIN — OXYCODONE HYDROCHLORIDE 10 MG: 5 TABLET ORAL at 17:05

## 2019-05-29 RX ADMIN — LIDOCAINE HYDROCHLORIDE 0.1 ML: 10 INJECTION, SOLUTION EPIDURAL; INFILTRATION; INTRACAUDAL; PERINEURAL at 11:58

## 2019-05-29 RX ADMIN — FENTANYL CITRATE 50 MCG: 50 INJECTION INTRAMUSCULAR; INTRAVENOUS at 16:20

## 2019-05-29 RX ADMIN — Medication 5 MG: at 14:49

## 2019-05-29 RX ADMIN — Medication 5 MG: at 14:38

## 2019-05-29 NOTE — OR NURSING
PT c/o numbness to right leg below the knee to include the foot, like he had a novocaine injection in the leg. Pedal pulse 2+ with good ROM, able to feel light touch. Pt able to bend knees and ankles, color WNL, and cap refill WNL. Contacted Shalom Hawkins regarding this complaint, he did not have any acute concerns about this matter.
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
## 2019-05-29 NOTE — LETTER
Transition Communication Hand-off for Care Transitions to Next Level of Care Provider    Name: Wenceslao Vasquez  : 1960  MRN #: [id number]  Primary Care Provider: Christopher Arreola  Primary Care MD Name: Dr. Christopher Arreola  Primary Clinic: [address] [facility] [address]  Primary Care Clinic Name: [facility]  Reason for Hospitalization:  neurogenic claudications  S/P laminectomy  S/P lumbar laminectomy  Admit Date/Time: 2019 10:41 AM  Discharge Date: 19  Payor Source: Payor: HU[employer]NA / Plan: HU[employer]NA MEDICARE ADVANTAGE / Product Type: Medicare /     Readmission Assessment Measure (SHELBI) Risk Score/category: Average     Reason for Communication Hand-off Referral: Other Having [facility] PT after laminectomy     Discharge Plan: Home with wife  care as needed through Monday, then starting Tuesday, brother will be with patient while wife at work.  [facility] PT referral made.       Concern for non-adherence with plan of care:   Y/N no   Discharge Needs Assessment:  Needs      Most Recent Value   Anticipated Changes Related to Illness  inability to care for self   Equipment Currently Used at Home  commode, shower chair, raised toilet, walker, standard, cane, straight   # of Referrals Placed by [facility]  Internal Clinic Care Coordination, Homecare   Home Care  [facility] [phone], Fax: [phone]          Already enrolled in Tele-monitoring program and name of program:  no   Follow-up specialty is recommended: No    Follow-up plan:    Future Appointments   Date Time Provider Department Center   6/3/2019 11:30 AM Gabi Enamorado OT [facility]   2019  9:30 AM [facility]   2019  3:00 PM Shalom Hawkins PA-C [facility]   2019  3:00 PM Shalom Hawkins PA-C [facility]       Any outstanding tests or procedures:        Referrals     Future Labs/Procedures    HOME CARE NURSING REFERRAL     Comments:    **Order classes of: [place]  Homecare,  Homecare and [place] Homecare will route to the Home Care and Hospice Referral Pool.  Home Care or Hospice will then contact the patient to schedule their appointment.**    If you do not hear from Home Care and Hospice, or you would like to call to schedule, please call the referring place of service that your provider has listed below.  ______________________________________________________________________    Your provider has referred you to: [facility]: [facility] [phone]   [email or web address]    Extended Emergency Contact Information  Primary Emergency Contact: PedroLeslye  Address: [address] [place] [address]  Home Phone: [phone]  Mobile Phone: [phone]  Relation: Spouse  Secondary Emergency Contact: No secondary contact   [facility]  Home Phone: none  Relation: None    Patient Anticipated Discharge Date: 6/1/19   RN, PT, HHA to begin 24 - 48 hours after discharge.  PLEASE EVALUATE AND TREAT (Evaluation timeline is 24 - 48 hrs. Please call if there is need for a variance to this timeline).    REASON FOR REFERRAL: Assessment & Treatment: PT    ADDITIONAL SERVICES NEEDED: OT    OTHER PERTINENT INFORMATION: Patient was last seen by provider on 6/1/19 for hospital discharge.    Current Outpatient Medications:  methocarbamol (ROBAXIN) 750 MG tablet, Take 1-2 tablets (750-1,500 mg) by mouth every 6 hours as needed for muscle spasms (muscle spasm), Disp: 60 tablet, Rfl: 1  oxyCODONE (ROXICODONE) 5 MG tablet, Take 1-2 tablets (5-10 mg) by mouth every 3 hours as needed for pain (Moderate to Severe), Disp: 30 tablet, Rfl: 0  senna-docusate (SENOKOT-S/PERICOLACE) 8.6-50 MG tablet, Take 1-2 tablets by mouth 2 times daily Take while on oral narcotics to prevent or treat constipation., Disp: 30 tablet, Rfl: 0      Patient Active Problem List:     CHELSY (obstructive sleep apnea)     Hyperlipidemia LDL goal <130      Sciatica     Low back pain     Stenosis of lumbosacral spine     Advanced directives, counseling/discussion     Hypertension goal BP (blood pressure) < 140/90     Spinal stenosis in cervical region     Cervical spondylosis without myelopathy     Pneumonia     Morbid obesity due to excess calories (H)     Syncope     Exertional chest pain     Lumbar stenosis     S/P laminectomy     S/P lumbar laminectomy      Documentation of Face to Face and Certification for Home Health Services    I certify that patient, Wenceslao Vasquez is under my care and that I, or a Nurse Practitioner or Physician's Assistant working with me, had a face-to-face encounter that meets the physician face-to-face encounter requirements with this patient on: 6/1/2019.    This encounter with the patient was in whole, or in part, for the following medical condition, which is the primary reason for Home Health Care: Patient underwent L 2-3 and L 3-4 laminectomy.    I certify that, based on my findings, the following services are medically necessary Home Health Services: Occupational Therapy and Physical Therapy    My clinical findings support the need for the above services because: Occupational Therapy Services are needed to assess and treat cognitive ability and address ADL safety due to impairment in mobility and Physical Therapy Services are needed to assess and treat the following functional impairments: weakness and impaired mobility.    Further, I certify that my clinical findings support that this patient is homebound (i.e. absences from home require considerable and taxing effort and are for medical reasons or [employer] services or infrequently or of short duration when for other reasons) because: Requires assistance of another person or specialized equipment to access medical services because patient: Requires supervision of another for safe transfer..    Based on the above findings, I certify that this patient is confined to the home and needs  intermittent skilled nursing care, physical therapy and/or speech therapy.  The patient is under my care, and I have initiated the establishment of the plan of care.  This patient will be followed by a physician who will periodically review the plan of care.    Physician/Provider to provide follow up care: Christopher Arreola certified Physician at time of discharge: Dr. Timmons    Please be aware that coverage of these services is subject to the terms and limitations of your health insurance plan.  Call member services at your health plan with any benefit or coverage questions.            Key Recommendations:  Attend follow up appointments.     GT Christina     AVS/Discharge Summary is the source of truth; this is a helpful guide for improved communication of patient story

## 2019-05-29 NOTE — ANESTHESIA CARE TRANSFER NOTE
Patient: Wenceslao Vasquez    Procedure(s):  LAMINECTOMIES LUMBAR 2-4    Diagnosis: neurogenic claudications  Diagnosis Additional Information: No value filed.    Anesthesia Type:   General, ETT     Note:  Airway :Face Mask  Patient transferred to:PACU  Handoff Report: Identifed the Patient, Identified the Reponsible Provider, Reviewed the pertinent medical history, Discussed the surgical course, Reviewed Intra-OP anesthesia mangement and issues during anesthesia, Set expectations for post-procedure period and Allowed opportunity for questions and acknowledgement of understanding      Vitals: (Last set prior to Anesthesia Care Transfer)    CRNA VITALS  5/29/2019 1519 - 5/29/2019 1557      5/29/2019             Pulse:  91    SpO2:  100 %    EKG:  Sinus rhythm                Electronically Signed By: ANNEL Sorto CRNA  May 29, 2019  3:57 PM
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
## 2019-05-30 PROBLEM — Z98.890 S/P LUMBAR LAMINECTOMY: Status: ACTIVE | Noted: 2019-05-30

## 2019-05-30 LAB — GLUCOSE BLDC GLUCOMTR-MCNC: 113 MG/DL (ref 70–99)

## 2019-05-30 PROCEDURE — 25800030 ZZH RX IP 258 OP 636: Performed by: PHYSICIAN ASSISTANT

## 2019-05-30 PROCEDURE — 25000125 ZZHC RX 250: Performed by: PHYSICIAN ASSISTANT

## 2019-05-30 PROCEDURE — 25000132 ZZH RX MED GY IP 250 OP 250 PS 637: Performed by: PHYSICIAN ASSISTANT

## 2019-05-30 PROCEDURE — 12000000 ZZH R&B MED SURG/OB

## 2019-05-30 PROCEDURE — 25000132 ZZH RX MED GY IP 250 OP 250 PS 637: Performed by: NURSE PRACTITIONER

## 2019-05-30 PROCEDURE — 00000146 ZZHCL STATISTIC GLUCOSE BY METER IP

## 2019-05-30 RX ORDER — METHOCARBAMOL 500 MG/1
500-1000 TABLET, FILM COATED ORAL 4 TIMES DAILY PRN
Status: DISCONTINUED | OUTPATIENT
Start: 2019-05-30 | End: 2019-06-01 | Stop reason: HOSPADM

## 2019-05-30 RX ADMIN — RANITIDINE 150 MG: 150 TABLET ORAL at 17:48

## 2019-05-30 RX ADMIN — OXYCODONE HYDROCHLORIDE 5 MG: 5 TABLET ORAL at 21:25

## 2019-05-30 RX ADMIN — OXYCODONE HYDROCHLORIDE 10 MG: 5 TABLET ORAL at 17:48

## 2019-05-30 RX ADMIN — OXYCODONE HYDROCHLORIDE 10 MG: 5 TABLET ORAL at 05:41

## 2019-05-30 RX ADMIN — SODIUM CHLORIDE, POTASSIUM CHLORIDE, SODIUM LACTATE AND CALCIUM CHLORIDE: 600; 310; 30; 20 INJECTION, SOLUTION INTRAVENOUS at 05:44

## 2019-05-30 RX ADMIN — OXYCODONE HYDROCHLORIDE 10 MG: 5 TABLET ORAL at 13:54

## 2019-05-30 RX ADMIN — HYDROXYZINE HYDROCHLORIDE 25 MG: 25 TABLET ORAL at 13:54

## 2019-05-30 RX ADMIN — METHOCARBAMOL 500 MG: 500 TABLET, FILM COATED ORAL at 16:24

## 2019-05-30 RX ADMIN — OXYCODONE HYDROCHLORIDE 10 MG: 5 TABLET ORAL at 01:50

## 2019-05-30 RX ADMIN — HYDROXYZINE HYDROCHLORIDE 25 MG: 25 TABLET ORAL at 21:28

## 2019-05-30 RX ADMIN — FAMOTIDINE 20 MG: 10 INJECTION, SOLUTION INTRAVENOUS at 05:41

## 2019-05-30 ASSESSMENT — ACTIVITIES OF DAILY LIVING (ADL)
ADLS_ACUITY_SCORE: 23
ADLS_ACUITY_SCORE: 23

## 2019-05-30 NOTE — PROGRESS NOTES
[facility]    Neurosurgery Progress Note    Date of Service (when I saw the patient): 05/30/2019     Assessment & Plan   Wenceslao Vasquez is a 58 year old male who was admitted on 5/29/2019. He presented with severe, progressive neurogenic claudications and progressive loss of gait function. MRI revealed severe stenosis at L2-3 and L3-4. On 5/29/2019 he underwent a L2-3 and L3-4 bilateral laminectomy and medial facetectomy with Dr. Calvo. He has been on flat bedrest overnight. Today he reports incisional back pain but denies radiculopathy. He reports right foot numbness and tingling. He states this is improving as it was previously knee down numbness. Proprioception intact on right. He attempted to void overnight but had to be straight cathed.     Active Problems:    S/P laminectomy    Assessment: s/p L2-3 and L3-4 laminectomies    Plan:  Increase HOB gradually then OOB and ambulation  Continue oral pain managment  Encourage use of IS and deep breathing   Suture/Staple removal in 10-14 days       I have discussed the following assessment and plan Dr. Calvo who is in agreement with initial plan and will follow up with further consultation recommendations.    Luz Maria Santa CNP  [facility]  [place] [address]    Tel [phone]  Pager [phone]      Interval History   Stable. Doing well. Pain reasonably controlled.    Physical Exam   Temp: 97  F (36.1  C) Temp src: Oral BP: 124/70 Pulse: 57 Heart Rate: 64 Resp: 18 SpO2: 97 % O2 Device: Nasal cannula Oxygen Delivery: 2.5 LPM  There were no vitals filed for this visit.  Vital Signs with Ranges  Temp:  [97  F (36.1  C)-98.2  F (36.8  C)] 97  F (36.1  C)  Pulse:  [50-85] 57  Heart Rate:  [62-89] 64  Resp:  [11-27] 18  BP: (117-151)/(61-96) 124/70  SpO2:  [88 %-98 %] 97 %  I/O last 3 completed shifts:  In: 2281.67 [P.O.:50; I.V.:2231.67]  Out: 1250  [Urine:1250]    Heart Rate: 64, Blood pressure 124/70, pulse 57, temperature 97  F (36.1  C), temperature source Oral, resp. rate 18, SpO2 97 %.  0 lbs 0 oz  HEENT:  Normocephalic.  PERRLA.    Heart:  No peripheral edema  Lungs:  No SOB  Skin:  Warm and dry, good capillary refill.  Extremities:  Good radial and dorsalis pedis pulses bilaterally, no edema, cyanosis or clubbing.    NEUROLOGICAL EXAMINATION:   Mental status:  Alert and Oriented x 3, speech is fluent.  Cranial nerves:  II-XII intact.   Motor:  Strength is 5/5 throughout the upper and lower extremities   Hip Flexor:                Right: 5/5  Left:  5/5  Hip Adductor:             Right:  5/5  Left:  5/5  Hip Abductor:             Right:  5/5  Left:  5/5  Gastroc Soleus:        Right:  5/5  Left:  5/5  Tib/Ant:                      Right:  5/5  Left:  5/5  EHL:                     Right:  5/5  Left:  5/5  Sensation:  Decreased right foot and ankle  Reflexes:  Negative Babinski.  Negative Clonus.    Gait:  Deferred.       Medications     lactated ringers 25 mL/hr at 05/30/19 0544     sodium chloride Stopped (05/30/19 0540)       ranitidine  150 mg Oral Q12H    Or     famotidine  20 mg Intravenous Q12H     sodium chloride (PF)  3 mL Intracatheter Q8H       Data     CBC RESULTS:   Recent Labs   Lab Test 05/23/19  1014   WBC 5.5   RBC 4.19*   HGB 14.4   HCT 43.2   *   MCH 34.4*   MCHC 33.3   RDW 12.2        Basic Metabolic Panel:  Lab Results   Component Value Date     05/23/2019      Lab Results   Component Value Date    POTASSIUM 4.2 05/23/2019     Lab Results   Component Value Date    CHLORIDE 109 05/23/2019     Lab Results   Component Value Date    BETY 9.2 05/23/2019     Lab Results   Component Value Date    CO2 27 05/23/2019     Lab Results   Component Value Date    BUN 18 05/23/2019     Lab Results   Component Value Date    CR 0.88 05/23/2019     Lab Results   Component Value Date    GLC 91 05/23/2019     INR:  Lab Results    Component Value Date    INR 1.02 03/06/2019    INR 1.00 10/27/2017    INR 1.07 08/05/2013

## 2019-05-30 NOTE — PLAN OF CARE
Patient is alert and oriented x4. Patient has had difficulty with muscle stiffness and pain when raising the head of the bed. Patient tolerated 5 degrees incline increase every 20-30 minutes. At 1800 patient felt well enough to stand. Patient dangled legs at bedside for a few minutes and then with heavy assistance of 2 staff was able to get to his feet. Patient did not stand for long. Right foot/leg felt very weak and patient was leaning onto staff at right side. Patient had moderate to severe pain when moving to stand. Patient describes the pain as muscle tightness and sharp over incision. Patient then wanted to lay on left side with head of bed at 25 degrees. Oxycodone, atarax, and robaxin are working well for pain control when patient is at rest. Vital signs are stable, afebrile. Will continue to monitor.

## 2019-05-30 NOTE — PROGRESS NOTES
At 0730, patient in supine position.  Order are to sit up patient 15 degrees every hour as tolerated. Patient tolerated this for 3 occurrences thus far.  Was able to advance diet.  Patient had a piece of toast with peanut butter.  Reporting pain 4/10 with decrease in pain with 10 mg of oxycodone. Plan is to eventually have patient ambulating today.  Voiding small amounts.  Needed to be straight cath'd overnight.  Will continue to monitor and assess per plan of care.

## 2019-05-30 NOTE — PROGRESS NOTES
"Notified MD at 350pm PM regarding pain management.      Spoke with: Luz Maria Santa NP    Orders were obtained.    Comments: patient complaining of back \"tightness\" and does not tolerate HOB elevation.  Plan to order Robaxin      "

## 2019-05-30 NOTE — PROGRESS NOTES
"Patient has orders to lay flat until 0730 due to laminectomy.  Rating pain 4/10 at times. Taking 10 mg of Oxycodone every 3 hours and patient reports decrease in pain.  Patient reporting feeling pain in lower abdomen and had not voided an adequate amount since procedure.  Bladder scanned and was 600+.  Straight cath for 800.  Patient report feeling a \"sense of relief.\"  Continue to monitor and assess per plan of care.   "

## 2019-05-30 NOTE — OP NOTE
Date of surgery: 5/29/2019  Surgeon: Brent Calvo MD  Assistant: LEVAR Mccurdy  Note: Shalom Hawkins was present for and assisted with the entire surgery, and his/her role as an assistant was crucial for aid in positioning, exposure, suctioning, retraction, and closure    Preoperative diagnosis: Lumbar stenosis  Postoperative diagnosis: Lumbar stenosis    Procedure:  1.  L2-3 bilateral laminectomy and medial facetectomy for decompression of central stenosis  2.  L3-4 bilateral laminectomy and medial facetectomy for decompression of stenosis  3.  Use of intraoperative fluoroscopy    EBL: 100 mL    Indications: 58-year-old male who presented with severe, progressive neurogenic claudications and progressive loss of gait function.  MRI showed severe stenosis at L2-3 and L3-4.  He underwent non-operative management with failure to improve.  Risks, benefits, indications, and alternatives were discussed with the patient and his family in detail, and they wished to proceed.    Description of surgery: The patient was positioned prone.  Sterile prepping and draping procedures were performed.  Antibiotics were administered and timeout was performed.  A midline lumbar incision was performed.  The monopolar was used to expose the spinous processes, lamina, and medial facets from L2-L4.  Fluoroscopy was used to verify the correct level.  The Leksell rongeurs was used to remove the spinous processes at L2-4.  The high speed drill was then used to perform bilateral laminectomies and medial facetectomies at both L2-3 and L3-4.  The Kerrison rongeurs were then used to remove the Ligamentum flavum at both L2-3 and L3-4, with decompression of the thecal sac.  The bilateral nerve roots at both levels were noted to be decompressed.  Antibiotic irrigation was performed.  A durotomy was re-enforced with duragen and duraseal.  The fascia was closed with 0-Vicryl sutures, and the dermal layer was closed with 2-0 vicryl sutures.  There were no  intraprocedural complications.

## 2019-05-31 ENCOUNTER — APPOINTMENT (OUTPATIENT)
Dept: OCCUPATIONAL THERAPY | Facility: CLINIC | Age: 59
DRG: 519 | End: 2019-05-31
Attending: INTERNAL MEDICINE
Payer: COMMERCIAL

## 2019-05-31 ENCOUNTER — APPOINTMENT (OUTPATIENT)
Dept: PHYSICAL THERAPY | Facility: CLINIC | Age: 59
DRG: 519 | End: 2019-05-31
Attending: INTERNAL MEDICINE
Payer: COMMERCIAL

## 2019-05-31 LAB — GLUCOSE BLDC GLUCOMTR-MCNC: 96 MG/DL (ref 70–99)

## 2019-05-31 PROCEDURE — 00000146 ZZHCL STATISTIC GLUCOSE BY METER IP

## 2019-05-31 PROCEDURE — 12000000 ZZH R&B MED SURG/OB

## 2019-05-31 PROCEDURE — 97110 THERAPEUTIC EXERCISES: CPT | Mod: GP | Performed by: PHYSICAL THERAPIST

## 2019-05-31 PROCEDURE — 99232 SBSQ HOSP IP/OBS MODERATE 35: CPT | Performed by: INTERNAL MEDICINE

## 2019-05-31 PROCEDURE — 97162 PT EVAL MOD COMPLEX 30 MIN: CPT | Mod: GP | Performed by: PHYSICAL THERAPIST

## 2019-05-31 PROCEDURE — 97166 OT EVAL MOD COMPLEX 45 MIN: CPT | Mod: GO

## 2019-05-31 PROCEDURE — 25000132 ZZH RX MED GY IP 250 OP 250 PS 637: Performed by: PHYSICIAN ASSISTANT

## 2019-05-31 RX ADMIN — OXYCODONE HYDROCHLORIDE 10 MG: 5 TABLET ORAL at 08:29

## 2019-05-31 RX ADMIN — HYDROXYZINE HYDROCHLORIDE 25 MG: 25 TABLET ORAL at 03:41

## 2019-05-31 RX ADMIN — RANITIDINE 150 MG: 150 TABLET ORAL at 05:40

## 2019-05-31 RX ADMIN — OXYCODONE HYDROCHLORIDE 10 MG: 5 TABLET ORAL at 03:41

## 2019-05-31 RX ADMIN — RANITIDINE 150 MG: 150 TABLET ORAL at 16:31

## 2019-05-31 ASSESSMENT — ACTIVITIES OF DAILY LIVING (ADL)
ADLS_ACUITY_SCORE: 23
ADLS_ACUITY_SCORE: 14
ADLS_ACUITY_SCORE: 14

## 2019-05-31 NOTE — PROGRESS NOTES
S: Shift note  B: S/p laminectomy   A: Pain controlled with oxycodone.   Right knee gives out during transfers but patient is able to pivot transfer from bed to chair with assist of two people, gait belt and walker. PT and OT to see patient. Right foot numbness.   Skin warm and dry, incision on back clean, dry and intact.   R: Continue to monitor

## 2019-05-31 NOTE — PROGRESS NOTES
A-(assessment): Alert and oriented x4. VSS. Afebrile. Tolerating RA with SATS low to mid 90's. LS clear.     Patient reported pain in back. Mary was administered x1 and Atarax was administered x1 throughout the night and was effective in managing pain to comfortable levels. Patient has been tolerating regular diet.     Drinking adequate amounts of fluids with encouragement. Patient did not transfer at night-he was worried his RLE was too weak to stand-able to reposition himself in bed and used urinal at bedside. Bowel Sounds Ax4 normoactive. Patient passing gas. No stool.    Patient voiding adequate amounts of clear yellow urine without difficulties/no issues.     CMS intact in all extreminites. Pulses are strong/WDL. Patient reports some weakness/numbness in RLE. His RLE is continuing to give out when attempting to stand. Patient attempted to stand tonight but was unable to stand for more than 20 seconds. Dressing on back intact. No drainage observed on dressing.

## 2019-05-31 NOTE — PROGRESS NOTES
Incisional pain remains a complaint this shift for Pt ( oxycodone and atarax given x1 this shift)   Incisional dressing remains clean dry and intact.  Pt able to turn independently in bed this shift.   Medications and interaction well tolerated.   Iv remains patent and saline locked.   Pt remains free of falls and uses call light appropriately  Will continue to monitor as care continues.

## 2019-05-31 NOTE — PLAN OF CARE
"Discharge Planner OT   Patient plan for discharge: Pt's wife stated \"I know he can't go home like this.\"  Current status: Pt is a 58 year old male being seen for OT evaluation s/p L2-4 laminectomy on 5/29/19. Pt was initially scheduled for same day surgery however was admitted for monitoring. Pt lives in a house with his wife, 5 steps to enter and 13 steps to upstairs where pt's bathroom is located. Pt has a tub shower with shower chair available and a raised toilet seat. Pt also has a commode available. Pt was previously independent with all ADL/IADL tasks and pt completed all IADL tasks at home. Pt's wife works 10+ hour shifts daily and is unable to stay with pt 24/7 upon discharge. Pt currently requires assist of 2 for functional mobility and per physical therapy R knee collapses during transfers. Pt requires mod-max assist of 1-2 for dressing, bathing, toileting, and bed mobility due to significant decreased strength and cognitive deficits. Pt scored a 9/30 on the [employer] cognitive screen, indicating severe cognitive impairment. Pt's wife reports pt \"doesn't do well on medications\" however also stated pt would have performed \"much better last week\" on the screen. Pt demonstrated delayed processing, impairments in sequencing and following commands, and impaired initiation.   Barriers to return to prior living situation: Level of assist; cognition; medical needs  Recommendations for discharge: TCU  Rationale for recommendations: Based on pt's cognition and current level of function, pt would benefit from TCU placement for further skilled OT services and for safe discharge disposition.        Entered by: Theresa Valerio 05/31/2019 1:19 PM       "

## 2019-05-31 NOTE — PROGRESS NOTES
[facility]    Medicine Progress Note - Hospitalist Service       Date of Admission:  5/29/2019  Assessment & Plan   Active Problems:    Neurogenic claudication due to severe L2-L3, L3-L4 stenosis.  S/P laminectomy.  Postop pain well controlled.  Mild right foot weakness.  Physical therapy evaluated, recommending TCU.  The patient will remain hospitalized over the weekend awaiting TCU placement.  Pain management, activity, incision care per neurosurgery.  Bowel regimen now receiving opiate analgesics.  Discontinue IV fluids, since he has good oral intake.  Patient to follow-up in 10-14 days for staple removal at neurosurgery clinic.    Diet: Diet  Advance Diet as Tolerated: Regular Diet Adult    DVT Prophylaxis: Pneumatic Compression Devices  Murrell Catheter: in place, indication:    Code Status: Full Code      Disposition Plan   Expected discharge: 1-2 days, recommended to transitional care unit once safe disposition plan/ TCU bed available.  Entered: Aparna Timmons MD 05/31/2019, 4:57 PM       The patient's care was discussed with the Bedside Nurse, Care Coordinator/ and Neurosurgery Team.    Aparna Timmons MD  Hospitalist Service  [facility]    _________________________________________________________    Interval History   Patient is new to me.  Chart reviewed.  Patient was seen and examined.  Incisional pain well controlled.  Patient able to walk with physical therapy last night, due to right distal leg weakness.  Today noted new right foot weakness.  No chest pain, dyspnea, cardiac palpitations, abdominal pain, nausea, vomiting, dysuria.    Data reviewed today: I reviewed all medications, new labs and imaging results over the last 24 hours.    Physical Exam   Vital Signs: Temp: 98  F (36.7  C) Temp src: Oral BP: 136/64 Pulse: 78   Resp: 18 SpO2: 98 % O2 Device: None (Room air)    Weight: 0 lbs 0 oz  General: Alert and oriented #3, no  distress  HEENT: Pupils equal reactive to light and accommodation, no scleral icterus, moist mucous membranes  Lungs clear to auscultation, good air movement bilaterally, no rhonchi or wheezes  Heart: S1-S2, rhythmic and regular  Abdomen: Soft nontender distended audible bowel sounds  Skin: L-spine surgical incision with intact dressing.  Musculoskeletal: Slight weakness in the right foot extension, muscle strength 4/5 with extension, 5/5 with dorsiflexion    Data   No lab results found in last 7 days.    No results found for this or any previous visit (from the past 24 hour(s)).

## 2019-05-31 NOTE — PLAN OF CARE
Discharge Planner PT   Patient plan for discharge: Wife and patient agreeable to TCU with goal of return home  Current status: Pt is a 57yo male referred to PT for evaluation on 5/31/19 for right leg weakenss and safe discharge planning. Since L2-4 laminectomy on 5/29/19 with Dr. Calvo his right leg gives out in standing, immediately. It has been taking 2 nurses to assist him with stand pivot transfers. Wife reports he had leaking dural fluid and was admitted for monitoring, he only stood up for the first time yesterday with 2 nurses assisting and his right leg gave out immediately. He has pivoted to the chair with assist of 2 nurses and 2WW. Patient is lethargic and somewhat confused, not good historian. Wife reports his right leg used to give out when he would walk long distances. Wife works full time. [place] with 5 steps to enter with 2 rails, and 10 stairs to bathroom with R ascending rail. She does not think he is safe to go home in his current condition. Current function: significant weakness in RLE, some weakness in LLE and BUE as well. Pt requiring multiple cues to give full physical effort as he appears somewhat drowsy. Transfers Sit to stand with mod A and blocking R knee with 2 UE support on arm rests and transfer to 2WW. Stood x10 seconds prior to R elbow and R knee collapsing PT blocking knee and continued to block during stand to sit, with verbal cues for sequencing UE support and mod A at gait belt. Due to weakness did not transfer to bed and did not assess bed mobility. Pt instructed in spinal precautions and seated exercises. /60, HR 71. Pt denies dizziness, headache throughout session, reports feels fatigued and weak.  Barriers to return to prior living situation: stairs to enter home, pt requiring significant physical assistance at this time for mobility, unable to safely stand  Recommendations for discharge: TC  Rationale for recommendations: Recommend discharge to TCU,  and acute PT services to address strengthening, transfers, and progression to ambulation as able.       Entered by: Nereida Hoffman 05/31/2019 1:10 PM

## 2019-05-31 NOTE — CONSULTS
CARE TRANSITION SOCIAL WORK INITIAL ASSESSMENT:      Met with: Patient and Family.    DATA  Active Problems:    S/P laminectomy    S/P lumbar laminectomy       Primary Care Clinic Name: [facility]  Primary Care MD Name: Dr. Christopher Arreola    ASSESSMENT  Cognitive Status: awake, alert and oriented.       Description of Support System: Involved, Supportive   Who is your support system?: Wife   Support Assessment: Adequate family and caregiver support   Insurance Concerns: No Insurance issues identified  Living Arrangements: house    This writer met with pt and his wife, Leslye, introduced self and role. Discussed discharge planning and medicare guidelines in regards to home care and SNF benefits.  Patient is from home with his wife.  He is usually independent with self cares.  Physical therapy has been working with patient and patient is currently an assist of 2.  Physical therapy recommending TCU at discharge.  Patient and wife in agreement with TCU.  They request referral be sent to [facility] (Admissions: [phone] Main Phone: [phone] Fax: [phone]).  [facility] has a bed available, however, patient has [employer] and needs prior authorization prior to admission.  Amberly, at [facility], states she will likely not get authorization back from [facility] until Monday.  [facility] requires $1750 up front for [employer] co-pays after day 20.  Patient is not able to pay the $1750.  Will check with patient's [address] [place] [address] (Main Phone: [phone] Admissions Phone: [phone] Fax: [phone]).   Talked with [facility] and they will assess.  They do not require an up front fee.  Discussed with patient/wife.  They are aware of discharge options being TCU (once facility gets prior authorization vs home with home care).     PLAN    TCU (referral pending) vs Home with [id number] home with home care    GT [place]  [facility] [phone]/ [facility] [phone]

## 2019-05-31 NOTE — ANESTHESIA POSTPROCEDURE EVALUATION
Patient: Wenceslao Vasquez    Procedure(s):  LAMINECTOMIES LUMBAR 2-4    Diagnosis:neurogenic claudications  Diagnosis Additional Information: No value filed.    Anesthesia Type:  General, ETT    Note:  Anesthesia Post Evaluation    Patient location during evaluation: Floor  Patient participation: Able to fully participate in evaluation  Level of consciousness: awake  Pain management: adequate  Airway patency: patent  Cardiovascular status: acceptable and hemodynamically stable  Respiratory status: acceptable, room air and nonlabored ventilation  Hydration status: stable  PONV: none     Anesthetic complications: None    Comments: Patient was happy with the anesthesia care received and no anesthesia related complications were noted.  I will follow up with the patient again if it is needed.        Last vitals:  Vitals:    05/30/19 0626 05/30/19 0751 05/30/19 1553   BP: 130/89 124/70 133/63   Pulse:  57 72   Resp:  18 18   Temp:  97  F (36.1  C) 98  F (36.7  C)   SpO2:  97% 93%         Electronically Signed By: ANNEL Richardson CRNA  May 30, 2019  9:14 PM

## 2019-05-31 NOTE — PROGRESS NOTES
05/31/19 1159   Quick Adds   Type of Visit Initial PT Evaluation       Present no   Living Environment   Lives With spouse   Living Arrangements house   Home Accessibility stairs within home;stairs to enter home   Number of Stairs, Main Entrance 5   Stair Railings, Main Entrance railings on both sides of stairs   Number of Stairs, Within Home, Primary 10   Stair Railings, Within Home, Primary railing on right side (ascending)   Living Environment Comment Bathroom upstairs, bedroom downstairs, has commode but patient refuses to use it. Old home, stairs are steep and curved.   Self-Care   Usual Activity Tolerance good   Current Activity Tolerance poor   Regular Exercise No   Equipment Currently Used at Home none   Activity/Exercise/Self-Care Comment does all the house and yard work. Has 3 canes at home, has 2WW.    Functional Level Prior   Ambulation 0-->independent   Transferring 0-->independent   Toileting 0-->independent   Bathing 0-->independent   Communication 0-->understands/communicates without difficulty   Swallowing 0-->swallows foods/liquids without difficulty   Cognition 0 - no cognition issues reported   Fall history within last six months yes   Number of times patient has fallen within last six months 2   Which of the above functional risks had a recent onset or change? ambulation   Prior Functional Level Comment fell walking in the barn, he felt his right leg give out   General Information   Onset of Illness/Injury or Date of Surgery - Date 05/29/19   Referring Physician Aparna Mendez MD   Patient/Family Goals Statement Family reports he can't safely go home in this condition, they are agreeable to TCU   Pertinent History of Current Problem (include personal factors and/or comorbidities that impact the POC) Pt is a 57yo male referred to PT for evaluation on 5/31/19 for right leg weakenss and safe discharge planning. Right leg used to give out when he would walk long distances.  Since L2-4 laminectomy on 5/29/19 with Dr. Calvo his right leg gives out in standing, immediately. It has been taking 2 nurses to assist him with stand pivot transfers. Wife works full time.   Precautions/Limitations spinal precautions   Weight-Bearing Status - LUE full weight-bearing   Weight-Bearing Status - RUE full weight-bearing   Weight-Bearing Status - LLE full weight-bearing   Weight-Bearing Status - RLE full weight-bearing   Cognitive Status Examination   Orientation person;place   Level of Consciousness lethargic/somnolent;confused   Follows Commands and Answers Questions unable to follow multi-step instructions   Personal Safety and Judgment impaired   Memory impaired   Cognitive Comment concerns regarding new lethargy, poor historian. Per wife he is not acting like himself   Pain Assessment   Patient Currently in Pain Yes, see Vital Sign flowsheet  (low back)   Integumentary/Edema   Integumentary/Edema no deficits were identifed   Integumentary/Edema Comments Bandage over incision on lumbar spine, no drainage   Posture    Posture Protracted shoulders   Range of Motion (ROM)   ROM Comment BUE/LE ROM WNL for mobility   Strength   Manual Muscle Testing Quick Adds MMT: Shoulder;MMT: Elbow/Forearm;MMT: Hip;MMT: Knee;MMT: Ankle   Strength Comments Hip Flexion R 3+/5 (L 4/5), Hip ABD R 4/5 (L 3+/5). Knee Extension R 3+/5 (L 4+/5), Flexion R 3/5 (L 4-/5). Ankle DF R 3/5 (L 4+/5), PF R 3-/5 (L 4+/5). Shoulder Flexion 4/5 B, Extension 4/5 B, IR 4/5 B, ER 4/5 B. Elbow flexion 4/5 B, extension 4/5 B. with UE testing pt required 3 cues to give maximum physical effort with testing.   Bed Mobility   Bed Mobility Comments Did not test as pt was in chair to start exam and was unsafe to transfer with Ax1.    Transfer Skills   Transfer Transfer Skill: Sit to Stand;Transfer Safety Analysis Sit/Stand;Transfer Skill:  Stand to Sit;Transfer Safety Analysis Stand/Sit   Transfer Comments Sit to stand mod A and blocking R knee  with 2 UE support on arm rests and transfer to 2WW. Stood x10 seconds prior to R elbow and R knee collapsing PT blocking knee and continued to block during stand to sit, with verbal cues for sequencing UE support and mod A at gait belt.   Transfer Skill:  Sit to Stand   Level of [place]: Sit/Stand moderate assist (50% patients effort)   Physical Assist/Nonphysical Assist: Sit/Stand 1 person assist;verbal cues   Weightbearing Restrictions: Sit/Stand full weight-bearing   Assistive Device for Transfer: Sit/Stand rolling walker   Transfer Safety Analysis Sit to Stand   Impairments Contributing to Impaired Transfers: Sit to Stand decreased strength   Transfer Skill: Stand to Sit   Level of [place]: Stand/Sit moderate assist (50% patients effort)   Physical Assist/Nonphysical Assist: Stand/Sit 1 person assist;verbal cues   Weight-Bearing Restrictions: Stand/Sit full weight-bearing   Assistive Device: Stand to Sit rolling walker   Transfer Safety Analysis Stand To Sit   Impairments Contributing to Impaired Transfers: Stand to Sit decreased strength   Gait   Gait Comments Not tested due to weakness/collapse with sit <> stand.   Balance   Balance Comments Pt maintains appropriate seated balance with 1-2 UE support on arm rests. Standing requires significant assist for safety due to UE/LE.   Coordination   Coordination no deficits were identified   Coordination Comments Pt can complete rapid alternating movements of LEs with slow but appropraite coordination, R ankle weakness limiting.   Muscle Tone   Muscle Tone no deficits were identified   General Therapy Interventions   Planned Therapy Interventions strengthening;transfer training;bed mobility training;gait training   Clinical Impression   Criteria for Skilled Therapeutic Intervention yes, treatment indicated   PT Diagnosis Impaired functional mobility, weakness   Influenced by the following impairments post op status with complications   Functional limitations  due to impairments unable to transfer, stand, walk, stairs   Clinical Presentation Evolving/Changing   Clinical Presentation Rationale 57 yo post-op laminectomy 2 days ago with complications, condition is under ongoing management   Clinical Decision Making (Complexity) Moderate complexity   Therapy Frequency` daily   Predicted Duration of Therapy Intervention (days/wks) 3 days   Anticipated Discharge Disposition Transitional Care Facility   Risk & Benefits of therapy have been explained Yes   Patient, Family & other staff in agreement with plan of care Yes   Clinical Impression Comments Pt is a 59yo male referred to PT for evaluation on 5/31/19 for right leg weakenss and safe discharge planning. Since L2-4 laminectomy on 5/29/19 with Dr. Calvo his right leg gives out in standing, immediately. It has been taking 2 nurses to assist him with stand pivot transfers. Wife reports he had leaking dural fluid and was admitted for monitoring, he only stood up for the first time yesterday with 2 nurses assisting and his right leg gave out immediately. He has pivoted to the chair with assist of 2 nurses and 2WW. Patient is lethargic and somewhat confused, not good historian. Wife reports his right leg used to give out when he would walk long distances. Wife works full time. [place] with 5 steps to enter with 2 rails, and 10 stairs to bathroom with R ascending rail. She does not think he is safe to go home in his current condition. Current function: significant weakness in RLE, some weakness in LLE and BUE as well. Pt requiring multiple cues to give full physical effort as he appears somewhat drowsy. Transfers Sit to stand with mod A and blocking R knee with 2 UE support on arm rests and transfer to 2WW. Stood x10 seconds prior to R elbow and R knee collapsing PT blocking knee and continued to block during stand to sit, with verbal cues for sequencing UE support and mod A at gait belt. Due to weakness did not  "transfer to bed and did not assess bed mobility. Pt instructed in spinal precautions and seated exercises. Recommend discharge to TCU, and acute PT services to address strengthening, transfers, and progression to ambulation as able.     [facility] TM \"6 Clicks\"   2016, Trustees of [facility], under license to [employer].  All rights reserved.   6 Clicks Short Forms Basic Mobility Inpatient Short Form   [facility]  \"6 Clicks\" V.2 Basic Mobility Inpatient Short Form   1. Turning from your back to your side while in a flat bed without using bedrails? 2 - A Lot   2. Moving from lying on your back to sitting on the side of a flat bed without using bedrails? 2 - A Lot   3. Moving to and from a bed to a chair (including a wheelchair)? 2 - A Lot   4. Standing up from a chair using your arms (e.g., wheelchair, or bedside chair)? 2 - A Lot   5. To walk in hospital room? 1 - Total   6. Climbing 3-5 steps with a railing? 1 - Total   Basic Mobility Raw Score (Score out of 24.Lower scores equate to lower levels of function) 10   Total Evaluation Time   Total Evaluation Time (Minutes) 35       Thank you for your referral!    Nereida Hoffman PT, DPT  [facility]  [phone]    "

## 2019-05-31 NOTE — PROGRESS NOTES
[facility]    Neurosurgery Progress Note    Date of Service (when I saw the patient): 05/31/2019     Assessment & Plan   Wenceslao Vasquez is a 58 year old male who was admitted on 5/29/2019. He presented with severe, progressive neurogenic claudications and progressive loss of gait function. MRI revealed severe stenosis at L2-3 and L3-4. On 5/29/2019 he underwent a L2-3 and L3-4 bilateral laminectomy and medial facetectomy with Dr. Calvo. Today, he states he is feeling relatively well but does feel weak in his distal RLE. No overt weakness on exam. Per wife, he has seemed to have decreased ability to work with therapy when he is on pain mediation and seems to be much more alert and strength improved when not on sedating medication. No overt weakness noted on exam. Therapies currently recommending TCU, so will need to stay over weekend as they do not accept this late in the week. Will have him continue to work with therapies over weekend. Appreciate hospitalist assistance in management.     Active Problems:    S/P laminectomy    Assessment: s/p L2-3 and L3-4 laminectomies    Plan:  -Therapies recommending TCU at current; will have him continue to work with therapies over weekend and discharge likely Monday unless cleared for home with outpatient therapy  -Appreciate hospitalist assistance  -Follow up in 10-14 days for staple removal  -Pain control        I have discussed the following assessment and plan with Dr. Calvo who is in agreement with initial plan and will follow up with further consultation recommendations.      Yi Reynoso PA-C  [facility]  [place] [address]    Tel [phone]  Pager [phone]      Interval History   Stable    Physical Exam   Temp: 98.8  F (37.1  C) Temp src: Oral BP: 115/62 Pulse: 72 Heart Rate: 72 Resp: 20 SpO2: 95 % O2 Device: None (Room air)    There were no vitals filed for this  visit.  Vital Signs with Ranges  Temp:  [98  F (36.7  C)-98.8  F (37.1  C)] 98.8  F (37.1  C)  Pulse:  [70-72] 72  Heart Rate:  [72] 72  Resp:  [18-20] 20  BP: (107-133)/(58-63) 115/62  SpO2:  [93 %-95 %] 95 %  I/O last 3 completed shifts:  In: -   Out: 975 [Urine:975]    Heart Rate: 72, Blood pressure 115/62, pulse 72, temperature 98.8  F (37.1  C), temperature source Oral, resp. rate 20, SpO2 95 %.  0 lbs 0 oz  HEENT:  Normocephalic, atraumatic.  PERRLA.  EOM s intact.    Neck:  Supple, non-tender, without lymphadenopathy.  Heart:  No peripheral edema  Lungs:  No SOB  Skin:  Warm and dry.   Extremities:  No edema, cyanosis or clubbing.    NEUROLOGICAL EXAMINATION:   Mental status:  Alert and Oriented x 3, speech is fluent.  Cranial nerves:  II-XII intact.   Motor:     Hip Flexor:                Right: 5/5  Left:  5/5  Hip Adductor:             Right:  5/5  Left:  5/5  Hip Abductor:             Right:  5/5  Left:  5/5  Gastroc Soleus:        Right:  5/5  Left:  5/5  Tib/Ant:                      Right:  5/5  Left:  5/5  EHL:                     Right:  5/5  Left:  5/5  Sensation:  intact    Medications     lactated ringers 25 mL/hr at 05/30/19 0544     sodium chloride Stopped (05/30/19 0540)       ranitidine  150 mg Oral Q12H    Or     famotidine  20 mg Intravenous Q12H     sodium chloride (PF)  3 mL Intracatheter Q8H       Data   CBC RESULTS:   Recent Labs   Lab Test 05/23/19  1014   WBC 5.5   RBC 4.19*   HGB 14.4   HCT 43.2   *   MCH 34.4*   MCHC 33.3   RDW 12.2        Basic Metabolic Panel:  Lab Results   Component Value Date     05/23/2019      Lab Results   Component Value Date    POTASSIUM 4.2 05/23/2019     Lab Results   Component Value Date    CHLORIDE 109 05/23/2019     Lab Results   Component Value Date    BETY 9.2 05/23/2019     Lab Results   Component Value Date    CO2 27 05/23/2019     Lab Results   Component Value Date    BUN 18 05/23/2019     Lab Results   Component Value Date     CR 0.88 05/23/2019     Lab Results   Component Value Date    GLC 91 05/23/2019     INR:  Lab Results   Component Value Date    INR 1.02 03/06/2019    INR 1.00 10/27/2017    INR 1.07 08/05/2013

## 2019-05-31 NOTE — PROGRESS NOTES
"   05/31/19 1300   Quick Adds   Type of Visit Initial Occupational Therapy Evaluation   Living Environment   Lives With spouse   Living Arrangements house   Home Accessibility stairs to enter home;stairs within home   Number of Stairs, Main Entrance 5   Stair Railings, Main Entrance railings on both sides of stairs   Number of Stairs, Within Home, Primary other (see comments)  (13)   Stair Railings, Within Home, Primary railing on right side (ascending)   Living Environment Comment Pt lives in a house with his wife, 5 steps to enter and 13 steps to upstairs where pt's bathroom is located. Pt has a tub shower with shower chair available and a raised toilet seat. Pt also has a commode available. Pt was previously independent with all ADL/IADL tasks and pt completed all IADL tasks at home. Pt's wife works 10+ hour shifts daily and is unable to stay with pt 24/7 upon discharge   Self-Care   Usual Activity Tolerance good   Current Activity Tolerance poor   Regular Exercise No   Equipment Currently Used at Home commode;shower chair;raised toilet;walker, standard;cane, straight   Functional Level   Ambulation 0-->independent   Transferring 0-->independent   Toileting 0-->independent   Bathing 0-->independent   Dressing 0-->independent   Eating 0-->independent   Communication 0-->understands/communicates without difficulty   Swallowing 0-->swallows foods/liquids without difficulty   Cognition 0 - no cognition issues reported   Fall history within last six months yes   Number of times patient has fallen within last six months 2   Which of the above functional risks had a recent onset or change? ambulation;cognition   General Information   Onset of Illness/Injury or Date of Surgery - Date 05/29/19   Referring Physician Aparna Timmons MD   Patient/Family Goals Statement Pt's wife stated \"I know he can't go home like this.\"   Additional Occupational Profile Info/Pertinent History of Current Problem Pt is a 58 year old male " "being seen for OT evaluation s/p L2-4 laminectomy on 5/29/19. Pt was initially scheduled for same day surgery however was admitted for monitoring   Precautions/Limitations spinal precautions;fall precautions   Cognitive Status Examination   Orientation person;place   Level of Consciousness lethargic/somnolent;confused   Follows Commands (Cognition) delayed response/completion;increased processing time needed;initiation impaired;physical/tactile prompts required;repetition of directions required;verbal cues/prompting required   Memory impaired   Attention Quiet environment required;Sustained attention impaired;Selective attention impaired, difficulty ignoring irrelevant stimuli;Alternating attention impaired, difficulty shifting between tasks;Divided attention impaired, difficulty with simultaneous tasks   Organization/Problem Solving Sequencing impaired;Problem solving impaired;Categorization of information impaired;Prioritizing of information/tasks impaired   Executive Function Initiation impaired;Working memory impaired, decreased storage of information for performing tasks;Cognitive flexibility impaired;Planning ability impaired;Self awareness/monitoring impaired   Cognitive Comment Pt scored a 9/30 on the [employer] cognitive screen, indicating severe cognitive impairment. Pt's wife reports pt \"doesn't do well on medications\" however also stated pt would have performed \"much better last week\" on the screen. Pt demonstrated delayed processing, impairments in sequencing and following commands, and impaired initiation.    Visual Perception   Occulomotor Deficits noted in visual pursuits, saccades, convergence, and divergence. Pt's pupils significantly constricted during session and pt demonstrated difficulty maintaining gaze   Strength   Strength Comments Bilateral shoulder flex 3-/5, bilateral elbow 4/5,  strength 3+/5.   Bathing   Level of [place] moderate assist (50% patients effort)   Physical " Assist/Nonphysical Assist 2 person assist;verbal cues   Assistive Device shower chair;grab bars;detachable shower head   Upper Body Dressing   Level of [place]: Dress Upper Body minimum assist (75% patients effort)   Physical Assist/Nonphysical Assist: Dress Upper Body 1 person assist;verbal cues   Lower Body Dressing   Level of [place]: Dress Lower Body moderate assist (50% patients effort)   Physical Assist/Nonphysical Assist: Dress Lower Body 1 person assist;2 person assist;verbal cues   Toileting   Level of [place]: Toilet moderate assist (50% patients effort)   Physical Assist/Nonphysical Assist: Toilet 2 person assist;verbal cues   Assistive Device grab bars;raised toilet seat   Grooming   Level of [place]: Grooming minimum assist (75% patients effort)   Physical Assist/Nonphysical Assist: Grooming 1 person assist;verbal cues   Eating/Self Feeding   Level of [place]: Eating independent   Physical Assist/Nonphysical Assist: Eating set-up required   Instrumental Activities of Daily Living (IADL)   Previous Responsibilities meal prep;housekeeping;yardwork;laundry;shopping;medication management;finances;driving   IADL Comments Pt completed all home mgmt tasks   Activities of Daily Living Analysis   Impairments Contributing to Impaired Activities of Daily Living cognition impaired;post surgical precautions;strength decreased;coordination impaired   General Therapy Interventions   Planned Therapy Interventions ADL retraining;IADL retraining;cognition;neuromuscular re-education;ROM;strengthening   Clinical Impression   Criteria for Skilled Therapeutic Interventions Met yes, treatment indicated   OT Diagnosis Decreased independence with ADL   Influenced by the following impairments impaired cognition; decreased strength; visual deficits   Assessment of Occupational Performance 5 or more Performance Deficits   Identified Performance Deficits home mgmt; dressing; bathing; toileting; bed  "mobility; functional mobility   Clinical Decision Making (Complexity) Moderate complexity   Therapy Frequency daily   Predicted Duration of Therapy Intervention (days/wks) 2-4 days   Anticipated Discharge Disposition Transitional Care Facility   Risks and Benefits of Treatment have been explained. Yes   Patient, Family & other staff in agreement with plan of care Yes   Clinical Impression Comments Pt would benefit from skilled inpatient OT services to progress toward prior level of function   [facility] TM \"6 Clicks\"   2016, Trustees of [facility], under license to [employer].  All rights reserved.   6 Clicks Short Forms Daily Activity Inpatient Short Form   [facility] AM-PAC  \"6 Clicks\" Daily Activity Inpatient Short Form   1. Putting on and taking off regular lower body clothing? 2 - A Lot   2. Bathing (including washing, rinsing, drying)? 1 - Total   3. Toileting, which includes using toilet, bedpan or urinal? 2 - A Lot   4. Putting on and taking off regular upper body clothing? 2 - A Lot   5. Taking care of personal grooming such as brushing teeth? 3 - A Little   6. Eating meals? 3 - A Little   Daily Activity Raw Score (Score out of 24.Lower scores equate to lower levels of function) 13   Total Evaluation Time   Total Evaluation Time (Minutes) 24     Theresa Valerio OTR/L  [facility]  [phone]  "

## 2019-06-01 ENCOUNTER — APPOINTMENT (OUTPATIENT)
Dept: OCCUPATIONAL THERAPY | Facility: CLINIC | Age: 59
DRG: 519 | End: 2019-06-01
Attending: NEUROLOGICAL SURGERY
Payer: COMMERCIAL

## 2019-06-01 ENCOUNTER — APPOINTMENT (OUTPATIENT)
Dept: PHYSICAL THERAPY | Facility: CLINIC | Age: 59
DRG: 519 | End: 2019-06-01
Attending: NEUROLOGICAL SURGERY
Payer: COMMERCIAL

## 2019-06-01 VITALS
TEMPERATURE: 97.8 F | DIASTOLIC BLOOD PRESSURE: 67 MMHG | OXYGEN SATURATION: 95 % | SYSTOLIC BLOOD PRESSURE: 126 MMHG | HEART RATE: 75 BPM | RESPIRATION RATE: 16 BRPM

## 2019-06-01 PROCEDURE — 97530 THERAPEUTIC ACTIVITIES: CPT | Mod: GO

## 2019-06-01 PROCEDURE — 97530 THERAPEUTIC ACTIVITIES: CPT | Mod: GP

## 2019-06-01 PROCEDURE — 99238 HOSP IP/OBS DSCHRG MGMT 30/<: CPT | Performed by: NURSE PRACTITIONER

## 2019-06-01 PROCEDURE — 25000132 ZZH RX MED GY IP 250 OP 250 PS 637: Performed by: PHYSICIAN ASSISTANT

## 2019-06-01 PROCEDURE — 97535 SELF CARE MNGMENT TRAINING: CPT | Mod: GO

## 2019-06-01 RX ADMIN — RANITIDINE 150 MG: 150 TABLET ORAL at 06:04

## 2019-06-01 ASSESSMENT — ACTIVITIES OF DAILY LIVING (ADL)
ADLS_ACUITY_SCORE: 14

## 2019-06-01 NOTE — DISCHARGE SUMMARY
[facility]  Hospitalist Discharge Summary       Date of Admission:  5/29/2019  Date of Discharge:  6/1/2019  Discharging Provider: Thompson Zamorano NP      Discharge Diagnoses   Active Problems:    S/P laminectomy    S/P lumbar laminectomy      Follow-ups Needed After Discharge   Follow-up Appointments     Follow-up and recommended labs and tests       [facility] for suture   removal and 6 weeks post op.  Please call the clinic at [phone] to   schedule your appointment.             Unresulted Labs Ordered in the Past 30 Days of this Admission     No orders found from 3/30/2019 to 5/30/2019.          Discharge Disposition   Discharged to home  Condition at discharge: Stable    Hospital Course      Wenceslao Vasquez is a 58-year-old male with severe, progressive neurogenic claudications and progressive loss of gait function.  MRI showed severe stenosis at L2-3 and L3-4.  He underwent non-operative management with failure to improve. Patient presented to the hospital for elective L2-3 and L3-4 bilateral laminectomy and medial facetectomy for decompression of central stenosis on 5/29/19. Patient did well postoperatively and, on the day of discharge, only noted mild incisional pain. Patient denies shortness of breath and is maintaining oxygen saturations above 90% on room air. Patient denies nausea and vomiting and is tolerating oral intake well. Patient notes slight weakness to right lower extremity although no overt weakness was noted on exam. Patient seen by physical and occupational therapy who thought patient could return home with home PT/OT and 24/7 supervision from family. Patient is medically stable for discharge home today.     Active Problems:  S/P lumbar laminectomy  Assessment:  Patient underwent L2-3 and L3-4 bilateral laminectomy and medial facetectomy for decompression of central stenosis on 5/29/19. Doing well postoperatively and  patient notes only minimal incisional pain on the day of discharge.   Plan: Spoke with Luz Maria Santa, neurosurgery NP and patient OK to discharge home with home PT/OT. Patient's wife notes she is able to be with him 24/7 for the first several days and then notes his brother will be able to come be with him thereafter. Reiterated plan to follow up with neurosurgery in 10-14 days for staple removal and patient expresses understanding. He wishes to manage pain with Tylenol as needed.     Consultations This Hospital Stay   HOSPITALIST IP CONSULT  PHYSICAL THERAPY ADULT IP CONSULT  OCCUPATIONAL THERAPY ADULT IP CONSULT  CARE TRANSITION RN/SW IP CONSULT    Code Status   Full Code    Time Spent on this Encounter   I, Thompson Zamorano, personally saw the patient today and spent less than or equal to 30 minutes discharging this patient.       Thompson Zamorano NP  [facility]  ______________________________________________________________________    Physical Exam   Vital Signs: Temp: 97.8  F (36.6  C) Temp src: Oral BP: 126/67 Pulse: 75 Heart Rate: 75 Resp: 16 SpO2: 95 % O2 Device: None (Room air)    Weight: 0 lbs 0 oz  Constitutional: awake, alert, cooperative, no apparent distress, and appears stated age  Respiratory: No increased work of breathing, good air exchange, clear to auscultation bilaterally, no crackles or wheezing  Cardiovascular: regular rate and rhythm, normal S1 and S2 and no murmur noted  GI: normal bowel sounds, non-distended and non-tender  Musculoskeletal: no lower extremity pitting edema present  there is no redness, warmth, or swelling of the joints  full range of motion noted  Neurologic: Awake, alert, oriented to name, place and time.  Cranial nerves II-XII are grossly intact.  Motor is 5 out of 5 bilaterally.         Primary Care Physician   Christopher Arreola    Discharge Orders      Diet Instructions    Resume pre-procedure diet     No lifting     No lifting over 10 lbs  and no strenuous physical activity for 6 weeks     Discharge Instructions    Patient to follow up with appointment in 2 weeks for suture removal and 6 weeks post op.    Discharge instructions:  No lifting of more than 10 pounds until follow up visit.  Ok to shampoo hair, shower or bathe, but, do not scrub or submerge incision under water until evaluated post operatively in clinic.    Ok to walk as tolerated, avoid bedrest.    No contact sports until after follow up visit  No high impact activities such as; running/jogging, snowmobile or 4 frausto riding or any other recreational vehicles    Call my office at [phone] for increasing redness, swelling or pus draining from the incision, increased pain or any other questions and concerns.    Go to ER with any seizure activity, mental status change (increasing confusion), difficulty with speech or increasing or acute weakness     Dressing    Keep dressing clean and dry.  Dressing / incisional care as instructed by RN and or Surgeon     Do not - immerse incision in water until sutures removed    Do not immerse incision in water until sutures removed     Reason for your hospital stay    Back surgery     Follow-up and recommended labs and tests     Please [facility] in 2 weeks for suture removal and 6 weeks post op.  Please call the clinic at [phone] to schedule your appointment.     Activity    Discharge instructions:  No lifting of more than 10 pounds, no bending, no twisting until follow up visit.    Ok to shampoo hair, shower or bathe, but, do not scrub or submerge incision under water until evaluated post-operatively in clinic.    Ok to walk as tolerated, avoid bed rest and prolonged sitting.    No contact sports until after follow up visit  No high impact activities such as; running/jogging, snowmobile or 4 frausto riding or any other recreational vehicles.    Do not take NSAIDS (ibuprofen, advil, aleve, naproxen, etc) for pain  control.    Do NOT drive while taking narcotic pain medication.    Call the [facility] at [phone] for increasing redness, swelling or pus draining from the incision, increased pain or any other questions and concerns.     Wound care and dressings    Keep your incision clean and dry at all times.  OK to remove dressing on postop day 2.  OK to shower on postop day 3 and allow water to run over incision, pat dry after shower.  No bathing swimming or submerging in water.  Call immediately or come to ED if any drainage occurs, or you develop new pain, redness, or swelling.     Diet    Follow this diet upon discharge: home diet       Significant Results and Procedures   Most Recent 3 CBC's:  Recent Labs   Lab Test 05/23/19  1014 03/06/19  1540 01/08/18  1336   WBC 5.5 7.1 10.1   HGB 14.4 14.5 13.0*   * 99 102*    199 177     Most Recent 3 BMP's:  Recent Labs   Lab Test 05/23/19  1014 03/06/19  1540 01/08/18  1505    139 142   POTASSIUM 4.2 3.6 3.4   CHLORIDE 109 107 111*   CO2 27 26 23   BUN 18 17 12   CR 0.88 0.88 0.69   ANIONGAP 6 6 8   BETY 9.2 9.1 8.1*   GLC 91 84 95   ,   Results for orders placed or performed during the hospital encounter of 05/29/19   XR Surgery LEONEL L/T 5 Min Fluoro w Stills    Narrative    This exam was marked as non-reportable because it will not be read by a   radiologist or a [place] non-radiologist provider.                   Discharge Medications   Current Discharge Medication List      START taking these medications    Details   senna-docusate (SENOKOT-S/PERICOLACE) 8.6-50 MG tablet Take 1-2 tablets by mouth 2 times daily Take while on oral narcotics to prevent or treat constipation.  Qty: 30 tablet, Refills: 0    Comments: While on narcotics  Associated Diagnoses: S/P lumbar laminectomy         CONTINUE these medications which have CHANGED    Details   methocarbamol (ROBAXIN) 750 MG tablet Take 1-2 tablets (750-1,500 mg) by mouth every 6 hours as needed  for muscle spasms (muscle spasm)  Qty: 60 tablet, Refills: 1    Associated Diagnoses: S/P lumbar laminectomy      oxyCODONE (ROXICODONE) 5 MG tablet Take 1-2 tablets (5-10 mg) by mouth every 3 hours as needed for pain (Moderate to Severe)  Qty: 30 tablet, Refills: 0    Associated Diagnoses: S/P lumbar laminectomy         CONTINUE these medications which have NOT CHANGED    Details   Multiple Vitamin (MULTI VITAMIN MENS PO) Take 1 tablet by mouth daily.    Associated Diagnoses: Low back pain; S/P lumbar discectomy         STOP taking these medications       Acetaminophen (TYLENOL EXTRA STRENGTH PO) Comments:   Reason for Stopping:             Allergies   Allergies   Allergen Reactions     Dust Mites Hives     Morphine Nausea and Vomiting

## 2019-06-01 NOTE — PROGRESS NOTES
S-(situation): Patient discharged to home 2spousewith     B-(background): S/P lumbar laminectomy    A-(assessment):VSS, RA afebrile , patient is up amb with walker and gait with SBA, numbness on and off to the r ext. Incision is CDI, Lungs clear.    R-(recommendations): Discharge instructions reviewed with spouse and patient. Listed belongings gathered and returned to patient.          Discharge Nursing Criteria:     Care Plan and Patient education resolved: Yes    New Medications- pt has been educated about purpose and side effects: Yes    Vaccines  Influenza status verified at discharge:  Yes      MISC  Prescriptions if needed, hard copies sent with patient  Yes  Home and hospital aquired medications returned to patient: Yes  Medication Bin checked and emptied on discharge Yes  Patient reports post-discharge pain management plan is effective: Yes

## 2019-06-01 NOTE — PROGRESS NOTES
Pt denies any pain this shift.   Pt repositioning independently in bed only requesting help to boost up in bed.   Pt was up to the bathroom X1 this shift with assist of one and did ambulate safely with mild weakness on the right leg.  CMS remains intact on the both legs.   Right leg remains slightly edematous with pulses palpable.   Incision dressing remains clean dry and intact.   Will continue to monitor as care continues.

## 2019-06-01 NOTE — PLAN OF CARE
S-(situation): shift note    B-(background): Back surgery - POD#3    A-(assessment): Pt is A&O.  VSS.  Afebrile.  Has mild back discomfort - refused any medication.  Incision covered - D&I.  Up to BR - walks with walker - SBA.  Voiding in good amounts.  Pulses present - neuros intact.  Has mild numbness in toes on right foot.      R-(recommendations): Will cont to monitor the above - possible discharge today.

## 2019-06-01 NOTE — PLAN OF CARE
Discharge Planner PT   Patient plan for discharge: Discharge home with assistance from family. Discharge to home with wife who can not provide 24/7 care, with sister-in-law who can not provide 24/7 care or with sister who can provide 24/7 care.  Current status: Patient is a 58 year old Male s/p L2-L4 laminectomry on 5/29.  Patient yesterday experienced significant right LE weakness which gave out when he attempted to weight bear through it which required 2 person Max Assistance. Today, the patient was able to ambulate 200+ feet with a FWW and without a rest break. Following his rest break the patient ambulated up and down 16 steps with bilateral rails followed by a second 200+ foot walk with a FWW. When navigating the stairs, he ambulated up with an alternating pattern and was able to ambulate down with cueing for descending with his right (affected) foot first.  Patient required frequent cueing for stair navigation. All activities were SBA and his strength tested 5/5 MMT in all lower extremity muscles following the long walks and stair ambulation. Patient stated that he was giving approximately 8/10 effort during the walk and the stairs.   Barriers to return to prior living situation: Unable to provide 24/7 care from his wife or sister-in-law. 15 stairs to enter his home. 8 stairs to enter his sister-in-laws home.  Recommendations for discharge: Discharge home with 24/7 home assistance from family and with home therapy for continued strengthening and activity tolerance.  Rationale for recommendations: Based on patient's activity tolerance, strength, history of right leg weakness and patient's need for cueing for proper stair navigation.       Entered by: Justino Fonseca 06/01/2019 11:24 AM         Physical Therapy Discharge Summary    Reason for therapy discharge:    Discharged to home with home therapy.    Progress towards therapy goal(s). See goals on Care Plan in [facility] electronic health record for goal  details.  Goals partially met.  Barriers to achieving goals:   discharge from facility.    Therapy recommendation(s):    see above

## 2019-06-01 NOTE — PLAN OF CARE
S-(situation): Mobility/sensation    B-(background): post op lumbar surgery with c/o numbness and weakness in right leg post op. Evaluated by Neuro surgery today, indicating numbness and weakness most likely r/t swelling as expected post op and gradual improvement expected    A-(assessment): pt. Has been up to bathroom x 2 this jackie with assist of 1 gait belt and walker. Needs most assistance getting from sitting to standing. Once up moves steadily but slowly. States numbness and tingling in the right leg continues but not new. Some puffiness also in the right lower extremity and foot.     R-(recommendations): continue to encourage increase activity for strengthening and  stability Mary FREDERICK

## 2019-06-01 NOTE — PROGRESS NOTES
Name: Wenceslao Vasquez    MRN#: [id number]    Reason for Hospitalization: neurogenic claudications  S/P laminectomy  S/P lumbar laminectomy    Discharge Date: 6/1/2019    Patient / Family response to discharge plan: Writer visited with patient and his wife, Leslye.  Patient has improved physically since yesterday and is well enough to return to his own home.  Wife states that she will be with pt 24/7 through Monday. Starting on Tuesday, patient's brother will be with him at patient's home while wife is at work.      Discussed recommendation for home care PT.  Pt was provided with Medicare certified home care list. Pt chooses to use [facility].  Writer has sent an e-mail referral to [facility].      Wife transporting patient home today.      Other Providers (Care Coordinator, County Services, PCA services etc): No    CTS Hand Off Completed: Yes- Having [facility]         SHELBI Score: Low     Future Appointments:   Future Appointments   Date Time Provider Department Center   6/3/2019 11:30 AM Gabi Enamorado OT [facility]   6/7/2019  9:30 AM PH SPECIALTY RN [facility]   7/26/2019  3:00 PM Shalom Hawkins PA-C [facility]   8/26/2019  3:00 PM Shalom Hawkins PA-C [facility]       Discharge Disposition: home    Discharge Planner   Discharge Plans in progress: Completed. Patient discharge to home with wife and [facility] PT to follow.   Barriers to discharge plan: None   Follow up plan: Per surgeon       Entered by: JAQUELINE WANG 06/01/2019 3:19 PM           GT Christina

## 2019-06-01 NOTE — PLAN OF CARE
Discharge Planner OT   Patient plan for discharge: Would like to go to his sister in-laws home today or soon.  He stated she has less stairs at her home and all facilities on one level.   Current status: Patient does appear to still have some confusion; memory recall, sequencing, and planning.  He was aware of person, place, time, and situation. He was able to demonstrate use of sock aid and reacher after one visual/verbal demonstration.  AE education and demonstration with handout provided; recommended reacher, toilet safety frame, and toilet robert to safely maintain precautions.  Also would be beneficial to have sock aid and shower chair (already has one).  Patient expressed understanding.  Toileting SBA with use of FWW for ambulation to toilet, standing, and returning to bed.  Standing tolerance to improve safety with BADL; patient tolerated 10 minutes and reported no increase in symptoms.  Education provided on importance of safety with standing tolerance and BADL that he would be standing for.   Barriers to return to prior living situation: Set-up of living situation, increased need for assistance, cognition.   Recommendations for discharge: To a family members home that is suitable for his current physical abilities and family to provided 24/7 assistance/supervision. Home Occupational Therapy.   Rationale for recommendations: Patient demonstrated significant improvement with his physical ability to complete BADL with less assistance.  He still presents with some confusion that affects his ability to safely care for self alone.  If family is not able to provided assistance needed TCU recommended at this time to progress safety with BADL tasks.        Entered by: Gabi Enamorado 06/01/2019 8:31 AM      Thank you for the referral to Occupational Therapy!    AMADOU Carlisle/L  [facility]  [phone]      Occupational Therapy Discharge Summary    Reason for therapy discharge:    Discharged to home  with home therapy.    Progress towards therapy goal(s). See goals on Care Plan in [facility] electronic health record for goal details.  Goals met    Therapy recommendation(s):    Continued therapy is recommended.  Rationale/Recommendations:  Continue with progress of safety and independence with BADL following spinal precautions.

## 2019-06-03 ENCOUNTER — TELEPHONE (OUTPATIENT)
Dept: NEUROSURGERY | Facility: OTHER | Age: 59
End: 2019-06-03

## 2019-06-03 ENCOUNTER — PATIENT OUTREACH (OUTPATIENT)
Dept: CARE COORDINATION | Facility: CLINIC | Age: 59
End: 2019-06-03

## 2019-06-03 NOTE — TELEPHONE ENCOUNTER
Contacted patient for post-op follow up call. Patient is s/p laminectomy on 5/29. After surgery patient is doing well overall, but reports continued left leg pain. Pain is controlled by oxycodone. Patient has been eating and drinking well. No issues with bowel or bladder; advised to increase fluid, fiber intake, and use OTC stool softener if needed. No issues or concerns with incision. Reminded patient to watch for s/sx of infection, use proper incision care, and follow activity restrictions. Patient will call to schedule 6 week post op f/u appt.  All questions were answered to the best of my ability and the patient's satisfaction. Patient verbalized understanding and advised to call with any additional questions or concerns.    Patient is also requesting a temporary disability parking certificate. Application completed and mailed to patient's home address.

## 2019-06-03 NOTE — PROGRESS NOTES
Clinic Care Coordination Contact  [facility]/Voicemail       Clinical Data: Care Coordinator Outreach  Outreach attempted x 1.  Left message on voicemail with call back information and requested return call.  Plan: Care Coordinator will try to reach patient again in 1-2 business days.    Grazyna HERNANDEZ, RN, PHN  Care Coordination    [facility]  [address] [place] [address]  Office: [phone]  Fax [phone]   [facility]  [address] [place] [address]  Office: [phone] Fax [phone]  [email or web address]   [email or web address]   Connect with [facility] on social media.

## 2019-06-04 ASSESSMENT — ACTIVITIES OF DAILY LIVING (ADL): DEPENDENT_IADLS:: TRANSPORTATION

## 2019-06-04 NOTE — PROGRESS NOTES
Clinic Care Coordination Contact  Care Coordination Communication    Referral Source: IP Handoff    Clinical Data: Patient was hospitalized at [facility].     Reason for Hospitalization:  neurogenic claudications  S/P laminectomy  S/P lumbar laminectomy  Admit Date/Time: 5/29/2019 10:41 AM  Discharge Date: 6/1/19    Home Care Contact:              Home Care Agency:  Home Care              Contact name () and phone number: [phone]              Care Coordination contacted home care: Yes              Anticipated start of care date: 6/4/19    Patient Contact: Patient's wife called back and states patient is doing well, states physical therapy started yesterday.  Other than that patient has been spending most of his time on the couch.  Patient does have a appointment with the nurse on 6 7 and will follow-up with neurosurgery in July.  Wife declines any further needs at this time.                Plan: RN/SW Care Coordinator will await notification from home care staff informing RN/SW Care Coordinator of patients discharge plans/needs. RN/SW Care Coordinator will review chart and outreach to home care every 4 weeks and as needed.      Grazyna HERNANDEZ, RN, PHN  Care Coordination    [facility]  [address] [place] [address]  Office: [phone]  Fax [phone]   [facility]  [address] [place] [address]  Office: [phone] Fax [phone]  [email or web address]   [email or web address]   Connect with [facility] on social media.

## 2019-06-05 ENCOUNTER — MYC REFILL (OUTPATIENT)
Dept: NEUROSURGERY | Facility: CLINIC | Age: 59
End: 2019-06-05

## 2019-06-05 DIAGNOSIS — Z98.890 S/P LUMBAR LAMINECTOMY: ICD-10-CM

## 2019-06-05 NOTE — PROGRESS NOTES
"Wenceslao Vasquez  Gender: male  : 1960  [address] [facility] [id number] [phone] (home)     Medical Record: [id number]  Pharmacy: [id number] - [facility] [place], [place] - [address] [place] [address]  Primary Care Provider: Christopher Arreola    Parent's names are: Data Unavailable (mother) and Data Unavailable (father).      [facility]  2019     Discharge Phone Call:  Key Words/Key Times      Introduction - AIDET (Acknowledge, Introduce, Duration, Explanation)      Empathy-   We are calling to see how you are since your recent stay in the hospital?     Call back COMMENTS: \"Everything went pretty good\".      Clinical Questions -  (f/u appts, medication side effects/purpose, ability to care for self at home) \"For your safety, it is important to us that you understand the purpose and side effects of your medications, can you tell me what your new medications are?\"     Call back COMMENTS: \" Pain medications are helping, have follow up appointment on Friday\".\" Home care has been to the house once and it went well.\"Planning on going to appointment at the end of the month and in August.\"      Staff Recognition -  We like to recognize staff and physicians who have done an excellent job.  Do you remember any people from your care team that you would like recognize?     Call back COMMENTS: \"Nursing staff was great, but Haley the nursing assistant gave great care\".      Very Good Care -  We want to provide very good care to all patients.  How was your care?     Call back COMMENTS: \"Nursing staff did great.\"      Opportunities for Improvement -  Our goal is to be the best.  Do you have any suggestions for things that we could improve upon?     Call back COMMENTS: No suggestions given.      Thank You               "

## 2019-06-06 RX ORDER — OXYCODONE HYDROCHLORIDE 5 MG/1
5-10 TABLET ORAL
Qty: 30 TABLET | Refills: 0 | Status: SHIPPED | OUTPATIENT
Start: 2019-06-06 | End: 2019-06-07

## 2019-06-07 ENCOUNTER — ALLIED HEALTH/NURSE VISIT (OUTPATIENT)
Dept: FAMILY MEDICINE | Facility: CLINIC | Age: 59
End: 2019-06-07
Payer: COMMERCIAL

## 2019-06-07 VITALS — HEART RATE: 68 BPM | SYSTOLIC BLOOD PRESSURE: 125 MMHG | DIASTOLIC BLOOD PRESSURE: 74 MMHG | TEMPERATURE: 97.4 F

## 2019-06-07 DIAGNOSIS — Z98.890 S/P LUMBAR LAMINECTOMY: ICD-10-CM

## 2019-06-07 DIAGNOSIS — M48.062 SPINAL STENOSIS OF LUMBAR REGION WITH NEUROGENIC CLAUDICATION: Primary | ICD-10-CM

## 2019-06-07 PROCEDURE — 99211 OFF/OP EST MAY X REQ PHY/QHP: CPT

## 2019-06-07 RX ORDER — OXYCODONE HYDROCHLORIDE 5 MG/1
5-10 TABLET ORAL
Qty: 30 TABLET | Refills: 0 | Status: ON HOLD | OUTPATIENT
Start: 2019-06-07 | End: 2019-06-12

## 2019-06-07 ASSESSMENT — PAIN SCALES - GENERAL: PAINLEVEL: NO PAIN (0)

## 2019-06-07 NOTE — NURSING NOTE
"1. Chief complaint: Wenceslao comes in today for a suture removal post   1.  L2-3 bilateral laminectomy and medial facetectomy for decompression of central stenosis  2.  L3-4 bilateral laminectomy and medial facetectomy for decompression of stenosis  3.  Use of intraoperative fluoroscopy  Done with Dr. Calvo on 5/29/19.  Per Shalom Hawkins I removed running sutures from Wenceslao's back.    2. Secondary Complaint: Wenceslao reported that his right foot was becoming \"cold\" since the surgery off and on throughout the day.  The foot \"warms back up\" when he sits out in the sun.  He wants to make sure this is normal after his procedure.  His Calf is non tender, it is not swollen, leg is normal body temperature, and he is not having pain in the calf.    Discussed above with Shaolm Hawkins.  Shalom states that this \"cold\" sensation can be normal after surgery and from the description above does not have concerns for a blood clot.  Shalom wanted patient to have a hand out for signs and symptoms to look for in case he developed a blood clot and this was given to patient and his wife.  Wenceslao is instructed per hand out that if he has any of the signs or symptoms to report to the ED.    3. Wenceslao is concerned about taking narcotics for a long time but has significant pain still today when off of medications.  Wenceslao asks about Tylenol or Ibuprofen.  Wenceslao's wife has concerns about running out of his narcotic before neurosurgery is back in [place] for a refill.  Discussed the above with Shalom Hawkins.  Shalom wrote another prescription for #30 oxycodone to be filled Monday 6/10/19 or Tuesday 6/11/19.  Shalom suggested that if he would like to try to stop taking the Oxycodone at this time he could take 1 oxycodone with 1 extra strength Tylenol and see if that managed his pain.  He should continue on the Muscle relaxant at this time, avoid ibuprofen, and take no more than 4000 mg of Tylenol per day.  Wenceslao is given this information and will follow up as " scheduled.    He has no other questions or concerns today and will contact Neurosurgery with any new questions or concerns.

## 2019-06-11 ENCOUNTER — HOSPITAL ENCOUNTER (OUTPATIENT)
Facility: CLINIC | Age: 59
Setting detail: OBSERVATION
Discharge: HOME | End: 2019-06-12
Attending: FAMILY MEDICINE | Admitting: HOSPITALIST
Payer: COMMERCIAL

## 2019-06-11 ENCOUNTER — TELEPHONE (OUTPATIENT)
Dept: NEUROSURGERY | Facility: CLINIC | Age: 59
End: 2019-06-11

## 2019-06-11 ENCOUNTER — APPOINTMENT (OUTPATIENT)
Dept: GENERAL RADIOLOGY | Facility: CLINIC | Age: 59
End: 2019-06-11
Attending: FAMILY MEDICINE
Payer: COMMERCIAL

## 2019-06-11 ENCOUNTER — APPOINTMENT (OUTPATIENT)
Dept: CT IMAGING | Facility: CLINIC | Age: 59
End: 2019-06-11
Attending: FAMILY MEDICINE
Payer: COMMERCIAL

## 2019-06-11 DIAGNOSIS — Z98.890 S/P LUMBAR LAMINECTOMY: ICD-10-CM

## 2019-06-11 DIAGNOSIS — G45.9 TIA (TRANSIENT ISCHEMIC ATTACK): ICD-10-CM

## 2019-06-11 DIAGNOSIS — I10 HYPERTENSION GOAL BP (BLOOD PRESSURE) < 140/90: Primary | ICD-10-CM

## 2019-06-11 DIAGNOSIS — M48.062 SPINAL STENOSIS OF LUMBAR REGION WITH NEUROGENIC CLAUDICATION: ICD-10-CM

## 2019-06-11 PROBLEM — R41.82 ALTERED MENTAL STATUS: Status: ACTIVE | Noted: 2019-06-11

## 2019-06-11 LAB
ALBUMIN SERPL-MCNC: 3.7 G/DL (ref 3.4–5)
ALBUMIN UR-MCNC: NEGATIVE MG/DL
ALP SERPL-CCNC: 82 U/L (ref 40–150)
ALT SERPL W P-5'-P-CCNC: 34 U/L (ref 0–70)
ANION GAP SERPL CALCULATED.3IONS-SCNC: 6 MMOL/L (ref 3–14)
APPEARANCE UR: CLEAR
AST SERPL W P-5'-P-CCNC: 21 U/L (ref 0–45)
BASOPHILS # BLD AUTO: 0 10E9/L (ref 0–0.2)
BASOPHILS NFR BLD AUTO: 0.1 %
BILIRUB SERPL-MCNC: 0.3 MG/DL (ref 0.2–1.3)
BILIRUB UR QL STRIP: NEGATIVE
BUN SERPL-MCNC: 20 MG/DL (ref 7–30)
CALCIUM SERPL-MCNC: 8.8 MG/DL (ref 8.5–10.1)
CHLORIDE SERPL-SCNC: 106 MMOL/L (ref 94–109)
CO2 SERPL-SCNC: 27 MMOL/L (ref 20–32)
COLOR UR AUTO: YELLOW
CREAT SERPL-MCNC: 0.83 MG/DL (ref 0.66–1.25)
DIFFERENTIAL METHOD BLD: ABNORMAL
EOSINOPHIL NFR BLD AUTO: 0.3 %
ERYTHROCYTE [DISTWIDTH] IN BLOOD BY AUTOMATED COUNT: 12.5 % (ref 10–15)
GFR SERPL CREATININE-BSD FRML MDRD: >90 ML/MIN/{1.73_M2}
GLUCOSE BLDC GLUCOMTR-MCNC: 130 MG/DL (ref 70–99)
GLUCOSE SERPL-MCNC: 124 MG/DL (ref 70–99)
GLUCOSE UR STRIP-MCNC: NEGATIVE MG/DL
HCT VFR BLD AUTO: 37.6 % (ref 40–53)
HGB BLD-MCNC: 12.9 G/DL (ref 13.3–17.7)
HGB UR QL STRIP: NEGATIVE
IMM GRANULOCYTES # BLD: 0 10E9/L (ref 0–0.4)
IMM GRANULOCYTES NFR BLD: 0.3 %
KETONES UR STRIP-MCNC: NEGATIVE MG/DL
LACTATE BLD-SCNC: 1.4 MMOL/L (ref 0.7–2)
LEUKOCYTE ESTERASE UR QL STRIP: NEGATIVE
LYMPHOCYTES # BLD AUTO: 1.1 10E9/L (ref 0.8–5.3)
LYMPHOCYTES NFR BLD AUTO: 15.2 %
MCH RBC QN AUTO: 34.8 PG (ref 26.5–33)
MCHC RBC AUTO-ENTMCNC: 34.3 G/DL (ref 31.5–36.5)
MCV RBC AUTO: 101 FL (ref 78–100)
MONOCYTES # BLD AUTO: 0.3 10E9/L (ref 0–1.3)
MONOCYTES NFR BLD AUTO: 4.7 %
NEUTROPHILS # BLD AUTO: 5.8 10E9/L (ref 1.6–8.3)
NEUTROPHILS NFR BLD AUTO: 79.4 %
NITRATE UR QL: NEGATIVE
NRBC # BLD AUTO: 0 10*3/UL
NRBC BLD AUTO-RTO: 0 /100
NT-PROBNP SERPL-MCNC: 27 PG/ML (ref 0–900)
PH UR STRIP: 6 PH (ref 5–7)
PLATELET # BLD AUTO: 280 10E9/L (ref 150–450)
POTASSIUM SERPL-SCNC: 3.9 MMOL/L (ref 3.4–5.3)
PROT SERPL-MCNC: 7.4 G/DL (ref 6.8–8.8)
RBC # BLD AUTO: 3.71 10E12/L (ref 4.4–5.9)
SODIUM SERPL-SCNC: 139 MMOL/L (ref 133–144)
SOURCE: NORMAL
SP GR UR STRIP: 1.02 (ref 1–1.03)
UROBILINOGEN UR STRIP-MCNC: 0 MG/DL (ref 0–2)
WBC # BLD AUTO: 7.3 10E9/L (ref 4–11)

## 2019-06-11 PROCEDURE — 25000128 H RX IP 250 OP 636: Performed by: FAMILY MEDICINE

## 2019-06-11 PROCEDURE — 80053 COMPREHEN METABOLIC PANEL: CPT | Performed by: FAMILY MEDICINE

## 2019-06-11 PROCEDURE — G0378 HOSPITAL OBSERVATION PER HR: HCPCS

## 2019-06-11 PROCEDURE — 96361 HYDRATE IV INFUSION ADD-ON: CPT | Performed by: FAMILY MEDICINE

## 2019-06-11 PROCEDURE — 00000146 ZZHCL STATISTIC GLUCOSE BY METER IP

## 2019-06-11 PROCEDURE — 99285 EMERGENCY DEPT VISIT HI MDM: CPT | Mod: 25 | Performed by: FAMILY MEDICINE

## 2019-06-11 PROCEDURE — 71045 X-RAY EXAM CHEST 1 VIEW: CPT | Mod: TC

## 2019-06-11 PROCEDURE — 25800030 ZZH RX IP 258 OP 636: Performed by: FAMILY MEDICINE

## 2019-06-11 PROCEDURE — 70450 CT HEAD/BRAIN W/O DYE: CPT

## 2019-06-11 PROCEDURE — 99219 ZZC INITIAL OBSERVATION CARE,LEVL II: CPT | Performed by: HOSPITALIST

## 2019-06-11 PROCEDURE — 96360 HYDRATION IV INFUSION INIT: CPT | Performed by: FAMILY MEDICINE

## 2019-06-11 PROCEDURE — 85025 COMPLETE CBC W/AUTO DIFF WBC: CPT | Performed by: FAMILY MEDICINE

## 2019-06-11 PROCEDURE — 83605 ASSAY OF LACTIC ACID: CPT | Performed by: FAMILY MEDICINE

## 2019-06-11 PROCEDURE — 81003 URINALYSIS AUTO W/O SCOPE: CPT | Performed by: FAMILY MEDICINE

## 2019-06-11 PROCEDURE — 83880 ASSAY OF NATRIURETIC PEPTIDE: CPT | Performed by: FAMILY MEDICINE

## 2019-06-11 RX ORDER — ONDANSETRON 2 MG/ML
4 INJECTION INTRAMUSCULAR; INTRAVENOUS EVERY 6 HOURS PRN
Status: DISCONTINUED | OUTPATIENT
Start: 2019-06-11 | End: 2019-06-12 | Stop reason: HOSPADM

## 2019-06-11 RX ORDER — OXYCODONE HYDROCHLORIDE 5 MG/1
5-10 TABLET ORAL
Status: DISCONTINUED | OUTPATIENT
Start: 2019-06-11 | End: 2019-06-12 | Stop reason: HOSPADM

## 2019-06-11 RX ORDER — SODIUM CHLORIDE 9 MG/ML
INJECTION, SOLUTION INTRAVENOUS CONTINUOUS
Status: DISCONTINUED | OUTPATIENT
Start: 2019-06-12 | End: 2019-06-12 | Stop reason: HOSPADM

## 2019-06-11 RX ORDER — NALOXONE HYDROCHLORIDE 0.4 MG/ML
.1-.4 INJECTION, SOLUTION INTRAMUSCULAR; INTRAVENOUS; SUBCUTANEOUS
Status: DISCONTINUED | OUTPATIENT
Start: 2019-06-11 | End: 2019-06-12 | Stop reason: HOSPADM

## 2019-06-11 RX ORDER — HYDRALAZINE HYDROCHLORIDE 20 MG/ML
10 INJECTION INTRAMUSCULAR; INTRAVENOUS EVERY 6 HOURS PRN
Status: DISCONTINUED | OUTPATIENT
Start: 2019-06-11 | End: 2019-06-12 | Stop reason: HOSPADM

## 2019-06-11 RX ORDER — ACETAMINOPHEN 650 MG/1
650 SUPPOSITORY RECTAL EVERY 4 HOURS PRN
Status: DISCONTINUED | OUTPATIENT
Start: 2019-06-11 | End: 2019-06-12 | Stop reason: HOSPADM

## 2019-06-11 RX ORDER — LISINOPRIL 10 MG/1
10 TABLET ORAL DAILY
Status: DISCONTINUED | OUTPATIENT
Start: 2019-06-12 | End: 2019-06-12 | Stop reason: HOSPADM

## 2019-06-11 RX ORDER — ONDANSETRON 4 MG/1
4 TABLET, ORALLY DISINTEGRATING ORAL EVERY 6 HOURS PRN
Status: DISCONTINUED | OUTPATIENT
Start: 2019-06-11 | End: 2019-06-12 | Stop reason: HOSPADM

## 2019-06-11 RX ORDER — METHOCARBAMOL 750 MG/1
750-1500 TABLET, FILM COATED ORAL EVERY 6 HOURS PRN
Status: DISCONTINUED | OUTPATIENT
Start: 2019-06-11 | End: 2019-06-12 | Stop reason: HOSPADM

## 2019-06-11 RX ORDER — ACETAMINOPHEN 325 MG/1
650 TABLET ORAL EVERY 4 HOURS PRN
Status: DISCONTINUED | OUTPATIENT
Start: 2019-06-11 | End: 2019-06-12 | Stop reason: HOSPADM

## 2019-06-11 RX ORDER — SODIUM CHLORIDE 9 MG/ML
1000 INJECTION, SOLUTION INTRAVENOUS CONTINUOUS
Status: DISCONTINUED | OUTPATIENT
Start: 2019-06-11 | End: 2019-06-11

## 2019-06-11 RX ADMIN — SODIUM CHLORIDE 1000 ML: 9 INJECTION, SOLUTION INTRAVENOUS at 21:01

## 2019-06-11 RX ADMIN — SODIUM CHLORIDE 1000 ML: 9 INJECTION, SOLUTION INTRAVENOUS at 22:31

## 2019-06-11 NOTE — LETTER
Transition Communication Hand-off for Care Transitions to Next Level of Care Provider    Name: Wenceslao Vasquez  : 1960  MRN #: [id number]  Primary Care Provider: Christopher Arreola  Primary Care MD Name: Christopher Arreola MD  Primary Clinic: [address] [facility] [address]  Primary Care Clinic Name: [facility]  Reason for Hospitalization:  TIA (transient ischemic attack) [G45.9]  Admit Date/Time: 2019  8:36 PM  Discharge Date: 2019  Payor Source: Payor: [employer] / Plan: [employer] MEDICARE ADVANTAGE / Product Type: Medicare /     Readmission Assessment Measure (SHELBI) Risk Score/category: low         Reason for Communication Hand-off Referral: Avoidable readmission within 30 days    Discharge Plan: Home with resumption with [facility] Phone: [phone] - PT       Concern for non-adherence with plan of care:   Y/N no  Discharge Needs Assessment:  Needs      Most Recent Value   Home Care  [facility] [phone], Fax: [phone]        Follow-up plan:    Future Appointments   Date Time Provider Department Center   2019  3:00 PM Shalom Hawkins PA-C [facility]   2019  3:00 PM Shalom Hawkins PA-C [facility]       Any outstanding tests or procedures:        Referrals     Future Labs/Procedures    NEUROLOGY ADULT REFERRAL     Comments:    Your provider has referred you for the following:   Consult at [facility]: [facility] - [facility] Neurology [facility] [phone]   [email or web address]    Please be aware that coverage of these services is subject to the terms and limitations of your health insurance plan.  Call member services at your health plan with any benefit or coverage questions.      Please bring the following with you to your appointment:    (1) Any X-Rays, CTs or MRIs which have been performed.  Contact the facility where they were done to arrange for  prior to your scheduled  appointment.    (2) List of current medications  (3) This referral request   (4) Any documents/labs given to you for this referral            Key Recommendations:  [facility] Phone: [phone] PT services and family support    Trudi Victor    AVS/Discharge Summary is the source of truth; this is a helpful guide for improved communication of patient story

## 2019-06-11 NOTE — TELEPHONE ENCOUNTER
REASON FOR CALL: Pt's spouse LVM stating that a question was missed on their disability parking certificate. The question was - Is the applicant qualified, in all medical respects, to exercise reasonable and ordinary control over a motor vehicle?    She needs a call back on either 1) Yes, 2) Yes, with adaptive equipment, or 3) No, please specify.

## 2019-06-11 NOTE — TELEPHONE ENCOUNTER
Left a detailed message with wife that she can answer yes to the question that was missed. Asked for a return call to the clinic if she had any further questions or concerns.

## 2019-06-12 ENCOUNTER — APPOINTMENT (OUTPATIENT)
Dept: MRI IMAGING | Facility: CLINIC | Age: 59
End: 2019-06-12
Attending: NURSE PRACTITIONER
Payer: COMMERCIAL

## 2019-06-12 ENCOUNTER — PATIENT OUTREACH (OUTPATIENT)
Dept: CARE COORDINATION | Facility: CLINIC | Age: 59
End: 2019-06-12

## 2019-06-12 ENCOUNTER — APPOINTMENT (OUTPATIENT)
Dept: PHYSICAL THERAPY | Facility: CLINIC | Age: 59
End: 2019-06-12
Attending: HOSPITALIST
Payer: COMMERCIAL

## 2019-06-12 VITALS
RESPIRATION RATE: 18 BRPM | TEMPERATURE: 97.4 F | OXYGEN SATURATION: 96 % | HEART RATE: 60 BPM | SYSTOLIC BLOOD PRESSURE: 153 MMHG | DIASTOLIC BLOOD PRESSURE: 79 MMHG

## 2019-06-12 PROBLEM — G45.9 TIA (TRANSIENT ISCHEMIC ATTACK): Status: ACTIVE | Noted: 2019-06-12

## 2019-06-12 LAB
ANION GAP SERPL CALCULATED.3IONS-SCNC: 5 MMOL/L (ref 3–14)
BUN SERPL-MCNC: 15 MG/DL (ref 7–30)
CALCIUM SERPL-MCNC: 8.1 MG/DL (ref 8.5–10.1)
CHLORIDE SERPL-SCNC: 109 MMOL/L (ref 94–109)
CO2 SERPL-SCNC: 27 MMOL/L (ref 20–32)
CREAT SERPL-MCNC: 0.83 MG/DL (ref 0.66–1.25)
GFR SERPL CREATININE-BSD FRML MDRD: >90 ML/MIN/{1.73_M2}
GLUCOSE SERPL-MCNC: 95 MG/DL (ref 70–99)
POTASSIUM SERPL-SCNC: 3.7 MMOL/L (ref 3.4–5.3)
SODIUM SERPL-SCNC: 141 MMOL/L (ref 133–144)

## 2019-06-12 PROCEDURE — 97116 GAIT TRAINING THERAPY: CPT | Mod: GP

## 2019-06-12 PROCEDURE — 36415 COLL VENOUS BLD VENIPUNCTURE: CPT | Performed by: HOSPITALIST

## 2019-06-12 PROCEDURE — 25000132 ZZH RX MED GY IP 250 OP 250 PS 637: Performed by: NURSE PRACTITIONER

## 2019-06-12 PROCEDURE — 25000132 ZZH RX MED GY IP 250 OP 250 PS 637: Performed by: HOSPITALIST

## 2019-06-12 PROCEDURE — 80048 BASIC METABOLIC PNL TOTAL CA: CPT | Performed by: HOSPITALIST

## 2019-06-12 PROCEDURE — 25500064 ZZH RX 255 OP 636: Performed by: HOSPITALIST

## 2019-06-12 PROCEDURE — 99217 ZZC OBSERVATION CARE DISCHARGE: CPT | Performed by: NURSE PRACTITIONER

## 2019-06-12 PROCEDURE — 70544 MR ANGIOGRAPHY HEAD W/O DYE: CPT

## 2019-06-12 PROCEDURE — G0378 HOSPITAL OBSERVATION PER HR: HCPCS

## 2019-06-12 PROCEDURE — 70553 MRI BRAIN STEM W/O & W/DYE: CPT

## 2019-06-12 PROCEDURE — 25000131 ZZH RX MED GY IP 250 OP 636 PS 637: Performed by: HOSPITALIST

## 2019-06-12 PROCEDURE — 25800030 ZZH RX IP 258 OP 636: Performed by: HOSPITALIST

## 2019-06-12 PROCEDURE — A9585 GADOBUTROL INJECTION: HCPCS | Performed by: HOSPITALIST

## 2019-06-12 PROCEDURE — 97162 PT EVAL MOD COMPLEX 30 MIN: CPT | Mod: GP

## 2019-06-12 PROCEDURE — 25000125 ZZHC RX 250: Performed by: HOSPITALIST

## 2019-06-12 PROCEDURE — 70549 MR ANGIOGRAPH NECK W/O&W/DYE: CPT

## 2019-06-12 PROCEDURE — 99207 ZZC CDG-CODE CATEGORY CHANGED: CPT | Performed by: NURSE PRACTITIONER

## 2019-06-12 RX ORDER — METHOCARBAMOL 500 MG/1
250 TABLET, FILM COATED ORAL EVERY 6 HOURS PRN
Start: 2019-06-12 | End: 2019-12-02

## 2019-06-12 RX ORDER — ONDANSETRON 4 MG/1
4 TABLET, ORALLY DISINTEGRATING ORAL EVERY 6 HOURS PRN
Qty: 12 TABLET | Refills: 0 | Status: SHIPPED | OUTPATIENT
Start: 2019-06-12 | End: 2019-06-27

## 2019-06-12 RX ORDER — GADOBUTROL 604.72 MG/ML
10 INJECTION INTRAVENOUS ONCE
Status: COMPLETED | OUTPATIENT
Start: 2019-06-12 | End: 2019-06-12

## 2019-06-12 RX ORDER — ASPIRIN 81 MG/1
81 TABLET, CHEWABLE ORAL ONCE
Status: COMPLETED | OUTPATIENT
Start: 2019-06-12 | End: 2019-06-12

## 2019-06-12 RX ORDER — ACETAMINOPHEN 325 MG/1
650 TABLET ORAL EVERY 4 HOURS PRN
Refills: 0
Start: 2019-06-12 | End: 2023-01-30

## 2019-06-12 RX ORDER — LISINOPRIL 10 MG/1
10 TABLET ORAL DAILY
Qty: 3 TABLET | Refills: 0 | Status: SHIPPED | OUTPATIENT
Start: 2019-06-13 | End: 2019-12-02

## 2019-06-12 RX ADMIN — ASPIRIN 81 MG 81 MG: 81 TABLET ORAL at 14:31

## 2019-06-12 RX ADMIN — LISINOPRIL 10 MG: 10 TABLET ORAL at 08:01

## 2019-06-12 RX ADMIN — OXYCODONE HYDROCHLORIDE 5 MG: 5 TABLET ORAL at 12:11

## 2019-06-12 RX ADMIN — METHOCARBAMOL 1500 MG: 750 TABLET, FILM COATED ORAL at 06:49

## 2019-06-12 RX ADMIN — SODIUM CHLORIDE: 9 INJECTION, SOLUTION INTRAVENOUS at 08:02

## 2019-06-12 RX ADMIN — ONDANSETRON 4 MG: 4 TABLET, ORALLY DISINTEGRATING ORAL at 12:11

## 2019-06-12 RX ADMIN — LISINOPRIL 10 MG: 10 TABLET ORAL at 00:24

## 2019-06-12 RX ADMIN — OXYCODONE HYDROCHLORIDE 5 MG: 5 TABLET ORAL at 00:24

## 2019-06-12 RX ADMIN — SODIUM CHLORIDE: 9 INJECTION, SOLUTION INTRAVENOUS at 00:13

## 2019-06-12 RX ADMIN — GADOBUTROL 10 ML: 604.72 INJECTION INTRAVENOUS at 11:39

## 2019-06-12 RX ADMIN — SODIUM CHLORIDE 50 ML: 9 INJECTION, SOLUTION INTRAVENOUS at 11:39

## 2019-06-12 NOTE — DISCHARGE SUMMARY
"[facility]  Hospitalist Discharge Summary       Date of Admission:  6/11/2019  Date of Discharge:  6/12/2019  Discharging Provider: Arabella Lucero CNP    Discharge Diagnoses   Principal Problem:    Altered mental status  Active Problems:    Hyperlipidemia LDL goal <130    Hypertension goal BP (blood pressure) < 140/90    Lumbar stenosis    S/P lumbar laminectomy    TIA (transient ischemic attack)    CHELSY (obstructive sleep apnea)      Follow-ups Needed After Discharge   Follow-up Appointments     Follow-up and recommended labs and tests       Follow up with primary care provider, Christopher Arreola, within 7 days for   hospital follow- up.  The following labs/tests are recommended: BMP,   management of blood pressure, fasting cholesterol and monitoring of TIA   symptoms. Patient was started on Lisinopril and Aspirin 81 mg.    Follow up with neurology, referral complete.             Discharge Disposition   Discharged to home  Condition at discharge: Stable    Hospital Course        Altered mental status    TIA    Wenceslao Vasquez is a 58 year old male who presented to the emergency room with altered mental status.  Patient reportedly took 2 pain pills at 8:00 and about 30 minutes later started complaining of a right-sided headache and had a large emesis.  Per patient family he was not responding to questions and \" staring off into space\".  Patient was answering \" yes\" to all questions.  Patient with severe, progressive neurogenic claudications and progressive loss of gait function.  MRI showed severe stenosis at L2-3 and L3-4.  He underwent non-operative management with failure to improve. Patient presented to the hospital for elective L2-3 and L3-4 bilateral laminectomy and medial facetectomy for decompression of central stenosis on 5/29/19.  Patient did have dural tear postoperatively but was discharged in stable condition.  Since surgery patient has not been eating or drinking well in " the last several days has had a very poor appetite.  Patient has had nausea on and off.  Patient was discharged home on oxycodone, Robaxin and stool softeners. Patient spouse reports pt c/o being cold with chills and was clammy to the touch. States today he started starring off and on occasion with jerk and wake up.  According to the family, patient has not had any other new symptoms, no diarrhea, there is been no recent trauma.  Patient incisional site is clean dry and intact with healing scabbed areas.    Patient had these mental status changes prior to arrival to the emergency room as well as a slight right sided facial droop and decreased  in the right hand. Blood sugar was 130.  Patient had CT that was normal in the emergency room.  Patient was admitted overnight for MRI.  Patient symptoms did seem to clear with completely baseline mental status and normal strength in his right hand overnight.  This morning patient did have recurrence of his right hand weakness.  No facial droop or other neurologic deficits.  Patient has a baseline right lower extremity weakness.  Patient sent for MRI/ MRA of head and neck which was within normal limits.  Discussed the above with neurosurgery PA and stroke neurologist Dr. Quevedo. LEVAR [place] with neurosurgery updated, no changes to plan aside from regular follow-up.  After discussion with stroke neurology, discussed the chance of TIA.  Will start baby aspirin daily.  Aspirin prescription was cleared with neurosurgery.  Patient is 2 weeks postop and both NSAIDs and aspirin are okay.  Discussed with patient if he does take ibuprofen and aspirin to monitor for acid reflux. No sign of infection on incision or on exam.  Urinalysis within normal limits.  Both basic metabolic panel and CBC normal.    As well as all of the above patient presented with elevated blood pressure.  He was on lisinopril and discontinued about 5 years ago. Lisinopril was restarted with benefit and noted  decrease in blood pressure.    Patient was admitted to observation overnight and evaluation complete as above.  Patient discharged in stable condition on day of discharge.  Patient does have right lower extremity weakness that is chronic.  Patient presented with right arm weakness and altered mental status that had resolved and then returned to the next morning.  By this afternoon patient symptoms had resolved.   Patient was seen by physical therapy and recommendation for home PT complete.  Etiology uncertain at this time.  Possible TIA.  Recommend patient discontinue oxycodone.  Recommend lower dose of Robaxin.  If patient trials any new medication recommend taking one at a time.  Recommend increasing oral intake, eating and drinking well, sleeping well.  Recommend ongoing management of blood pressure.  Recommend daily baby aspirin.Patient will need to follow-up with primary care for monitoring of blood pressure and medication adjustment.  Patient will need to follow-up with neurosurgery as previously scheduled.  Patient will need to follow-up with neurology to monitor for signs of TIA and possible work-up.  Patient is due for cholesterol testing.  Patient will need to be fasting.  Patient discharged in stable condition.         Hypertension goal BP (blood pressure) < 140/90  Pressure elevated on admission up to 170/100, hx of hypertension in the past, was on lisinopril but stopped about 5 years ago.  Recent hospitalization shows normal blood pressure.  Unclear etiology and likely multifactorial for elevated blood pressure.  Lisinopril restarted this admission and will need to follow-up with primary care.        Lumbar stenosis  Chronic lumbar stenosis,  status post laminectomy, seem to be normal otherwise.given patient above symptoms recommend to stop oxycodone and decrease dose of Robaxin to 250 mg.  Recommend using ibuprofen and Tylenol as needed for pain.  Will have home physical therapy evaluation.  Ice, rest,  activity per PT.       CHELSY (obstructive sleep apnea)  Chronic and stable, not on Cpap.  Follow-up with primary care.       Hyperlipidemia LDL goal <130  Chronic and stable, last checked 6 of 2018.  Given possible TIA and on discussion with stroke neurology recommend rechecking at next primary care visit.       Consultations This Hospital Stay   PHYSICAL THERAPY ADULT IP CONSULT  CARE TRANSITION RN/SW IP CONSULT    Code Status   Full Code    Time Spent on this Encounter   IArabella, personally saw the patient today and spent greater than 30 minutes discharging this patient.       Arabella Lucero, [facility]  ______________________________________________________________________    Physical Exam   Vital Signs: Temp: 97.4  F (36.3  C) Temp src: Oral BP: 153/79 Pulse: 60 Heart Rate: 57 Resp: 18 SpO2: 96 % O2 Device: None (Room air)    Weight: 0 lbs 0 oz    Constitutional: alert, oriented in no distress  Eyes: PERRLA  HEENT: normocephalic atraumatic, normal ENT  Respiratory: clear to auscultation bilaterally  Cardiovascular:  Regular rate and rhythm  GI: supple, non tender, non distended positive bowel sound  Skin: no rash or bruise  Musculoskeletal: no leg edema, ROM intact  Neurologic: alert, oriented, normal speech, right leg weakness, right arm and hand weakness. No facial droop.   Psychiatric: affect normal.          Primary Care Physician   Christopher Arreola    Discharge Orders      NEUROLOGY ADULT REFERRAL      Reason for your hospital stay    You were in the hospital for a change in cognition right arm weakness. MRI and CT of your head were negative.     Follow-up and recommended labs and tests     Follow up with primary care provider, Christopher Arreola, within 7 days for hospital follow- up.  The following labs/tests are recommended: BMP, management of blood pressure, fasting cholesterol and monitoring of TIA symptoms. Patient was started on Lisinopril and Aspirin 81  mg.    Follow up with neurology, referral complete.     Activity    Your activity upon discharge: activity as tolerated     Diet    Follow this diet upon discharge: Orders Placed This Encounter      Room Service      Combination Diet Low Saturated Fat Na <2400mg Diet       Significant Results and Procedures   Most Recent 3 CBC's:  Recent Labs   Lab Test 06/11/19 2057 05/23/19  1014 03/06/19  1540   WBC 7.3 5.5 7.1   HGB 12.9* 14.4 14.5   * 103* 99    193 199     Most Recent 3 BMP's:  Recent Labs   Lab Test 06/12/19 0522 06/11/19 2057 05/23/19  1014    139 142   POTASSIUM 3.7 3.9 4.2   CHLORIDE 109 106 109   CO2 27 27 27   BUN 15 20 18   CR 0.83 0.83 0.88   ANIONGAP 5 6 6   BETY 8.1* 8.8 9.2   GLC 95 124* 91     Most Recent Cholesterol Panel:  Recent Labs   Lab Test 06/07/18  0835   CHOL 148   LDL 77   HDL 31*   TRIG 198*     Most Recent 6 glucoses:  Recent Labs   Lab Test 06/12/19 0522 06/11/19 2057 05/23/19  1014 03/06/19  1540 01/08/18  1505 01/08/18  0530   GLC 95 124* 91 84 95 112*     Most Recent Urinalysis:  Recent Labs   Lab Test 06/11/19  2146 03/06/19  1532 04/30/18  1326   COLOR Yellow Yellow Yellow   APPEARANCE Clear Clear Clear   URINEGLC Negative Negative Negative   URINEBILI Negative Negative Negative   URINEKETONE Negative Negative Negative   SG 1.016 1.024 1.025   UBLD Negative Negative Trace*   URINEPH 6.0 5.0 6.0   PROTEIN Negative 10* Negative   UROBILINOGEN  --   --  0.2   NITRITE Negative Negative Negative   LEUKEST Negative Negative Negative   RBCU  --  1 O - 2   WBCU  --  1 0 - 5   ,   Results for orders placed or performed during the hospital encounter of 06/11/19   CT Head w/o Contrast    Narrative    CT SCAN OF THE HEAD WITHOUT CONTRAST   6/11/2019 8:52 PM     HISTORY: Headache, confusion and decreased responsiveness, sedation,  recent back surgery.    TECHNIQUE: Axial images of the head and coronal reformations without  IV contrast material. Radiation dose for this  scan was reduced using  automated exposure control, adjustment of the mA and/or kV according  to patient size, or iterative reconstruction technique.    COMPARISON: None.    FINDINGS: There is mild generalized atrophy of the brain. There is no  evidence of intracranial hemorrhage, mass, acute infarct or anomaly.     The visualized portions of the sinuses and mastoids appear normal.  There is no evidence of trauma.      Impression    IMPRESSION:  1. No acute abnormality.  2. Mild brain atrophy.      KATERINA SIMPSON MD   XR Chest Port 1 View    Narrative    CHEST ONE VIEW UPRIGHT 6/11/2019 9:00 PM     HISTORY: Altered loss of consciousness.      COMPARISON: January 8, 2018      Impression    IMPRESSION: No acute disease.    VERENICE GRAY MD   MR Brain w/o & w Contrast    Narrative    MRI OF THE BRAIN WITHOUT AND WITH CONTRAST 6/12/2019 12:09 PM     COMPARISON: Head CT 6/11/2019    HISTORY:  Headache, chronic, neuro deficit. Altered level of  consciousness (LOC), unexplained. Altered mental status, right-sided  weakness, thumping in ears.     TECHNIQUE: Multi-sequence, multi-planar MRI images of the brain were  acquired before and after the administration of IV gadolinium (10 mL  Gadavist).    FINDINGS: Mild diffuse cerebral volume loss again noted. The  ventricles and basal cisterns are normal in configuration. There is no  midline shift. There are no extra-axial fluid collections. Gray-white  differentiation is well maintained. There is no evidence for stroke or  acute intracranial hemorrhage. There is no abnormal contrast  enhancement in the brain or its coverings.    There is no sinusitis or mastoiditis.      Impression    IMPRESSION: Mild diffuse cerebral volume loss again noted. Otherwise,  normal brain MRI. No evidence for acute intracranial pathology.   MRA Neck (Carotids) wo & w Contrast    Narrative    MRA NECK WITHOUT AND WITH CONTRAST  6/12/2019 12:09 PM     COMPARISON: None    HISTORY: Altered mental  status, right-sided weakness.    TECHNIQUE: 2D time-of-flight MR angiogram of the neck without contrast  and 3D MR angiogram of the neck with 10 mL Gadavist gadolinium IV  contrast.  MIP reconstruction of all MR angiographic data was  performed. Estimates of carotid stenoses are made relative to the  distal internal carotid artery diameters except as noted.    FINDINGS:    Carotids: The common carotid arteries bilaterally are widely patent.  The cervical internal carotid arteries bilaterally are patent without  stenosis.    Vertebrals: The vertebral arteries bilaterally are patent without  stenosis and demonstrate antegrade flow.    Aortic arch: The arteries as they arise from the aortic arch are  normally arranged with no evidence for stenosis.      Impression    IMPRESSION:    Normal MR angiogram of the neck.   MRA Brain ([place]) wo Contrast    Narrative    MR ANGIOGRAM OF THE HEAD WITHOUT CONTRAST   6/12/2019 12:08 PM     COMPARISON: None.    HISTORY: Headache, chronic, neuro deficit. Altered mental status.  Thumping in ears, right-sided weakness.    TECHNIQUE:  3D time-of-flight MR angiogram of the head without  contrast. MIP reconstruction of all MR angiographic data was  performed.    FINDINGS:  The bilateral distal internal carotid, basilar, bilateral  anterior cerebral, bilateral middle cerebral and bilateral posterior  cerebral arteries are patent and unremarkable with no evidence for  cerebral artery stenosis or aneurysm.     The P1 segment of the right posterior cerebral artery is not  visualized and is likely hypoplastic or congenitally absent. The right  posterior cerebral artery is supplied by the patent right posterior  communicating artery. The anterior communicating artery is patent and  unremarkable. The posterior communicating arteries bilaterally are  patent and unremarkable.       Impression    IMPRESSION:  Normal MR angiogram of the head.         Discharge Medications   Current  Discharge Medication List      START taking these medications    Details   acetaminophen (TYLENOL) 325 MG tablet Take 2 tablets (650 mg) by mouth every 4 hours as needed for mild pain  Refills: 0    Associated Diagnoses: S/P lumbar laminectomy      lisinopril (PRINIVIL/ZESTRIL) 10 MG tablet Take 1 tablet (10 mg) by mouth daily  Qty: 3 tablet, Refills: 0    Associated Diagnoses: Hypertension goal BP (blood pressure) < 140/90      melatonin 1 MG TABS tablet Take 1 tablet (1 mg) by mouth nightly as needed for sleep      ondansetron (ZOFRAN-ODT) 4 MG ODT tab Take 1 tablet (4 mg) by mouth every 6 hours as needed for nausea or vomiting  Qty: 12 tablet, Refills: 0    Associated Diagnoses: S/P lumbar laminectomy         CONTINUE these medications which have CHANGED    Details   aspirin (ASA) 81 MG EC tablet Take 1 tablet (81 mg) by mouth daily  Qty: 30 tablet, Refills: 0    Associated Diagnoses: TIA (transient ischemic attack)      methocarbamol (ROBAXIN) 500 MG tablet Take 0.5 tablets (250 mg) by mouth every 6 hours as needed for muscle spasms (muscle spasm)    Associated Diagnoses: S/P lumbar laminectomy         CONTINUE these medications which have NOT CHANGED    Details   Multiple Vitamin (MULTI VITAMIN MENS PO) Take 1 tablet by mouth daily.    Associated Diagnoses: Low back pain; S/P lumbar discectomy      senna-docusate (SENOKOT-S/PERICOLACE) 8.6-50 MG tablet Take 1-2 tablets by mouth 2 times daily Take while on oral narcotics to prevent or treat constipation.  Qty: 30 tablet, Refills: 0    Comments: While on narcotics  Associated Diagnoses: S/P lumbar laminectomy         STOP taking these medications       oxyCODONE (ROXICODONE) 5 MG tablet Comments:   Reason for Stopping:             Allergies   Allergies   Allergen Reactions     Dust Mites Hives     Morphine Nausea and Vomiting

## 2019-06-12 NOTE — ED NOTES
Pts spouse reports pt c/o being cold with chills and was clammy to the touch. States today he started staring off and on occasion with jerk and wakes up.

## 2019-06-12 NOTE — ED TRIAGE NOTES
Pt presents with wife over concerns of change  In mental status.  Pt brought back to triage and wife described symptoms.  Wife states that when she returned home pt was staring off in space.  Pt complained of a headache and no speaking.  Pt slow to answer questions.  Code stroke called at 2034 and pt brought to room 6.  Pt has slight right sided facial droop and decreased  in the right hand. Blood sugar was 130.  Dr Hilario in to assess pt.  After provider assessment pt to CT for a stat head CT, no CTA.

## 2019-06-12 NOTE — PROGRESS NOTES
S-(situation): Patient registered to Observation. Patient arrived to room 252 via cart from ED    B-(background): AMS, recent back surgery    A-(assessment): Alert and oriented x4. VSS. Neuro's intact q4hrs. Afebrile. Patient reported pain in Right head, eye. RLE also pain with spasms numbness, tingling-has been basline. Mary 5mg was administered x1 and was effective in managing pain to comfortable levels. Robaxin administered for spasms in RLE awaiting effectiveness.      CMS intact in all extremities.  Skin: surgical incision on back has dried scabs, no drainage or signs of infection noted. Pt reports he had sutures removed a couple days ago      R-(recommendations): Orders and observation goals reviewed with patient    Nursing Observation criteria listed below was met:    Skin issues/needs documented:Yes  Isolation needs addressed, if appropriate: Yes  Fall Prevention: Education given and documented: Yes  Education Assessment documented:Yes  Education Documented (Pre-existing chronic infection such as, MRSA/VRE need education on admission): Yes  OBS video/handout Reviewed & DocumentedYes (handout) refused to watch video  Medication Reconciliation Complete: Yes  New medication patient education completed and documented (Possible Side Effects of Common Medications handout): Yes  Home medications if not able to send immediately home with family stored here: NA  Reminder note placed in discharge instructions: NA  Patient has discharge needs (If yes, please explain): No

## 2019-06-12 NOTE — PROGRESS NOTES
S-(situation): Patient discharged to home via car with wife.    B-(background): Patient admitted with possible TIA.     A-(assessment): Patient has right sided weakness. States his hearing is affected. MRI negative. Follow up outpatient with neurologoy is scheduled. Patient stated upon discharge that 'maybe this is medication related. I took multiple pain medications near the same time.' Wife states she did not know this and will do his medications from now on.    R-(recommendations): Discharge instructions reviewed with patient and wife Pat. Listed belongings gathered and returned to patient. Yes.         Discharge Nursing Criteria:     Care Plan and Patient education resolved: Yes    New Medications- pt has been educated about purpose and side effects: Yes    Vaccines  Influenza status verified at discharge:  Yes    MISC  Prescriptions if needed, hard copies sent with patient  Yes  Home and hospital aquired medications returned to patient: NA  Medication Bin checked and emptied on discharge Yes  Patient reports post-discharge pain management plan is effective: Yes
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
## 2019-06-12 NOTE — PROGRESS NOTES
06/12/19 1300   Quick Adds   Type of Visit Initial PT Evaluation       Present no   Living Environment   Lives With spouse   Living Arrangements house   Home Accessibility stairs to enter home;stairs within home   Number of Stairs, Main Entrance 5   Stair Railings, Main Entrance railings on both sides of stairs   Number of Stairs, Within Home, Primary 10   Stair Railings, Within Home, Primary railing on right side (ascending)   Transportation Anticipated family or friend will provide   Self-Care   Usual Activity Tolerance moderate   Current Activity Tolerance moderate   Regular Exercise No   Equipment Currently Used at Home commode;shower chair;raised toilet;walker, standard;cane, straight   Functional Level Prior   Ambulation 1-->assistive equipment   Transferring 1-->assistive equipment   Toileting 1-->assistive equipment   Bathing 1-->assistive equipment   Communication 0-->understands/communicates without difficulty   Swallowing 0-->swallows foods/liquids without difficulty   Cognition 0 - no cognition issues reported   Fall history within last six months yes   Number of times patient has fallen within last six months 2   Which of the above functional risks had a recent onset or change? ambulation   Prior Functional Level Comment patient reports he ambulates wtih 2WW within home. He has not been into community since surgery, however reports he will use 2WW if he does. Able to perform ADLs independently, other than donning socks, which his wife will help with   General Information   Onset of Illness/Injury or Date of Surgery - Date 06/11/19   Referring Physician Teri Juarez MD   Patient/Family Goals Statement return home to [facility]   Pertinent History of Current Problem (include personal factors and/or comorbidities that impact the POC) Admitted 6/11 with confusion and altered mental status. Recent hospitalization following L2-L4 laminectomy on 5/29/19. Normal MRI/MRA & CT. PMH:  cervical discectomy 2013, HTN.   Precautions/Limitations spinal precautions   Weight-Bearing Status - LUE full weight-bearing   Weight-Bearing Status - RUE full weight-bearing   Weight-Bearing Status - LLE full weight-bearing   Weight-Bearing Status - RLE full weight-bearing   General Info Comments order comments: Consult PT for mobility/falls. Discharge recommendations and therapy to facilitate a safe discharge. Assess/treat for safe discharge plan and recommendation for outpatient therapy as needed.   Cognitive Status Examination   Orientation orientation to person, place and time   Level of Consciousness alert   Follows Commands and Answers Questions 100% of the time   Personal Safety and Judgment intact   Memory intact   Pain Assessment   Patient Currently in Pain No   Integumentary/Edema   Integumentary/Edema other (describe)   Integumentary/Edema Comments lumbar incision scabbing, no apparent drainage or sign of infection   Posture    Posture Protracted shoulders   Range of Motion (ROM)   ROM Comment B UE/LE WFL   Strength   Strength Comments R hip flexion& abduction, knee flexion & extgension: 4/5. R DF & PF 4-/5. L LE: 4+/5 throughout. R shoulder flexion, abduction 4+/5, R elbow flex & ext 4+/5.    Bed Mobility   Bed Mobility Comments MOD IND with log roll   Transfer Skills   Transfer Comments SBA sit<>stand   Transfer Skill:  Sit to Stand   Level of [place]: Sit/Stand contact guard   Physical Assist/Nonphysical Assist: Sit/Stand supervision;verbal cues   Weightbearing Restrictions: Sit/Stand full weight-bearing   Assistive Device for Transfer: Sit/Stand rolling walker   Gait   Gait Comments ambulates in hallway with 2WW x 500 ft with CGA to SBA for safety   Balance   Balance Comments fair dynamic standing balance with  BUE support at 2WW   Sensory Examination   Sensory Perception other (describe)   Sensory Perception Comments decreased distal R LE sensations when compared to L LE. R patellar & achilles  "DTR at 1+, Left at 2+   Coordination   Coordination no deficits were identified   Muscle Tone   Muscle Tone no deficits were identified   Muscle Tone Comments negative clonus, negative UE & LE tone   Clinical Impression   Criteria for Skilled Therapeutic Intervention evaluation only   PT Diagnosis decreased R LE strength & sensation s/p lumbar laminectomy   Influenced by the following impairments strength, sensation, DTR   Functional limitations due to impairments prolonged ambulation, stairs, ADLs   Clinical Presentation Evolving/Changing   Clinical Presentation Rationale patient s/p 2 wks L2-L4 laminectomy returns to hospital with AMS. Patient exhibits decreased R LE strength, sensation, and DTRs as well as patient is limited with prolonged ambulation, requiring use of assistive device. patient will benefit from continued skilled physical therapy intervention at home or in an outpatient setting.    Clinical Decision Making (Complexity) Moderate complexity   Anticipated Discharge Disposition Home with Home Therapy;Other (see comments)  (OP PT)   Risk & Benefits of therapy have been explained Yes   Patient, Family & other staff in agreement with plan of care Yes   [facility] [employer] TM \"6 Clicks\"   2016, Trustees of [facility], under license to [employer].  All rights reserved.   6 Clicks Short Forms Basic Mobility Inpatient Short Form   [facility] AM-PAC  \"6 Clicks\" V.2 Basic Mobility Inpatient Short Form   1. Turning from your back to your side while in a flat bed without using bedrails? 4 - None   2. Moving from lying on your back to sitting on the side of a flat bed without using bedrails? 4 - None   3. Moving to and from a bed to a chair (including a wheelchair)? 4 - None   4. Standing up from a chair using your arms (e.g., wheelchair, or bedside chair)? 4 - None   5. To walk in hospital room? 4 - None   6. Climbing 3-5 steps with a railing? 3 - A Little   Basic Mobility Raw Score (Score " out of 24.Lower scores equate to lower levels of function) 23   Total Evaluation Time   Total Evaluation Time (Minutes) 25       Thank you for your referral.     Sherine Colin PT, DPT    [facility]    O: [phone]  E: [email or web address]

## 2019-06-12 NOTE — ED NOTES
PT reports about 8pm he took 2 of his pain pills, admits that he has not had more than a 1/2 bottle of water to drink today. Pt c/o a slight headache to right side of head and eye area that he rates 3/10. Pt sitting up on cot, remains awake and alert talking to family.

## 2019-06-12 NOTE — PROGRESS NOTES
SPIRITUAL HEALTH SERVICES  SPIRITUAL ASSESSMENT Progress Note  [facility]      During Rounding,  introduced himself to Wenceslao Vasquez & his wife and informed them of his availability.    Aiden Jaramillo M.Div., [facility]  Staff   Office tel: [phone]

## 2019-06-12 NOTE — ED NOTES
Pts surgical incision on back has dried scabs, no drainage or signs of infection noted. Pt reports he had sutures removed a couple days ago.

## 2019-06-12 NOTE — PROGRESS NOTES
Name: Wenceslao Vasquez    MRN#: [id number]    Reason for Hospitalization: TIA (transient ischemic attack) [G45.9]    Discharge Date: 06/12/19    Patient/Family Response to DC Plan: in agreement    Transportation: Family    Care Coordinator Hand off completed: Yes    Other Providers (Care Coordinator, [facility], [facility] Services etc.):  No    Future Appointments :   Future Appointments   Date Time Provider Department Center   7/26/2019  3:00 PM Shalom Hawkins PA-C [facility]   8/26/2019  3:00 [facility]       Discharge Disposition: home with resumption with [facility] Phone: [phone] - PT    Trudi Victor RN Care Coordinator  [facility] [phone]  [facility] [phone]

## 2019-06-12 NOTE — PLAN OF CARE
Discharge Planner PT   Patient plan for discharge: Home with wife assist  Current status: Patient is a 58 year old year old male admitted 6/11 with confusion and altered mental status. Recent hospitalization following L2-L4 laminectomy on 5/29/19. Normal MRI/MRA & CT.   Upon evaluation, patient exhibits mild decrease in R LE strength, 1+ Patellar & Achilles DTR on Right (2+ Left), decreased sensation in distal R LE. Did not observe ankle clonus or muscular tone deficits.   Patient able to perform bed mobility with log roll technique MOD IND & sit<>stand transfers SBA. Patient demonstrates good dynamic balance with standing marching and B UE support at 2WW. Patient ambulates in hallway with 2WW x 500 ft with CGA to SBA for safety. Exhibits good safety and LE control when ascending & descending 4 steps with bilateral railings, CGA for safety. Patient then ambulates in stairwell x 10 steps ascending with step-through patter, descending with step-to pattern ( up with good L , down with bad R ), CGA provided. Patient exhibits increased reliance on UE strength while ambulating on stairs, verbal cues help to decrease this reliance.  Barriers to return to prior living situation: 5 steps into home, 1 flight of stairs within home, wife at work from 4 am - 4 pm weekdays  Recommendations for discharge: Home with HH PT, OP PT if transportation is provided  Rationale for recommendations: Patient demonstrates safety with mobility per home requirements. Patient would benefit from continued skilled therapeutic intervention in order to progress towards his desired level of function.         Entered by: Sherine Colin 06/12/2019 2:17 PM       Physical Therapy Discharge Summary    Reason for therapy discharge:    Discharged to home with home therapy.    Progress towards therapy goal(s). See goals on Care Plan in [facility] electronic health record for goal details.  Goals met    Therapy recommendation(s):    see above

## 2019-06-12 NOTE — CONSULTS
Care Transition Initial Assessment - RN      Met with: Patient and Family.    DATA   Principal Problem:    Altered mental status  Active Problems:    Hyperlipidemia LDL goal <130    Hypertension goal BP (blood pressure) < 140/90    Lumbar stenosis    S/P lumbar laminectomy    TIA (transient ischemic attack)    CHELSY (obstructive sleep apnea)       Primary Care Clinic Name: [facility]  Primary Care MD Name: Christopher Arreola MD  Contact information and PCP information verified: Yes    ASSESSMENT  Cognitive Status: awake, alert and oriented.       Resources List: Home Care  Description of Support System: Supportive, Involved   Who is your support system?: Wife       Insurance Concerns: No Insurance issues identified    This writer met with pt and his spouse Pat, introduced self and role. Care Transitions is consulted for discharge planning. This writer discussed discharge planning and Medicare guidelines in regards to home care. Pt lives with his spouse in the community. Pt has PT services at home with [facility]- [place] [place] Phone: [phone] and he does have family support, patient in agreement with this writer discussing patients discharge options with family present. Patient is in agreement and has a goal of receiving home care in the event that these services are needed upon discharge.  Transportation provided by family.  Pt was provided with Medicare certified home care list. Pt still chooses to use [facility]- [facility] Phone: [phone].   Patient is agreeable to [facility]. As a system [facility] wants to ensure that across the care continuum that you have support through care coordination services that include nurses and social workers in the outpatient setting.  Due to your recent surgery, I feel it is important you have this support when you discharge.  They will be calling you within 24-48 hours of your discharge.   A brochure describing the services was provided.  If you have questions you can  reach out to your clinic directly and ask for the Care Coordinator assigned to your care    PLAN  [facility]: [phone] PT      Trudi Victor RN Care Coordinator  [facility] [phone]  [facility] [phone]    HOME CARE HAND OFF  Patient Name: Wenceslao Vasquez    MRN: [id number]    : 1960    Patient Zip Code: [id number]    Admit Diagnosis: TIA (transient ischemic attack) [G45.9]    Services Pt Needs at Home: Resume PT    Discharge Support: Family/Friend Support    Living Arrangements: With family     or Address Other Than Pt: No    Wound Care: No    Anticipate DC Date: 2019    Trudi Victor RN Care Coordinator  [facility] [phone]  [facility] [phone]
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
## 2019-06-13 ASSESSMENT — ACTIVITIES OF DAILY LIVING (ADL): DEPENDENT_IADLS:: TRANSPORTATION

## 2019-06-13 NOTE — PROGRESS NOTES
Clinic Care Coordination Contact  Care Coordination Communication    Clinical Data: Patient was hospitalized at [facility].     Reason for Hospitalization:  TIA (transient ischemic attack) [G45.9]  Admit Date/Time: 6/11/2019  8:36 PM  Discharge Date: 6/12/2019    Home Care Contact:              Home Care Agency: [facility]              Contact name () and phone number: Colette Murrell   [phone]              Care Coordination contacted home care: Yes              Anticipated start of care date: 6/14    Patient Contact:   Clinical Data: Care Coordinator Outreach  Outreach attempted x 1.  Left message on voicemail with call back information and requested return call.  Plan:Care Coordinator will try to reach patient again in 1-2 business days.      Grazyna HERNANDEZ, RN, PHN  Care Coordination    [facility]  [address] [place] [address]  Office: [phone]  Fax [phone]   [facility]  [address] [place] [address]  Office: [phone] Fax [phone]  [email or web address]   [email or web address]   Connect with [facility] on social media.

## 2019-06-14 NOTE — PROGRESS NOTES
Clinic Care Coordination Contact  [facility]/Voicemail    Referral Source: IP Handoff  Clinical Data: Care Coordinator Outreach  Outreach attempted x 2.  Left message on voicemail with call back information and requested return call.    Plan:  Care Coordinator reviewed chart patient has already been admitted back to home care services. Left a message with home care skin them to notify me when patient is discharged from services.  We will continue to monitor for discharge every 4 weeks.         Grazyna HERNANDEZ, RN, PHN  Care Coordination    [facility] [address] [place] [address]  Office: [phone]  Fax [phone]   [facility]  [address] [place] [address]  Office: [phone] Fax [phone]  [email or web address]   [email or web address]   Connect with [facility] on social media.

## 2019-06-14 NOTE — PROGRESS NOTES
Clinic Care Coordination Contact  Care Coordination Communication      Reason for Hospitalization:  TIA (transient ischemic attack) [G45.9]  Admit Date/Time: 6/11/2019  8:36 PM  Discharge Date: 6/12/2019     Home Care Contact:              Home Care Agency: [facility]              Contact name () and phone number: Colette Murrell   [phone]              Care Coordination contacted home care: Yes              Anticipated start of care date: 6/14      Patient Contact:               Introduced self and role of care coordination.               Discharge instructions were reviewed with patient/caregiver.               Do you have any questions about your medications? no              Follow up appointment is scheduled for not scheduled.              Provided 24 Hour Nurse Line and/or 24 Hour Appointment Scheduling: Yes              Home care has contacted patient: Yes              Patient questions/concerns: Patient called back and states he is doing okay.  He has been doing his exercises every day.  He is hoping he will get his strength back in his left leg and foot.  Patient states he does have occasional ringing in his ears that comes and goes.  He is not able to check his blood pressure at home as his machine is broken, he will be purchasing a new one.  Offered to assist patient making appointment with PCP.  Patient states he will have to speak with his wife as she is [place] missed a lot of work.    Plan: RN/SW Care Coordinator will await notification from home care staff informing RN/SW Care Coordinator of patients discharge plans/needs. RN/SW Care Coordinator will review chart and outreach to home care every 4 weeks and as needed.        Grazyna HERNANDEZ, RN, PHN  Care Coordination    [facility]  [address] [place] [address]  Office: [phone]  Fax [phone]   [facility]  [address] [place] [address]  Office: [phone] Fax  [phone]  [email or web address]   [email or web address]   Connect with [facility] on social media.

## 2019-06-17 ENCOUNTER — TELEPHONE (OUTPATIENT)
Dept: NEUROSURGERY | Facility: CLINIC | Age: 59
End: 2019-06-17

## 2019-06-17 DIAGNOSIS — Z98.890 S/P LUMBAR LAMINECTOMY: Primary | ICD-10-CM

## 2019-06-17 DIAGNOSIS — M79.661 RIGHT CALF PAIN: ICD-10-CM

## 2019-06-17 RX ORDER — METHYLPREDNISOLONE 4 MG
TABLET, DOSE PACK ORAL
Qty: 21 TABLET | Refills: 0 | Status: CANCELLED | OUTPATIENT
Start: 2019-06-17

## 2019-06-17 NOTE — TELEPHONE ENCOUNTER
REASON FOR CALL: YOLY Roberts from pt's home care called. She states that pt's right foot has been feeling cold and painful. He is unable to sleep. She states that there is no redness or tenderness, and no blood clots. She would like for clinic to call the pt back to further discuss, and to see if clinic can help the pt w/area of concern.

## 2019-06-17 NOTE — TELEPHONE ENCOUNTER
"Talked with patient and spouse. Pt states that pain started after surgery on 5/29/19. Pain described as numbness and burning pain and feels as if foot is \"swollen from the inside\", starts at calf and radiates to foot. Has been alternating ice/heat, doing exercises recommended by PT. He has been taking Tylenol, does have rx for Oxycodone but has not been taking.     Encouraged pt to utilize Oxycodone, Robaxin and Tylenol together. Recommended MDP to help with pain and inflammation. Pt verbalized understanding and all questions were answered.     Will send to care team for approval, pt would like MDP sent [facility]    "

## 2019-06-17 NOTE — TELEPHONE ENCOUNTER
RC to patient per request of Alejandra gonzales RN. Patient is s/p L2-4 laminectomies on 5/29/19 with Dr. Calvo. LVM with patient on both numbers listed.    Will await return call from patient/homecare.

## 2019-06-18 ENCOUNTER — TELEPHONE (OUTPATIENT)
Dept: FAMILY MEDICINE | Facility: OTHER | Age: 59
End: 2019-06-18

## 2019-06-18 DIAGNOSIS — Z53.9 DIAGNOSIS NOT YET DEFINED: Primary | ICD-10-CM

## 2019-06-18 PROCEDURE — G0180 MD CERTIFICATION HHA PATIENT: HCPCS | Performed by: FAMILY MEDICINE

## 2019-06-18 NOTE — TELEPHONE ENCOUNTER
Home Health Certification and Plan of Care forms received.     Certification Period from 6/3/19 to 8/1/19.    RN reviewed medications.     Forms given to PCP to sign.     MARY Xiao, RN  [facility]

## 2019-06-18 NOTE — TELEPHONE ENCOUNTER
Called and informed patient of plan for a RLE ultrasound to r/o DVT, if negative would try MDP per Yi ISAACS. Order placed for ultrasound and detailed message left with wife per patients request to help coordinate appointment for ultrasound in .

## 2019-06-19 ENCOUNTER — HOSPITAL ENCOUNTER (OUTPATIENT)
Dept: ULTRASOUND IMAGING | Facility: CLINIC | Age: 59
Discharge: HOME | End: 2019-06-19
Attending: PHYSICIAN ASSISTANT | Admitting: PHYSICIAN ASSISTANT
Payer: COMMERCIAL

## 2019-06-19 ENCOUNTER — TELEPHONE (OUTPATIENT)
Dept: NEUROSURGERY | Facility: CLINIC | Age: 59
End: 2019-06-19

## 2019-06-19 DIAGNOSIS — Z98.890 S/P LUMBAR LAMINECTOMY: ICD-10-CM

## 2019-06-19 DIAGNOSIS — Z98.890 S/P LUMBAR LAMINECTOMY: Primary | ICD-10-CM

## 2019-06-19 DIAGNOSIS — M79.661 RIGHT CALF PAIN: ICD-10-CM

## 2019-06-19 PROCEDURE — 93971 EXTREMITY STUDY: CPT | Mod: RT

## 2019-06-19 RX ORDER — METHYLPREDNISOLONE 4 MG
TABLET, DOSE PACK ORAL
Qty: 21 TABLET | Refills: 0 | Status: SHIPPED | OUTPATIENT
Start: 2019-06-19 | End: 2019-06-27

## 2019-06-19 NOTE — TELEPHONE ENCOUNTER
----- Message from Yi Reynoso PA-C sent at 6/19/2019  1:02 PM CDT -----  US negative. OK to order MDP. Thx!    Called and reviewed above with patient. Understanding was verbalized. MDP sent to [facility] pharmacy in [place]. Patient will contact clinic if any further questions or concerns.

## 2019-06-27 ENCOUNTER — OFFICE VISIT (OUTPATIENT)
Dept: FAMILY MEDICINE | Facility: OTHER | Age: 59
End: 2019-06-27
Payer: COMMERCIAL

## 2019-06-27 VITALS
SYSTOLIC BLOOD PRESSURE: 124 MMHG | OXYGEN SATURATION: 98 % | RESPIRATION RATE: 20 BRPM | TEMPERATURE: 97.8 F | DIASTOLIC BLOOD PRESSURE: 78 MMHG | BODY MASS INDEX: 34.31 KG/M2 | WEIGHT: 222.31 LBS | HEART RATE: 60 BPM

## 2019-06-27 DIAGNOSIS — M48.07 STENOSIS OF LUMBOSACRAL SPINE: ICD-10-CM

## 2019-06-27 DIAGNOSIS — I10 HYPERTENSION GOAL BP (BLOOD PRESSURE) < 140/90: ICD-10-CM

## 2019-06-27 DIAGNOSIS — G45.9 TIA (TRANSIENT ISCHEMIC ATTACK): Primary | ICD-10-CM

## 2019-06-27 DIAGNOSIS — E78.5 HYPERLIPIDEMIA LDL GOAL <130: ICD-10-CM

## 2019-06-27 PROCEDURE — 99495 TRANSJ CARE MGMT MOD F2F 14D: CPT | Performed by: PHYSICIAN ASSISTANT

## 2019-06-27 RX ORDER — AMITRIPTYLINE HYDROCHLORIDE 10 MG/1
10 TABLET ORAL AT BEDTIME
Qty: 30 TABLET | Refills: 0 | Status: SHIPPED | OUTPATIENT
Start: 2019-06-27 | End: 2019-12-02

## 2019-06-27 NOTE — PROGRESS NOTES
Subjective     Wenceslao Vasquez is a 58 year old male who presents to clinic today for the following health issues:    [facility]       Hospital Follow-up Visit:    Hospital/Nursing Home/IP Rehab Facility: [facility]  Date of Admission: 06/11/2019  Date of Discharge: 06/12/2019  Reason(s) for Admission: hypertension, TIA, spinal stenosis seen Teri Kelly MD and Abhay Hager            Problems taking medications regularly:  None       Medication changes since discharge: Was put on Lisinopril and Medro dospak       Problems adhering to non-medication therapy:  None    Summary of hospitalization:  [facility] discharge summary reviewed  Diagnostic Tests/Treatments reviewed.  Follow up needed: Neurosurgery for ongoing back pain  Other Healthcare Providers Involved in Patient s Care:         Specialist appointment - Neurosurgery  Update since discharge: stable.     Post Discharge Medication Reconciliation: discharge medications reconciled, continue medications without change.  Plan of care communicated with patient and family     Coding guidelines for this visit:  Type of Medical   Decision Making Face-to-Face Visit       within 7 Days of discharge Face-to-Face Visit        within 14 days of discharge   Moderate Complexity [id number]   High Complexity [id number]          Patient is a 58 year old male who presents with his wife for follow up of recent hospitalization. He was admitted to [facility] on 06/11/2019 for observation due to presents with symptoms of right sided headache, vomiting, facial droop, and odd behaviors. Patient recently, 05/29/2019, underwent L2-4 laminectomy and medial facetectomy for decompression of central stenosis causing chronic right lower extremity weakness and numbness. Wife says that he has not been tolerating the opioid pain meds well, complaining of poor appetite, nausea, clammy and chills. Review of the ED note shows that the patient's lab work and  "imaging returned grossly unremarkable and his symptoms improved. Diagnosis was TIA was given and patient was told to begin aspirin as a preventative measure against CVA. Today he reports that the symptoms have not recurred. He has continued to take the aspirin without issue and has not used the other pain medication. He is frustrated that the numbness in the right leg has not improved and complains of a burning/ache that improves with walking. I see that he underwent US on 06/19/2019 to rule out DVT, returned normal. Discussed with patient that I suspect his symptoms are due to the stenosis and/or surgical procedure. Reviewed recommendations for repeat lipids and consideration for restarting statin medication. Patient is not fasting today, but will return to have this done.       Altered mental status    TIA     Wenceslao Vasquez is a 58 year old male who presented to the emergency room with altered mental status.  Patient reportedly took 2 pain pills at 8:00 and about 30 minutes later started complaining of a right-sided headache and had a large emesis.  Per patient family he was not responding to questions and \" staring off into space\".  Patient was answering \" yes\" to all questions.  Patient with severe, progressive neurogenic claudications and progressive loss of gait function.  MRI showed severe stenosis at L2-3 and L3-4.  He underwent non-operative management with failure to improve. Patient presented to the hospital for elective L2-3 and L3-4 bilateral laminectomy and medial facetectomy for decompression of central stenosis on 5/29/19.  Patient did have dural tear postoperatively but was discharged in stable condition.  Since surgery patient has not been eating or drinking well in the last several days has had a very poor appetite.  Patient has had nausea on and off.  Patient was discharged home on oxycodone, Robaxin and stool softeners. Patient spouse reports pt c/o being cold with chills and was clammy to the " touch. States today he started starring off and on occasion with jerk and wake up.  According to the family, patient has not had any other new symptoms, no diarrhea, there is been no recent trauma.  Patient incisional site is clean dry and intact with healing scabbed areas.     Patient had these mental status changes prior to arrival to the emergency room as well as a slight right sided facial droop and decreased  in the right hand. Blood sugar was 130.  Patient had CT that was normal in the emergency room.  Patient was admitted overnight for MRI.  Patient symptoms did seem to clear with completely baseline mental status and normal strength in his right hand overnight.  This morning patient did have recurrence of his right hand weakness.  No facial droop or other neurologic deficits.  Patient has a baseline right lower extremity weakness.  Patient sent for MRI/ MRA of head and neck which was within normal limits.  Discussed the above with neurosurgery PA and stroke neurologist Dr. Quevedo. LEVAR [place] with neurosurgery updated, no changes to plan aside from regular follow-up.  After discussion with stroke neurology, discussed the chance of TIA.  Will start baby aspirin daily.  Aspirin prescription was cleared with neurosurgery.  Patient is 2 weeks postop and both NSAIDs and aspirin are okay.  Discussed with patient if he does take ibuprofen and aspirin to monitor for acid reflux. No sign of infection on incision or on exam.  Urinalysis within normal limits.  Both basic metabolic panel and CBC normal.     As well as all of the above patient presented with elevated blood pressure.  He was on lisinopril and discontinued about 5 years ago. Lisinopril was restarted with benefit and noted decrease in blood pressure.     Patient was admitted to observation overnight and evaluation complete as above.  Patient discharged in stable condition on day of discharge.  Patient does have right lower extremity weakness that is  chronic.  Patient presented with right arm weakness and altered mental status that had resolved and then returned to the next morning.  By this afternoon patient symptoms had resolved.   Patient was seen by physical therapy and recommendation for home PT complete.  Etiology uncertain at this time.  Possible TIA.  Recommend patient discontinue oxycodone.  Recommend lower dose of Robaxin.  If patient trials any new medication recommend taking one at a time.  Recommend increasing oral intake, eating and drinking well, sleeping well.  Recommend ongoing management of blood pressure.  Recommend daily baby aspirin.Patient will need to follow-up with primary care for monitoring of blood pressure and medication adjustment.  Patient will need to follow-up with neurosurgery as previously scheduled.  Patient will need to follow-up with neurology to monitor for signs of TIA and possible work-up.  Patient is due for cholesterol testing.  Patient will need to be fasting.  Patient discharged in stable condition.           Hypertension goal BP (blood pressure) < 140/90  Pressure elevated on admission up to 170/100, hx of hypertension in the past, was on lisinopril but stopped about 5 years ago.  Recent hospitalization shows normal blood pressure.  Unclear etiology and likely multifactorial for elevated blood pressure.  Lisinopril restarted this admission and will need to follow-up with primary care.          Lumbar stenosis  Chronic lumbar stenosis,  status post laminectomy, seem to be normal otherwise.given patient above symptoms recommend to stop oxycodone and decrease dose of Robaxin to 250 mg.  Recommend using ibuprofen and Tylenol as needed for pain.  Will have home physical therapy evaluation.  Ice, rest, activity per PT.        CHELSY (obstructive sleep apnea)  Chronic and stable, not on Cpap.  Follow-up with primary care.        Hyperlipidemia LDL goal <130  Chronic and stable, last checked 6 of 2018.  Given possible TIA and on  discussion with stroke neurology recommend rechecking at next primary care visit.     Reviewed and updated as needed this visit by Provider         Review of Systems   ROS COMP: Constitutional, HEENT, cardiovascular, pulmonary, gi and gu systems are negative, except as otherwise noted.      Objective    /78 (BP Location: Left arm, Patient Position: Sitting, Cuff Size: Adult Large)   Pulse 60   Temp 97.8  F (36.6  C) (Temporal)   Resp 20   Wt 100.8 kg (222 lb 5 oz)   SpO2 98%   BMI 34.31 kg/m    Body mass index is 34.31 kg/m .  Physical Exam   GENERAL: healthy, alert and no distress  EYE: PERRL, normal EOM movements  NECK: no adenopathy, no asymmetry, masses, or scars and thyroid normal to palpation  RESP: lungs clear to auscultation - no rales, rhonchi or wheezes  CV: regular rate and rhythm, normal S1 S2, no S3 or S4, no murmur, click or rub, no peripheral edema and peripheral pulses strong  ABDOMEN: soft, nontender, no hepatosplenomegaly, no masses and bowel sounds normal  MS: no gross musculoskeletal defects noted, no edema, antalgic gait to the right  NEURO: Normal strength and tone, poor sensitivity to light touch over medial right lower leg and plantar surface of right foot, mentation intact and speech normal, normal alternating movements, neg rhomberg, CN 2-12 intact  PSYCH: mentation appears normal, affect normal/bright    Diagnostic Test Results:  Labs reviewed in Epic        Assessment & Plan     1. TIA (transient ischemic attack)  Patient will remain on aspirin and return for lipid labs. Discussed TIA pathophysiology with him and risk for CVA following.  ABCD2 score is low risk for repeat stroke. No subsequent symptoms since discharge. Consider resuming statin medication pending lab results.     - Lipid Profile; Future    2. Stenosis of lumbosacral spine  Ongoing weakness, numbness and burning in right lower leg and foot. Did not improve with surgery. Will start amitriptyline as patient has  tried gabapentin in the past and did not tolerate it (made him irritable). Discussed possible adverse effects and realistic timeline for benefit. Patient will call in 2 weeks to update, if tolerating consider increasing to 20mg at bedtime.   - amitriptyline (ELAVIL) 10 MG tablet; Take 1 tablet (10 mg) by mouth At Bedtime  Dispense: 30 tablet; Refill: 0    3. Hyperlipidemia LDL goal <130  Pending lab results will consider resuming statin medication.     4. Hypertension goal BP (blood pressure) < 140/90  No longer taking the lisinopril. BP is 124/78, will continue to monitor at future visits.     Follow up with clinic for annual checkup or sooner if conditions change, worsen or fail to improve as expected.      No follow-ups on file.    Kapil Pollock PA-C  [facility]

## 2019-06-27 NOTE — PATIENT INSTRUCTIONS
Patient Education     What Is a TIA?    A TIA (transient ischemic attack) is an early warning that a stroke (also called a brain attack) is coming. A TIA is a temporary stroke. It causes no lasting damage. But the effects of a stroke, if it happens, can be very serious and lasting. If you think you are having symptoms of a TIA or stroke--even if they don t last--get medical help right away.  Symptoms of TIA and stroke  Symptoms may come on suddenly and last for a few seconds or a few hours. You may have symptoms only once. Or they may come and go for days. If you notice any of the following symptoms, don t wait. Call 911 or emergency services right away.    Weakness, numbness, tingling, or loss of feeling in your face, arm, or leg    Trouble seeing in one or both eyes; double vision    Slurred speech, trouble talking, or problems understanding others when they speak    Sudden, severe headache    Dizziness or a feeling of spinning    Loss of balance or falling    Blackouts  F.A.S.T. is an easy way to remember the signs of a stroke. When you see the signs, you will know what you need to call 911 fast.   F.A.S.T. stands for:     F is for face drooping. One side of the face is drooping or numb. When the person smiles, the smile is uneven.    A is for arm weakness. One arm is weak or numb. When the person lifts both arms and the same time, one arm may drift downward.    S is for speech difficulty. You may notice slurred speech or trouble speaking. The person can't repeat a simple sentence correctly when asked.    T is for time to call 911. If someone shows any of these symptoms, even if they go away, call 911 right away. Make note of the time the symptoms first appeared.   Date Last Reviewed: 7/1/2017 2000-2018 [employer]. [address] [place] [address], [place] [address]. All rights reserved. This information is not intended as a substitute for professional medical care. Always follow your healthcare  professional's instructions.           Patient Education     Transient Ischemic Attack (TIA)   Your symptoms were caused by a TIA, or mini-stroke. Even though your symptoms have gone away, this condition is as serious as a full stroke. It means you are more likely to have a full stroke. About 1 in 3 people who have a TIA go on to have a full stroke. And 4% to 10% of those people will have the stroke within 2 days.  A TIA is caused when something decreases or blocks blood flow to a part of your brain. A TIA often happens when a blood clot travels to a blood vessel in the brain. The clot reduces or blocks blood flow. This causes the symptoms you had. After a short while, the clot dissolves. Blood flows again, and the symptoms go away. People with hardening of the arteries (atherosclerosis) are at higher risk for a TIA. So are people who have an irregular heartbeat called atrial fibrillation.  A TIA causes symptoms similar to a stroke, but they last less than 24 hours. A full stroke causes symptoms that last more than 24 hours and may be permanent. But even if your symptoms only lasted a short time, the TIA may have damaged your brain tissue. Once you have had a TIA, you are at risk of having a full stroke. You will need tests to look at the blood flow to your brain. The tests can also rule out other causes of your symptoms. The tests may include an ultrasound of the arteries in your neck and an evaluation of your heart. They may also include a CT scan of your brain, an MRI scan of your brain, or both. If your healthcare provider finds problems, he or she will recommend treatment with medicines, procedures, or both.  Your provider may prescribe medicines to reduce your chance of having another TIA and stroke. These may include medicines that prevent blood clots, such as antiplatelet medicines and blood thinners (anticoagulants). Your doctor may recommend other treatment. This may include a procedure to open up a  blocked artery in your neck.  Home care  The following guidelines will help you take care of yourself at home:    Take any medicines your doctor has prescribed as directed. These may include antiplatelet medicines or medicines for other conditions, such as high blood pressure or high cholesterol.    A TIA is a serious event that puts you at risk of having a full stroke. Because of this, it is important to take steps to help prevent a stroke from happening. Your doctor will look at all of your risk factors when deciding on what other treatment you may need.  Ways to reduce your risk for stroke  High blood pressure, diabetes, high cholesterol, heavy drinking, and smoking are risk factors for stroke and heart disease. You can control these by taking medicines and making diet and lifestyle changes. One way to help prevent a stroke is to take aspirin or a similar medicine every day. But don't take daily aspirin unless your healthcare provider tells you to.  Your provider will work with you to make lifestyle changes to help prevent a stroke.  Diet  Your healthcare provider will give you information about changes you may need to make to your diet. You may need to see a registered dietitian for help with diet changes. Changes may include:    Eating less fat and cholesterol    Eating less salt (sodium). This is especially important if you have high blood pressure.    Eating more fresh fruits and vegetables    Eating lean proteins, such as fish, poultry, beans, and peas    Eating less red meat and processed meats    Using low-fat dairy products    Using vegetable and nut oils in limited amounts    Limiting how many sweets and processed foods such as chips, cookies, and baked goods you eat    Limiting how much alcohol you drink  Physical activity  Your healthcare provider may recommend that you get more exercise if you have not been as active as possible. He or she may suggest that you get 40 minutes of moderate to  vigorous physical activity each day. You should do this at least 3 to 4 days a week. A few examples of moderate to vigorous exercise are:    Walking at a brisk pace, about 3 to 4 miles per hour    Jogging or running    Swimming or water aerobics    Hiking    Dancing    Martial arts    Tennis    Riding a bike  Other ways to reduce your risk    Weight management. If you are overweight or obese, your healthcare provider will work with you to lose weight and lower your body mass index (BMI) to a normal or near-normal level. Making diet changes and increasing physical activity can help.    Smoking. If you smoke, break the habit. Enroll in a stop smoking program to improve your chances of success.    Stress. Learn how to manage your stress. This will help you deal with stress at home and at work.  Follow-up care  Call your doctor for an appointment in the next few days for another evaluation, or as advised. This is to make a plan for preventing another TIA or stroke. You may need to see a neurologist to follow up on your TIA. A neurologist is a doctor who specializes in treating brain and nervous system problems. You may need other tests or procedures.  If you had an X-ray, CT scan, MRI scan, or ECG (electrocardiogram), a specialist will review it. You ll be told of any new findings that will affect your care.  Call 911  Call 911 if any of these occur:    Any of your TIA symptoms return    New problems with speech, vision, walking, or weakness or numbness of the face or on one side of the body    Severe headache, fainting spell, dizziness, or seizure  Date Last Reviewed: 10/1/2016    4863-2700 [employer]. [address] [place] [address], [place] [address]. All rights reserved. This information is not intended as a substitute for professional medical care. Always follow your healthcare professional's instructions.

## 2019-07-08 ENCOUNTER — TELEPHONE (OUTPATIENT)
Dept: NEUROSURGERY | Facility: CLINIC | Age: 59
End: 2019-07-08

## 2019-07-08 NOTE — TELEPHONE ENCOUNTER
Surgery procedure/date: L2-4 laminectomies on 5/29/19 with Dr. Calvo.    Patient called clinic today reporting: Continued numbness from right knee to right foot. He states this has been present since surgery. Triage call from 6/17 states patient's home care nurse called because patient's right foot was cold and painful. US was ordered to rule out DVT, results came back negative. He tried MDP on 6/19 but no relief.     On 7/4/19 he started experiencing new pain in left buttocks to posterior left knee. He denies any falls or injuries that occurred. He reports 10/10 pain, especially when walking. Tried oxycodone but no relief, taking 2 tabs q 4-5 hours.      Will communicate with care team for recommendation.

## 2019-07-08 NOTE — TELEPHONE ENCOUNTER
Discussed with Dr. Calvo. Recommend patient continue with post op recommendations, and follow-up in clinic in 1-2 weeks. Discussed with wife and she's willing to bring patient to [place] since there are sooner available appts here. Scheduled for next Monday 7/15/19.     Advised patient/wife to call back if symptoms change or worsen before Monday.

## 2019-07-15 ENCOUNTER — OFFICE VISIT (OUTPATIENT)
Dept: NEUROSURGERY | Facility: CLINIC | Age: 59
End: 2019-07-15
Attending: NEUROLOGICAL SURGERY
Payer: COMMERCIAL

## 2019-07-15 VITALS
SYSTOLIC BLOOD PRESSURE: 131 MMHG | WEIGHT: 222 LBS | DIASTOLIC BLOOD PRESSURE: 75 MMHG | HEART RATE: 80 BPM | TEMPERATURE: 98 F | OXYGEN SATURATION: 96 % | BODY MASS INDEX: 34.26 KG/M2

## 2019-07-15 DIAGNOSIS — Z98.890 S/P LUMBAR LAMINECTOMY: Primary | ICD-10-CM

## 2019-07-15 PROCEDURE — G0463 HOSPITAL OUTPT CLINIC VISIT: HCPCS

## 2019-07-15 PROCEDURE — 99024 POSTOP FOLLOW-UP VISIT: CPT | Performed by: NEUROLOGICAL SURGERY

## 2019-07-15 RX ORDER — PREGABALIN 75 MG/1
75 CAPSULE ORAL 2 TIMES DAILY
Qty: 60 CAPSULE | Refills: 0 | Status: SHIPPED | OUTPATIENT
Start: 2019-07-15 | End: 2019-08-15

## 2019-07-15 RX ORDER — HYDROMORPHONE HYDROCHLORIDE 2 MG/1
2-4 TABLET ORAL EVERY 6 HOURS PRN
Qty: 40 TABLET | Refills: 0 | Status: SHIPPED | OUTPATIENT
Start: 2019-07-15 | End: 2019-09-12

## 2019-07-15 ASSESSMENT — PAIN SCALES - GENERAL: PAINLEVEL: WORST PAIN (10)

## 2019-07-15 NOTE — PATIENT INSTRUCTIONS
Dilaudid and Lyrica provided today    Follow up on 7/26 as scheduled    Please call our clinic with further questions/concerns    YOLY Mai    [facility]  Phone: [phone]
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
## 2019-07-15 NOTE — NURSING NOTE
"Wenceslao Vasquez is a 58 year old male who presents for:  Chief Complaint   Patient presents with     other     right lower leg numbness and left leg pain         Initial Vitals:  /75   Pulse 80   Temp 98  F (36.7  C) (Oral)   Wt 222 lb (100.7 kg)   SpO2 96%   BMI 34.26 kg/m   Estimated body mass index is 34.26 kg/m  as calculated from the following:    Height as of 5/23/19: 5' 7.5\" (1.715 m).    Weight as of this encounter: 222 lb (100.7 kg).. Body surface area is 2.19 meters squared. BP completed using cuff size: large  Worst Pain (10)          Marlen Hoyt RN     "
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
## 2019-07-26 ENCOUNTER — OFFICE VISIT (OUTPATIENT)
Dept: NEUROSURGERY | Facility: CLINIC | Age: 59
End: 2019-07-26
Payer: COMMERCIAL

## 2019-07-26 VITALS
OXYGEN SATURATION: 99 % | BODY MASS INDEX: 35.6 KG/M2 | WEIGHT: 230.7 LBS | HEART RATE: 90 BPM | SYSTOLIC BLOOD PRESSURE: 130 MMHG | DIASTOLIC BLOOD PRESSURE: 72 MMHG | TEMPERATURE: 98 F

## 2019-07-26 DIAGNOSIS — M54.16 LUMBAR RADICULOPATHY: ICD-10-CM

## 2019-07-26 DIAGNOSIS — Z98.890 S/P LUMBAR LAMINECTOMY: Primary | ICD-10-CM

## 2019-07-26 PROCEDURE — 99024 POSTOP FOLLOW-UP VISIT: CPT | Performed by: PHYSICIAN ASSISTANT

## 2019-07-26 RX ORDER — OXYCODONE HYDROCHLORIDE 5 MG/1
5-10 TABLET ORAL EVERY 6 HOURS PRN
Qty: 45 TABLET | Refills: 0 | Status: SHIPPED | OUTPATIENT
Start: 2019-07-26 | End: 2019-08-15

## 2019-07-26 ASSESSMENT — PAIN SCALES - GENERAL: PAINLEVEL: MODERATE PAIN (4)

## 2019-07-26 NOTE — NURSING NOTE
"Wenceslao Vasquez is a 58 year old male who presents for:  Chief Complaint   Patient presents with     Neurologic Problem     LAMINECTOMIES LUMBAR 2-4        Initial Vitals:  /72   Pulse 90   Temp 98  F (36.7  C) (Temporal)   Wt 104.6 kg (230 lb 11.2 oz)   SpO2 99%   BMI 35.60 kg/m   Estimated body mass index is 35.6 kg/m  as calculated from the following:    Height as of 5/23/19: 1.715 m (5' 7.5\").    Weight as of this encounter: 104.6 kg (230 lb 11.2 oz).. Body surface area is 2.23 meters squared. BP completed using cuff size: regular  Moderate Pain (4)        Nursing Comments:         Linn Delgado    "

## 2019-07-26 NOTE — PROGRESS NOTES
[facility]  Neurosurgery followup:    HPI: 58-year-old male, 6 weeks out from L2-L4 laminectomy.  He states that he continues with severe pain in his right lower extremity, with some numbness in the plantar aspect of his foot.  He describes severe pain that comes from his lateral calf and moves anteriorly down the ankle and into the dorsal aspect of his right foot.  Symptoms are bad enough that it keeps him from sleeping most nights.  He had seen Dr. Calvo about 2 weeks ago and was started on Lyrica.  He states that this has not yet helped.  Exam:  Constitutional:  Alert, well nourished, NAD.  HEENT: Normocephalic, atraumatic.   Pulm:  Without shortness of breath   CV:  No pitting edema of BLE.      Neurological:  Awake  Alert  Oriented x 3  Motor exam:        IP Q DF PF EHL  R   5  5   5   5    5  L   5  5   5   5    5     Reflexes are 2+ in the patellar and Achilles. There is no clonus. Downgoing Babinski.      Able to spontaneously move L/E bilaterally  Sensation intact throughout all L/E dermatomes     Incision: Well-healed    A/P:  6 weeks out from L2-4 laminectomy, now with right lower extremity paresthesias developed since then.  He was placed on Lyrica, which I encouraged him to stay on.  At this point we will also check a new lumbar spine MRI, with and without contrast.  We will follow-up with him by phone as soon as the results are available.  He voiced agreement and understanding.        Shalom Hawkins PA-C  [facility]  [address] [address]    Tel [phone]  Pager [phone]

## 2019-07-26 NOTE — LETTER
7/26/2019         RE: Wenceslao Vasquez  [address] [facility] [address]        Dear Colleague,    Thank you for referring your patient, Wenceslao Vasquez, to the [facility]. Please see a copy of my visit note below.    [facility]  Neurosurgery followup:    HPI: 58-year-old male, 6 weeks out from L2-L4 laminectomy.  He states that he continues with severe pain in his right lower extremity, with some numbness in the plantar aspect of his foot.  He describes severe pain that comes from his lateral calf and moves anteriorly down the ankle and into the dorsal aspect of his right foot.  Symptoms are bad enough that it keeps him from sleeping most nights.  He had seen Dr. Calvo about 2 weeks ago and was started on Lyrica.  He states that this has not yet helped.  Exam:  Constitutional:  Alert, well nourished, NAD.  HEENT: Normocephalic, atraumatic.   Pulm:  Without shortness of breath   CV:  No pitting edema of BLE.      Neurological:  Awake  Alert  Oriented x 3  Motor exam:        IP Q DF PF EHL  R   5  5   5   5    5  L   5  5   5   5    5     Reflexes are 2+ in the patellar and Achilles. There is no clonus. Downgoing Babinski.      Able to spontaneously move L/E bilaterally  Sensation intact throughout all L/E dermatomes     Incision: Well-healed    A/P:  6 weeks out from L2-4 laminectomy, now with right lower extremity paresthesias developed since then.  He was placed on Lyrica, which I encouraged him to stay on.  At this point we will also check a new lumbar spine MRI, with and without contrast.  We will follow-up with him by phone as soon as the results are available.  He voiced agreement and understanding.        Shalom Hawkins PA-C  [facility]  [address]  [address] [place] [address], [place] [address]    Tel [phone]  Pager [phone]      Again, thank you for allowing me to participate in the care of your patient.        Sincerely,        Shalom AVENDAÑO  SHITAL Hawkins

## 2019-07-27 ENCOUNTER — HOSPITAL ENCOUNTER (OUTPATIENT)
Dept: MRI IMAGING | Facility: CLINIC | Age: 59
Discharge: HOME | End: 2019-07-27
Attending: PHYSICIAN ASSISTANT | Admitting: PHYSICIAN ASSISTANT
Payer: COMMERCIAL

## 2019-07-27 DIAGNOSIS — M54.16 LUMBAR RADICULOPATHY: ICD-10-CM

## 2019-07-27 DIAGNOSIS — Z98.890 S/P LAMINECTOMY: ICD-10-CM

## 2019-07-27 DIAGNOSIS — Z98.890 S/P LUMBAR LAMINECTOMY: ICD-10-CM

## 2019-07-27 DIAGNOSIS — M48.061 LUMBAR STENOSIS: Primary | ICD-10-CM

## 2019-07-27 PROCEDURE — 72158 MRI LUMBAR SPINE W/O & W/DYE: CPT

## 2019-07-27 PROCEDURE — A9585 GADOBUTROL INJECTION: HCPCS | Performed by: PHYSICIAN ASSISTANT

## 2019-07-27 PROCEDURE — 25500064 ZZH RX 255 OP 636: Performed by: PHYSICIAN ASSISTANT

## 2019-07-27 RX ORDER — GADOBUTROL 604.72 MG/ML
10 INJECTION INTRAVENOUS ONCE
Status: COMPLETED | OUTPATIENT
Start: 2019-07-27 | End: 2019-07-27

## 2019-07-27 RX ADMIN — GADOBUTROL 10 ML: 604.72 INJECTION INTRAVENOUS at 11:41

## 2019-08-01 ENCOUNTER — TELEPHONE (OUTPATIENT)
Dept: NEUROSURGERY | Facility: CLINIC | Age: 59
End: 2019-08-01

## 2019-08-01 NOTE — TELEPHONE ENCOUNTER
Wife Leslye left message on the nurse line stating she is returning a call to Shalom ISAACS for husbands MRI results. Will forward to Shalom ISAACS to review.

## 2019-08-14 ENCOUNTER — MYC MEDICAL ADVICE (OUTPATIENT)
Dept: NEUROSURGERY | Facility: CLINIC | Age: 59
End: 2019-08-14

## 2019-08-14 DIAGNOSIS — Z98.890 S/P LUMBAR LAMINECTOMY: ICD-10-CM

## 2019-08-14 DIAGNOSIS — M54.16 LUMBAR RADICULOPATHY: ICD-10-CM

## 2019-08-15 ENCOUNTER — MYC REFILL (OUTPATIENT)
Dept: NEUROSURGERY | Facility: CLINIC | Age: 59
End: 2019-08-15

## 2019-08-15 DIAGNOSIS — Z98.890 S/P LUMBAR LAMINECTOMY: ICD-10-CM

## 2019-08-15 RX ORDER — PREGABALIN 75 MG/1
75 CAPSULE ORAL 2 TIMES DAILY
Qty: 60 CAPSULE | Refills: 0
Start: 2019-08-15 | End: 2019-08-26

## 2019-08-15 RX ORDER — OXYCODONE HYDROCHLORIDE 5 MG/1
5-10 TABLET ORAL EVERY 6 HOURS PRN
Qty: 25 TABLET | Refills: 0 | Status: SHIPPED | OUTPATIENT
Start: 2019-08-15 | End: 2019-08-26

## 2019-08-15 NOTE — TELEPHONE ENCOUNTER
Called and left detailed message on Wife's phone (Leslye) that refill was approved for oxycodone and brought to [facility] and refill for Lyrica authorized as well and was called into [facility] in [place] per patient request.

## 2019-08-15 NOTE — TELEPHONE ENCOUNTER
Prescription Refill Request    Medication: Lyrica 75mg capsule     Surgical procedure/date: L2-4 laminectomy 5/29    Current regimen/number of tabs per day: 75 mg BID    Last refill: 7/15    Pain scale today (0-10): 2-5/10, at night goes up to 10/10. Down his back, incisional area, into buttocks and then R leg knee down to foot, numbness.      location: [facility] [address] [place] [address].  Rx approved by Luz Maria Santa CNP and called into [facility].     Any patient questions or concerns: None.

## 2019-08-15 NOTE — TELEPHONE ENCOUNTER
Prescription Refill Request    Medication: Oxycodone     Surgical procedure/date: L2-4 laminectomy 5/29    Current regimen/number of tabs per day: 2 tabs at night before bed. Occasionally 1-2 tabs throughout the day.     Last refill: 7/26    Pain scale today (0-10): 2-5/10, at night goes up to 10/10. Down his back, incisional area, into buttocks and then R leg knee down to foot, numbness.      location: [facility]     Any patient questions or concerns: Pt has a f/u visit with Dr. Calvo 8/26.     Will forward request to care team for approval.

## 2019-08-26 ENCOUNTER — OFFICE VISIT (OUTPATIENT)
Dept: NEUROSURGERY | Facility: CLINIC | Age: 59
End: 2019-08-26
Attending: NEUROLOGICAL SURGERY
Payer: COMMERCIAL

## 2019-08-26 VITALS
WEIGHT: 225 LBS | TEMPERATURE: 98.1 F | BODY MASS INDEX: 33.33 KG/M2 | OXYGEN SATURATION: 95 % | RESPIRATION RATE: 16 BRPM | HEIGHT: 69 IN | DIASTOLIC BLOOD PRESSURE: 84 MMHG | HEART RATE: 83 BPM | SYSTOLIC BLOOD PRESSURE: 127 MMHG

## 2019-08-26 DIAGNOSIS — Z98.890 S/P LUMBAR LAMINECTOMY: Primary | ICD-10-CM

## 2019-08-26 DIAGNOSIS — M54.16 LUMBAR RADICULOPATHY: ICD-10-CM

## 2019-08-26 DIAGNOSIS — Z98.890 S/P LUMBAR LAMINECTOMY: ICD-10-CM

## 2019-08-26 DIAGNOSIS — M54.41 CHRONIC RIGHT-SIDED LOW BACK PAIN WITH RIGHT-SIDED SCIATICA: ICD-10-CM

## 2019-08-26 DIAGNOSIS — G89.29 CHRONIC RIGHT-SIDED LOW BACK PAIN WITH RIGHT-SIDED SCIATICA: ICD-10-CM

## 2019-08-26 PROCEDURE — 99024 POSTOP FOLLOW-UP VISIT: CPT | Performed by: NEUROLOGICAL SURGERY

## 2019-08-26 PROCEDURE — G0463 HOSPITAL OUTPT CLINIC VISIT: HCPCS

## 2019-08-26 RX ORDER — OXYCODONE HYDROCHLORIDE 5 MG/1
5-10 TABLET ORAL EVERY 8 HOURS PRN
Qty: 25 TABLET | Refills: 0 | Status: SHIPPED | OUTPATIENT
Start: 2019-08-26 | End: 2019-12-02

## 2019-08-26 RX ORDER — PREGABALIN 75 MG/1
75 CAPSULE ORAL 2 TIMES DAILY
Qty: 60 CAPSULE | Refills: 2 | Status: SHIPPED | OUTPATIENT
Start: 2019-08-26 | End: 2019-12-02

## 2019-08-26 ASSESSMENT — MIFFLIN-ST. JEOR: SCORE: 1825.97

## 2019-08-26 ASSESSMENT — PAIN SCALES - GENERAL: PAINLEVEL: EXTREME PAIN (8)

## 2019-08-26 NOTE — PATIENT INSTRUCTIONS
-Order placed for epidural steroid injection. The steroid can take 10-14 days to reach max effect. Please call our clinic if symptoms persist after this timeframe. Dr. Rich's care team will contact you to schedule your injection.       -Order placed for both outpatient physical therapy and homecare physical therapy. (Patient is hoping to schedule with homecare again if possible).      [facility]  Phone: [phone]  Fax: [phone]

## 2019-08-26 NOTE — NURSING NOTE
"Wenceslao Vasquez is a 58 year old male who presents for:  Chief Complaint   Patient presents with     Surgical Followup     90 days s/p 5/29/19 L2-4 Laminectomies        Initial Vitals:  /84   Pulse 83   Temp 98.1  F (36.7  C) (Oral)   Resp 16   Ht 5' 9\" (1.753 m)   Wt 225 lb (102.1 kg)   SpO2 95%   BMI 33.23 kg/m   Estimated body mass index is 33.23 kg/m  as calculated from the following:    Height as of this encounter: 5' 9\" (1.753 m).    Weight as of this encounter: 225 lb (102.1 kg).. Body surface area is 2.23 meters squared. BP completed using cuff size: regular  Extreme Pain (8)        Nursing Comments: Pt present today for 90 days f/u s/p 5/29/19 L2-4 Laminectomies. Pt states that he gets pain of 8/10 on his lower right leg.        Hal Diggs, [facility]    "
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
## 2019-08-28 ENCOUNTER — MEDICAL CORRESPONDENCE (OUTPATIENT)
Dept: HEALTH INFORMATION MANAGEMENT | Facility: CLINIC | Age: 59
End: 2019-08-28

## 2019-08-29 ENCOUNTER — DOCUMENTATION ONLY (OUTPATIENT)
Dept: CARE COORDINATION | Facility: CLINIC | Age: 59
End: 2019-08-29

## 2019-08-29 DIAGNOSIS — Z12.11 SPECIAL SCREENING FOR MALIGNANT NEOPLASMS, COLON: Primary | ICD-10-CM

## 2019-08-29 NOTE — PROGRESS NOTES
[facility] now requests orders and shares plan of care/discharge summaries for some patients through [employer].  Please REPLY TO THIS MESSAGE OR ROUTE BACK TO THE AUTHOR in order to give authorization for orders when needed.  This is considered a verbal order, you will still receive a faxed copy of orders for signature.  Thank you for your assistance in improving collaboration for our patients.    ORDER    Pt has last colonoscopy in July 2015, recommended to repeat in 3 to 5 years. Pt reports that he had bright red blood in stools about 3 weeks ago that lasted for about a week and has had no further issues. Denies any dark, tarry stools or pain. Can pt be referred for a colonoscopy??

## 2019-09-05 ENCOUNTER — TELEPHONE (OUTPATIENT)
Dept: FAMILY MEDICINE | Facility: OTHER | Age: 59
End: 2019-09-05

## 2019-09-05 NOTE — TELEPHONE ENCOUNTER
Date of colonoscopy/EGD: 9/17  Surgeon: Dr. Samuels  Prep:Miralax  Packet:Colonoscopy/EGD instructions mailed to patient's home address.   Date: 9/5/2019      Surgery Scheduler

## 2019-09-12 ENCOUNTER — OFFICE VISIT (OUTPATIENT)
Dept: FAMILY MEDICINE | Facility: OTHER | Age: 59
End: 2019-09-12
Payer: COMMERCIAL

## 2019-09-12 VITALS
SYSTOLIC BLOOD PRESSURE: 134 MMHG | TEMPERATURE: 97.3 F | DIASTOLIC BLOOD PRESSURE: 76 MMHG | WEIGHT: 230.5 LBS | OXYGEN SATURATION: 96 % | HEART RATE: 80 BPM | RESPIRATION RATE: 20 BRPM | BODY MASS INDEX: 34.93 KG/M2 | HEIGHT: 68 IN

## 2019-09-12 DIAGNOSIS — G89.29 CHRONIC RIGHT-SIDED LOW BACK PAIN WITH RIGHT-SIDED SCIATICA: ICD-10-CM

## 2019-09-12 DIAGNOSIS — Z01.818 PREOP GENERAL PHYSICAL EXAM: Primary | ICD-10-CM

## 2019-09-12 DIAGNOSIS — M54.41 CHRONIC RIGHT-SIDED LOW BACK PAIN WITH RIGHT-SIDED SCIATICA: ICD-10-CM

## 2019-09-12 LAB
ANION GAP SERPL CALCULATED.3IONS-SCNC: 8 MMOL/L (ref 3–14)
BASOPHILS # BLD AUTO: 0 10E9/L (ref 0–0.2)
BASOPHILS NFR BLD AUTO: 0 %
BUN SERPL-MCNC: 21 MG/DL (ref 7–30)
CALCIUM SERPL-MCNC: 8.5 MG/DL (ref 8.5–10.1)
CHLORIDE SERPL-SCNC: 107 MMOL/L (ref 94–109)
CO2 SERPL-SCNC: 26 MMOL/L (ref 20–32)
CREAT SERPL-MCNC: 0.91 MG/DL (ref 0.66–1.25)
DIFFERENTIAL METHOD BLD: ABNORMAL
EOSINOPHIL # BLD AUTO: 0 10E9/L (ref 0–0.7)
EOSINOPHIL NFR BLD AUTO: 0.6 %
ERYTHROCYTE [DISTWIDTH] IN BLOOD BY AUTOMATED COUNT: 12.3 % (ref 10–15)
GFR SERPL CREATININE-BSD FRML MDRD: >90 ML/MIN/{1.73_M2}
GLUCOSE SERPL-MCNC: 122 MG/DL (ref 70–99)
HCT VFR BLD AUTO: 38.7 % (ref 40–53)
HGB BLD-MCNC: 13.6 G/DL (ref 13.3–17.7)
LYMPHOCYTES # BLD AUTO: 1.9 10E9/L (ref 0.8–5.3)
LYMPHOCYTES NFR BLD AUTO: 31.1 %
MCH RBC QN AUTO: 35.5 PG (ref 26.5–33)
MCHC RBC AUTO-ENTMCNC: 35.1 G/DL (ref 31.5–36.5)
MCV RBC AUTO: 101 FL (ref 78–100)
MONOCYTES # BLD AUTO: 0.4 10E9/L (ref 0–1.3)
MONOCYTES NFR BLD AUTO: 5.6 %
NEUTROPHILS # BLD AUTO: 3.9 10E9/L (ref 1.6–8.3)
NEUTROPHILS NFR BLD AUTO: 62.7 %
PLATELET # BLD AUTO: 173 10E9/L (ref 150–450)
POTASSIUM SERPL-SCNC: 3.8 MMOL/L (ref 3.4–5.3)
RBC # BLD AUTO: 3.83 10E12/L (ref 4.4–5.9)
SODIUM SERPL-SCNC: 141 MMOL/L (ref 133–144)
WBC # BLD AUTO: 6.2 10E9/L (ref 4–11)

## 2019-09-12 PROCEDURE — 80048 BASIC METABOLIC PNL TOTAL CA: CPT | Performed by: PHYSICIAN ASSISTANT

## 2019-09-12 PROCEDURE — 36415 COLL VENOUS BLD VENIPUNCTURE: CPT | Performed by: PHYSICIAN ASSISTANT

## 2019-09-12 PROCEDURE — 93000 ELECTROCARDIOGRAM COMPLETE: CPT | Performed by: PHYSICIAN ASSISTANT

## 2019-09-12 PROCEDURE — 99214 OFFICE O/P EST MOD 30 MIN: CPT | Performed by: PHYSICIAN ASSISTANT

## 2019-09-12 PROCEDURE — 85025 COMPLETE CBC W/AUTO DIFF WBC: CPT | Performed by: PHYSICIAN ASSISTANT

## 2019-09-12 ASSESSMENT — PAIN SCALES - GENERAL: PAINLEVEL: EXTREME PAIN (8)

## 2019-09-12 ASSESSMENT — MIFFLIN-ST. JEOR: SCORE: 1835.04
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
## 2019-09-12 NOTE — PATIENT INSTRUCTIONS
Before Your Surgery      Call your surgeon if there is any change in your health. This includes signs of a cold or flu (such as a sore throat, runny nose, cough, rash or fever).    Do not smoke, drink alcohol or take over the counter medicine (unless your surgeon or primary care doctor tells you to) for the 24 hours before and after surgery.    If you take prescribed drugs: Follow your doctor s orders about which medicines to take and which to stop until after surgery.  o Do not take any medications day procedure  o Stop NSAIDs (aspirin, ibuprofen, motrin, advil, aleve, naproxen, etc) 5-7 days prior to procedure  o Acetaminophen/tylenol is a-ok    Eating and drinking prior to surgery: follow the instructions from your surgeon  o Fast 8-12 hours prior to the procedure    Take a shower or bath the night before surgery. Use the soap your surgeon gave you to gently clean your skin. If you do not have soap from your surgeon, use your regular soap. Do not shave or scrub the surgery site.  Wear clean pajamas and have clean sheets on your bed.   Patient Education     Carpal Tunnel Syndrome Prevention Tips  Carpal tunnel syndrome is a painful condition in the hand and wrist. It occurs when there is too much pressure on the median nerve at the wrist. The median nerve runs from your forearm to the palm of your hand. It may get squeezed or pressed when it passes through the carpal tunnel from your wrist to your hand. You may then feel numbness, tingling, pain, or weakness in your hand and up your forearm.  Doing the same hand activities over and over can put you at higher risk for carpal tunnel syndrome. But you can reduce your risk. Learn how to change the way you use your hands. Below are tips for at home and on the job. Also follow the hand and wrist safety policies at your workplace.      Keep your wrist in a straight (neutral) position when exercising.      Keep your wrist in neutral  Keep a straight (neutral) wrist  position as often as you can. Don t use your wrist in a bent (flexed) position for long periods of time. This includes extended or twisted positions.  When you sleep, don't have your wrist flexed (don't sleep all curled up on your side). And don't put extra pressure on your wrist for long periods of time (don't sleep on your stomach with your hands under you).  Watch your   Don t use only your thumb and index finger to grasp or lift something. This can put stress on your wrist. When you can, use your whole hand and all its fingers to grasp an object.  Minimize repetition  Don t move your arms or hands the same way for long periods of time. And don't hold an object in the same way for long periods of time. Even simple, light tasks can cause injury this way. Instead, switch tasks or switch hands.  Rest your hands  Give your hands a break from time to time with a rest. Even a few minutes once an hour can help.  Reduce speed and force  Slow down when you do a forceful, repetitive motion. This gives your wrist time to recover from the effort. Use power tools to help reduce the force.  Strengthen the muscles  Weak muscles may lead to a poor wrist or arm position. Exercises will make your hand and arm muscles stronger. This can help you keep a better position.  Date Last Reviewed: 1/1/2018 2000-2018 [employer]. [address] [place] [address]. All rights reserved. This information is not intended as a substitute for professional medical care. Always follow your healthcare professional's instructions.

## 2019-09-12 NOTE — NURSING NOTE
Health Maintenance Due   Topic Date Due     URINE DRUG SCREEN  1960     HIV SCREENING  09/28/1974     ZOSTER IMMUNIZATION (1 of 2) 09/28/2009     MEDICARE ANNUAL WELLNESS VISIT  06/07/2019     INFLUENZA VACCINE (1) 09/01/2019     Michelle TELLO LPN

## 2019-09-16 ENCOUNTER — ANESTHESIA EVENT (OUTPATIENT)
Dept: GASTROENTEROLOGY | Facility: CLINIC | Age: 59
End: 2019-09-16
Payer: COMMERCIAL

## 2019-09-17 ENCOUNTER — ANESTHESIA (OUTPATIENT)
Dept: GASTROENTEROLOGY | Facility: CLINIC | Age: 59
End: 2019-09-17
Payer: COMMERCIAL

## 2019-09-17 ENCOUNTER — HOSPITAL ENCOUNTER (OUTPATIENT)
Facility: CLINIC | Age: 59
Discharge: HOME | End: 2019-09-17
Attending: SURGERY | Admitting: SURGERY
Payer: COMMERCIAL

## 2019-09-17 VITALS
TEMPERATURE: 98 F | OXYGEN SATURATION: 98 % | DIASTOLIC BLOOD PRESSURE: 81 MMHG | RESPIRATION RATE: 16 BRPM | SYSTOLIC BLOOD PRESSURE: 132 MMHG

## 2019-09-17 LAB — COLONOSCOPY: NORMAL

## 2019-09-17 PROCEDURE — 25000128 H RX IP 250 OP 636: Performed by: NURSE ANESTHETIST, CERTIFIED REGISTERED

## 2019-09-17 PROCEDURE — 37000008 ZZH ANESTHESIA TECHNICAL FEE, 1ST 30 MIN: Performed by: SURGERY

## 2019-09-17 PROCEDURE — 25800030 ZZH RX IP 258 OP 636: Performed by: NURSE ANESTHETIST, CERTIFIED REGISTERED

## 2019-09-17 PROCEDURE — 25000125 ZZHC RX 250: Performed by: NURSE ANESTHETIST, CERTIFIED REGISTERED

## 2019-09-17 PROCEDURE — 45378 DIAGNOSTIC COLONOSCOPY: CPT | Performed by: SURGERY

## 2019-09-17 PROCEDURE — G0105 COLORECTAL SCRN; HI RISK IND: HCPCS | Performed by: SURGERY

## 2019-09-17 PROCEDURE — 37000009 ZZH ANESTHESIA TECHNICAL FEE, EACH ADDTL 15 MIN: Performed by: SURGERY

## 2019-09-17 RX ORDER — PROPOFOL 10 MG/ML
INJECTION, EMULSION INTRAVENOUS PRN
Status: DISCONTINUED | OUTPATIENT
Start: 2019-09-17 | End: 2019-09-17

## 2019-09-17 RX ORDER — FLUMAZENIL 0.1 MG/ML
0.2 INJECTION, SOLUTION INTRAVENOUS
Status: DISCONTINUED | OUTPATIENT
Start: 2019-09-17 | End: 2019-09-17 | Stop reason: HOSPADM

## 2019-09-17 RX ORDER — NALOXONE HYDROCHLORIDE 0.4 MG/ML
.1-.4 INJECTION, SOLUTION INTRAMUSCULAR; INTRAVENOUS; SUBCUTANEOUS
Status: DISCONTINUED | OUTPATIENT
Start: 2019-09-17 | End: 2019-09-17 | Stop reason: HOSPADM

## 2019-09-17 RX ORDER — LIDOCAINE 40 MG/G
CREAM TOPICAL
Status: DISCONTINUED | OUTPATIENT
Start: 2019-09-17 | End: 2019-09-17 | Stop reason: HOSPADM

## 2019-09-17 RX ORDER — ONDANSETRON 2 MG/ML
4 INJECTION INTRAMUSCULAR; INTRAVENOUS
Status: DISCONTINUED | OUTPATIENT
Start: 2019-09-17 | End: 2019-09-17

## 2019-09-17 RX ORDER — PROPOFOL 10 MG/ML
INJECTION, EMULSION INTRAVENOUS CONTINUOUS PRN
Status: DISCONTINUED | OUTPATIENT
Start: 2019-09-17 | End: 2019-09-17

## 2019-09-17 RX ORDER — ONDANSETRON 2 MG/ML
4 INJECTION INTRAMUSCULAR; INTRAVENOUS EVERY 6 HOURS PRN
Status: DISCONTINUED | OUTPATIENT
Start: 2019-09-17 | End: 2019-09-17 | Stop reason: HOSPADM

## 2019-09-17 RX ORDER — SODIUM CHLORIDE, SODIUM LACTATE, POTASSIUM CHLORIDE, CALCIUM CHLORIDE 600; 310; 30; 20 MG/100ML; MG/100ML; MG/100ML; MG/100ML
INJECTION, SOLUTION INTRAVENOUS CONTINUOUS
Status: DISCONTINUED | OUTPATIENT
Start: 2019-09-17 | End: 2019-09-17 | Stop reason: HOSPADM

## 2019-09-17 RX ORDER — ONDANSETRON 4 MG/1
4 TABLET, ORALLY DISINTEGRATING ORAL EVERY 6 HOURS PRN
Status: DISCONTINUED | OUTPATIENT
Start: 2019-09-17 | End: 2019-09-17 | Stop reason: HOSPADM

## 2019-09-17 RX ORDER — LIDOCAINE HYDROCHLORIDE 20 MG/ML
INJECTION, SOLUTION INFILTRATION; PERINEURAL PRN
Status: DISCONTINUED | OUTPATIENT
Start: 2019-09-17 | End: 2019-09-17

## 2019-09-17 RX ADMIN — SODIUM CHLORIDE, POTASSIUM CHLORIDE, SODIUM LACTATE AND CALCIUM CHLORIDE: 600; 310; 30; 20 INJECTION, SOLUTION INTRAVENOUS at 10:12

## 2019-09-17 RX ADMIN — PROPOFOL 50 MG: 10 INJECTION, EMULSION INTRAVENOUS at 10:38

## 2019-09-17 RX ADMIN — LIDOCAINE HYDROCHLORIDE 1 ML: 10 INJECTION, SOLUTION EPIDURAL; INFILTRATION; INTRACAUDAL; PERINEURAL at 10:12

## 2019-09-17 RX ADMIN — PROPOFOL 150 MCG/KG/MIN: 10 INJECTION, EMULSION INTRAVENOUS at 10:38

## 2019-09-17 RX ADMIN — LIDOCAINE HYDROCHLORIDE 60 MG: 20 INJECTION, SOLUTION INFILTRATION; PERINEURAL at 10:38

## 2019-09-17 RX ADMIN — PROPOFOL 30 MG: 10 INJECTION, EMULSION INTRAVENOUS at 10:40
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
## 2019-09-17 NOTE — DISCHARGE INSTRUCTIONS
[facility]    Home Care Following Endoscopy          Activity:    You have just undergone an endoscopic procedure usually performed with conscious sedation.  Do not work or operate machinery (including a car) for at least 12 hours.      I encourage you to walk and attempt to pass this air as soon as possible.    Diet:    Return to the diet you were on before your procedure but eat lightly for the first 12-24 hours.    Drink plenty of water.    Resume any regular medications unless otherwise advised by your physician.  Please begin any new medication prescribed as a result of your procedure as directed by your physician.     If you had any biopsy or polyp removed please refrain from aspirin or aspirin products for 2 days.  If on Coumadin please restart as instructed by your physician.   Pain:    You may take Tylenol as needed for pain.  Expected Recovery:    You can expect some mild abdominal fullness and/or discomfort due to the air used to inflate your intestinal tract. It is also normal to have a mild sore throat after upper endoscopy.    Call Your Physician if You Have:    After Upper Endoscopy:  o Shoulder, back or chest pain.  o Difficulty breathing or swallowing.  o Vomiting blood.    After Colonoscopy:  o Worsening persisting abdominal pain which is worse with activity.  o Fevers (>101 degrees F), chills or shakes.  o Passage of continued blood with bowel movements.   Any questions or concerns about your recovery, please call [phone] or after hours [phone] Nurse Advice Line.    Follow-up Care:    You should receive a call or letter with your results within 1 week. Please call if you have not received a notification of your results.  If asked to return to clinic please make an appointment 1 week after your procedure.  Call [phone].     [facility]  Same-Day Surgery   Adult Discharge Orders & Instructions     For 24 hours after surgery    1. Get plenty of  rest.  A responsible adult must stay with you for at least 24 hours after you leave the hospital.   2. Do not drive or use heavy equipment.  If you have weakness or tingling, don't drive or use heavy equipment until this feeling goes away.  3. Do not drink alcohol.  4. Avoid strenuous or risky activities.  Ask for help when climbing stairs.   5. You may feel lightheaded.  IF so, sit for a few minutes before standing.  Have someone help you get up.   6. If you have nausea (feel sick to your stomach): Drink only clear liquids such as apple juice, ginger ale, broth or 7-Up.  Rest may also help.  Be sure to drink enough fluids.  Move to a regular diet as you feel able.  7. You may have a slight fever. Call the doctor if your fever is over 100 F (37.7 C) (taken under the tongue) or lasts longer than 24 hours.  8. You may have a dry mouth, a sore throat, muscle aches or trouble sleeping.  These should go away after 24 hours.  9. Do not make important or legal decisions.   Call your doctor for any of the followin.  Signs of infection (fever, growing tenderness at the surgery site, a large amount of drainage or bleeding, severe pain, foul-smelling drainage, redness, swelling).    2. It has been over 8 to 10 hours since surgery and you are still not able to urinate (pass water).    3.  Headache for over 24 hours.

## 2019-09-17 NOTE — ANESTHESIA CARE TRANSFER NOTE
Patient: Wenceslao Vasquez    Procedure(s):  COLONOSCOPY    Diagnosis: Special screening for malignant neoplasms, colon  Diagnosis Additional Information: No value filed.    Anesthesia Type:   MAC     Note:  Airway :Face Mask  Patient transferred to:Phase II  Handoff Report: Identifed the Patient, Identified the Reponsible Provider, Reviewed the pertinent medical history, Discussed the surgical course, Reviewed Intra-OP anesthesia mangement and issues during anesthesia, Set expectations for post-procedure period and Allowed opportunity for questions and acknowledgement of understanding      Vitals: (Last set prior to Anesthesia Care Transfer)    CRNA VITALS  9/17/2019 1027 - 9/17/2019 1108      9/17/2019             Resp Rate (observed):  21                Electronically Signed By: ANNEL Hearn CRNA  September 17, 2019  11:08 AM

## 2019-09-17 NOTE — ANESTHESIA POSTPROCEDURE EVALUATION
Patient: Wenceslao Vasquez    Procedure(s):  COLONOSCOPY    Diagnosis:Special screening for malignant neoplasms, colon  Diagnosis Additional Information: No value filed.    Anesthesia Type:  MAC    Note:  Anesthesia Post Evaluation    Patient location during evaluation: Phase 2  Patient participation: Able to fully participate in evaluation  Level of consciousness: awake and alert  Pain management: adequate  Airway patency: patent  Cardiovascular status: acceptable and stable  Respiratory status: acceptable and room air  Hydration status: acceptable  PONV: none     Anesthetic complications: None    Comments:  Patient was happy with the anesthesia care received and no anesthesia related complications were noted.  I will follow up with the patient again if it is needed.        Last vitals:  Vitals:    09/17/19 0953 09/17/19 1057   BP: (!) 151/94    Resp: 20 16   Temp: 98  F (36.7  C)    SpO2: 95%          Electronically Signed By: ANNEL Hearn CRNA  September 17, 2019  11:09 AM

## 2019-09-20 ENCOUNTER — HOSPITAL ENCOUNTER (OUTPATIENT)
Facility: CLINIC | Age: 59
Discharge: HOME | End: 2019-09-20
Attending: ANESTHESIOLOGY | Admitting: ANESTHESIOLOGY
Payer: COMMERCIAL

## 2019-09-20 ENCOUNTER — HOSPITAL ENCOUNTER (OUTPATIENT)
Dept: GENERAL RADIOLOGY | Facility: CLINIC | Age: 59
End: 2019-09-20
Attending: ANESTHESIOLOGY | Admitting: ANESTHESIOLOGY
Payer: COMMERCIAL

## 2019-09-20 ENCOUNTER — ANESTHESIA EVENT (OUTPATIENT)
Dept: SURGERY | Facility: CLINIC | Age: 59
End: 2019-09-20
Payer: COMMERCIAL

## 2019-09-20 ENCOUNTER — ANESTHESIA (OUTPATIENT)
Dept: SURGERY | Facility: CLINIC | Age: 59
End: 2019-09-20
Payer: COMMERCIAL

## 2019-09-20 VITALS
SYSTOLIC BLOOD PRESSURE: 145 MMHG | DIASTOLIC BLOOD PRESSURE: 96 MMHG | TEMPERATURE: 98.3 F | OXYGEN SATURATION: 96 % | HEART RATE: 66 BPM | RESPIRATION RATE: 16 BRPM

## 2019-09-20 DIAGNOSIS — G89.29 CHRONIC LOW BACK PAIN WITH RIGHT-SIDED SCIATICA: ICD-10-CM

## 2019-09-20 DIAGNOSIS — M54.41 CHRONIC LOW BACK PAIN WITH RIGHT-SIDED SCIATICA: ICD-10-CM

## 2019-09-20 PROCEDURE — 40000277 XR SURGERY CARM FLUORO LESS THAN 5 MIN W STILLS

## 2019-09-20 PROCEDURE — 25000128 H RX IP 250 OP 636: Performed by: NURSE ANESTHETIST, CERTIFIED REGISTERED

## 2019-09-20 PROCEDURE — 25000128 H RX IP 250 OP 636: Performed by: ANESTHESIOLOGY

## 2019-09-20 PROCEDURE — 62323 NJX INTERLAMINAR LMBR/SAC: CPT | Performed by: ANESTHESIOLOGY

## 2019-09-20 PROCEDURE — 25000125 ZZHC RX 250: Performed by: NURSE ANESTHETIST, CERTIFIED REGISTERED

## 2019-09-20 PROCEDURE — 64483 NJX AA&/STRD TFRM EPI L/S 1: CPT | Mod: 50 | Performed by: ANESTHESIOLOGY

## 2019-09-20 PROCEDURE — 37000008 ZZH ANESTHESIA TECHNICAL FEE, 1ST 30 MIN: Performed by: ANESTHESIOLOGY

## 2019-09-20 PROCEDURE — 64483 NJX AA&/STRD TFRM EPI L/S 1: CPT | Performed by: ANESTHESIOLOGY

## 2019-09-20 RX ORDER — LIDOCAINE 40 MG/G
CREAM TOPICAL
Status: DISCONTINUED | OUTPATIENT
Start: 2019-09-20 | End: 2019-09-20 | Stop reason: HOSPADM

## 2019-09-20 RX ORDER — ONDANSETRON 4 MG/1
4 TABLET, ORALLY DISINTEGRATING ORAL EVERY 30 MIN PRN
Status: DISCONTINUED | OUTPATIENT
Start: 2019-09-20 | End: 2019-09-20 | Stop reason: HOSPADM

## 2019-09-20 RX ORDER — IOPAMIDOL 612 MG/ML
INJECTION, SOLUTION INTRATHECAL PRN
Status: DISCONTINUED | OUTPATIENT
Start: 2019-09-20 | End: 2019-09-20 | Stop reason: HOSPADM

## 2019-09-20 RX ORDER — ONDANSETRON 2 MG/ML
4 INJECTION INTRAMUSCULAR; INTRAVENOUS EVERY 30 MIN PRN
Status: DISCONTINUED | OUTPATIENT
Start: 2019-09-20 | End: 2019-09-20 | Stop reason: HOSPADM

## 2019-09-20 RX ORDER — LIDOCAINE HYDROCHLORIDE 20 MG/ML
INJECTION, SOLUTION INFILTRATION; PERINEURAL PRN
Status: DISCONTINUED | OUTPATIENT
Start: 2019-09-20 | End: 2019-09-20

## 2019-09-20 RX ORDER — NALOXONE HYDROCHLORIDE 0.4 MG/ML
.1-.4 INJECTION, SOLUTION INTRAMUSCULAR; INTRAVENOUS; SUBCUTANEOUS
Status: DISCONTINUED | OUTPATIENT
Start: 2019-09-20 | End: 2019-09-20 | Stop reason: HOSPADM

## 2019-09-20 RX ORDER — SODIUM CHLORIDE, SODIUM LACTATE, POTASSIUM CHLORIDE, CALCIUM CHLORIDE 600; 310; 30; 20 MG/100ML; MG/100ML; MG/100ML; MG/100ML
INJECTION, SOLUTION INTRAVENOUS CONTINUOUS
Status: DISCONTINUED | OUTPATIENT
Start: 2019-09-20 | End: 2019-09-20 | Stop reason: HOSPADM

## 2019-09-20 RX ORDER — MEPERIDINE HYDROCHLORIDE 25 MG/ML
12.5 INJECTION INTRAMUSCULAR; INTRAVENOUS; SUBCUTANEOUS
Status: DISCONTINUED | OUTPATIENT
Start: 2019-09-20 | End: 2019-09-20 | Stop reason: HOSPADM

## 2019-09-20 RX ORDER — TRIAMCINOLONE ACETONIDE 40 MG/ML
INJECTION, SUSPENSION INTRA-ARTICULAR; INTRAMUSCULAR PRN
Status: DISCONTINUED | OUTPATIENT
Start: 2019-09-20 | End: 2019-09-20 | Stop reason: HOSPADM

## 2019-09-20 RX ORDER — PROPOFOL 10 MG/ML
INJECTION, EMULSION INTRAVENOUS PRN
Status: DISCONTINUED | OUTPATIENT
Start: 2019-09-20 | End: 2019-09-20

## 2019-09-20 RX ADMIN — LIDOCAINE HYDROCHLORIDE 50 MG: 20 INJECTION, SOLUTION INFILTRATION; PERINEURAL at 14:23

## 2019-09-20 RX ADMIN — PROPOFOL 30 MG: 10 INJECTION, EMULSION INTRAVENOUS at 14:25

## 2019-09-20 RX ADMIN — LIDOCAINE HYDROCHLORIDE 5 ML: 10 INJECTION, SOLUTION EPIDURAL; INFILTRATION; INTRACAUDAL; PERINEURAL at 13:28

## 2019-09-20 RX ADMIN — PROPOFOL 50 MG: 10 INJECTION, EMULSION INTRAVENOUS at 14:23

## 2019-09-20 ASSESSMENT — LIFESTYLE VARIABLES: TOBACCO_USE: 0
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
## 2019-09-20 NOTE — OP NOTE
PRIMARY PROBLEM: Low back pain and bilateral leg pains    PROCEDURE: L4-5  Transforaminal Epidural Steroid Injections with fluoroscopic guidance and contrast.     PROCEDURE DETAILS: After written informed consent was obtained from the patient, the patient was escorted to the procedure room.  The patient was placed in the prone position.  A  time out  was conducted to verify patient identity, procedure to be performed, side, site, allergies and any special requirements.  The skin over the thoracolumbar region was prepped and draped in normal sterile fashion. Fluoroscopy was used to identify the neural foramen in AP view and the skin was anesthetized with 2 mL of 1% lidocaine with bicarbonate buffer. A 22-gauge Quincke spinal needle was advanced through this location and advanced under fluoroscopic guidance towards the neural foramen.  The target zone was the 6 o clock position of the pedicle.   Prior to entering the foramen, the depth of the needle was gauged with a lateral view on fluoroscopy. While still in a lateral view, the needle was slowly advanced to avoid injury to the spinal nerve.  Then, in the oblique view (approximately 28 degrees), after negative aspiration, 1.5 mL of Omnipaque contrast dye was injected revealing epidural spread without evidence of intravascular or intrathecal spread.  He does have epidural scarring at the levels of the previous decompression so basically I had spread at the L4-5 foraminal openings up to the superior aspect of the L4 vertebral body and then there was a decompression at that level and there was no further flow of medication at that location due to the previous surgical laminectomy causing epidural scarring.  Nonetheless, there is still flow of medication around the L4-5 segment and therefore I did feel it was worth going ahead and doing the injection.  Then a 2.5cc solution of 20 mg of Triamcinolone in 2 mL of  Preservative-Free saline was slowly injected into the  epidural space at each segment.  After injection of the medication, as the needle tip was withdrawn, it was flushed with local anesthetic.   The patient was monitored with blood pressure and pulse oximetry machines with the assistance of an RN throughout the procedure.  The patient was alert and responsive to questions throughout the procedure.   The patient tolerated the procedure well and was observed in the post-procedural area.  The patient was dismissed without apparent complications.     DIAGNOSIS:  1.  L4-5 spondylolisthesis with central stenosis and foraminal stenosis from the L4-5 segment causing back pain and leg pains    PLAN:  1. Performed lateral L4-5  transforaminal epidural steroid injections.  2. The patient was instructed to follow-up per Dr. Rich's instructions.      Calvin Rich MD  Diplomate of the American Board of Anesthesiology, Pain Medicine

## 2019-09-20 NOTE — DISCHARGE INSTRUCTIONS
Home Care Instructions                Procedure:  Epidural Steroid Injection or Joint injection    Activity:    Rest today    Do not work today    Resume normal activity tomorrow    Pain:    You may experience soreness at the injection site for one or two days    You may use an ice pack for 20 minutes every 2 hours for the first 24 hours    You may use a heating pad after the first 24 hours    You may use Tylenol  (acetaminophen) every 4 hours or other pain medicines as directed by your physician    Safety  Sedation medicine, if given may remain active for many hours.    It is important for the next 24 hours that you do not:    Drive a car    Operate machines or power tools    Consume alcohol, including beer    Sign any important papers or legal documents    You may experience numbness radiating into your legs or arms, (depending on the procedure location)  This numbness may last several hours.  Until the numb sensation returns to normal please use caution in walking, climbing stairs, stepping out of your vehicle, etc.    Common side effects of steroids:  Not everyone will experience corticosteroid side effects. If side effects are experienced they will gradually subside in the 7-10 day period following an injection.    Most common side effects include:    Flushed face and/or chest    Feeling of warmth, particularly in face but could be overall feeling of warmth    Increased blood sugar in diabetic patients    Menstrual irregularities may occur.  If taking hormone based birth control an alternate method of birth control is recommended    Sleep disturbances and/or mood swings are possible    Leg cramps    Please contact us if you have:  Severe pain   Fever more than 101.5 degrees Fahrenheit  Signs of infection (redness, swelling or drainage)      If you have questions during normal business hours (8am-5pm Monday-Friday) contact the [facility] at [phone]. If you need help after hours, we recommend that  you go to a hospital emergency room or dial 911.

## 2019-09-20 NOTE — ANESTHESIA CARE TRANSFER NOTE
Patient: Wenceslao Vasquez    Procedure(s):  INJECTION, SPINE, LUMBAR 4-5, EPIDURAL    Diagnosis: S/P lumbar laminectomy   Chronic right-sided low back pain with right-sided sciatica  Diagnosis Additional Information: No value filed.    Anesthesia Type:   MAC     Note:  Airway :Nasal Cannula  Patient transferred to:Phase II  Handoff Report: Identifed the Patient, Identified the Reponsible Provider, Reviewed the pertinent medical history, Discussed the surgical course, Reviewed Intra-OP anesthesia mangement and issues during anesthesia, Set expectations for post-procedure period and Allowed opportunity for questions and acknowledgement of understanding      Vitals: (Last set prior to Anesthesia Care Transfer)    CRNA VITALS  9/20/2019 1402 - 9/20/2019 1442      9/20/2019             Resp Rate (observed):  16                Electronically Signed By: ANNEL Hearn CRNA  September 20, 2019  2:42 PM

## 2019-09-20 NOTE — ANESTHESIA POSTPROCEDURE EVALUATION
Patient: Wenceslao Vasquez    Procedure(s):  INJECTION, SPINE, LUMBAR 4-5, EPIDURAL    Diagnosis:S/P lumbar laminectomy   Chronic right-sided low back pain with right-sided sciatica  Diagnosis Additional Information: No value filed.    Anesthesia Type:  MAC    Note:  Anesthesia Post Evaluation    Patient location during evaluation: Phase 2  Patient participation: Able to fully participate in evaluation  Level of consciousness: awake and alert  Pain management: adequate  Airway patency: patent  Cardiovascular status: acceptable and stable  Respiratory status: acceptable and room air  Hydration status: acceptable  PONV: none     Anesthetic complications: None    Comments:  Patient was happy with the anesthesia care received and no anesthesia related complications were noted.  I will follow up with the patient again if it is needed.        Last vitals:  Vitals:    09/20/19 1318   BP: (!) 142/91   Resp: 16   Temp: 98.3  F (36.8  C)   SpO2: 94%         Electronically Signed By: ANNEL Hearn CRNA  September 20, 2019  2:42 PM

## 2019-09-27 ENCOUNTER — HEALTH MAINTENANCE LETTER (OUTPATIENT)
Age: 59
End: 2019-09-27

## 2019-11-16 ENCOUNTER — MYC MEDICAL ADVICE (OUTPATIENT)
Dept: FAMILY MEDICINE | Facility: OTHER | Age: 59
End: 2019-11-16

## 2019-11-18 NOTE — TELEPHONE ENCOUNTER
OK for workin for a  Only appointment in the next 1-2 weeks.. Please call patient  to schedule time.  Electronically signed by Christopher Arreola MD

## 2019-12-02 ENCOUNTER — OFFICE VISIT (OUTPATIENT)
Dept: FAMILY MEDICINE | Facility: OTHER | Age: 59
End: 2019-12-02
Payer: COMMERCIAL

## 2019-12-02 VITALS
OXYGEN SATURATION: 97 % | TEMPERATURE: 98 F | DIASTOLIC BLOOD PRESSURE: 78 MMHG | BODY MASS INDEX: 35.94 KG/M2 | WEIGHT: 236.4 LBS | RESPIRATION RATE: 16 BRPM | HEART RATE: 94 BPM | SYSTOLIC BLOOD PRESSURE: 134 MMHG

## 2019-12-02 DIAGNOSIS — M48.07 STENOSIS OF LUMBOSACRAL SPINE: Primary | ICD-10-CM

## 2019-12-02 PROCEDURE — 99214 OFFICE O/P EST MOD 30 MIN: CPT | Performed by: FAMILY MEDICINE

## 2019-12-02 ASSESSMENT — PAIN SCALES - GENERAL: PAINLEVEL: WORST PAIN (10)

## 2019-12-02 NOTE — PROGRESS NOTES
Subjective     Wenceslao Vasquez is a 59 year old male who presents to clinic today for the following health issues:    HPI   Dizziness      Duration: 2-3 days, none currently     Description   Feeling faint:  no   Feeling like the surroundings are moving: YES  Loss of consciousness or falls: no     Accompanying signs and symptoms:   Nausea/vomitting: no   Palpitations: no   Weakness in arms or legs: no   Vision or speech changes: no   Ringing in ears (Tinnitus): no   Hearing loss related to dizziness: no    Other (fevers/chills/sweating/dyspnea): YES- sweating    History (similar episodes/head trauma/previous evaluation/recent bleeding): None    Precipitating or alleviating factors (new meds/chemicals): None  Worse with activity/head movement: YES    Therapies tried and outcome: None    Musculoskeletal problem/pain      Duration: May 2019    Description  Location: bilateral legs      Intensity:  10/10    Accompanying signs and symptoms: radiation of pain to calf and numbness    History  Previous similar problem: no   Previous evaluation:  none     Precipitating or alleviating factors:  Trauma or overuse: no   Aggravating factors include: walking    Therapies tried and outcome: nothing    Prior history of multiple lumbar and cervical disc surgeries and recent Lumbar LAYTON post lumbar laminectomy L2-4. His last MRI showed large pseudomeningocele     MRI LUMBAR SPINE WITH AND WITHOUT CONTRAST   7/27/2019 11:44 AM      HISTORY: Radiculopathy, prior surgery, new or progressive symptoms.   Status post lumbar laminectomy; lumbar radiculopathy.     TECHNIQUE: Multiplanar multisequence MRI of the lumbar spine with and  without contrast. A total of 10 mL of Gadavist was injected  intravenously.     COMPARISON: Lumbar spine MRI 4/15/2011.     FINDINGS:  Postoperative change of L2-L3 through L3-L4 laminectomies  are demonstrated. A large (6.6 x 2.5 x 7.8 cm (AP by TR by SI) fluid  collection is present within the posterior epidural  and paraspinal  soft tissues at the level of the laminectomies. This also demonstrates  peripheral enhancement.     Alignment is significant for trace (1 to 2 mm) anterolisthesis of L4  on L5. Bone marrow demonstrates mild discogenic degenerative endplate  change most conspicuous at L5-S1 where there is fatty marrow change.  Multilevel mild disc height loss and facet arthropathy are present as  detailed below. There is fusion across the bilateral L4-L5 facet  joints, right more than left.     The conus medullaris and cauda equina are unremarkable. Postcontrast  imaging demonstrates slight enhancement along the posterior and right  posterolateral margin of the thecal sac at the level of L3-L4 (series  9 image 20).     Paraspinal soft tissues demonstrate T2 hyperintense nonenhancing  lesions within the bilateral kidneys which are statistically likely  benign cysts.     Segmental Analysis:      T12-L1:  Mild disc bulge. Mild bilateral facet arthropathy. Mild  bilateral foraminal stenosis. No significant spinal canal stenosis.      L1-L2:  Mild disc bulge. Mild bilateral facet arthropathy.  Mild right  foraminal stenosis. Moderate left foraminal stenosis. Mild spinal  canal stenosis.       L2-L3:  Postoperative level. Broad-based disc bulge. Mild bilateral  facet adenopathy. Mild bilateral foraminal stenosis, left worse than  right. Moderate spinal canal stenosis related to disc bulge and  posterior epidural fluid collection.       L3-L4:  Postoperative level. Broad-based disc bulge. Mild bilateral  facet arthropathy.  Moderate spinal canal stenosis related to disc  bulging and posterior epidural fluid collection. Moderate left  foraminal stenosis. Mild right foraminal stenosis.     L4-L5:  Broad-based disc bulge with slight disc and covering. Severe  bilateral facet arthropathy with bilateral facet joint fusion.  Moderate bilateral foraminal stenosis.  Moderate spinal canal  stenosis.     L5-S1:  Broad-based disc  bulge. Mild right facet arthropathy. Moderate  left facet arthropathy. Moderate to severe left foraminal stenosis.  Mild spinal canal stenosis.     There is probable fusion across the right sacroiliac joint, partially  visualized.                                                                      IMPRESSION:    1. Postoperative change of L2-L3 through L3-L4  laminectomy/decompression with large posterior epidural fluid  collection with peripheral enhancement. This probably represents  chronic mildly complex seroma, however, infection cannot be completely  excluded.  2. Moderate degenerative change as detailed.     KATERINA TRUJILLO MD      His leg weakness has progressed in the last 2 months. Weak and numb in both legs R>L. He denies loss of bowel or bladder function. He denies falls but has had near falls as he stumbles. He also complains of bilateral lateral hip pain and left knee pain and grinding. He has seen ortho for this in the past year.     Assessment/Plan:         ICD-10-CM     1. Spinal stenosis of lumbar region, unspecified whether neurogenic claudication present:     This is likely the cause of his pain. There are signs on exam that correlate with the findings on the MRI.  There are plenty of left sided abnormalities on the lumbar MRI M48.061     2. Primary osteoarthritis of left hip-- mild -- likely not the cause of his pain. M16.12           Plan:  We did talk about the possibility of a intra-articular hip injection for diagnostic and therapeutic purposes, but he really does not show much in the way of hip pathology on exam or radiographically, and I doubt that a hip injection would be beneficial in any way.  Based on the report, it does seem like the lumbar stenosis is the cause of the pain and there is seems to be left-sided pathology.  I would suggest a lumbar epidural steroid injection, or other treatments deemed appropriate by the spine service.     Return to clinic as needed      HELLEN Lindsay  Yaw STAFFORD  Dept. Orthopedic Surgery  [facility]    Patient Active Problem List   Diagnosis     CHELSY (obstructive sleep apnea)     Hyperlipidemia LDL goal <130     Sciatica     Low back pain     Stenosis of lumbosacral spine     Advanced directives, counseling/discussion     Hypertension goal BP (blood pressure) < 140/90     Spinal stenosis in cervical region     Cervical spondylosis without myelopathy     Pneumonia     Morbid obesity due to excess calories (H)     Syncope     Exertional chest pain     Lumbar stenosis     S/P laminectomy     S/P lumbar laminectomy     Altered mental status     TIA (transient ischemic attack)     Past Surgical History:   Procedure Laterality Date     BACK SURGERY       C NONSPECIFIC PROCEDURE      lumbar disc surgery     C NONSPECIFIC PROCEDURE      hammer toe surgery right foot     C NONSPECIFIC PROCEDURE      nasal surgery     CERVICAL DISKECTOMY  8/8/13    [facility]     COLONOSCOPY  06/03/09     COLONOSCOPY N/A 9/17/2019    Procedure: COLONOSCOPY;  Surgeon: Jose F Samuels DO;  Location: [facility]     CYSTOSCOPY, DILATE URETHRA, COMBINED N/A 7/23/2018    Procedure: COMBINED CYSTOSCOPY, DILATE URETHRA;  cystosdcopy with urethral dilation and catheter placement;  Surgeon: Dakota Moore MD;  Location: [facility]     CYSTOSCOPY, DILATE URETHRA, COMBINED N/A 12/10/2018    Procedure: CYSTOSCOPY, URETHRAL DILATION;  Surgeon: Dakota Moore MD;  Location: [facility]     DIL URETHRA STRIC,MALE,SUBSEQ       HC REMOVAL HEEL SPUR, CALCANEUS  2/2005     HC REVISE MEDIAN N/CARPAL TUNNEL SURG  1993    right     HEAD & NECK SURGERY  2013    Plate in neck     INJECT EPIDURAL LUMBAR Bilateral 9/20/2019    Procedure: INJECTION, SPINE, LUMBAR 4-5, EPIDURAL;  Surgeon: Calvin Rich MD;  Location: [facility]     INJECT EPIDURAL TRANSFORAMINAL Bilateral 4/12/2019    Procedure: Inject epidural transforaminal Lumbar 3-4 bilateral;  Surgeon: Calvin Rich MD;  Location: [facility]      INSERT CATHETER BLADDER N/A 7/23/2018    Procedure: INSERT CATHETER BLADDER;;  Surgeon: Dakota Moore MD;  Location: [facility]     LAMINECTOMY LUMBAR TWO LEVELS N/A 5/29/2019    Procedure: LAMINECTOMIES LUMBAR 2-4;  Surgeon: Brent Calvo MD;  Location: [facility]     LAPAROSCOPIC CHOLECYSTECTOMY  3/11/2013    Procedure: LAPAROSCOPIC CHOLECYSTECTOMY;  laparoscopic cholecystectomy;  Surgeon: Clinton Whittaker MD;  Location: [facility]       Social History     Tobacco Use     Smoking status: Never Smoker     Smokeless tobacco: Never Used   Substance Use Topics     Alcohol use: Yes     Comment: very little     Family History   Problem Relation Age of Onset     Alcohol/Drug Brother      Prostate Cancer Brother      Arthritis Mother      Cancer Mother         Kidney - Stage 3     Other Cancer Mother         Kidney     Heart Disease Maternal Grandmother      Heart Disease Paternal Grandmother      C.A.D. No family hx of      Diabetes No family hx of      Anesthesia Reaction No family hx of         Hard to come out of     Cancer - colorectal No family hx of      Colon Cancer No family hx of          Current Outpatient Medications   Medication Sig Dispense Refill     acetaminophen (TYLENOL) 325 MG tablet Take 2 tablets (650 mg) by mouth every 4 hours as needed for mild pain  0     Multiple Vitamin (MULTI VITAMIN MENS PO) Take 1 tablet by mouth daily.       Allergies   Allergen Reactions     Dust Mites Hives     Morphine Nausea and Vomiting     Recent Labs   Lab Test 09/12/19  1602 06/12/19  0522 06/11/19  2057  06/07/18  0835 04/30/18  1347 01/08/18  1505 01/08/18  0530 11/09/17  1709  01/11/17  0855 07/21/16  0822   A1C  --   --   --   --   --  5.3  --   --   --   --   --   --    LDL  --   --   --   --  77  --   --   --   --   --  100* 90   HDL  --   --   --   --  31*  --   --   --   --   --  36* 36*   TRIG  --   --   --   --  198*  --   --   --   --   --  185* 139   ALT  --   --  34  --   --   --  31 35  --    < >   --   --    CR 0.91 0.83 0.83   < >  --   --  0.69 0.74  --    < >  --   --    GFRESTIMATED >90 >90 >90   < >  --   --  >90 >90  --    < >  --   --    GFRESTBLACK >90 >90 >90   < >  --   --  >90 >90  --    < >  --   --    POTASSIUM 3.8 3.7 3.9   < >  --   --  3.4 3.5  --    < >  --   --    TSH  --   --   --   --   --   --   --   --  0.70  --   --   --     < > = values in this interval not displayed.      BP Readings from Last 3 Encounters:   12/02/19 134/78   09/20/19 (!) 145/96   09/17/19 132/81    Wt Readings from Last 3 Encounters:   12/02/19 107.2 kg (236 lb 6.4 oz)   09/12/19 104.6 kg (230 lb 8 oz)   08/26/19 102.1 kg (225 lb)                      Reviewed and updated as needed this visit by Provider         Review of Systems   ROS COMP: CONSTITUTIONAL: NEGATIVE for fever, chills, change in weight  ENT/MOUTH: NEGATIVE for ear, mouth and throat problems  RESP: NEGATIVE for significant cough or SOB  CV: NEGATIVE for chest pain, palpitations or peripheral edema  MUSCULOSKELETAL: POSITIVE  for back pain chronic lumbar, muscle weakness bilateral legs, paresthesias and radicular pain into bilateral leg R>L and NEGATIVE for joint swelling and joint warmth  NEURO:   ROS otherwise negative      Objective    /78 (BP Location: Right arm, Patient Position: Sitting, Cuff Size: Adult Large)   Pulse 94   Temp 98  F (36.7  C) (Temporal)   Resp 16   Wt 107.2 kg (236 lb 6.4 oz)   SpO2 97%   BMI 35.94 kg/m    Body mass index is 35.94 kg/m .  Physical Exam   GENERAL: healthy, alert and no distress  NECK: no adenopathy, no asymmetry, masses, or scars and thyroid normal to palpation  RESP: lungs clear to auscultation - no rales, rhonchi or wheezes  CV: regular rate and rhythm, normal S1 S2, no S3 or S4, no murmur, click or rub, no peripheral edema and peripheral pulses strong  ABDOMEN: soft, nontender, no hepatosplenomegaly, no masses and bowel sounds normal  MS: RLE exam shows EXTREMITIES: Full ROM in hips.  No  deformities.  Tender around greater trochanter on right and at muscular insertions.   and LLE exam shows Knee exam: left positive for moderate crepitations, some mild tenderness and pain on range of motion, n effusion is present, no pseudolaxity noted.    Comprehensive back pain exam:  No tenderness, Pain limits the following motions: forward flexion, unable to heel walk on right side, indicating possible S1 nerve involvement, unable to toe walk on both sides, indicating possible L4 nerve involvement, Demonstrates weakness in R>L great toe extensors, indication possible L5 nerve involvement, Demonstrates weakness in R>L knee extensors, indicating possible L3 nerve involvement and Demonstrates weakness in  R>L hip flexors, indicating possible L2 nerve involvement, Diminished paterllar reflex on  right side relative to contralateral side; possible L4 spinal nerve root involvement and Diminished Achilles reflex on  right side relative to contralateral side; possible S1 spinal nerve root involvement, Light touch diminished on  right medial knee, indicating possible L3 nerve involvement, Light touch diminished on  right medial malleolus, indicating possible L4 nerve involvement, Light touch diminished on  right dorsum of foot (or Toes 1-3), indicating possible L5 nerve involvement and Light touch diminished on  right lateral malleolus (or Toes 4 and 5), indicating possible S1 nerve involvement and Straight leg raise negative bilaterally    Diagnostic Test Results:  Labs reviewed in Epic        Assessment & Plan     1. Stenosis of lumbosacral spine  Chronic with previous large pseudomeningocele. He underwent L4-5 LAYTON without improvement and now with progression of weakness. He may also have a component of hip and knee OE which is worsening due to his antalgic gait and forward stooping biomechanics. Will obtain repeat lumbar MRI. If previous large pseudomeningocele has resolved then with pursue further evaluation of  "bilateral hips and left knee.   - MR Lumbar Spine w/o Contrast; Future     BMI:   Estimated body mass index is 35.94 kg/m  as calculated from the following:    Height as of 9/12/19: 1.727 m (5' 8\").    Weight as of this encounter: 107.2 kg (236 lb 6.4 oz).           FURTHER TESTING:       - MRI Lumbar spine.    Return in about 2 weeks (around 12/16/2019) for Review MRI.    Christopher Arreola MD  [facility]        "

## 2019-12-05 ENCOUNTER — HOSPITAL ENCOUNTER (OUTPATIENT)
Dept: MRI IMAGING | Facility: CLINIC | Age: 59
Discharge: HOME | End: 2019-12-05
Attending: FAMILY MEDICINE | Admitting: FAMILY MEDICINE
Payer: COMMERCIAL

## 2019-12-05 DIAGNOSIS — M48.07 STENOSIS OF LUMBOSACRAL SPINE: ICD-10-CM

## 2019-12-05 PROCEDURE — 72158 MRI LUMBAR SPINE W/O & W/DYE: CPT

## 2019-12-05 PROCEDURE — 25500064 ZZH RX 255 OP 636: Performed by: FAMILY MEDICINE

## 2019-12-05 PROCEDURE — A9585 GADOBUTROL INJECTION: HCPCS | Performed by: FAMILY MEDICINE

## 2019-12-05 RX ORDER — GADOBUTROL 604.72 MG/ML
10 INJECTION INTRAVENOUS ONCE
Status: COMPLETED | OUTPATIENT
Start: 2019-12-05 | End: 2019-12-05

## 2019-12-05 RX ADMIN — GADOBUTROL 10 ML: 604.72 INJECTION INTRAVENOUS at 10:29

## 2019-12-09 DIAGNOSIS — M54.16 LUMBAR RADICULOPATHY: Primary | ICD-10-CM

## 2019-12-10 ENCOUNTER — TELEPHONE (OUTPATIENT)
Dept: SURGERY | Facility: CLINIC | Age: 59
End: 2019-12-10

## 2019-12-10 NOTE — TELEPHONE ENCOUNTER
Pt scheduled for injection  Date:1/9/20  Time:4:00pm  Dr. Rich    Instructed pt to have H&P and  for procedure.

## 2019-12-18 ENCOUNTER — OFFICE VISIT (OUTPATIENT)
Dept: FAMILY MEDICINE | Facility: OTHER | Age: 59
End: 2019-12-18
Payer: COMMERCIAL

## 2019-12-18 VITALS
BODY MASS INDEX: 36.09 KG/M2 | DIASTOLIC BLOOD PRESSURE: 80 MMHG | OXYGEN SATURATION: 95 % | WEIGHT: 237.38 LBS | HEART RATE: 76 BPM | TEMPERATURE: 97.9 F | SYSTOLIC BLOOD PRESSURE: 132 MMHG | RESPIRATION RATE: 18 BRPM

## 2019-12-18 DIAGNOSIS — Z01.818 PREOP GENERAL PHYSICAL EXAM: Primary | ICD-10-CM

## 2019-12-18 DIAGNOSIS — Z98.890 S/P LUMBAR LAMINECTOMY: ICD-10-CM

## 2019-12-18 DIAGNOSIS — M48.07 STENOSIS OF LUMBOSACRAL SPINE: ICD-10-CM

## 2019-12-18 PROCEDURE — 99214 OFFICE O/P EST MOD 30 MIN: CPT | Performed by: FAMILY MEDICINE

## 2019-12-18 ASSESSMENT — PAIN SCALES - GENERAL: PAINLEVEL: EXTREME PAIN (9)
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
## 2020-01-08 ENCOUNTER — ANESTHESIA EVENT (OUTPATIENT)
Dept: SURGERY | Facility: CLINIC | Age: 60
End: 2020-01-08
Payer: COMMERCIAL

## 2020-01-08 ASSESSMENT — LIFESTYLE VARIABLES: TOBACCO_USE: 0

## 2020-01-09 ENCOUNTER — HOSPITAL ENCOUNTER (OUTPATIENT)
Facility: CLINIC | Age: 60
Discharge: HOME | End: 2020-01-09
Attending: ANESTHESIOLOGY | Admitting: ANESTHESIOLOGY
Payer: COMMERCIAL

## 2020-01-09 ENCOUNTER — ANESTHESIA (OUTPATIENT)
Dept: SURGERY | Facility: CLINIC | Age: 60
End: 2020-01-09
Payer: COMMERCIAL

## 2020-01-09 ENCOUNTER — HOSPITAL ENCOUNTER (OUTPATIENT)
Dept: GENERAL RADIOLOGY | Facility: CLINIC | Age: 60
End: 2020-01-09
Attending: ANESTHESIOLOGY | Admitting: ANESTHESIOLOGY
Payer: COMMERCIAL

## 2020-01-09 VITALS
SYSTOLIC BLOOD PRESSURE: 152 MMHG | DIASTOLIC BLOOD PRESSURE: 88 MMHG | HEART RATE: 73 BPM | TEMPERATURE: 97.9 F | OXYGEN SATURATION: 96 % | RESPIRATION RATE: 16 BRPM

## 2020-01-09 DIAGNOSIS — M54.16 LUMBAR RADICULOPATHY: ICD-10-CM

## 2020-01-09 PROCEDURE — 25000128 H RX IP 250 OP 636: Performed by: NURSE ANESTHETIST, CERTIFIED REGISTERED

## 2020-01-09 PROCEDURE — 62323 NJX INTERLAMINAR LMBR/SAC: CPT | Performed by: ANESTHESIOLOGY

## 2020-01-09 PROCEDURE — 25000128 H RX IP 250 OP 636: Performed by: ANESTHESIOLOGY

## 2020-01-09 PROCEDURE — 25000125 ZZHC RX 250: Performed by: NURSE ANESTHETIST, CERTIFIED REGISTERED

## 2020-01-09 PROCEDURE — 40000277 XR SURGERY CARM FLUORO LESS THAN 5 MIN W STILLS: Mod: TC

## 2020-01-09 PROCEDURE — 37000008 ZZH ANESTHESIA TECHNICAL FEE, 1ST 30 MIN: Performed by: ANESTHESIOLOGY

## 2020-01-09 RX ORDER — PROPOFOL 10 MG/ML
INJECTION, EMULSION INTRAVENOUS PRN
Status: DISCONTINUED | OUTPATIENT
Start: 2020-01-09 | End: 2020-01-09

## 2020-01-09 RX ORDER — ONDANSETRON 2 MG/ML
4 INJECTION INTRAMUSCULAR; INTRAVENOUS EVERY 30 MIN PRN
Status: DISCONTINUED | OUTPATIENT
Start: 2020-01-09 | End: 2020-01-09 | Stop reason: HOSPADM

## 2020-01-09 RX ORDER — IOPAMIDOL 612 MG/ML
INJECTION, SOLUTION INTRATHECAL PRN
Status: DISCONTINUED | OUTPATIENT
Start: 2020-01-09 | End: 2020-01-09 | Stop reason: HOSPADM

## 2020-01-09 RX ORDER — TRIAMCINOLONE ACETONIDE 40 MG/ML
INJECTION, SUSPENSION INTRA-ARTICULAR; INTRAMUSCULAR PRN
Status: DISCONTINUED | OUTPATIENT
Start: 2020-01-09 | End: 2020-01-09 | Stop reason: HOSPADM

## 2020-01-09 RX ORDER — ONDANSETRON 4 MG/1
4 TABLET, ORALLY DISINTEGRATING ORAL EVERY 30 MIN PRN
Status: DISCONTINUED | OUTPATIENT
Start: 2020-01-09 | End: 2020-01-09 | Stop reason: HOSPADM

## 2020-01-09 RX ORDER — SODIUM CHLORIDE, SODIUM LACTATE, POTASSIUM CHLORIDE, CALCIUM CHLORIDE 600; 310; 30; 20 MG/100ML; MG/100ML; MG/100ML; MG/100ML
INJECTION, SOLUTION INTRAVENOUS CONTINUOUS
Status: DISCONTINUED | OUTPATIENT
Start: 2020-01-09 | End: 2020-01-09 | Stop reason: HOSPADM

## 2020-01-09 RX ORDER — LIDOCAINE HYDROCHLORIDE 20 MG/ML
INJECTION, SOLUTION INFILTRATION; PERINEURAL PRN
Status: DISCONTINUED | OUTPATIENT
Start: 2020-01-09 | End: 2020-01-09

## 2020-01-09 RX ORDER — MEPERIDINE HYDROCHLORIDE 25 MG/ML
12.5 INJECTION INTRAMUSCULAR; INTRAVENOUS; SUBCUTANEOUS
Status: DISCONTINUED | OUTPATIENT
Start: 2020-01-09 | End: 2020-01-09 | Stop reason: HOSPADM

## 2020-01-09 RX ADMIN — PROPOFOL 50 MG: 10 INJECTION, EMULSION INTRAVENOUS at 15:49

## 2020-01-09 RX ADMIN — LIDOCAINE HYDROCHLORIDE 60 MG: 20 INJECTION, SOLUTION INFILTRATION; PERINEURAL at 15:48

## 2020-01-09 RX ADMIN — PROPOFOL 50 MG: 10 INJECTION, EMULSION INTRAVENOUS at 15:50

## 2020-01-09 RX ADMIN — PROPOFOL 30 MG: 10 INJECTION, EMULSION INTRAVENOUS at 15:52

## 2020-01-09 NOTE — OP NOTE
CHIEF COMPLAINT: Primarily right leg pain from a radiculopathy  PROCEDURE: Right paramedian L4-5 interlaminar epidural steroid injection using fluoroscopic guidance with contrast dye.   PROCEDURE DETAILS: After written informed consent was obtained from the patient, the patient was escorted to the procedure room.  The patient was placed in the prone position.  A  time out  was conducted to verify patient identity, procedure to be performed, side, site, allergies and any special requirements.  The skin over the neck and upper back region were prepped and draped in normal sterile fashion. Fluoroscopy was used to identify the interspace in an AP view and the skin was anesthetized with 2 mL of 1% lidocaine with bicarbonate buffer.  A 20-gauge 3-1/2 inch Tuohy needle was advanced using the loss of resistance technique with preservative free normal saline with fluoroscopic guidance. After negative aspiration for CSF and blood, 1.5 cc of Omnipaque contrast dye was injected revealing the appropriate epidurogram without evidence of intrathecal or intravascular spread. Following this, a 3-mL solution of 40 mg of triamcinolone with 2 cc preservative-free normal saline was slowly injected.  I good flow of injected medication over the right L5 and L4 nerve roots but there was no spread up past the location where he had the laminectomies performed which was at the L3-4 disc margin.  After injection of the medication, as the needle tip was withdrawn, it was flushed with local anesthetic.  The patient was monitored with blood pressure and pulse oximetry machines with the assistance of an RN throughout the procedure.  The patient was alert and responsive to questions throughout the procedure.   The patient tolerated the procedure well and was observed in the post-procedural area.  The patient was dismissed without apparent complications.     DIAGNOSIS:  1.  Right leg pain secondary to a right L4 radiculitis versus spondylolisthesis  versus arachnoiditis  PLAN:  1. Performed a right paramedian L4-5 interlaminar epidural steroid injection.   2. The patient was instructed to follow-up at the [facility] if today's procedure is not helpful.  If the injection today is not helpful and we are still trying to figure out why he is having this right leg pain I would recommend a right lower extremity EMG as well as a diagnostic and therapeutic selective right L4 nerve root block.  He does have foraminal stenosis at the right L4-5 level which could be causing some radiculitis of his right L4 nerve root.  The other possibility is he has L5 persistent radiculitis/radiculopathy.  This L5 radiculopathy would be more likely if his pain is coming from the grade 1 spondylolisthesis that he has.  I was quite honest with him and told him that right now I am not sure why he is still having right leg pain but the pain is in a right L4 dermatomal distribution.  Obviously dermatomes overlap so I think doing a combination of an EMG with diagnostic nerve root testing would be the most systematic scientific way of approaching his right leg pain.  Calvin Rich MD  Diplomate of the American Board of Anesthesiology, Pain Medicine

## 2020-01-09 NOTE — ANESTHESIA CARE TRANSFER NOTE
Patient: Wenceslao Vasquez    Procedure(s):  Lumbar 4-5 Translaminar Epidural Injection    Diagnosis: Lumbar radiculopathy [M54.16]  Diagnosis Additional Information: No value filed.    Anesthesia Type:   MAC     Note:  Airway :Room Air  Patient transferred to:Phase II  Handoff Report: Identifed the Patient, Identified the Reponsible Provider, Reviewed the pertinent medical history, Discussed the surgical course, Reviewed Intra-OP anesthesia mangement and issues during anesthesia, Set expectations for post-procedure period and Allowed opportunity for questions and acknowledgement of understanding      Vitals: (Last set prior to Anesthesia Care Transfer)    CRNA VITALS  1/9/2020 1526 - 1/9/2020 1602      1/9/2020             Pulse:  75    SpO2:  96 %    Resp Rate (observed):  18                Electronically Signed By: ANNEL Richardson CRNA  January 9, 2020  4:02 PM
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
## 2020-01-09 NOTE — ANESTHESIA POSTPROCEDURE EVALUATION
Patient: Wenceslao Vasquez    Procedure(s):  Lumbar 4-5 Translaminar Epidural Injection    Diagnosis:Lumbar radiculopathy [M54.16]  Diagnosis Additional Information: No value filed.    Anesthesia Type:  MAC    Note:  Anesthesia Post Evaluation    Patient location during evaluation: Phase 2 and Bedside  Patient participation: Able to fully participate in evaluation  Level of consciousness: awake  Pain management: adequate  Airway patency: patent  Cardiovascular status: acceptable and hemodynamically stable  Respiratory status: acceptable, room air and nonlabored ventilation  Hydration status: stable  PONV: none     Anesthetic complications: None    Comments: Patient was happy with the anesthesia care received and no anesthesia related complications were noted.  I will follow up with the patient again if it is needed.        Last vitals:  Vitals:    01/09/20 1512   BP: (!) 166/101   Resp: 16   Temp: 97.9  F (36.6  C)         Electronically Signed By: ANNEL Richardson CRNA  January 9, 2020  4:04 PM

## 2020-01-09 NOTE — DISCHARGE INSTRUCTIONS
Home Care Instructions                Procedure: Epidural injection or joint injection    Activity:    Rest today    Do not work today    Resume normal activity tomorrow    Pain:    You may experience soreness at the injection site for 1 to 3 days.    You may use an ice pack for 20 minutes every 2 hours for the first 24 hours    You may use a heating pad after the first 24 hours    You may use Tylenol  (acetaminophen) every 4 hours or other pain medicines as directed by your physician    Safety  Sedation medicine, if given may remain active for many hours.    It is important for the next 24 hours that you do not:    Drive a car    Operate machines or power tools    Consume alcohol, including beer    Sign any important papers or legal documents    You may experience numbness radiating into your legs or arms, (depending on the procedure location)  This numbness may last several hours.  Until the numb sensation returns to normal please use caution in walking, climbing stairs, stepping out of your vehicle, etc.    Common side effects of steroids:  Not everyone will experience corticosteroid side effects. If side effects are experienced they will gradually subside in the 7-10 day period following an injection.    Most common side effects include:    Flushed face and/or chest    Feeling of warmth, particularly in face but could be overall feeling of warmth    Increased blood sugar in diabetic patients    Menstrual irregularities may occur.  If taking hormone based birth control an alternate method of birth control is recommended    Sleep disturbances and/or mood swings are possible    Leg cramps    Please contact us if you have:  Severe pain   Fever more than 101.5 degrees Fahrenheit  Signs of infection (redness, swelling or drainage)      If you have questions during normal business hours (8am-5pm Monday-Friday) contact the [facility] at [phone]. If you need help after hours, we recommend that you go to a  hospital emergency room or dial 911.

## 2020-01-27 ENCOUNTER — OFFICE VISIT (OUTPATIENT)
Dept: NEUROSURGERY | Facility: CLINIC | Age: 60
End: 2020-01-27
Payer: COMMERCIAL

## 2020-01-27 VITALS
DIASTOLIC BLOOD PRESSURE: 80 MMHG | OXYGEN SATURATION: 96 % | HEART RATE: 76 BPM | WEIGHT: 238 LBS | BODY MASS INDEX: 36.07 KG/M2 | HEIGHT: 68 IN | SYSTOLIC BLOOD PRESSURE: 142 MMHG | TEMPERATURE: 98 F

## 2020-01-27 DIAGNOSIS — Z98.890 S/P LUMBAR LAMINECTOMY: ICD-10-CM

## 2020-01-27 DIAGNOSIS — M79.661 RIGHT CALF PAIN: Primary | ICD-10-CM

## 2020-01-27 PROCEDURE — 99213 OFFICE O/P EST LOW 20 MIN: CPT | Performed by: NURSE PRACTITIONER

## 2020-01-27 ASSESSMENT — MIFFLIN-ST. JEOR: SCORE: 1864.06

## 2020-01-27 ASSESSMENT — PAIN SCALES - GENERAL: PAINLEVEL: EXTREME PAIN (8)

## 2020-01-27 NOTE — PATIENT INSTRUCTIONS
-Right leg EMG ordered. They will contact you to schedule. We will contact you with the results.  -Please contact the clinic if pain persists at [phone].

## 2020-01-27 NOTE — PROGRESS NOTES
"[facility]  Neurosurgery Follow Up:    HPI: 7 months s/p L2-L4 laminectomy. Continues to have severe right lower extremity pain below the knee and plantar aspect of his right foot. He has undergone an LESI as recommended with no relief of symptoms. He was also referred for PT which he continues to do exercises at home.  He reports right leg weakness. Continues to use walker for ambulation.    Medical, surgical, family, and social history unchanged since prior exam.  Exam:  Constitutional:  Alert, well nourished, NAD.  HEENT: Normocephalic, atraumatic.   Pulm:  Without shortness of breath   CV:  No pitting edema of BLE.      Vital Signs:  BP (!) 142/80   Pulse 76   Temp 98  F (36.7  C) (Temporal)   Ht 5' 8\" (1.727 m)   Wt 238 lb (108 kg)   SpO2 96%   BMI 36.19 kg/m      Neurological:  Awake  Alert  Oriented x 3  Motor exam:        IP Q DF PF EHL  R   5  5   5   5    5  L   5  5   5   5    5     Able to spontaneously move L/E bilaterally  Sensation intact throughout all L/E dermatomes     Incisions:  Healed.  Imaging: Lumbar MRI 12/5/2019  IMPRESSION:    1. Posterior decompression at L2-L3 and L3-L4. The postoperative fluid collection seen on the previous scan has decreased in size. There is still enhancement along the margins of this postoperative fluid collection.  2. Prior posterior fusion at L4-L5. Right sacroiliac joint arthrodesis.  3. Multilevel degenerative changes. The degenerative change is stable when compared to 7/27/2019. No new disc herniations.  4. Some clumping of the nerve roots in the lower dural sac, and the nerve roots are located along the periphery of the dural sac. This pattern could be due to arachnoiditis.    A/P: 7 months s/p L2-L4 laminectomy. Continues to have severe right lower extremity pain below the knee and plantar aspect of his right foot. He has undergone an LESI as recommended with no relief of symptoms. He was also referred for PT which he continues to do " exercises at home.  He reports right leg weakness. Continues to use walker for ambulation. Discussed lumbar MRI again. Recommended RLE EMG at this time. Will also discuss with Dr. Calvo. He verbalized understanding and agreement.    Patient Instructions   -Right leg EMG ordered. They will contact you to schedule. We will contact you with the results.  -Please contact the clinic if pain persists at [phone].      Luz Maria Santa, PHOEBE  [address] [address]  Tel [phone]  Fax [phone]

## 2020-01-27 NOTE — NURSING NOTE
"Wenceslao Vasquez is a 59 year old male who presents for:  Chief Complaint   Patient presents with     RECHECK     low back pain        Initial Vitals:  BP (!) 142/80   Pulse 76   Temp 98  F (36.7  C) (Temporal)   Ht 5' 8\" (1.727 m)   Wt 238 lb (108 kg)   SpO2 96%   BMI 36.19 kg/m   Estimated body mass index is 36.19 kg/m  as calculated from the following:    Height as of this encounter: 5' 8\" (1.727 m).    Weight as of this encounter: 238 lb (108 kg).. Body surface area is 2.28 meters squared. BP completed using cuff size: large  Extreme Pain (8)    Nursing Comments:     Linn Delgado    "

## 2020-01-27 NOTE — LETTER
"    1/27/2020         RE: Wenceslao Vasquez  [address] [place] [id number]        Dear Colleague,    Thank you for referring your patient, Wenceslao Vasquez, to the [facility]. Please see a copy of my visit note below.    Wenceslao to follow up with Primary Care provider regarding elevated blood pressure.    Linn Delgado, [facility]       [facility]  Neurosurgery Follow Up:    HPI: 7 months s/p L2-L4 laminectomy. Continues to have severe right lower extremity pain below the knee and plantar aspect of his right foot. He has undergone an LESI as recommended with no relief of symptoms. He was also referred for PT which he continues to do exercises at home.  He reports right leg weakness. Continues to use walker for ambulation.    Medical, surgical, family, and social history unchanged since prior exam.  Exam:  Constitutional:  Alert, well nourished, NAD.  HEENT: Normocephalic, atraumatic.   Pulm:  Without shortness of breath   CV:  No pitting edema of BLE.      Vital Signs:  BP (!) 142/80   Pulse 76   Temp 98  F (36.7  C) (Temporal)   Ht 5' 8\" (1.727 m)   Wt 238 lb (108 kg)   SpO2 96%   BMI 36.19 kg/m       Neurological:  Awake  Alert  Oriented x 3  Motor exam:        IP Q DF PF EHL  R   5  5   5   5    5  L   5  5   5   5    5     Able to spontaneously move L/E bilaterally  Sensation intact throughout all L/E dermatomes     Incisions:  Healed.  Imaging: Lumbar MRI 12/5/2019  IMPRESSION:    1. Posterior decompression at L2-L3 and L3-L4. The postoperative fluid collection seen on the previous scan has decreased in size. There is still enhancement along the margins of this postoperative fluid collection.  2. Prior posterior fusion at L4-L5. Right sacroiliac joint arthrodesis.  3. Multilevel degenerative changes. The degenerative change is stable when compared to 7/27/2019. No new disc herniations.  4. Some clumping of the nerve roots in the lower dural sac, and the nerve roots are located " along the periphery of the dural sac. This pattern could be due to arachnoiditis.    A/P: 7 months s/p L2-L4 laminectomy. Continues to have severe right lower extremity pain below the knee and plantar aspect of his right foot. He has undergone an LESI as recommended with no relief of symptoms. He was also referred for PT which he continues to do exercises at home.  He reports right leg weakness. Continues to use walker for ambulation. Discussed lumbar MRI again. Recommended RLE EMG at this time. Will also discuss with Dr. Calvo. He verbalized understanding and agreement.    Patient Instructions   -Right leg EMG ordered. They will contact you to schedule. We will contact you with the results.  -Please contact the clinic if pain persists at [phone].      Luz Maria Santa CNP  [facility]  [address] [place] [address]  Tel [phone]  Fax [phone]      Again, thank you for allowing me to participate in the care of your patient.        Sincerely,        Luz Maria Santa, ZULAY

## 2020-01-27 NOTE — PROGRESS NOTES
Wenceslao to follow up with Primary Care provider regarding elevated blood pressure.    Linn Delgado, CMA

## 2020-01-28 ENCOUNTER — TELEPHONE (OUTPATIENT)
Dept: FAMILY MEDICINE | Facility: OTHER | Age: 60
End: 2020-01-28

## 2020-01-28 ENCOUNTER — TELEPHONE (OUTPATIENT)
Dept: NEUROSURGERY | Facility: CLINIC | Age: 60
End: 2020-01-28

## 2020-01-28 NOTE — TELEPHONE ENCOUNTER
Panel Management Review      Patient has the following on his problem list:     Hypertension   Last three blood pressure readings:  BP Readings from Last 3 Encounters:   01/27/20 (!) 142/80   01/09/20 (!) 152/88   12/18/19 132/80     Blood pressure: FAILED    HTN Guidelines:  Less than 140/90      Composite cancer screening  Chart review shows that this patient is due/due soon for the following None  Summary:    Patient is due/failing the following:   BP CHECK and PHYSICAL    Action needed:   Patient needs office visit for Medicare annual physical. Blood pressure check with float nurse if not doing Medicare annual wellness    Type of outreach:    Sent [employer] message.    Questions for provider review:    None                                                                                                                                    Mehnaz Tran MA

## 2020-01-28 NOTE — TELEPHONE ENCOUNTER
DHEERAJM with patient requesting a return call to clinic to schedule follow up to see Dr. Calvo after completing EMG.

## 2020-01-28 NOTE — LETTER
[facility]  [address] [facility] [id number] [phone]        Wenceslao Vasquez  [address] [facility] [address]      February 12, 2020      Dear Wenceslao,     We care about your health and have reviewed your health plan, including your medical conditions, medication list, and lab results and am making recommendations based on this review, to better manage your health.     You are in particular need of attention regarding:   -High Blood Pressure   -Wellness (Physical) Visit     I am recommending that you:   -schedule a NURSE-ONLY APPOINTMENT within the next 1-4 weeks for Blood pressure check only if not completing the Medicare Annual wellness.   -schedule a WELLNESS (Physical) APPOINTMENT with me.   I will check fasting labs the same day - nothing to eat except water and meds for 8-10 hours prior.     If you've had the preventative screening completed at another facility or feel you're not due for this screening, please call our clinic at the number listed above or send us a My Chart message so we can update our records. We would like to thank you in advance for taking the time to take care of your health.  If you have any questions, please don't hesitate to contact our clinic.     Sincerely,       [facility]

## 2020-02-04 NOTE — PLAN OF CARE
Admitted from ED. VSS. RA. A&Ox4. Back pain radiating to L hip and leg managed with percocet. Tolerating regular diet. Plan for NPO at midnight. Neurosurg consult tomorrow.    PEDIATRICS

## 2020-02-12 NOTE — TELEPHONE ENCOUNTER
3rd attempt, letter sent.     Mehnaz Tran MA    Please call patient to see how he is doing. He is on my schedule for 6:20. If his finger infection is worse then recommend we consider him coming in sooner if possible. Huddle with provider after discussing with patient.     MERY Feldman CNP

## 2020-02-21 ENCOUNTER — TRANSFERRED RECORDS (OUTPATIENT)
Dept: HEALTH INFORMATION MANAGEMENT | Facility: CLINIC | Age: 60
End: 2020-02-21

## 2020-02-27 ENCOUNTER — OFFICE VISIT (OUTPATIENT)
Dept: NEUROSURGERY | Facility: CLINIC | Age: 60
End: 2020-02-27
Payer: COMMERCIAL

## 2020-02-27 VITALS
HEART RATE: 75 BPM | TEMPERATURE: 97.7 F | SYSTOLIC BLOOD PRESSURE: 136 MMHG | WEIGHT: 237.8 LBS | BODY MASS INDEX: 36.04 KG/M2 | RESPIRATION RATE: 18 BRPM | OXYGEN SATURATION: 96 % | DIASTOLIC BLOOD PRESSURE: 86 MMHG | HEIGHT: 68 IN

## 2020-02-27 DIAGNOSIS — Z98.890 S/P LUMBAR LAMINECTOMY: Primary | ICD-10-CM

## 2020-02-27 PROCEDURE — 99213 OFFICE O/P EST LOW 20 MIN: CPT | Performed by: NEUROLOGICAL SURGERY

## 2020-02-27 ASSESSMENT — MIFFLIN-ST. JEOR: SCORE: 1863.15

## 2020-02-27 ASSESSMENT — PAIN SCALES - GENERAL: PAINLEVEL: EXTREME PAIN (9)

## 2020-02-27 NOTE — PATIENT INSTRUCTIONS
Today's Visit    1. Referral to Neurologist Call Main Office Phone: [phone] to schedule.  [facility] through [facility].  2.  referral to [facility] for a right SI Joint Injection and evaluation of a spinal cord stimulator.  3. Ordered a lumbar CT scan without contrast. To schedule, please call [phone]. You should schedule this within one week. We will call you with the results. If you don't hear from us 7 days after the scan, please call us.  4. Fax us your [facility] paperwork. Dr. Calvo said it's ok to fill out.    Please call our clinic with any questions or concerns    Ritu MONTEMAYOR RN  [facility] - [address] [address]  Telephone:  [phone]       Fax:  [phone]

## 2020-02-27 NOTE — PROGRESS NOTES
"[facility]  Neurosurgery Follow Up:    HPI: 9 months post-op s/p L2-4 laminectomy.  Continued distal right leg pain.  Also having right low back/hip pain.  EMG was normal.  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    Medical, surgical, family, and social history unchanged since prior exam.  Exam:  Constitutional:  Alert, well nourished, NAD.  HEENT: Normocephalic, atraumatic.   Pulm:  Without shortness of breath   CV:  No pitting edema of BLE.      Vital Signs:  /86   Pulse 75   Temp 97.7  F (36.5  C) (Temporal)   Resp 18   Ht 1.727 m (5' 8\")   Wt 107.9 kg (237 lb 12.8 oz)   SpO2 96%   BMI 36.16 kg/m      Neurological:  Awake  Alert  Oriented x 3  Motor exam:        IP Q DF PF EHL  R   5  5   5   5    5  L   5  5   5   5    5     Able to spontaneously move L/E bilaterally  Sensation intact throughout all L/E dermatomes     Incisions:  Healed.  Imaging: Lumbar MRI 12/5/2019  IMPRESSION:    1. Posterior decompression at L2-L3 and L3-L4. The postoperative fluid collection seen on the previous scan has decreased in size. There is still enhancement along the margins of this postoperative fluid collection.  2. Prior posterior fusion at L4-L5. Right sacroiliac joint arthrodesis.  3. Multilevel degenerative changes. The degenerative change is stable when compared to 7/27/2019. No new disc herniations.  4. Some clumping of the nerve roots in the lower dural sac, and the nerve roots are located along the periphery of the dural sac. This pattern could be due to arachnoiditis.    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

## 2020-02-27 NOTE — LETTER
"    2/27/2020         RE: Wenceslao Vasquez  [address] [place] [id number]        Dear Colleague,    Thank you for referring your patient, Wenceslao Vasquez, to the [facility]. Please see a copy of my visit note below.    [facility]  Neurosurgery Follow Up:    HPI: 9 months post-op s/p L2-4 laminectomy.  Continued distal right leg pain.  Also having right low back/hip pain.  EMG was normal.  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    Medical, surgical, family, and social history unchanged since prior exam.  Exam:  Constitutional:  Alert, well nourished, NAD.  HEENT: Normocephalic, atraumatic.   Pulm:  Without shortness of breath   CV:  No pitting edema of BLE.      Vital Signs:  /86   Pulse 75   Temp 97.7  F (36.5  C) (Temporal)   Resp 18   Ht 1.727 m (5' 8\")   Wt 107.9 kg (237 lb 12.8 oz)   SpO2 96%   BMI 36.16 kg/m       Neurological:  Awake  Alert  Oriented x 3  Motor exam:        IP Q DF PF EHL  R   5  5   5   5    5  L   5  5   5   5    5     Able to spontaneously move L/E bilaterally  Sensation intact throughout all L/E dermatomes     Incisions:  Healed.  Imaging: Lumbar MRI 12/5/2019  IMPRESSION:    1. Posterior decompression at L2-L3 and L3-L4. The postoperative fluid collection seen on the previous scan has decreased in size. There is still enhancement along the margins of this postoperative fluid collection.  2. Prior posterior fusion at L4-L5. Right sacroiliac joint arthrodesis.  3. Multilevel degenerative changes. The degenerative change is stable when compared to 7/27/2019. No new disc herniations.  4. Some clumping of the nerve roots in the lower dural sac, and the nerve roots are located along the periphery of the dural sac. This pattern could be due to arachnoiditis.    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        Sincerely,        Brent Calvo MD

## 2020-02-27 NOTE — NURSING NOTE
"Wenceslao Vasquez is a 59 year old male who presents for:  Chief Complaint   Patient presents with     Neurologic Problem     EMG F/u        Initial Vitals:  /86   Pulse 75   Temp 97.7  F (36.5  C) (Temporal)   Resp 18   Ht 5' 8\" (1.727 m)   Wt 237 lb 12.8 oz (107.9 kg)   SpO2 96%   BMI 36.16 kg/m   Estimated body mass index is 36.16 kg/m  as calculated from the following:    Height as of this encounter: 5' 8\" (1.727 m).    Weight as of this encounter: 237 lb 12.8 oz (107.9 kg).. Body surface area is 2.28 meters squared. BP completed using cuff size: regular  Extreme Pain (9)        Ruba Delaney CMA    "

## 2020-02-27 NOTE — NURSING NOTE
Writer filled out FMLA paperwork during clinic, copy sent to scanning. Copy placed at  for Pat to .    Ritu Wagner RN on 2/27/2020 at 12:35 PM

## 2020-02-27 NOTE — NURSING NOTE
Family will bring FMLA paperwork back to this writer for completion. They need this paperwork to be filled out so wife can be excused from work to drive patient to and from appts.    Ritu Wagner RN on 2/27/2020 at 11:31 AM

## 2020-03-04 ENCOUNTER — HOSPITAL ENCOUNTER (OUTPATIENT)
Dept: CT IMAGING | Facility: CLINIC | Age: 60
Discharge: HOME | End: 2020-03-04
Attending: NEUROLOGICAL SURGERY | Admitting: NEUROLOGICAL SURGERY
Payer: COMMERCIAL

## 2020-03-04 DIAGNOSIS — Z98.890 S/P LUMBAR LAMINECTOMY: ICD-10-CM

## 2020-03-04 PROCEDURE — 72131 CT LUMBAR SPINE W/O DYE: CPT

## 2020-03-05 ENCOUNTER — TELEPHONE (OUTPATIENT)
Dept: NEUROSURGERY | Facility: CLINIC | Age: 60
End: 2020-03-05

## 2020-03-05 ENCOUNTER — MYC MEDICAL ADVICE (OUTPATIENT)
Dept: NEUROSURGERY | Facility: CLINIC | Age: 60
End: 2020-03-05

## 2020-03-05 NOTE — TELEPHONE ENCOUNTER
Dr. Calvo reviewed patients  CT LUMBAR SPINE scan. At this time he is recommending a right SI joint injection. Would like to hold off on Spinal Cord Stimulator trial at this time.  Relayed to patient. They will cancel for now the spinal cord stimulator, proceed with the SI joint injection, and let us know if symptoms do not improve.  No further questions.

## 2020-03-06 NOTE — TELEPHONE ENCOUNTER
Calling patient to discuss Dr. Calvo's recommendations in response to his MyChart. Discussed with Dr. Calvo his recommendation of no Spinal Cord Stimulator at this time. Dr. Calvo stated the CT noted inflammation in the area of the SI joint. He Is fine with him pursuing the spinal cord stimulator if the patient would like. With the imaging, he felt the SI joint may provide relief.    Patient agreed with plan. He asked that I call his wife to talk to her as well. Discussed with wife. No further questions they plan to go to the consultation and discuss the SDS and decide. No further questions.

## 2020-03-13 ENCOUNTER — TRANSFERRED RECORDS (OUTPATIENT)
Dept: HEALTH INFORMATION MANAGEMENT | Facility: CLINIC | Age: 60
End: 2020-03-13

## 2020-03-15 ENCOUNTER — HEALTH MAINTENANCE LETTER (OUTPATIENT)
Age: 60
End: 2020-03-15

## 2020-04-13 ENCOUNTER — TRANSFERRED RECORDS (OUTPATIENT)
Dept: HEALTH INFORMATION MANAGEMENT | Facility: CLINIC | Age: 60
End: 2020-04-13

## 2020-04-13 LAB — PHQ9 SCORE: 21

## 2020-06-09 ENCOUNTER — TRANSFERRED RECORDS (OUTPATIENT)
Dept: HEALTH INFORMATION MANAGEMENT | Facility: CLINIC | Age: 60
End: 2020-06-09

## 2020-06-09 LAB — PHQ9 SCORE: 13

## 2020-06-25 ENCOUNTER — HOSPITAL ENCOUNTER (OUTPATIENT)
Dept: PHYSICAL THERAPY | Facility: CLINIC | Age: 60
Setting detail: THERAPIES SERIES
End: 2020-06-25
Attending: PHYSICIAN ASSISTANT
Payer: COMMERCIAL

## 2020-06-25 DIAGNOSIS — M54.41 CHRONIC RIGHT-SIDED LOW BACK PAIN WITH RIGHT-SIDED SCIATICA: ICD-10-CM

## 2020-06-25 DIAGNOSIS — G62.9 PERIPHERAL NERVE DISORDER: Primary | ICD-10-CM

## 2020-06-25 DIAGNOSIS — G89.29 CHRONIC RIGHT-SIDED LOW BACK PAIN WITH RIGHT-SIDED SCIATICA: ICD-10-CM

## 2020-06-25 DIAGNOSIS — E53.8 VITAMIN B12 DEFICIENCY DISEASE: ICD-10-CM

## 2020-06-25 DIAGNOSIS — G62.9 NEUROPATHY: ICD-10-CM

## 2020-06-25 DIAGNOSIS — G89.29 OTHER CHRONIC PAIN: ICD-10-CM

## 2020-06-25 LAB
ERYTHROCYTE [SEDIMENTATION RATE] IN BLOOD BY WESTERGREN METHOD: 7 MM/H (ref 0–20)
HBA1C MFR BLD: 5.2 % (ref 0–5.6)
TSH SERPL DL<=0.005 MIU/L-ACNC: 0.88 MU/L (ref 0.4–4)
VIT B12 SERPL-MCNC: 512 PG/ML (ref 193–986)

## 2020-06-25 PROCEDURE — 86235 NUCLEAR ANTIGEN ANTIBODY: CPT | Performed by: PSYCHIATRY & NEUROLOGY

## 2020-06-25 PROCEDURE — 36415 COLL VENOUS BLD VENIPUNCTURE: CPT | Performed by: PSYCHIATRY & NEUROLOGY

## 2020-06-25 PROCEDURE — 82784 ASSAY IGA/IGD/IGG/IGM EACH: CPT | Performed by: PSYCHIATRY & NEUROLOGY

## 2020-06-25 PROCEDURE — 82607 VITAMIN B-12: CPT | Performed by: PSYCHIATRY & NEUROLOGY

## 2020-06-25 PROCEDURE — 86038 ANTINUCLEAR ANTIBODIES: CPT | Performed by: PSYCHIATRY & NEUROLOGY

## 2020-06-25 PROCEDURE — 97161 PT EVAL LOW COMPLEX 20 MIN: CPT | Mod: GP | Performed by: PHYSICAL THERAPIST

## 2020-06-25 PROCEDURE — 86431 RHEUMATOID FACTOR QUANT: CPT | Performed by: PSYCHIATRY & NEUROLOGY

## 2020-06-25 PROCEDURE — 85652 RBC SED RATE AUTOMATED: CPT | Performed by: PSYCHIATRY & NEUROLOGY

## 2020-06-25 PROCEDURE — 83036 HEMOGLOBIN GLYCOSYLATED A1C: CPT | Mod: QW | Performed by: PSYCHIATRY & NEUROLOGY

## 2020-06-25 PROCEDURE — 86255 FLUORESCENT ANTIBODY SCREEN: CPT | Performed by: PSYCHIATRY & NEUROLOGY

## 2020-06-25 PROCEDURE — 86334 IMMUNOFIX E-PHORESIS SERUM: CPT | Performed by: PSYCHIATRY & NEUROLOGY

## 2020-06-25 PROCEDURE — 84443 ASSAY THYROID STIM HORMONE: CPT | Performed by: PSYCHIATRY & NEUROLOGY

## 2020-06-25 NOTE — PROGRESS NOTES
06/25/20 1300   General Information   Type of Visit Initial OP Ortho PT Evaluation   Start of Care Date 06/25/20   Referring Physician arelis LAWRENCE   Patient/Family Goals Statement left knee pain    Orders Evaluate and Treat   Date of Order 06/16/20   Certification Required? No   Medical Diagnosis pain in left knee M25.562   Surgical/Medical history reviewed Yes   Precautions/Limitations no known precautions/limitations   Weight-Bearing Status - LLE full weight-bearing   Weight-Bearing Status - RLE full weight-bearing   Body Part(s)   Body Part(s) Knee   Presentation and Etiology   Pertinent history of current problem (include personal factors and/or comorbidities that impact the POC) patient seen with wife they report has had left knee pain about 6 months . pain posterior knee when bending or straightening knee . pain does radiate from posterior knee down calf to foot . has crepitis in knee . has had a steriod years ago and did seem to help . PMH had low back surgery may 2019 due to JESSICA BEE giving out Dr Calvo does not have follow ups. also sees the neurogist and is being worked up .has numbness in B feet , B carpel tunnel .   Impairments A. Pain;D. Decreased ROM;F. Decreased strength and endurance;H. Impaired gait   Functional Limitations perform activities of daily living;perform desired leisure / sports activities   Symptom Location left knee    Onset date of current episode/exacerbation 01/25/20   Chronicity Chronic   Pain rating (0-10 point scale) Best (/10);Worst (/10)   Best (/10) 0/10   Worst (/10) 9/10   Pain quality A. Sharp;B. Dull;C. Aching   Frequency of pain/symptoms C. With activity   Pain/symptoms exacerbated by B. Walking   Pain/symptoms eased by H. Cold   Progression of symptoms since onset: Unchanged   Prior Level of Function   Functional Level Prior Comment walking outside    Current Level of Function   Current Community Support Family/friend caregiver   Patient role/employment history F.  Retired   Fall Risk Screen   Fall screen completed by PT   Have you fallen 2 or more times in the past year? No   Have you fallen and had an injury in the past year? No   Is patient a fall risk? No   Fall screen comments B LE get weak and has to always be mindful of how tired he is , this winter did go into snowbank    Knee Objective Findings   Side (if bilateral, select both right and left) Left   Gait/Locomotion slow and guarded    Knee ROM Comment left knee -3 to 100 right knee -3 to 110   Knee/Hip Strength Comments strength grossly 4-/5 unable to do sit to stand without B UE support and is very slow and guarded    Anterior Drawer Test neg   Posterior Drawer Test neg   Varus Stress Test neg   Valgus Stress Test neg   Gonzalez's Test neg   Palpation slight crepitis noted no significant swelling    Left Hamstring Flexibility B 45 and reproduced left calf pain    Planned Therapy Interventions   Planned Therapy Interventions balance training;ROM;strengthening;stretching   Planned Modality Interventions   Planned Modality Interventions Comments modalities as needed    Clinical Impression   Criteria for Skilled Therapeutic Interventions Met yes, treatment indicated   PT Diagnosis left knee pain , B LE weakness and tightness   Functional limitations due to impairments LEFS 24/80   Clinical Presentation Stable/Uncomplicated   Clinical Presentation Rationale patient seen with wife he reports 6 months of new increased knee and calf pain , had back surgery 1 year ago , presents with limited ROM , strength and flexability , Rx is exercises in clinic and instruction in HEP    Clinical Decision Making (Complexity) Low complexity   Predicted Duration of Therapy Intervention (days/wks) 1x week x 6 weeks    Risk & Benefits of therapy have been explained Yes   Patient, Family & other staff in agreement with plan of care Yes   Education Assessment   Preferred Learning Style Listening;Reading;Demonstration   Barriers to Learning No  barriers   ORTHO GOALS   PT Ortho Eval Goals 1;2;3   Ortho Goal 1   Goal Identifier 1   Goal Description instruction in HEP and compliant with it 5 of 7 days to improve quality of life   Target Date 08/25/20   Ortho Goal 2   Goal Identifier 2   Goal Description patient to have reduction in pain level currently 0-9/10 goal is 0-4/10   Target Date 08/25/20   Ortho Goal 3   Goal Identifier 3   Goal Description patient to have improved tolerance to activity currently LEFS 24 / 80 goal is 50/80   Target Date 08/25/20   Total Evaluation Time   PT Eval, Low Complexity Minutes ([id number]) 30

## 2020-06-26 LAB
ANA SER QL IF: NEGATIVE
ANCA AB PATTERN SER IF-IMP: NORMAL
C-ANCA TITR SER IF: NORMAL {TITER}
ENA SS-A IGG SER IA-ACNC: <0.2 AI (ref 0–0.9)
ENA SS-B IGG SER IA-ACNC: <0.2 AI (ref 0–0.9)

## 2020-06-28 LAB
IGA SERPL-MCNC: 286 MG/DL (ref 84–499)
IGG SERPL-MCNC: 978 MG/DL (ref 610–1616)
IGM SERPL-MCNC: 66 MG/DL (ref 35–242)
PROT PATTERN SERPL IFE-IMP: NORMAL
RHEUMATOID FACT SER NEPH-ACNC: <7 IU/ML (ref 0–20)

## 2020-07-15 ENCOUNTER — TRANSFERRED RECORDS (OUTPATIENT)
Dept: HEALTH INFORMATION MANAGEMENT | Facility: CLINIC | Age: 60
End: 2020-07-15

## 2020-07-31 NOTE — PROGRESS NOTES
Outpatient Physical Therapy Discharge Note     Patient: Wenceslao Vasquez  : 1960    Beginning/End Dates of Reporting Period:  2020 to 2020    Referring Provider: arelis LAWRENCE     Therapy Diagnosis: knee pain      Client Self Report:      Objective Measurements:      patient seen for evaluation only , he cancelled his follow up Rx                Goals:  Goal Identifier 1   Goal Description instruction in HEP and compliant with it 5 of 7 days to improve quality of life   Target Date 20   Date Met      Progress:     Goal Identifier 2   Goal Description patient to have reduction in pain level currently 0-9/10 goal is 0-4/10   Target Date 20   Date Met      Progress:     Goal Identifier 3   Goal Description patient to have improved tolerance to activity currently LEFS 24 / 80 goal is 50/80   Target Date 20   Date Met      Progress:     Goal Identifier     Goal Description     Target Date     Date Met      Progress:     Goal Identifier     Goal Description     Target Date     Date Met      Progress:     Goal Identifier     Goal Description     Target Date     Date Met      Progress:     Goal Identifier     Goal Description     Target Date     Date Met      Progress:     Goal Identifier     Goal Description     Target Date     Date Met      Progress:     Progress Toward Goals:   Progress this reporting period: patient did not continue with therapy           Plan:  Discharge from therapy.    Discharge:    Reason for Discharge: Patient chooses to discontinue therapy.  Patient has failed to schedule further appointments.    Equipment Issued: none    Discharge Plan: Patient to continue home program.

## 2020-08-12 ENCOUNTER — OFFICE VISIT (OUTPATIENT)
Dept: FAMILY MEDICINE | Facility: CLINIC | Age: 60
End: 2020-08-12
Payer: COMMERCIAL

## 2020-08-12 VITALS
DIASTOLIC BLOOD PRESSURE: 84 MMHG | WEIGHT: 231.5 LBS | HEART RATE: 76 BPM | RESPIRATION RATE: 18 BRPM | BODY MASS INDEX: 35.2 KG/M2 | SYSTOLIC BLOOD PRESSURE: 132 MMHG | OXYGEN SATURATION: 96 % | TEMPERATURE: 97.4 F

## 2020-08-12 DIAGNOSIS — M96.1 POST LAMINECTOMY SYNDROME: ICD-10-CM

## 2020-08-12 DIAGNOSIS — R41.82 ALTERED MENTAL STATUS, UNSPECIFIED ALTERED MENTAL STATUS TYPE: ICD-10-CM

## 2020-08-12 DIAGNOSIS — Z01.818 PREOP GENERAL PHYSICAL EXAM: Primary | ICD-10-CM

## 2020-08-12 PROCEDURE — 99214 OFFICE O/P EST MOD 30 MIN: CPT | Performed by: FAMILY MEDICINE

## 2020-08-12 PROCEDURE — 93005 ELECTROCARDIOGRAM TRACING: CPT | Performed by: FAMILY MEDICINE

## 2020-08-12 RX ORDER — PREGABALIN 50 MG/1
50 CAPSULE ORAL
COMMUNITY
Start: 2020-07-11 | End: 2022-01-17

## 2020-08-12 RX ORDER — OXYCODONE AND ACETAMINOPHEN 5; 325 MG/1; MG/1
5-325 TABLET ORAL
COMMUNITY
Start: 2020-07-13 | End: 2022-01-17

## 2020-08-12 ASSESSMENT — PATIENT HEALTH QUESTIONNAIRE - PHQ9: SUM OF ALL RESPONSES TO PHQ QUESTIONS 1-9: 4

## 2020-08-12 ASSESSMENT — PAIN SCALES - GENERAL: PAINLEVEL: EXTREME PAIN (8)

## 2020-08-12 NOTE — PATIENT INSTRUCTIONS
Before Your Surgery      Call your surgeon if there is any change in your health. This includes signs of a cold or flu (such as a sore throat, runny nose, cough, rash or fever).    Do not smoke, drink alcohol or take over the counter medicine (unless your surgeon or primary care doctor tells you to) for the 24 hours before and after surgery.    If you take prescribed drugs: Follow your doctor s orders about which medicines to take and which to stop until after surgery.    Eating and drinking prior to surgery: follow the instructions from your surgeon    Take a shower or bath the night before surgery. Use the soap your surgeon gave you to gently clean your skin. If you do not have soap from your surgeon, use your regular soap. Do not shave or scrub the surgery site.  Wear clean pajamas and have clean sheets on your bed.   Before Your Surgery    Call your surgeon if there is any change in your health. This includes signs of a cold or flu (such as a sore throat, runny nose, cough, rash or fever).  Do not smoke, drink alcohol or take over the counter medicine (unless your surgeon or primary care doctor tells you to) for the 24 hours before and after surgery.  If you take prescribed drugs: Follow your doctor s orders about which medicines to take and which to stop until after surgery.  Eating and drinking prior to surgery: follow the instructions from your surgeon  Take a shower or bath the night before surgery. Use the soap your surgeon gave you to gently clean your skin. If you do not have soap from your surgeon, use your regular soap. Do not shave or scrub the surgery site.  Wear clean pajamas and have clean sheets on your bed.       --Patient is to take all scheduled medications on the day of surgery
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
## 2020-08-25 ENCOUNTER — TRANSFERRED RECORDS (OUTPATIENT)
Dept: HEALTH INFORMATION MANAGEMENT | Facility: CLINIC | Age: 60
End: 2020-08-25

## 2020-09-04 ENCOUNTER — TRANSFERRED RECORDS (OUTPATIENT)
Dept: HEALTH INFORMATION MANAGEMENT | Facility: CLINIC | Age: 60
End: 2020-09-04

## 2020-09-09 ENCOUNTER — TRANSFERRED RECORDS (OUTPATIENT)
Dept: HEALTH INFORMATION MANAGEMENT | Facility: CLINIC | Age: 60
End: 2020-09-09

## 2020-12-08 ENCOUNTER — TRANSFERRED RECORDS (OUTPATIENT)
Dept: HEALTH INFORMATION MANAGEMENT | Facility: CLINIC | Age: 60
End: 2020-12-08

## 2020-12-16 DIAGNOSIS — G47.33 OSA (OBSTRUCTIVE SLEEP APNEA): Primary | ICD-10-CM

## 2020-12-17 RX ORDER — ZOLPIDEM TARTRATE 10 MG/1
TABLET ORAL
Qty: 30 TABLET | Refills: 1 | Status: SHIPPED | OUTPATIENT
Start: 2020-12-17 | End: 2020-12-23

## 2020-12-17 NOTE — TELEPHONE ENCOUNTER
Ambien      Last Written Prescription Date:  5/20/10  Last Fill Quantity: 30,   # refills: 0  Last Office Visit: 8/12/2020  Future Office visit:       Routing refill request to provider for review/approval because:  Drug not on the [facility], [facility] or [facility] refill protocol or controlled substance  Drug not active on patient's medication list

## 2020-12-22 ENCOUNTER — MYC MEDICAL ADVICE (OUTPATIENT)
Dept: FAMILY MEDICINE | Facility: CLINIC | Age: 60
End: 2020-12-22

## 2020-12-22 ENCOUNTER — TRANSFERRED RECORDS (OUTPATIENT)
Dept: HEALTH INFORMATION MANAGEMENT | Facility: CLINIC | Age: 60
End: 2020-12-22

## 2021-01-09 ENCOUNTER — HEALTH MAINTENANCE LETTER (OUTPATIENT)
Age: 61
End: 2021-01-09

## 2021-02-05 ENCOUNTER — TRANSFERRED RECORDS (OUTPATIENT)
Dept: HEALTH INFORMATION MANAGEMENT | Facility: CLINIC | Age: 61
End: 2021-02-05

## 2021-03-26 ENCOUNTER — TRANSFERRED RECORDS (OUTPATIENT)
Dept: HEALTH INFORMATION MANAGEMENT | Facility: CLINIC | Age: 61
End: 2021-03-26

## 2021-05-08 ENCOUNTER — HEALTH MAINTENANCE LETTER (OUTPATIENT)
Age: 61
End: 2021-05-08

## 2021-10-23 ENCOUNTER — HEALTH MAINTENANCE LETTER (OUTPATIENT)
Age: 61
End: 2021-10-23

## 2022-01-11 ASSESSMENT — ENCOUNTER SYMPTOMS
CONSTIPATION: 1
HEARTBURN: 0
ABDOMINAL PAIN: 0
DIARRHEA: 0
PARESTHESIAS: 0
FREQUENCY: 0
SORE THROAT: 0
DYSURIA: 0
NERVOUS/ANXIOUS: 1
WEAKNESS: 1
COUGH: 0
HEMATOCHEZIA: 0
HEMATURIA: 0
DIZZINESS: 0
FEVER: 0
ARTHRALGIAS: 1
PALPITATIONS: 0
JOINT SWELLING: 1
EYE PAIN: 0
MYALGIAS: 1
CHILLS: 0
HEADACHES: 0
SHORTNESS OF BREATH: 0
NAUSEA: 0

## 2022-01-11 ASSESSMENT — ACTIVITIES OF DAILY LIVING (ADL): CURRENT_FUNCTION: MONEY MANAGEMENT REQUIRES ASSISTANCE

## 2022-01-17 ENCOUNTER — OFFICE VISIT (OUTPATIENT)
Dept: FAMILY MEDICINE | Facility: CLINIC | Age: 62
End: 2022-01-17
Payer: COMMERCIAL

## 2022-01-17 VITALS
SYSTOLIC BLOOD PRESSURE: 148 MMHG | BODY MASS INDEX: 34.57 KG/M2 | OXYGEN SATURATION: 94 % | TEMPERATURE: 98 F | HEIGHT: 69 IN | DIASTOLIC BLOOD PRESSURE: 84 MMHG | WEIGHT: 233.38 LBS | HEART RATE: 80 BPM | RESPIRATION RATE: 20 BRPM

## 2022-01-17 DIAGNOSIS — I10 HYPERTENSION GOAL BP (BLOOD PRESSURE) < 140/90: ICD-10-CM

## 2022-01-17 DIAGNOSIS — G47.33 OSA (OBSTRUCTIVE SLEEP APNEA): ICD-10-CM

## 2022-01-17 DIAGNOSIS — Z00.00 ENCOUNTER FOR MEDICARE ANNUAL WELLNESS EXAM: Primary | ICD-10-CM

## 2022-01-17 DIAGNOSIS — R73.9 HYPERGLYCEMIA: ICD-10-CM

## 2022-01-17 DIAGNOSIS — R53.83 FATIGUE, UNSPECIFIED TYPE: ICD-10-CM

## 2022-01-17 LAB
ANION GAP SERPL CALCULATED.3IONS-SCNC: 5 MMOL/L (ref 3–14)
BUN SERPL-MCNC: 21 MG/DL (ref 7–30)
CALCIUM SERPL-MCNC: 9 MG/DL (ref 8.5–10.1)
CHLORIDE BLD-SCNC: 112 MMOL/L (ref 94–109)
CO2 SERPL-SCNC: 25 MMOL/L (ref 20–32)
CREAT SERPL-MCNC: 0.72 MG/DL (ref 0.66–1.25)
ERYTHROCYTE [DISTWIDTH] IN BLOOD BY AUTOMATED COUNT: 12.3 % (ref 10–15)
GFR SERPL CREATININE-BSD FRML MDRD: >90 ML/MIN/1.73M2
GLUCOSE BLD-MCNC: 147 MG/DL (ref 70–99)
HBA1C MFR BLD: 5.6 % (ref 0–5.6)
HCT VFR BLD AUTO: 40.3 % (ref 40–53)
HGB BLD-MCNC: 14.3 G/DL (ref 13.3–17.7)
MCH RBC QN AUTO: 35.5 PG (ref 26.5–33)
MCHC RBC AUTO-ENTMCNC: 35.5 G/DL (ref 31.5–36.5)
MCV RBC AUTO: 100 FL (ref 78–100)
PLATELET # BLD AUTO: 189 10E3/UL (ref 150–450)
POTASSIUM BLD-SCNC: 3.8 MMOL/L (ref 3.4–5.3)
RBC # BLD AUTO: 4.03 10E6/UL (ref 4.4–5.9)
SODIUM SERPL-SCNC: 142 MMOL/L (ref 133–144)
WBC # BLD AUTO: 6.8 10E3/UL (ref 4–11)

## 2022-01-17 PROCEDURE — 36415 COLL VENOUS BLD VENIPUNCTURE: CPT | Performed by: FAMILY MEDICINE

## 2022-01-17 PROCEDURE — 80048 BASIC METABOLIC PNL TOTAL CA: CPT | Performed by: FAMILY MEDICINE

## 2022-01-17 PROCEDURE — 99213 OFFICE O/P EST LOW 20 MIN: CPT | Mod: 25 | Performed by: FAMILY MEDICINE

## 2022-01-17 PROCEDURE — 85027 COMPLETE CBC AUTOMATED: CPT | Performed by: FAMILY MEDICINE

## 2022-01-17 PROCEDURE — 83036 HEMOGLOBIN GLYCOSYLATED A1C: CPT | Performed by: FAMILY MEDICINE

## 2022-01-17 PROCEDURE — 99396 PREV VISIT EST AGE 40-64: CPT | Performed by: FAMILY MEDICINE

## 2022-01-17 RX ORDER — DULOXETIN HYDROCHLORIDE 30 MG/1
30 CAPSULE, DELAYED RELEASE ORAL 2 TIMES DAILY
COMMUNITY
Start: 2021-11-10 | End: 2022-03-21

## 2022-01-17 RX ORDER — LISINOPRIL 10 MG/1
10 TABLET ORAL DAILY
Qty: 90 TABLET | Refills: 3 | Status: SHIPPED | OUTPATIENT
Start: 2022-01-17 | End: 2023-01-10

## 2022-01-17 RX ORDER — OXYCODONE AND ACETAMINOPHEN 10; 325 MG/1; MG/1
10 TABLET ORAL 2 TIMES DAILY PRN
COMMUNITY
Start: 2021-10-08 | End: 2024-03-21

## 2022-01-17 ASSESSMENT — ENCOUNTER SYMPTOMS
PALPITATIONS: 0
CHILLS: 0
NAUSEA: 0
CONSTIPATION: 1
SHORTNESS OF BREATH: 0
ABDOMINAL PAIN: 0
FREQUENCY: 0
SORE THROAT: 0
HEMATURIA: 0
JOINT SWELLING: 1
FEVER: 0
DIZZINESS: 0
DIARRHEA: 0
MYALGIAS: 1
HEADACHES: 0
ARTHRALGIAS: 1
WEAKNESS: 1
COUGH: 0
DYSURIA: 0
EYE PAIN: 0
HEARTBURN: 0
NERVOUS/ANXIOUS: 1
PARESTHESIAS: 0
HEMATOCHEZIA: 0

## 2022-01-17 ASSESSMENT — ACTIVITIES OF DAILY LIVING (ADL): CURRENT_FUNCTION: MONEY MANAGEMENT REQUIRES ASSISTANCE

## 2022-01-17 ASSESSMENT — MIFFLIN-ST. JEOR: SCORE: 1841.02

## 2022-01-17 ASSESSMENT — PAIN SCALES - GENERAL: PAINLEVEL: EXTREME PAIN (8)

## 2022-01-17 NOTE — PROGRESS NOTES
"SUBJECTIVE:   Wenceslao Vasquez is a 61 year old male who presents for Preventive Visit.      Patient has been advised of split billing requirements and indicates understanding: Yes   Are you in the first 12 months of your Medicare coverage?  No    Healthy Habits:     In general, how would you rate your overall health?  Fair    Frequency of exercise:  None    Do you usually eat at least 4 servings of fruit and vegetables a day, include whole grains    & fiber and avoid regularly eating high fat or \"junk\" foods?  No    Taking medications regularly:  Yes    Barriers to taking medications:  None    Medication side effects:  Not applicable    Ability to successfully perform activities of daily living:  Money management requires assistance    Home Safety:  No safety concerns identified    Hearing Impairment:  No hearing concerns    In the past 6 months, have you been bothered by leaking of urine?  No    In general, how would you rate your overall mental or emotional health?  Fair      PHQ-2 Total Score: 2    Additional concerns today:  No    Do you feel safe in your environment? Yes    Have you ever done Advance Care Planning? (For example, a Health Directive, POLST, or a discussion with a medical provider or your loved ones about your wishes): Yes, advance care planning is on file.       Fall risk  Fallen 2 or more times in the past year?: Yes  Any fall with injury in the past year?: Yes  Timed Up and Go Test (>13.5 is fall risk; contact physician) : 13    Cognitive Screening   1) Repeat 3 items (Leader, Season, Table)      2) Clock draw: ABNORMAL time  3) 3 item recall: Recalls 1 object   Results: ABNORMAL clock, 1-2 items recalled: PROBABLE COGNITIVE IMPAIRMENT, **INFORM PROVIDER**    Mini-CogTM Copyright [employer]. Licensed by the author for use in [facility]; reprinted with permission ([email or web address]). All rights reserved.      Do you have sleep apnea, excessive snoring or daytime drowsiness?: yes    Reviewed " and updated as needed this visit by clinical staff  Tobacco  Allergies  Meds   Med Hx  Surg Hx  Fam Hx  Soc Hx       Reviewed and updated as needed this visit by Provider               Social History     Tobacco Use     Smoking status: Never Smoker     Smokeless tobacco: Never Used   Substance Use Topics     Alcohol use: Yes     Comment: very little         Alcohol Use 1/11/2022   Prescreen: >3 drinks/day or >7 drinks/week? Not Applicable   Prescreen: >3 drinks/day or >7 drinks/week? -           Hypertension Follow-up      Do you check your blood pressure regularly outside of the clinic? No     Are you following a low salt diet? No    Are your blood pressures ever more than 140 on the top number (systolic) OR more   than 90 on the bottom number (diastolic), for example 140/90? No    Chronic/Recurring Back Pain Follow Up      Where is your back pain located? (Select all that apply) low back bilateral    How would you describe your back pain?  dull ache and shooting    Where does your back pain spread? nowhere    Since your last clinic visit for back pain, how has your pain changed? always present, but gets better and worse    Does your back pain interfere with your job? YES    Since your last visit, have you tried any new treatment? 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      Current providers sharing in care for this patient include:   Patient Care Team:  Christopher Arreola MD as PCP - General  Christopher Arreola MD as Assigned PCP    The following health maintenance items are reviewed in Epic and correct as of today:  Health Maintenance Due   Topic Date Due     URINE DRUG SCREEN  Never done     ANNUAL REVIEW OF HM ORDERS  Never done     HIV SCREENING  Never done     ZOSTER IMMUNIZATION (1 of 2) Never done     MEDICARE ANNUAL WELLNESS VISIT  06/07/2019     Labs reviewed in EPIC  BP Readings from Last 3 Encounters:   01/17/22 (!) 148/84   08/12/20 132/84   02/27/20 136/86    Wt Readings from Last 3 Encounters:    01/17/22 105.9 kg (233 lb 6 oz)   08/12/20 105 kg (231 lb 8 oz)   02/27/20 107.9 kg (237 lb 12.8 oz)                  Patient Active Problem List   Diagnosis     CHELSY (obstructive sleep apnea)     Hyperlipidemia LDL goal <130     Sciatica     Low back pain     Stenosis of lumbosacral spine     Advanced directives, counseling/discussion     Hypertension goal BP (blood pressure) < 140/90     Spinal stenosis in cervical region     Cervical spondylosis without myelopathy     Pneumonia     Morbid obesity due to excess calories (H)     Syncope     Exertional chest pain     Lumbar stenosis     S/P laminectomy     S/P lumbar laminectomy     Altered mental status     TIA (transient ischemic attack)     Past Surgical History:   Procedure Laterality Date     BACK SURGERY       CERVICAL DISKECTOMY  8/8/13    [facility]     COLONOSCOPY  06/03/09     COLONOSCOPY N/A 9/17/2019    Procedure: COLONOSCOPY;  Surgeon: Jose F Samuels DO;  Location: [facility]     CYSTOSCOPY, DILATE URETHRA, COMBINED N/A 7/23/2018    Procedure: COMBINED CYSTOSCOPY, DILATE URETHRA;  cystosdcopy with urethral dilation and catheter placement;  Surgeon: Dakota Moore MD;  Location: [facility]     CYSTOSCOPY, DILATE URETHRA, COMBINED N/A 12/10/2018    Procedure: CYSTOSCOPY, URETHRAL DILATION;  Surgeon: Dakota Moore MD;  Location: [facility]     DIL URETHRA STRIC,MALE,SUBSEQ       HC REMOVAL HEEL SPUR, CALCANEUS  2/2005     HC REVISE MEDIAN N/CARPAL TUNNEL SURG  1993    right     HEAD & NECK SURGERY  2013    Plate in neck     INJECT EPIDURAL LUMBAR Bilateral 9/20/2019    Procedure: INJECTION, SPINE, LUMBAR 4-5, EPIDURAL;  Surgeon: Calvin Rich MD;  Location: [facility]     INJECT EPIDURAL LUMBAR N/A 1/9/2020    Procedure: Lumbar 4-5 Translaminar Epidural Injection;  Surgeon: Calvin Rich MD;  [facility]: [facility]     INJECT EPIDURAL TRANSFORAMINAL Bilateral 4/12/2019    Procedure: Inject epidural transforaminal Lumbar 3-4 bilateral;  Surgeon:  Calvin Rich MD;  [facility]     INSERT CATHETER BLADDER N/A 7/23/2018    Procedure: INSERT CATHETER BLADDER;;  Surgeon: Dakota Moore MD;  [facility]     LAMINECTOMY LUMBAR TWO LEVELS N/A 5/29/2019    Procedure: LAMINECTOMIES LUMBAR 2-4;  Surgeon: Brent Calvo MD;  [facility]: [facility]     LAPAROSCOPIC CHOLECYSTECTOMY  3/11/2013    Procedure: LAPAROSCOPIC CHOLECYSTECTOMY;  laparoscopic cholecystectomy;  Surgeon: Clinton Whittaker MD;  Location: [facility]     ZZC NONSPECIFIC PROCEDURE      lumbar disc surgery     ZZC NONSPECIFIC PROCEDURE      hammer toe surgery right foot     ZZC NONSPECIFIC PROCEDURE      nasal surgery       Social History     Tobacco Use     Smoking status: Never Smoker     Smokeless tobacco: Never Used   Substance Use Topics     Alcohol use: Yes     Comment: very little     Family History   Problem Relation Age of Onset     Alcohol/Drug Brother      Prostate Cancer Brother      Arthritis Mother      Cancer Mother         Kidney - Stage 3     Other Cancer Mother         Kidney     Heart Disease Maternal Grandmother      Heart Disease Paternal Grandmother      C.A.D. No family hx of      Diabetes No family hx of      Anesthesia Reaction No family hx of         Hard to come out of     Cancer - colorectal No family hx of      Colon Cancer No family hx of          Current Outpatient Medications   Medication Sig Dispense Refill     acetaminophen (TYLENOL) 325 MG tablet Take 2 tablets (650 mg) by mouth every 4 hours as needed for mild pain  0     DULoxetine (CYMBALTA) 30 MG capsule Take 30 mg by mouth 2 times daily       Multiple Vitamin (MULTI VITAMIN MENS PO) Take 1 tablet by mouth daily.       oxyCODONE-acetaminophen (PERCOCET)  MG per tablet Take 10 tablets by mouth 3 times daily       Allergies   Allergen Reactions     Dust Mites Hives     Morphine Nausea and Vomiting     Recent Labs   Lab Test 06/25/20  1131 09/12/19  1602 06/12/19  0522 06/11/19  2057 03/06/19  1540  "06/07/18  0835 04/30/18  1347 01/08/18  1505 01/08/18  0530 11/09/17  1709 07/03/17  0949 01/11/17  0855 07/21/16  0822   A1C 5.2  --   --   --   --   --  5.3  --   --   --   --   --   --    LDL  --   --   --   --   --  77  --   --   --   --   --  100* 90   HDL  --   --   --   --   --  31*  --   --   --   --   --  36* 36*   TRIG  --   --   --   --   --  198*  --   --   --   --   --  185* 139   ALT  --   --   --  34  --   --   --  31 35  --    < >  --   --    CR  --  0.91 0.83 0.83   < >  --   --  0.69 0.74  --    < >  --   --    GFRESTIMATED  --  >90 >90 >90   < >  --   --  >90 >90  --    < >  --   --    GFRESTBLACK  --  >90 >90 >90   < >  --   --  >90 >90  --    < >  --   --    POTASSIUM  --  3.8 3.7 3.9   < >  --   --  3.4 3.5  --    < >  --   --    TSH 0.88  --   --   --   --   --   --   --   --  0.70  --   --   --     < > = values in this interval not displayed.              Review of Systems   Constitutional: Negative for chills and fever.   HENT: Negative for congestion, ear pain, hearing loss and sore throat.    Eyes: Negative for pain and visual disturbance.   Respiratory: Negative for cough and shortness of breath.    Cardiovascular: Positive for peripheral edema. Negative for chest pain and palpitations.   Gastrointestinal: Positive for constipation. Negative for abdominal pain, diarrhea, heartburn, hematochezia and nausea.   Genitourinary: Negative for dysuria, frequency, genital sores, hematuria, impotence, penile discharge and urgency.   Musculoskeletal: Positive for arthralgias, joint swelling and myalgias.   Skin: Negative for rash.   Neurological: Positive for weakness. Negative for dizziness, headaches and paresthesias.   Psychiatric/Behavioral: Positive for mood changes. The patient is nervous/anxious.          OBJECTIVE:   BP (!) 148/84 (BP Location: Right arm, Patient Position: Chair, Cuff Size: Adult Large)   Pulse 80   Temp 98  F (36.7  C) (Temporal)   Resp 20   Ht 1.74 m (5' 8.5\")   Wt " "105.9 kg (233 lb 6 oz)   SpO2 94%   BMI 34.97 kg/m   Estimated body mass index is 34.97 kg/m  as calculated from the following:    Height as of this encounter: 1.74 m (5' 8.5\").    Weight as of this encounter: 105.9 kg (233 lb 6 oz).  Physical Exam  GENERAL: healthy, alert, no distress and obese  EYES: Eyes grossly normal to inspection, PERRL and conjunctivae and sclerae normal  HENT: ear canals and TM's normal, nose and mouth without ulcers or lesions  NECK: no adenopathy, no asymmetry, masses, or scars and thyroid normal to palpation  RESP: lungs clear to auscultation - no rales, rhonchi or wheezes  CV: regular rate and rhythm, normal S1 S2, no S3 or S4, no murmur, click or rub, no peripheral edema and peripheral pulses strong  ABDOMEN: soft, nontender, no hepatosplenomegaly, no masses and bowel sounds normal  MS: no gross musculoskeletal defects noted, no edema  NEURO: Normal strength and tone, mentation intact and speech normal  BACK: no CVA tenderness, no paralumbar tenderness  PSYCH: mentation appears normal, affect normal/bright  LYMPH: no cervical, supraclavicular, axillary, or inguinal adenopathy    Diagnostic Test Results:  Labs reviewed in Epic    ASSESSMENT / PLAN:       ICD-10-CM    1. Encounter for Medicare annual wellness exam  Z00.00    2. Hypertension goal BP (blood pressure) < 140/90  I10 lisinopril (ZESTRIL) 10 MG tablet     Basic metabolic panel  (Ca, Cl, CO2, Creat, Gluc, K, Na, BUN)     Basic metabolic panel  (Ca, Cl, CO2, Creat, Gluc, K, Na, BUN)   3. Fatigue, unspecified type  R53.83 CBC with platelets     CBC with platelets   4. Hyperglycemia  R73.9 Hemoglobin A1c     Hemoglobin A1c   5. CHELSY (obstructive sleep apnea)  G47.33 CPAP Order for DME - ONLY FOR DME       Patient has been advised of split billing requirements and indicates understanding: Yes  COUNSELING:  Reviewed preventive health counseling, as reflected in patient instructions       Regular exercise       Healthy " "diet/nutrition       Vision screening       Hearing screening       Immunizations    Declined: Zoster due to Other will obtain at pharmacy            Estimated body mass index is 34.97 kg/m  as calculated from the following:    Height as of this encounter: 1.74 m (5' 8.5\").    Weight as of this encounter: 105.9 kg (233 lb 6 oz).    Weight management plan: Discussed healthy diet and exercise guidelines    He reports that he has never smoked. He has never used smokeless tobacco.      Appropriate preventive services were discussed with this patient, including applicable screening as appropriate for cardiovascular disease, diabetes, osteopenia/osteoporosis, and glaucoma.  As appropriate for age/gender, discussed screening for colorectal cancer, prostate cancer, breast cancer, and cervical cancer. Checklist reviewing preventive services available has been given to the patient.    Reviewed patients plan of care and provided an AVS. The Basic Care Plan (routine screening as documented in Health Maintenance) for Wenceslao meets the Care Plan requirement. This Care Plan has been established and reviewed with the Patient.    Counseling Resources:  ATP IV Guidelines  Pooled Cohorts Equation Calculator  Breast Cancer Risk Calculator  Breast Cancer: Medication to Reduce Risk  FRAX Risk Assessment  ICSI Preventive Guidelines  Dietary Guidelines for Americans, 2010  USDA's MyPlate  ASA Prophylaxis  Lung CA Screening    Christopher Arreola MD  [facility]    Identified Health Risks:  "

## 2022-01-17 NOTE — PATIENT INSTRUCTIONS
Patient Education   Personalized Prevention Plan  You are due for the preventive services outlined below.  Your care team is available to assist you in scheduling these services.  If you have already completed any of these items, please share that information with your care team to update in your medical record.  Health Maintenance Due   Topic Date Due     URINE DRUG SCREEN  Never done     HIV Screening  Never done     Zoster (Shingles) Vaccine (1 of 2) Never done     Your Health Risk Assessment indicates you feel you are not in good health    A healthy lifestyle helps keep the body fit and the mind alert. It helps protect you from disease, helps you fight disease, and helps prevent chronic disease (disease that doesn't go away) from getting worse. This is important as you get older and begin to notice twinges in muscles and joints and a decline in the strength and stamina you once took for granted. A healthy lifestyle includes good healthcare, good nutrition, weight control, recreation, and regular exercise. Avoid harmful substances and do what you can to keep safe. Another part of a healthy lifestyle is stay mentally active and socially involved.    Good healthcare     Have a wellness visit every year.     If you have new symptoms, let us know right away. Don't wait until the next checkup.     Take medicines exactly as prescribed and keep your medicines in a safe place. Tell us if your medicine causes problems.   Healthy diet and weight control     Eat 3 or 4 small, nutritious, low-fat, high-fiber meals a day. Include a variety of fruits, vegetables, and whole-grain foods.     Make sure you get enough calcium in your diet. Calcium, vitamin D, and exercise help prevent osteoporosis (bone thinning).     If you live alone, try eating with others when you can. That way you get a good meal and have company while you eat it.     Try to keep a healthy weight. If you eat more calories than your body uses for energy, it  will be stored as fat and you will gain weight.     Recreation   Recreation is not limited to sports and team events. It includes any activity that provides relaxation, interest, enjoyment, and exercise. Recreation provides an outlet for physical, mental, and social energy. It can give a sense of worth and achievement. It can help you stay healthy.    Mental Exercise and Social Involvement  Mental and emotional health is as important as physical health. Keep in touch with friends and family. Stay as active as possible. Continue to learn and challenge yourself.   Things you can do to stay mentally active are:    Learn something new, like a foreign language or musical instrument.     Play SCRABBLE or do crossword puzzles. If you cannot find people to play these games with you at home, you can play them with others on your computer through the Internet.     Join a games club--anything from card games to chess or checkers or lawn bowling.     Start a new hobby.     Go back to school.     Volunteer.     Read.   Keep up with world events.    Exercise for a Healthier Heart  You may wonder how you can improve the health of your heart. If you re thinking about exercise, you re on the right track. You don t need to become an athlete. But you do need a certain amount of brisk exercise to help strengthen your heart. If you have been diagnosed with a heart condition, your healthcare provider may advise exercise to help stabilize your condition. To help make exercise a habit, choose safe, fun activities.      Exercise with a friend. When activity is fun, you're more likely to stick with it.   Before you start  Check with your healthcare provider before starting an exercise program. This is especially important if you have not been active for a while. It's also important if you have a long-term (chronic) health problem such as heart disease, diabetes, or obesity. Or if you are at high risk for having these problems.   Why  exercise?  Exercising regularly offers many healthy rewards. It can help you do all of the following:     Improve your blood cholesterol level to help prevent further heart trouble    Lower your blood pressure to help prevent a stroke or heart attack    Control diabetes, or reduce your risk of getting this disease    Improve your heart and lung function    Reach and stay at a healthy weight    Make your muscles stronger so you can stay active    Prevent falls and fractures by slowing the loss of bone mass (osteoporosis)    Manage stress better    Reduce your blood pressure    Improve your sense of self and your body image  Exercise tips      Ease into your routine. Set small goals. Then build on them. If you are not sure what your activity level should be, talk with your healthcare provider first before starting an exercise routine.    Exercise on most days. Aim for a total of 150 minutes (2 hours and 30 minutes) or more of moderate-intensity aerobic activity each week. Or 75 minutes (1 hour and 15 minutes) or more of vigorous-intensity aerobic activity each week. Or try for a combination of both. Moderate activity means that you breathe heavier and your heart rate increases but you can still talk. Think about doing 40 minutes of moderate exercise, 3 to 4 times a week. For best results, activity should last for about 40 minutes to lower blood pressure and cholesterol. It's OK to work up to the 40-minute period over time. Examples of moderate-intensity activity are walking 1 mile in 15 minutes. Or doing 30 to 45 minutes of yard work.    Step up your daily activity level.  Along with your exercise program, try being more active the whole day. Walk instead of drive. Or park further away so that you take more steps each day. Do more household tasks or yard work. You may not be able to meet the advised mount of physical activity. But doing some moderate- or vigorous-intensity aerobic activity can help reduce your risk  for heart disease. Your healthcare provider can help you figure out what is best for you.    Choose 1 or more activities you enjoy.  Walking is one of the easiest things you can do. You can also try swimming, riding a bike, dancing, or taking an exercise class.    When to call your healthcare provider  Call your healthcare provider if you have any of these:     Chest pain or feel dizzy or lightheaded    Burning, tightness, pressure, or heaviness in your chest, neck, shoulders, back, or arms    Abnormal shortness of breath    More joint or muscle pain    A very fast or irregular heartbeat (palpitations)  [facility] last reviewed this educational content on 7/1/2019 2000-2021 [employer]. All rights reserved. This information is not intended as a substitute for professional medical care. Always follow your healthcare professional's instructions.          Understanding USDA MyPlate  The USDA has guidelines to help you make healthy food choices. These are called MyPlate. MyPlate shows the food groups that make up healthy meals using the image of a place setting. Before you eat, think about the healthiest choices for what to put on your plate or in your cup or bowl. To learn more about building a healthy plate, visit www.choosemyplate.gov.    The food groups    Fruits. Any fruit or 100% fruit juice counts as part of the Fruit Group. Fruits may be fresh, canned, frozen, or dried, and may be whole, cut-up, or pureed. Make 1/2 of your plate fruits and vegetables.    Vegetables. Any vegetable or 100% vegetable juice counts as a member of the Vegetable Group. Vegetables may be fresh, frozen, canned, or dried. They can be served raw or cooked and may be whole, cut-up, or mashed. Make 1/2 of your plate fruits and vegetables.    Grains. All foods made from grains are part of the Grains Group. These include wheat, rice, oats, cornmeal, and barley. Grains are often used to make foods such as bread, pasta, oatmeal,  cereal, tortillas, and grits. Grains should be no more than 1/4 of your plate. At least half of your grains should be whole grains.    Protein. This group includes meat, poultry, seafood, beans and peas, eggs, processed soy products (such as tofu), nuts (including nut butters), and seeds. Make protein choices no more than 1/4 of your plate. Meat and poultry choices should be lean or low fat.    Dairy. The Dairy Group includes all fluid milk products and foods made from milk that contain calcium, such as yogurt and cheese. (Foods that have little calcium, such as cream, butter, and cream cheese, are not part of this group.) Most dairy choices should be low-fat or fat-free.    Oils. Oils aren't a food group, but they do contain essential nutrients. However it's important to watch your intake of oils. These are fats that are liquid at room temperature. They include canola, corn, olive, soybean, vegetable, and sunflower oil. Foods that are mainly oil include mayonnaise, certain salad dressings, and soft margarines. You likely already get your daily oil allowance from the foods you eat.  Things to limit  Eating healthy also means limiting these things in your diet:       Salt (sodium). Many processed foods have a lot of sodium. To keep sodium intake down, eat fresh vegetables, meats, poultry, and seafood when possible. Purchase low-sodium, reduced-sodium, or no-salt-added food products at the store. And don't add salt to your meals at home. Instead, season them with herbs and spices such as dill, oregano, cumin, and paprika. Or try adding flavor with lemon or lime zest and juice.    Saturated fat. Saturated fats are most often found in animal products such as beef, pork, and chicken. They are often solid at room temperature, such as butter. To reduce your saturated fat intake, choose leaner cuts of meat and poultry. And try healthier cooking methods such as grilling, broiling, roasting, or baking. For a simple lower-fat  ibeth, use plain nonfat yogurt instead of mayonnaise when making potato salad or macaroni salad.    Added sugars. These are sugars added to foods. They are in foods such as ice cream, candy, soda, fruit drinks, sports drinks, energy drinks, cookies, pastries, jams, and syrups. Cut down on added sugars by sharing sweet treats with a family member or friend. You can also choose fruit for dessert, and drink water or other unsweetened beverages.     [employer] last reviewed this educational content on 6/1/2020 2000-2021 [employer]. All rights reserved. This information is not intended as a substitute for professional medical care. Always follow your healthcare professional's instructions.        Activities of Daily Living    Your Health Risk Assessment indicates you have difficulties with activities of daily living such as housework, bathing, preparing meals, taking medication, etc. Please make a follow up appointment for us to address this issue in more detail.  Your Health Risk Assessment indicates you feel you are not in good emotional health.    Recreation   Recreation is not limited to sports and team events. It includes any activity that provides relaxation, interest, enjoyment, and exercise. Recreation provides an outlet for physical, mental, and social energy. It can give a sense of worth and achievement. It can help you stay healthy.    Mental Exercise and Social Involvement  Mental and emotional health is as important as physical health. Keep in touch with friends and family. Stay as active as possible. Continue to learn and challenge yourself.   Things you can do to stay mentally active are:    Learn something new, like a foreign language or musical instrument.     Play SCRABBLE or do crossword puzzles. If you cannot find people to play these games with you at home, you can play them with others on your computer through the Internet.     Join a games club--anything from card games to chess or  checkers or lawn bowling.     Start a new hobby.     Go back to school.     Volunteer.     Read.   Keep up with world events.    Preventing Falls at Home  A person can fall for many reasons. Older adults may fall because reaction time slows down as we age. Your muscles and joints may get stiff, weak, or less flexible because of illness, medicines, or a physical condition.   Other health problems that make falls more likely include:     Arthritis    Dizziness or lightheadedness when you stand up (orthostatic hypotension)    History of a stroke    Dizziness    Anemia    Certain medicines taken for mental illness or to control blood pressure.    Problems with balance or gait    Bladder or urinary problems    History of falling    Changes in vision (vision impairment)    Changes in thinking skills and memory (cognitive impairment)  Injuries from a fall can include serious injuries such as broken bones, dislocated joints, internal bleeding and cuts. Injuries like these can limit your independence.   Prevention tips  To help prevent falls and fall-related injuries, follow the tips below.    Floors  To make floors safer:     Put nonskid pads under area rugs.    Remove small rugs.    Replace worn floor coverings.    Tack carpets firmly to each step on carpeted stairs. Put nonskid strips on the edges of uncarpeted stairs.    Keep floors and stairs free of clutter and cords.    Arrange furniture so there are clear pathways.    Clean up any spills right away.  Bathrooms    To make bathrooms safer:     Install grab bars in the tub or shower.    Apply nonskid strips or put a nonskid rubber mat in the tub or shower.    Sit on a bath chair to bathe.    Use bathmats with nonskid backing.  Lighting  To improve visibility in your home:      Keep a flashlight in each room. Or put a lamp next to the bed within easy reach.    Put nightlights in the bedrooms, hallways, kitchen, and bathrooms.    Make sure all stairways have good  lighting.    Take your time when going up and down stairs.    Put handrails on both sides of stairs and in walkways for more support. To prevent injury to your wrist or arm, don t use handrails to pull yourself up.    Install grab bars to pull yourself up.    Move or rearrange items that you use often. This will make them easier to find or reach.    Look at your home to find any safety hazards. Especially look at doorways, walkways, and the driveway. Remove or repair any safety problems that you find.  Other changes to make    Look around to find any safety hazards. Look closely at doorways, walkways, and the driveway. Remove or repair any safety problems that you find.    Wear shoes that fit well.    Take your time when going up and down stairs.    Put handrails on both sides of stairs and in walkways for more support. To prevent injury to your wrist or arm, don t use handrails to pull yourself up.    Install grab bars wherever needed to pull yourself up.    Arrange items that you use often. This will make them easier to find or reach.    [employer] last reviewed this educational content on 3/1/2020    7378-1203 [employer]. All rights reserved. This information is not intended as a substitute for professional medical care. Always follow your healthcare professional's instructions.

## 2022-01-17 NOTE — PROGRESS NOTES
"    The patient was provided with suggestions to help him develop a healthy physical lifestyle.  He is at risk for lack of exercise and has been provided with information to increase physical activity for the benefit of his well-being.  The patient was counseled and encouraged to consider modifying their diet and eating habits. He was provided with information on recommended healthy diet options.  The patient reports that he has difficulty with activities of daily living. I have asked that the patient make a follow up appointment in 9 weeks where this issue will be further evaluated and addressed.  The patient was provided with suggestions to help him develop a healthy emotional lifestyle.  He is at risk for falling and has been provided with information to reduce the risk of falling at home.  Answers for HPI/ROS submitted by the patient on 1/11/2022  In general, how would you rate your overall physical health?: fair  Frequency of exercise:: None  Do you usually eat at least 4 servings of fruit and vegetables a day, include whole grains & fiber, and avoid regularly eating high fat or \"junk\" foods? : No  Taking medications regularly:: Yes  Medication side effects:: Not applicable  Activities of Daily Living: money management requires assistance  Home safety: no safety concerns identified  Hearing Impairment:: no hearing concerns  In the past 6 months, have you been bothered by leaking of urine?: No  abdominal pain: No  Blood in stool: No  Blood in urine: No  chest pain: No  chills: No  congestion: No  constipation: Yes  cough: No  diarrhea: No  dizziness: No  ear pain: No  eye pain: No  nervous/anxious: Yes  fever: No  frequency: No  genital sores: No  headaches: No  hearing loss: No  heartburn: No  arthralgias: Yes  joint swelling: Yes  peripheral edema: Yes  mood changes: Yes  myalgias: Yes  nausea: No  dysuria: No  palpitations: No  Skin sensation changes: No  sore throat: No  urgency: No  rash: No  shortness of " breath: No  visual disturbance: No  weakness: Yes  impotence: No  penile discharge: No  In general, how would you rate your overall mental or emotional health?: fair  Additional concerns today:: No

## 2022-03-03 ENCOUNTER — TRANSCRIBE ORDERS (OUTPATIENT)
Dept: OTHER | Age: 62
End: 2022-03-03
Payer: COMMERCIAL

## 2022-03-03 DIAGNOSIS — Z96.652 S/P TOTAL KNEE ARTHROPLASTY, LEFT: Primary | ICD-10-CM

## 2022-03-21 ENCOUNTER — OFFICE VISIT (OUTPATIENT)
Dept: FAMILY MEDICINE | Facility: CLINIC | Age: 62
End: 2022-03-21
Payer: COMMERCIAL

## 2022-03-21 VITALS
OXYGEN SATURATION: 95 % | HEART RATE: 92 BPM | WEIGHT: 235 LBS | RESPIRATION RATE: 10 BRPM | SYSTOLIC BLOOD PRESSURE: 128 MMHG | TEMPERATURE: 98.7 F | BODY MASS INDEX: 35.21 KG/M2 | DIASTOLIC BLOOD PRESSURE: 70 MMHG

## 2022-03-21 DIAGNOSIS — I10 HYPERTENSION GOAL BP (BLOOD PRESSURE) < 140/90: ICD-10-CM

## 2022-03-21 DIAGNOSIS — G47.33 OSA (OBSTRUCTIVE SLEEP APNEA): ICD-10-CM

## 2022-03-21 DIAGNOSIS — M17.12 PRIMARY OSTEOARTHRITIS OF LEFT KNEE: ICD-10-CM

## 2022-03-21 DIAGNOSIS — Z01.818 PREOP GENERAL PHYSICAL EXAM: Primary | ICD-10-CM

## 2022-03-21 LAB
ANION GAP SERPL CALCULATED.3IONS-SCNC: 5 MMOL/L (ref 3–14)
BUN SERPL-MCNC: 22 MG/DL (ref 7–30)
CALCIUM SERPL-MCNC: 8.8 MG/DL (ref 8.5–10.1)
CHLORIDE BLD-SCNC: 111 MMOL/L (ref 94–109)
CO2 SERPL-SCNC: 26 MMOL/L (ref 20–32)
CREAT SERPL-MCNC: 0.71 MG/DL (ref 0.66–1.25)
ERYTHROCYTE [DISTWIDTH] IN BLOOD BY AUTOMATED COUNT: 12.6 % (ref 10–15)
GFR SERPL CREATININE-BSD FRML MDRD: >90 ML/MIN/1.73M2
GLUCOSE BLD-MCNC: 159 MG/DL (ref 70–99)
HCT VFR BLD AUTO: 39 % (ref 40–53)
HGB BLD-MCNC: 13.5 G/DL (ref 13.3–17.7)
HOLD SPECIMEN: NORMAL
MCH RBC QN AUTO: 35.5 PG (ref 26.5–33)
MCHC RBC AUTO-ENTMCNC: 34.6 G/DL (ref 31.5–36.5)
MCV RBC AUTO: 103 FL (ref 78–100)
PLATELET # BLD AUTO: 186 10E3/UL (ref 150–450)
POTASSIUM BLD-SCNC: 3.6 MMOL/L (ref 3.4–5.3)
RBC # BLD AUTO: 3.8 10E6/UL (ref 4.4–5.9)
SODIUM SERPL-SCNC: 142 MMOL/L (ref 133–144)
WBC # BLD AUTO: 6.9 10E3/UL (ref 4–11)

## 2022-03-21 PROCEDURE — 85027 COMPLETE CBC AUTOMATED: CPT | Performed by: FAMILY MEDICINE

## 2022-03-21 PROCEDURE — 99214 OFFICE O/P EST MOD 30 MIN: CPT | Performed by: FAMILY MEDICINE

## 2022-03-21 PROCEDURE — 80048 BASIC METABOLIC PNL TOTAL CA: CPT | Performed by: FAMILY MEDICINE

## 2022-03-21 PROCEDURE — 93000 ELECTROCARDIOGRAM COMPLETE: CPT | Performed by: FAMILY MEDICINE

## 2022-03-21 PROCEDURE — 36415 COLL VENOUS BLD VENIPUNCTURE: CPT | Performed by: FAMILY MEDICINE

## 2022-03-21 RX ORDER — DULOXETIN HYDROCHLORIDE 30 MG/1
30 CAPSULE, DELAYED RELEASE ORAL EVERY MORNING
COMMUNITY
End: 2023-09-25

## 2022-03-21 NOTE — PATIENT INSTRUCTIONS
Preparing for Your Surgery  Getting started  A nurse will call you to review your health history and instructions. They will give you an arrival time based on your scheduled surgery time. Please be ready to share:    Your doctor's clinic name and phone number    Your medical, surgical and anesthesia history    A list of allergies and sensitivities    A list of medicines, including herbal treatments and over-the-counter drugs    Whether the patient has a legal guardian (ask how to send us the papers in advance)  Please tell us if you're pregnant--or if there's any chance you might be pregnant. Some surgeries may injure a fetus (unborn baby), so they require a pregnancy test. Surgeries that are safe for a fetus don't always need a test, and you can choose whether to have one.   If you have a child who's having surgery, please ask for a copy of Preparing for Your Child's Surgery.    Preparing for surgery    Within 30 days of surgery: Have a pre-op exam (sometimes called an H&P, or History and Physical). This can be done at a clinic or pre-operative center.  ? If you're having a , you may not need this exam. Talk to your care team.    At your pre-op exam, talk to your care team about all medicines you take. If you need to stop any medicines before surgery, ask when to start taking them again.  ? We do this for your safety. Many medicines can make you bleed too much during surgery. Some change how well surgery (anesthesia) drugs work.    Call your insurance company to let them know you're having surgery. (If you don't have insurance, call [phone].)    Call your clinic if there's any change in your health. This includes signs of a cold or flu (sore throat, runny nose, cough, rash, fever). It also includes a scrape or scratch near the surgery site.    If you have questions on the day of surgery, call your hospital or surgery center.  COVID testing  You may need to be tested for COVID-19 before having  surgery. If so, your surgical team will give you instructions for scheduling this test, separate from your preoperative history and physical.  Eating and drinking guidelines  For your safety: Unless your surgeon tells you otherwise, follow the guidelines below.    Eat and drink as usual until 8 hours before surgery. After that, no food or milk.    Drink clear liquids until 2 hours before surgery. These are liquids you can see through, like water, Gatorade and Propel Water. You may also have black coffee and tea (no cream or milk).    Nothing by mouth within 2 hours of surgery. This includes gum, candy and breath mints.    If you drink alcohol: Stop drinking it the night before surgery.    If your care team tells you to take medicine on the morning of surgery, it's okay to take it with a sip of water.  Preventing infection    Shower or bathe the night before and morning of your surgery. Follow the instructions your clinic gave you. (If no instructions, use regular soap.)    Don't shave or clip hair near your surgery site. We'll remove the hair if needed.    Don't smoke or vape the morning of surgery. You may chew nicotine gum up to 2 hours before surgery. A nicotine patch is okay.  ? Note: Some surgeries require you to completely quit smoking and nicotine. Check with your surgeon.    Your care team will make every effort to keep you safe from infection. We will:  ? Clean our hands often with soap and water (or an alcohol-based hand rub).  ? Clean the skin at your surgery site with a special soap that kills germs.  ? Give you a special gown to keep you warm. (Cold raises the risk of infection.)  ? Wear special hair covers, masks, gowns and gloves during surgery.  ? Give antibiotic medicine, if prescribed. Not all surgeries need antibiotics.  What to bring on the day of surgery    Photo ID and insurance card    Copy of your health care directive, if you have one    Glasses and hearing aides (bring cases)  ? You can't  wear contacts during surgery    Inhaler and eye drops, if you use them (tell us about these when you arrive)    CPAP machine or breathing device, if you use them    A few personal items, if spending the night    If you have . . .  ? A pacemaker, ICD (cardiac defibrillator) or other implant: Bring the ID card.  ? An implanted stimulator: Bring the remote control.  ? A legal guardian: Bring a copy of the certified (court-stamped) guardianship papers.  Please remove any jewelry, including body piercings. Leave jewelry and other valuables at home.  If you're going home the day of surgery    You must have a responsible adult drive you home. They should stay with you overnight as well.    If you don't have someone to stay with you, and you aren't safe to go home alone, we may keep you overnight. Insurance often won't pay for this.  After surgery  If it's hard to control your pain or you need more pain medicine, please call your surgeon's office.  Questions?   If you have any questions for your care team, list them here: _________________________________________________________________________________________________________________________________________________________________________ ____________________________________ ____________________________________ ____________________________________  For informational purposes only. Not to replace the advice of your health care provider. Copyright   2003, 2019 [facility]. All rights reserved. Clinically reviewed by Colleen Castle MD. [employer] [id number] - REV 07/21.    How to Take Your Medication Before Surgery  - HOLD (do not take) Lisinopril the morning of procedure
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
## 2022-03-27 ENCOUNTER — LAB (OUTPATIENT)
Dept: LAB | Facility: CLINIC | Age: 62
End: 2022-03-27
Payer: COMMERCIAL

## 2022-03-27 DIAGNOSIS — Z01.818 PREOP GENERAL PHYSICAL EXAM: ICD-10-CM

## 2022-03-27 PROCEDURE — U0005 INFEC AGEN DETEC AMPLI PROBE: HCPCS

## 2022-03-27 PROCEDURE — U0003 INFECTIOUS AGENT DETECTION BY NUCLEIC ACID (DNA OR RNA); SEVERE ACUTE RESPIRATORY SYNDROME CORONAVIRUS 2 (SARS-COV-2) (CORONAVIRUS DISEASE [COVID-19]), AMPLIFIED PROBE TECHNIQUE, MAKING USE OF HIGH THROUGHPUT TECHNOLOGIES AS DESCRIBED BY CMS-2020-01-R: HCPCS

## 2022-03-28 LAB — SARS-COV-2 RNA RESP QL NAA+PROBE: NEGATIVE

## 2022-04-04 ENCOUNTER — HOSPITAL ENCOUNTER (OUTPATIENT)
Dept: PHYSICAL THERAPY | Facility: CLINIC | Age: 62
Setting detail: THERAPIES SERIES
Discharge: HOME | End: 2022-04-04
Attending: ORTHOPAEDIC SURGERY
Payer: COMMERCIAL

## 2022-04-04 DIAGNOSIS — Z96.652 S/P TOTAL KNEE ARTHROPLASTY, LEFT: ICD-10-CM

## 2022-04-04 PROCEDURE — 97110 THERAPEUTIC EXERCISES: CPT | Mod: GP | Performed by: PHYSICAL THERAPIST

## 2022-04-04 PROCEDURE — 97161 PT EVAL LOW COMPLEX 20 MIN: CPT | Mod: GP | Performed by: PHYSICAL THERAPIST
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
## 2022-04-04 NOTE — PROGRESS NOTES
[facility]    OUTPATIENT PHYSICAL THERAPY ORTHOPEDIC EVALUATION  PLAN OF TREATMENT FOR OUTPATIENT REHABILITATION  (COMPLETE FOR INITIAL CLAIMS ONLY)  Patient's Last Name, First Name, M.I.  YOB: 1960  Wenceslao Vasquez    Provider s Name:  [facility]   Medical Record No.  [id number]   Start of Care Date:  04/04/22   Onset Date:  03/31/22 (Date of surgery )   Type:     _X__PT   ___OT   ___SLP Medical Diagnosis:  s/p total knee arthroplasty, left (Z96.652)     PT Diagnosis:  L knee pain, decreased L LE strength, decreased L knee AROM, muscle tightness, joint stiffness, balance deficits, gait deviations   Visits from SOC:  1      _________________________________________________________________________________  Plan of Treatment/Functional Goals:  balance training, bed mobility training, gait training, joint mobilization, manual therapy, neuromuscular re-education, ROM, strengthening, stretching     Cryotherapy, Electrical stimulation  Modalities as needed for symptom relief   Goals  Goal Identifier: 1  Goal Description: Patient will demonstrate full, painfree L knee AROM in order to facilitate return to prior level of function.   Target Date: 07/02/22    Goal Identifier: 2  Goal Description: Patient will increase LEFS score by at least 9 points in order to demonstrate clinically significant improvement in functional performance to facilitate return to prior level of function.    Target Date: 07/02/22    Goal Identifier: 3  Goal Description: Patient will demonstrate ability to ascend/descend set of 10 stairs without railing with normalized, pain free reciprocal gait pattern in order to facilitate return to prior level of function.   Target Date: 07/02/22      Therapy Frequency:  2 times/Week  Predicted Duration of Therapy Intervention:  90 days     Francesca Jacome, PT, DPT,  CLT-NA                 I CERTIFY THE NEED FOR THESE SERVICES FURNISHED UNDER        THIS PLAN OF TREATMENT AND WHILE UNDER MY CARE .             Physician Signature               Date    X_____________________________________________________                             Certification Date From:  04/04/22   Certification Date To:  07/02/22    Referring Provider:  Jose Younger MD    Initial Assessment        See Epic Evaluation Start of Care Date: 04/04/22

## 2022-04-11 ENCOUNTER — HOSPITAL ENCOUNTER (OUTPATIENT)
Dept: PHYSICAL THERAPY | Facility: CLINIC | Age: 62
Setting detail: THERAPIES SERIES
Discharge: HOME | End: 2022-04-11
Attending: PHYSICIAN ASSISTANT
Payer: COMMERCIAL

## 2022-04-11 PROCEDURE — 97110 THERAPEUTIC EXERCISES: CPT | Mod: GP | Performed by: PHYSICAL THERAPIST

## 2022-04-13 ENCOUNTER — HOSPITAL ENCOUNTER (OUTPATIENT)
Dept: PHYSICAL THERAPY | Facility: CLINIC | Age: 62
Setting detail: THERAPIES SERIES
Discharge: HOME | End: 2022-04-13
Attending: ORTHOPAEDIC SURGERY
Payer: COMMERCIAL

## 2022-04-13 PROCEDURE — 97110 THERAPEUTIC EXERCISES: CPT | Mod: GP | Performed by: PHYSICAL THERAPIST

## 2022-04-20 ENCOUNTER — HOSPITAL ENCOUNTER (OUTPATIENT)
Dept: PHYSICAL THERAPY | Facility: CLINIC | Age: 62
Setting detail: THERAPIES SERIES
Discharge: HOME | End: 2022-04-20
Attending: PHYSICIAN ASSISTANT
Payer: COMMERCIAL

## 2022-04-20 PROCEDURE — 97110 THERAPEUTIC EXERCISES: CPT | Mod: GP | Performed by: PHYSICAL THERAPIST

## 2022-04-21 ENCOUNTER — HOSPITAL ENCOUNTER (OUTPATIENT)
Dept: PHYSICAL THERAPY | Facility: CLINIC | Age: 62
Setting detail: THERAPIES SERIES
Discharge: HOME | End: 2022-04-21
Attending: ORTHOPAEDIC SURGERY
Payer: COMMERCIAL

## 2022-04-21 PROCEDURE — 97110 THERAPEUTIC EXERCISES: CPT | Mod: GP | Performed by: PHYSICAL THERAPIST

## 2022-04-25 ENCOUNTER — HOSPITAL ENCOUNTER (OUTPATIENT)
Dept: PHYSICAL THERAPY | Facility: CLINIC | Age: 62
Setting detail: THERAPIES SERIES
Discharge: HOME | End: 2022-04-25
Attending: ORTHOPAEDIC SURGERY
Payer: COMMERCIAL

## 2022-04-25 PROCEDURE — 97116 GAIT TRAINING THERAPY: CPT | Mod: GP | Performed by: PHYSICAL THERAPIST

## 2022-04-25 PROCEDURE — 97110 THERAPEUTIC EXERCISES: CPT | Mod: GP | Performed by: PHYSICAL THERAPIST

## 2022-04-27 ENCOUNTER — HOSPITAL ENCOUNTER (OUTPATIENT)
Dept: PHYSICAL THERAPY | Facility: CLINIC | Age: 62
Setting detail: THERAPIES SERIES
Discharge: HOME | End: 2022-04-27
Attending: ORTHOPAEDIC SURGERY
Payer: COMMERCIAL

## 2022-04-27 PROCEDURE — 97110 THERAPEUTIC EXERCISES: CPT | Mod: GP | Performed by: PHYSICAL THERAPIST

## 2022-05-02 ENCOUNTER — HOSPITAL ENCOUNTER (OUTPATIENT)
Dept: PHYSICAL THERAPY | Facility: CLINIC | Age: 62
Setting detail: THERAPIES SERIES
Discharge: HOME | End: 2022-05-02
Attending: ORTHOPAEDIC SURGERY
Payer: COMMERCIAL

## 2022-05-02 PROCEDURE — 97110 THERAPEUTIC EXERCISES: CPT | Mod: GP | Performed by: PHYSICAL THERAPIST

## 2022-05-09 ENCOUNTER — HOSPITAL ENCOUNTER (OUTPATIENT)
Dept: PHYSICAL THERAPY | Facility: CLINIC | Age: 62
Setting detail: THERAPIES SERIES
Discharge: HOME | End: 2022-05-09
Attending: ORTHOPAEDIC SURGERY
Payer: COMMERCIAL

## 2022-05-09 PROCEDURE — 97110 THERAPEUTIC EXERCISES: CPT | Mod: GP | Performed by: PHYSICAL THERAPIST

## 2022-05-12 ENCOUNTER — HOSPITAL ENCOUNTER (OUTPATIENT)
Dept: PHYSICAL THERAPY | Facility: CLINIC | Age: 62
Setting detail: THERAPIES SERIES
Discharge: HOME | End: 2022-05-12
Attending: ORTHOPAEDIC SURGERY
Payer: COMMERCIAL

## 2022-05-12 PROCEDURE — 97110 THERAPEUTIC EXERCISES: CPT | Mod: GP | Performed by: PHYSICAL THERAPIST

## 2022-05-16 ENCOUNTER — HOSPITAL ENCOUNTER (OUTPATIENT)
Dept: PHYSICAL THERAPY | Facility: CLINIC | Age: 62
Setting detail: THERAPIES SERIES
Discharge: HOME | End: 2022-05-16
Attending: ORTHOPAEDIC SURGERY
Payer: COMMERCIAL

## 2022-05-16 PROCEDURE — 97110 THERAPEUTIC EXERCISES: CPT | Mod: GP | Performed by: PHYSICAL THERAPIST

## 2022-05-16 NOTE — PROGRESS NOTES
[facility]    Outpatient Physical Therapy Progress Note  Patient: Wenceslao Vasquez  : 1960    Beginning/End Dates of Reporting Period:  2022 to 2022    Referring Provider: Jose Younger MD    Therapy Diagnosis: L knee pain, decreased L LE strength, decreased L knee AROM, muscle tightness, joint stiffness, balance deficits, gait deviations     Client Self Report: Arrives with walking stick again.  Had f/u with MD earlier today - patient reports he was told everything looks good, no concerns for infection, he should continue with his bone foam in order to work on straightening the knee and continue to work on bending.    Objective Measurements:  Objective Measure: Pain  Details: 6/10 L knee - start of session  Objective Measure: L knee AROM  Details: Extension: 12 deg, Flexion: 94 deg - measured in supine at end of session  Objective Measure: LEFS  Details: Lower Extremity Functional Scale (LEFS) assesses the patients level of difficulty with various activities. The higher the score, the greater the level of function a patient demonstrates. Pt scored 36 points out of 80 possible indicating patient is at 45% of maximal function. MCID is 9 points. LEFS is validated for patients 18 years and older, and if completed by a younger patient, is done to help determine functional deficits and activity limitations for goal use.         Goals:  Goal Identifier 1   Goal Description Patient will demonstrate full, painfree L knee AROM in order to facilitate return to prior level of function.    Target Date 22   Date Met      Progress (detail required for progress note): Extension: 12 deg, Flexion: 94 deg - measured in supine     Goal Identifier 2   Goal Description Patient will increase LEFS score by at least 9 points in order to demonstrate clinically significant improvement in functional performance to  facilitate return to prior level of function.     Target Date 07/02/22   Date Met      Progress (detail required for progress note): LEFS score 36/80 (45%) on 5/16/22, an improvement since initial score of 12/80 (15%) on 4/4/22.     Goal Identifier 3   Goal Description Patient will demonstrate ability to ascend/descend set of 10 stairs without railing with normalized, pain free reciprocal gait pattern in order to facilitate return to prior level of function.    Target Date 07/02/22   Date Met      Progress (detail required for progress note): Unable         Plan:  Continue therapy per current plan of care.    Discharge:  No          Francesca Jacome PT, DPT, CLT-NA  [facility]  Physical Therapist     Phone: [phone]  Email: [email or web address]

## 2022-05-18 ENCOUNTER — HOSPITAL ENCOUNTER (OUTPATIENT)
Dept: PHYSICAL THERAPY | Facility: CLINIC | Age: 62
Setting detail: THERAPIES SERIES
Discharge: HOME | End: 2022-05-18
Attending: ORTHOPAEDIC SURGERY
Payer: COMMERCIAL

## 2022-05-18 PROCEDURE — 97110 THERAPEUTIC EXERCISES: CPT | Mod: GP | Performed by: PHYSICAL THERAPIST

## 2022-05-23 ENCOUNTER — HOSPITAL ENCOUNTER (OUTPATIENT)
Dept: PHYSICAL THERAPY | Facility: CLINIC | Age: 62
Setting detail: THERAPIES SERIES
Discharge: HOME | End: 2022-05-23
Attending: ORTHOPAEDIC SURGERY
Payer: COMMERCIAL

## 2022-05-23 PROCEDURE — 97112 NEUROMUSCULAR REEDUCATION: CPT | Mod: GP | Performed by: PHYSICAL THERAPIST

## 2022-05-23 PROCEDURE — 97110 THERAPEUTIC EXERCISES: CPT | Mod: GP | Performed by: PHYSICAL THERAPIST

## 2022-05-23 PROCEDURE — 90901 BIOFEEDBACK TRAIN ANY METH: CPT | Mod: GP | Performed by: PHYSICAL THERAPIST

## 2022-05-25 ENCOUNTER — HOSPITAL ENCOUNTER (OUTPATIENT)
Dept: PHYSICAL THERAPY | Facility: CLINIC | Age: 62
Setting detail: THERAPIES SERIES
Discharge: HOME | End: 2022-05-25
Attending: ORTHOPAEDIC SURGERY
Payer: COMMERCIAL

## 2022-05-25 PROCEDURE — 97110 THERAPEUTIC EXERCISES: CPT | Mod: GP | Performed by: PHYSICAL THERAPIST

## 2022-05-25 PROCEDURE — 97112 NEUROMUSCULAR REEDUCATION: CPT | Mod: GP | Performed by: PHYSICAL THERAPIST

## 2022-06-01 ENCOUNTER — HOSPITAL ENCOUNTER (OUTPATIENT)
Dept: PHYSICAL THERAPY | Facility: CLINIC | Age: 62
Setting detail: THERAPIES SERIES
Discharge: HOME | End: 2022-06-01
Attending: ORTHOPAEDIC SURGERY
Payer: COMMERCIAL

## 2022-06-01 PROCEDURE — 97110 THERAPEUTIC EXERCISES: CPT | Mod: GP | Performed by: PHYSICAL THERAPIST

## 2022-06-06 ENCOUNTER — HOSPITAL ENCOUNTER (OUTPATIENT)
Dept: PHYSICAL THERAPY | Facility: CLINIC | Age: 62
Setting detail: THERAPIES SERIES
Discharge: HOME | End: 2022-06-06
Attending: ORTHOPAEDIC SURGERY
Payer: COMMERCIAL

## 2022-06-06 PROCEDURE — 97116 GAIT TRAINING THERAPY: CPT | Mod: GP | Performed by: PHYSICAL THERAPIST

## 2022-06-06 PROCEDURE — 97110 THERAPEUTIC EXERCISES: CPT | Mod: GP | Performed by: PHYSICAL THERAPIST

## 2022-06-08 ENCOUNTER — HOSPITAL ENCOUNTER (OUTPATIENT)
Dept: PHYSICAL THERAPY | Facility: CLINIC | Age: 62
Setting detail: THERAPIES SERIES
Discharge: HOME | End: 2022-06-08
Attending: ORTHOPAEDIC SURGERY
Payer: COMMERCIAL

## 2022-06-08 PROCEDURE — 97140 MANUAL THERAPY 1/> REGIONS: CPT | Mod: GP | Performed by: PHYSICAL THERAPIST

## 2022-06-08 PROCEDURE — 97110 THERAPEUTIC EXERCISES: CPT | Mod: GP | Performed by: PHYSICAL THERAPIST

## 2022-06-13 ENCOUNTER — HOSPITAL ENCOUNTER (OUTPATIENT)
Dept: PHYSICAL THERAPY | Facility: CLINIC | Age: 62
Setting detail: THERAPIES SERIES
Discharge: HOME | End: 2022-06-13
Attending: ORTHOPAEDIC SURGERY
Payer: COMMERCIAL

## 2022-06-13 PROCEDURE — 97140 MANUAL THERAPY 1/> REGIONS: CPT | Mod: GP | Performed by: PHYSICAL THERAPIST

## 2022-06-13 PROCEDURE — 97110 THERAPEUTIC EXERCISES: CPT | Mod: GP | Performed by: PHYSICAL THERAPIST

## 2022-06-15 ENCOUNTER — HOSPITAL ENCOUNTER (OUTPATIENT)
Dept: PHYSICAL THERAPY | Facility: CLINIC | Age: 62
Setting detail: THERAPIES SERIES
Discharge: HOME | End: 2022-06-15
Attending: ORTHOPAEDIC SURGERY
Payer: COMMERCIAL

## 2022-06-15 PROCEDURE — 97140 MANUAL THERAPY 1/> REGIONS: CPT | Mod: GP | Performed by: PHYSICAL THERAPIST

## 2022-06-15 PROCEDURE — 97110 THERAPEUTIC EXERCISES: CPT | Mod: GP | Performed by: PHYSICAL THERAPIST

## 2022-06-19 ENCOUNTER — NURSE TRIAGE (OUTPATIENT)
Dept: NURSING | Facility: CLINIC | Age: 62
End: 2022-06-19
Payer: COMMERCIAL

## 2022-06-19 NOTE — TELEPHONE ENCOUNTER
Pt's wife calling about pt being exposed to brother who tested positive for Covid today, wanting to know if pt should keep PT appointment for tomorrow. Pt is asymptomatic.     Per triage protocol disposition to home care, advice given to mask up and keep appointment if asymptomatic. Cancel appointment tomorrow morning if pt wakes up symptomatic.    Ashkan Vargas RN, BSN on 6/19/2022 at 4:05 PM  [facility]      COVID 19 Nurse Triage Plan/Patient Instructions    Please be aware that novel coronavirus (COVID-19) may be circulating in the community. If you develop symptoms such as fever, cough, or SOB or if you have concerns about the presence of another infection including coronavirus (COVID-19), please contact your health care provider or visit [email or web address].     Disposition/Instructions    Home care recommended. Follow home care protocol based instructions.    Thank you for taking steps to prevent the spread of this virus.  o Limit your contact with others.  o Wear a simple mask to cover your cough.  o Wash your hands well and often.    Resources    [facility]: About COVID-19: [email or web address]    CDC: What to Do If You're Sick: www.cdc.gov/coronavirus/2019-ncov/about/steps-when-sick.html    CDC: Ending Home Isolation: www.cdc.gov/coronavirus/2019-ncov/hcp/disposition-in-home-patients.html     CDC: Caring for Someone: www.cdc.gov/coronavirus/2019-ncov/if-you-are-sick/care-for-someone.html     [facility]: Interim Guidance for Hospital Discharge to Home: www.health.[facility].[place]/diseases/coronavirus/hcp/hospdischarge.pdf    [facility] clinical trials (COVID-19 research studies): clinicalaffairs.[email or web address].edu/umn-clinical-trials     Below are the COVID-19 hotlines at the [place] [facility] ([facility]). Interpreters are available.   o For health questions: Call 219-696-5923 or 1-757.870.5584 (7 a.m. to 7 p.m.)  o For questions about schools and childcare: Call  [phone] or 1-320.738.8423 (7 a.m. to 7 p.m.)                     Reason for Disposition    [1] CLOSE CONTACT COVID-19 EXPOSURE within last 14 days AND [2] NO symptoms    Additional Information    Negative: COVID-19 lab test positive    Negative: [1] Lives with someone known to have influenza (flu test positive) AND [2] flu-like symptoms (e.g., cough, runny nose, sore throat, SOB; with or without fever)    Negative: [1] Symptoms of COVID-19 (e.g., cough, fever, SOB, or others) AND [2] doctor (or NP/PA) diagnosed COVID-19 based on symptoms    Negative: [1] Symptoms of COVID-19 (e.g., cough, fever, SOB, or others) AND [2] lives in an area with community spread    Negative: [1] Symptoms of COVID-19 (e.g., cough, fever, SOB, or others) AND [2] within 14 days of EXPOSURE (close contact) with diagnosed or suspected COVID-19 patient    Negative: [1] Symptoms of COVID-19 (e.g., cough, fever, SOB, or others) AND [2] within 14 days of travel from high-risk area for COVID-19 community spread (identified by CDC)    Negative: [1] Difficulty breathing (shortness of breath) occurs AND [2] onset > 14 days after COVID-19 EXPOSURE (Close Contact)    Negative: [1] Dry cough occurs AND [2] onset > 14 days after COVID-19 EXPOSURE    Negative: [1] Wet cough (i.e., white-yellow, yellow, green, or kamlesh colored sputum) AND [2] onset > 14 days after COVID-19 EXPOSURE    Negative: [1] Common cold symptoms AND [2] onset > 14 days after COVID-19 EXPOSURE    Negative: COVID-19 vaccine reaction suspected (e.g., fever, headache, muscle aches) occurring during days 1 to 3 after getting vaccine    Negative: COVID-19 vaccine, questions about    Negative: [1] CLOSE CONTACT COVID-19 EXPOSURE within last 14 days AND [2] needs COVID-19 lab test to return to work AND [3] NO symptoms    Negative: [1] CLOSE CONTACT COVID-19 EXPOSURE within last 14 days AND [2] exposed person is a  (e.g., police or paramedic) AND [3] NO symptoms     Negative: [1] CLOSE CONTACT COVID-19 EXPOSURE within last 14 days AND [2] exposed person is a healthcare worker who was NOT using all recommended personal protective equipment (e.g., a respirator-N95 mask, eye protection, gloves, and gown) AND [3] NO symptoms    Negative: [1] Living or working in a correctional facility, long-term care facility, or shelter (i.e., congregate setting; densely populated) AND [2] where an outbreak has occurred AND [3] NO symptoms    Negative: [1] CLOSE CONTACT COVID-19 EXPOSURE within last 14 days AND [2] weak immune system (e.g., HIV positive, cancer chemo, splenectomy, organ transplant, chronic steroids) AND [3] NO symptoms    Protocols used: CORONAVIRUS (COVID-19) EXPOSURE-A- 1.18.2022

## 2022-06-20 ENCOUNTER — HOSPITAL ENCOUNTER (OUTPATIENT)
Dept: PHYSICAL THERAPY | Facility: CLINIC | Age: 62
Setting detail: THERAPIES SERIES
Discharge: HOME | End: 2022-06-20
Attending: ORTHOPAEDIC SURGERY
Payer: COMMERCIAL

## 2022-06-20 PROCEDURE — 97110 THERAPEUTIC EXERCISES: CPT | Mod: GP | Performed by: PHYSICAL THERAPIST

## 2022-06-22 ENCOUNTER — HOSPITAL ENCOUNTER (OUTPATIENT)
Dept: PHYSICAL THERAPY | Facility: CLINIC | Age: 62
Setting detail: THERAPIES SERIES
Discharge: HOME | End: 2022-06-22
Attending: ORTHOPAEDIC SURGERY
Payer: COMMERCIAL

## 2022-06-22 PROCEDURE — 97110 THERAPEUTIC EXERCISES: CPT | Mod: GP | Performed by: PHYSICAL THERAPIST

## 2022-06-22 NOTE — PROGRESS NOTES
[facility]    Outpatient Physical Therapy Progress Note  Patient: Wenceslao Vasquez  : 1960    Beginning/End Dates of Reporting Period:  2022 to 2022    Referring Provider: Jose Younger MD    Therapy Diagnosis: L knee pain, decreased L LE strength, decreased L knee AROM, muscle tightness, joint stiffness, balance deficits, gait deviations     Client Self Report: Reports no pain today, exercises are going well at home, has been working on all of them.  Felt  not too bad  after last session, even though his knee was pushed more than usual.  Starting to feel like his range of motion is coming along, although he notes it s still not where he would like it to be.    Objective Measurements:  Objective Measure: Pain  Details: 0/10 L knee - start of session    Objective Measure: L knee AROM  Details: Flexion: 105 deg, Extension: 3 deg - measured in supine at end of session    Objective Measure: LEFS  Details: Lower Extremity Functional Scale (LEFS) assesses the patients level of difficulty with various activities. The higher the score, the greater the level of function a patient demonstrates. Pt scored 58 points out of 80 possible indicating patient is at 72.50% of maximal function. MCID is 9 points. LEFS is validated for patients 18 years and older, and if completed by a younger patient, is done to help determine functional deficits and activity limitations for goal use.         Goals:  Goal Identifier 1   Goal Description Patient will demonstrate full, painfree L knee AROM in order to facilitate return to prior level of function.    Target Date 22   Date Met      Progress (detail required for progress note): Extension: 3 deg, Flexion: 105 deg - measured in supine at end of session.  This is an improvement from previous measurements of 12 deg of extension and 94 deg of flexion.     Goal Identifier  2   Goal Description Patient will increase LEFS score by at least 9 points in order to demonstrate clinically significant improvement in functional performance to facilitate return to prior level of function.     Target Date 07/02/22   Date Met      Progress (detail required for progress note): LEFS score 58/80 (72.50%) on 6/20/22, an improvement from previous scores of 36/80 (45%) on 5/16/22 and the initial score of 12/80 (15%) on 4/4/22.     Goal Identifier 3   Goal Description Patient will demonstrate ability to ascend/descend set of 10 stairs without railing with normalized, pain free reciprocal gait pattern in order to facilitate return to prior level of function.    Target Date 07/02/22   Date Met      Progress (detail required for progress note): Patient demonstrates ability to ascend/descend set of 10 stairs with use of one railing and reciprocal gait pattern.  When descending wtih R leg first there is still some minor hesitancy, and it improved with increased reps.         Plan:  Continue therapy per current plan of care.    Discharge:  Sydni Jacome PT, DPT, CLTELIANA  [facility]  Physical Therapist     Phone: [phone]  Email: [email or web address]

## 2022-06-27 ENCOUNTER — HOSPITAL ENCOUNTER (OUTPATIENT)
Dept: PHYSICAL THERAPY | Facility: CLINIC | Age: 62
Setting detail: THERAPIES SERIES
Discharge: HOME | End: 2022-06-27
Attending: ORTHOPAEDIC SURGERY
Payer: COMMERCIAL

## 2022-06-27 PROCEDURE — 97110 THERAPEUTIC EXERCISES: CPT | Mod: GP | Performed by: PHYSICAL THERAPIST

## 2022-06-27 PROCEDURE — 97140 MANUAL THERAPY 1/> REGIONS: CPT | Mod: GP | Performed by: PHYSICAL THERAPIST

## 2022-06-29 ENCOUNTER — HOSPITAL ENCOUNTER (OUTPATIENT)
Dept: PHYSICAL THERAPY | Facility: CLINIC | Age: 62
Setting detail: THERAPIES SERIES
Discharge: HOME | End: 2022-06-29
Attending: ORTHOPAEDIC SURGERY
Payer: COMMERCIAL

## 2022-06-29 PROCEDURE — 97110 THERAPEUTIC EXERCISES: CPT | Mod: GP | Performed by: PHYSICAL THERAPIST

## 2022-06-29 PROCEDURE — 97140 MANUAL THERAPY 1/> REGIONS: CPT | Mod: GP | Performed by: PHYSICAL THERAPIST

## 2022-07-06 ENCOUNTER — HOSPITAL ENCOUNTER (OUTPATIENT)
Dept: PHYSICAL THERAPY | Facility: CLINIC | Age: 62
Setting detail: THERAPIES SERIES
Discharge: HOME | End: 2022-07-06
Attending: ORTHOPAEDIC SURGERY
Payer: COMMERCIAL

## 2022-07-06 PROCEDURE — 97140 MANUAL THERAPY 1/> REGIONS: CPT | Mod: GP | Performed by: PHYSICAL THERAPIST

## 2022-07-06 PROCEDURE — 97110 THERAPEUTIC EXERCISES: CPT | Mod: GP | Performed by: PHYSICAL THERAPIST

## 2022-07-06 NOTE — PROGRESS NOTES
[facility]    OUTPATIENT PHYSICAL THERAPY  PLAN OF TREATMENT FOR OUTPATIENT REHABILITATION AND PROGRESS NOTE           Patient's Last Name, First Name, Wenceslao German Date of Birth  1960   Provider's Name  [facility] Medical Record No.  [id number]    Onset Date  03/31/22 (Date of surgery ) Start of Care Date  4-4-2022   Type:     _X_PT   ___OT   ___SLP Medical Diagnosis   s/p total knee arthroplasty, left (Z96.652)      PT Diagnosis   L knee pain, decreased L LE strength, decreased L knee AROM, muscle tightness, joint stiffness, balance deficits, gait deviations Plan of Treatment  Frequency/Duration: 1-2 times per week x 6 weeks  Certification date from 7-6-2022 to 8-     Goals:  Goal Identifier 1   Goal Description Patient will demonstrate full, painfree L knee AROM in order to facilitate return to prior level of function.    Target Date 08/04/22   Date Met      Progress (detail required for progress note): 2 degrees to 111 degrees after session on 7-6-2022     Goal Identifier 2   Goal Description Patient will increase LEFS score by at least 9 points in order to demonstrate clinically significant improvement in functional performance to facilitate return to prior level of function.     Target Date 07/28/22   Date Met      Progress (detail required for progress note): Minimal improvement from 58/80 (6-)  to 61/80 (7-6-2022     Goal Identifier 3   Goal Description Patient will demonstrate ability to ascend/descend set of 10 stairs without railing with normalized, pain free reciprocal gait pattern in order to facilitate return to prior level of function.    Target Date 07/28/22   Date Met      Progress (detail required for progress note): Patient demonstrates ability to ascend/descend set of 10 stairs with use of one railing and reciprocal gait  pattern.  When descending wtih R leg first there is still some minor hesitancy, and it improved with increased reps.       Beginning/End Dates of Progress Note Reporting Period:  4 sessions between 6- and 7-6-2022 for a total of 24 sessions.     Progress Toward Goals:   Progress this reporting period: Wenceslao has increased ROM after warm up. He had nice improvement of 9+ points on LEFS between eval and last note. Not much change in his function/LEFS since June. He has no symptoms at rest. His AROM for knee extension is -2 degrees after session today and 112 degrees of flexion. There is tightness in fat pad area and superior pouch and with patellar mobilization. He is working hard on home program. Skilled intervention for progressing ROM and strength for functional activities progressing into proprioception.    Client Self (Subjective) Report for Progress Note Reporting Period: Exercising consistently at home. No pain to start session.    Objective Measurements:   Objective Measure: Pain  Details: 0/10  Objective Measure: L knee AROM  Details: -3 degrees to 105 degrees  Objective Measure: LEFS  Details: 61/80 today and on 6- 58/80                I CERTIFY THE NEED FOR THESE SERVICES FURNISHED UNDER        THIS PLAN OF TREATMENT AND WHILE UNDER MY CARE .             Physician Signature               Date    X_____________________________________________________                      Referring Provider: MD Milena Mishra, PT

## 2022-07-11 ENCOUNTER — HOSPITAL ENCOUNTER (OUTPATIENT)
Dept: PHYSICAL THERAPY | Facility: CLINIC | Age: 62
Setting detail: THERAPIES SERIES
Discharge: HOME | End: 2022-07-11
Attending: ORTHOPAEDIC SURGERY
Payer: COMMERCIAL

## 2022-07-11 PROCEDURE — 97110 THERAPEUTIC EXERCISES: CPT | Mod: GP | Performed by: PHYSICAL THERAPIST

## 2022-07-11 PROCEDURE — 97140 MANUAL THERAPY 1/> REGIONS: CPT | Mod: GP | Performed by: PHYSICAL THERAPIST

## 2022-07-13 ENCOUNTER — HOSPITAL ENCOUNTER (OUTPATIENT)
Dept: PHYSICAL THERAPY | Facility: CLINIC | Age: 62
Setting detail: THERAPIES SERIES
Discharge: HOME | End: 2022-07-13
Attending: ORTHOPAEDIC SURGERY
Payer: COMMERCIAL

## 2022-07-13 PROCEDURE — 97110 THERAPEUTIC EXERCISES: CPT | Mod: GP | Performed by: PHYSICAL THERAPIST

## 2022-07-18 ENCOUNTER — HOSPITAL ENCOUNTER (OUTPATIENT)
Dept: PHYSICAL THERAPY | Facility: CLINIC | Age: 62
Setting detail: THERAPIES SERIES
Discharge: HOME | End: 2022-07-18
Attending: ORTHOPAEDIC SURGERY
Payer: COMMERCIAL

## 2022-07-18 PROCEDURE — 97110 THERAPEUTIC EXERCISES: CPT | Mod: GP | Performed by: PHYSICAL THERAPIST

## 2022-07-20 ENCOUNTER — HOSPITAL ENCOUNTER (OUTPATIENT)
Dept: PHYSICAL THERAPY | Facility: CLINIC | Age: 62
Setting detail: THERAPIES SERIES
Discharge: HOME | End: 2022-07-20
Attending: ORTHOPAEDIC SURGERY
Payer: COMMERCIAL

## 2022-07-20 PROCEDURE — 97110 THERAPEUTIC EXERCISES: CPT | Mod: GP | Performed by: PHYSICAL THERAPIST

## 2022-07-25 ENCOUNTER — HOSPITAL ENCOUNTER (OUTPATIENT)
Dept: PHYSICAL THERAPY | Facility: CLINIC | Age: 62
Setting detail: THERAPIES SERIES
Discharge: HOME | End: 2022-07-25
Attending: ORTHOPAEDIC SURGERY
Payer: COMMERCIAL

## 2022-07-25 PROCEDURE — 97110 THERAPEUTIC EXERCISES: CPT | Mod: GP | Performed by: PHYSICAL THERAPIST

## 2022-07-25 PROCEDURE — 97140 MANUAL THERAPY 1/> REGIONS: CPT | Mod: GP | Performed by: PHYSICAL THERAPIST

## 2022-07-27 ENCOUNTER — HOSPITAL ENCOUNTER (OUTPATIENT)
Dept: PHYSICAL THERAPY | Facility: CLINIC | Age: 62
Setting detail: THERAPIES SERIES
Discharge: HOME | End: 2022-07-27
Attending: ORTHOPAEDIC SURGERY
Payer: COMMERCIAL

## 2022-07-27 PROCEDURE — 97110 THERAPEUTIC EXERCISES: CPT | Mod: GP | Performed by: PHYSICAL THERAPIST

## 2022-07-27 PROCEDURE — 97116 GAIT TRAINING THERAPY: CPT | Mod: GP | Performed by: PHYSICAL THERAPIST

## 2022-07-29 ENCOUNTER — ALLIED HEALTH/NURSE VISIT (OUTPATIENT)
Dept: FAMILY MEDICINE | Facility: CLINIC | Age: 62
End: 2022-07-29
Payer: COMMERCIAL

## 2022-07-29 DIAGNOSIS — Z23 NEED FOR VACCINATION: Primary | ICD-10-CM

## 2022-07-29 PROCEDURE — 90750 HZV VACC RECOMBINANT IM: CPT

## 2022-07-29 PROCEDURE — 90471 IMMUNIZATION ADMIN: CPT

## 2022-07-29 PROCEDURE — 99207 PR NO CHARGE NURSE ONLY: CPT

## 2022-07-29 NOTE — PROGRESS NOTES
Prior to immunization administration, verified patients identity using patient s name and date of birth. Please see Immunization Activity for additional information.     Screening Questionnaire for Adult Immunization    Are you sick today?   No   Do you have allergies to medications, food, a vaccine component or latex?   No   Have you ever had a serious reaction after receiving a vaccination?   No   Do you have a long-term health problem with heart, lung, kidney, or metabolic disease (e.g., diabetes), asthma, a blood disorder, no spleen, complement component deficiency, a cochlear implant, or a spinal fluid leak?  Are you on long-term aspirin therapy?   No   Do you have cancer, leukemia, HIV/AIDS, or any other immune system problem?   No   Do you have a parent, brother, or sister with an immune system problem?   No   In the past 3 months, have you taken medications that affect  your immune system, such as prednisone, other steroids, or anticancer drugs; drugs for the treatment of rheumatoid arthritis, Crohn s disease, or psoriasis; or have you had radiation treatments?   No   Have you had a seizure, or a brain or other nervous system problem?   No   During the past year, have you received a transfusion of blood or blood    products, or been given immune (gamma) globulin or antiviral drug?   No   For women: Are you pregnant or is there a chance you could become       pregnant during the next month?   No   Have you received any vaccinations in the past 4 weeks?   No     Immunization questionnaire answers were all negative.        Per orders of Dr. Arreola, injection of Shingrix given by Erna Cintron CMA. Patient instructed to remain in clinic for 15 minutes afterwards, and to report any adverse reaction to me immediately.       Screening performed by Erna Cintron CMA on 7/29/2022 at 2:51 PM.

## 2022-09-23 DIAGNOSIS — I10 HYPERTENSION GOAL BP (BLOOD PRESSURE) < 140/90: ICD-10-CM

## 2022-09-27 RX ORDER — LISINOPRIL 10 MG/1
10 TABLET ORAL DAILY
Qty: 90 TABLET | Refills: 3 | OUTPATIENT
Start: 2022-09-27

## 2022-10-09 ENCOUNTER — HEALTH MAINTENANCE LETTER (OUTPATIENT)
Age: 62
End: 2022-10-09

## 2022-10-25 ENCOUNTER — MYC MEDICAL ADVICE (OUTPATIENT)
Dept: FAMILY MEDICINE | Facility: CLINIC | Age: 62
End: 2022-10-25

## 2023-01-10 DIAGNOSIS — I10 HYPERTENSION GOAL BP (BLOOD PRESSURE) < 140/90: ICD-10-CM

## 2023-01-10 RX ORDER — LISINOPRIL 10 MG/1
10 TABLET ORAL DAILY
Qty: 90 TABLET | Refills: 3 | Status: SHIPPED | OUTPATIENT
Start: 2023-01-10 | End: 2023-12-27

## 2023-01-10 NOTE — TELEPHONE ENCOUNTER
Medication Question or Refill  lisinopril (ZESTRIL) 10 MG tablet    Contacts       Type Contact Phone/Fax    01/10/2023 04:30 PM CST Phone (Incoming) Wenceslao Vasquez (Self) [phone] (M)          What medication are you calling about (include dose and sig)?: lisinopril (ZESTRIL) 10 MG tablet    Controlled Substance Agreement on file:   CSA -- Patient Level:    CSA: None found at the patient level.       Who prescribed the medication?: Dr. Pavon    Do you need a refill? No, pharmacy said there is no refills left.    When did you use the medication last?     Patient offered an appointment? Yes:     Do you have any questions or concerns?  Yes: patient has been out of medication    Preferred Pharmacy:   [id number] - [address] [address]  Phone: [phone] Fax: [phone]      Could we send this information to you in [facility] or would you prefer to receive a phone call?:   Patient would prefer a phone call   Okay to leave a detailed message?: Yes at Home number on file [phone] (home)    Pharmacy said there is no refills left.

## 2023-01-27 ASSESSMENT — ENCOUNTER SYMPTOMS
NAUSEA: 0
MYALGIAS: 0
FREQUENCY: 0
HEARTBURN: 0
DYSURIA: 0
HEADACHES: 1
EYE PAIN: 0
CHILLS: 0
HEMATURIA: 0
DIARRHEA: 0
SHORTNESS OF BREATH: 0
NERVOUS/ANXIOUS: 0
SORE THROAT: 0
COUGH: 0
ABDOMINAL PAIN: 0
FEVER: 0
CONSTIPATION: 1
PARESTHESIAS: 0
WEAKNESS: 0
ARTHRALGIAS: 1
DIZZINESS: 0
JOINT SWELLING: 0
HEMATOCHEZIA: 0
PALPITATIONS: 0

## 2023-01-27 ASSESSMENT — ACTIVITIES OF DAILY LIVING (ADL): CURRENT_FUNCTION: NO ASSISTANCE NEEDED

## 2023-01-30 ENCOUNTER — OFFICE VISIT (OUTPATIENT)
Dept: FAMILY MEDICINE | Facility: CLINIC | Age: 63
End: 2023-01-30
Payer: COMMERCIAL

## 2023-01-30 VITALS
RESPIRATION RATE: 21 BRPM | SYSTOLIC BLOOD PRESSURE: 128 MMHG | TEMPERATURE: 98.2 F | HEIGHT: 67 IN | DIASTOLIC BLOOD PRESSURE: 70 MMHG | HEART RATE: 80 BPM | OXYGEN SATURATION: 96 % | WEIGHT: 241.6 LBS | BODY MASS INDEX: 37.92 KG/M2

## 2023-01-30 DIAGNOSIS — Z00.00 ENCOUNTER FOR MEDICARE ANNUAL WELLNESS EXAM: Primary | ICD-10-CM

## 2023-01-30 DIAGNOSIS — R53.82 CHRONIC FATIGUE: ICD-10-CM

## 2023-01-30 DIAGNOSIS — Z11.4 SCREENING FOR HIV (HUMAN IMMUNODEFICIENCY VIRUS): ICD-10-CM

## 2023-01-30 DIAGNOSIS — G47.33 OSA (OBSTRUCTIVE SLEEP APNEA): ICD-10-CM

## 2023-01-30 DIAGNOSIS — Z79.899 ENCOUNTER FOR LONG-TERM (CURRENT) USE OF MEDICATIONS: ICD-10-CM

## 2023-01-30 DIAGNOSIS — E66.01 MORBID OBESITY DUE TO EXCESS CALORIES (H): ICD-10-CM

## 2023-01-30 PROBLEM — G89.29 OTHER CHRONIC PAIN: Status: ACTIVE | Noted: 2020-04-19

## 2023-01-30 PROBLEM — M51.9 UNSPECIFIED THORACIC, THORACOLUMBAR AND LUMBOSACRAL INTERVERTEBRAL DISC DISORDER: Status: ACTIVE | Noted: 2020-04-19

## 2023-01-30 PROBLEM — G62.9 POLYNEUROPATHY, UNSPECIFIED: Status: ACTIVE | Noted: 2020-06-25

## 2023-01-30 PROBLEM — R20.2 PARESTHESIA OF SKIN: Status: ACTIVE | Noted: 2020-04-19

## 2023-01-30 PROBLEM — G03.9 MENINGITIS, UNSPECIFIED: Status: ACTIVE | Noted: 2020-04-19

## 2023-01-30 PROBLEM — Z96.652 STATUS POST TOTAL LEFT KNEE REPLACEMENT: Status: ACTIVE | Noted: 2022-03-31

## 2023-01-30 LAB
ERYTHROCYTE [DISTWIDTH] IN BLOOD BY AUTOMATED COUNT: 12.3 % (ref 10–15)
HCT VFR BLD AUTO: 42.3 % (ref 40–53)
HGB BLD-MCNC: 14.1 G/DL (ref 13.3–17.7)
MCH RBC QN AUTO: 34.2 PG (ref 26.5–33)
MCHC RBC AUTO-ENTMCNC: 33.3 G/DL (ref 31.5–36.5)
MCV RBC AUTO: 103 FL (ref 78–100)
PLATELET # BLD AUTO: 189 10E3/UL (ref 150–450)
RBC # BLD AUTO: 4.12 10E6/UL (ref 4.4–5.9)
TSH SERPL DL<=0.005 MIU/L-ACNC: 0.58 UIU/ML (ref 0.3–4.2)
WBC # BLD AUTO: 7 10E3/UL (ref 4–11)

## 2023-01-30 PROCEDURE — 85027 COMPLETE CBC AUTOMATED: CPT | Performed by: FAMILY MEDICINE

## 2023-01-30 PROCEDURE — 99214 OFFICE O/P EST MOD 30 MIN: CPT | Mod: 25 | Performed by: FAMILY MEDICINE

## 2023-01-30 PROCEDURE — 36415 COLL VENOUS BLD VENIPUNCTURE: CPT | Performed by: FAMILY MEDICINE

## 2023-01-30 PROCEDURE — 99396 PREV VISIT EST AGE 40-64: CPT | Performed by: FAMILY MEDICINE

## 2023-01-30 PROCEDURE — 84443 ASSAY THYROID STIM HORMONE: CPT | Performed by: FAMILY MEDICINE

## 2023-01-30 RX ORDER — ACETAMINOPHEN 500 MG
1000 TABLET ORAL
COMMUNITY
Start: 2022-03-31

## 2023-01-30 RX ORDER — ZOLPIDEM TARTRATE 5 MG/1
5 TABLET ORAL
Qty: 30 TABLET | Refills: 0 | Status: SHIPPED | OUTPATIENT
Start: 2023-01-30 | End: 2023-02-23

## 2023-01-30 RX ORDER — DULOXETIN HYDROCHLORIDE 60 MG/1
60 CAPSULE, DELAYED RELEASE ORAL AT BEDTIME
COMMUNITY
Start: 2023-01-29 | End: 2024-03-21

## 2023-01-30 ASSESSMENT — ENCOUNTER SYMPTOMS
SHORTNESS OF BREATH: 0
PALPITATIONS: 0
COUGH: 0
ARTHRALGIAS: 1
NAUSEA: 0
HEMATOCHEZIA: 0
MYALGIAS: 0
PARESTHESIAS: 0
WEAKNESS: 0
JOINT SWELLING: 0
EYE PAIN: 0
ABDOMINAL PAIN: 0
HEMATURIA: 0
HEADACHES: 1
CONSTIPATION: 1
CHILLS: 0
NERVOUS/ANXIOUS: 0
FREQUENCY: 0
HEARTBURN: 0
DIZZINESS: 0
DIARRHEA: 0
SORE THROAT: 0
DYSURIA: 0
FEVER: 0

## 2023-01-30 ASSESSMENT — PAIN SCALES - GENERAL: PAINLEVEL: MODERATE PAIN (4)

## 2023-01-30 ASSESSMENT — ACTIVITIES OF DAILY LIVING (ADL): CURRENT_FUNCTION: NO ASSISTANCE NEEDED

## 2023-01-30 NOTE — PATIENT INSTRUCTIONS
Patient Education   Personalized Prevention Plan  You are due for the preventive services outlined below.  Your care team is available to assist you in scheduling these services.  If you have already completed any of these items, please share that information with your care team to update in your medical record.  Health Maintenance Due   Topic Date Due     URINE DRUG SCREEN  Never done     HIV Screening  Never done     ANNUAL REVIEW OF HM ORDERS  01/17/2023     Zoster (Shingles) Vaccine (2 of 2) 09/23/2022     Your Health Risk Assessment indicates you feel you are not in good health    A healthy lifestyle helps keep the body fit and the mind alert. It helps protect you from disease, helps you fight disease, and helps prevent chronic disease (disease that doesn't go away) from getting worse. This is important as you get older and begin to notice twinges in muscles and joints and a decline in the strength and stamina you once took for granted. A healthy lifestyle includes good healthcare, good nutrition, weight control, recreation, and regular exercise. Avoid harmful substances and do what you can to keep safe. Another part of a healthy lifestyle is stay mentally active and socially involved.    Good healthcare     Have a wellness visit every year.     If you have new symptoms, let us know right away. Don't wait until the next checkup.     Take medicines exactly as prescribed and keep your medicines in a safe place. Tell us if your medicine causes problems.   Healthy diet and weight control     Eat 3 or 4 small, nutritious, low-fat, high-fiber meals a day. Include a variety of fruits, vegetables, and whole-grain foods.     Make sure you get enough calcium in your diet. Calcium, vitamin D, and exercise help prevent osteoporosis (bone thinning).     If you live alone, try eating with others when you can. That way you get a good meal and have company while you eat it.     Try to keep a healthy weight. If you eat more  calories than your body uses for energy, it will be stored as fat and you will gain weight.     Recreation   Recreation is not limited to sports and team events. It includes any activity that provides relaxation, interest, enjoyment, and exercise. Recreation provides an outlet for physical, mental, and social energy. It can give a sense of worth and achievement. It can help you stay healthy.    Mental Exercise and Social Involvement  Mental and emotional health is as important as physical health. Keep in touch with friends and family. Stay as active as possible. Continue to learn and challenge yourself.   Things you can do to stay mentally active are:    Learn something new, like a foreign language or musical instrument.     Play SCRABBLE or do crossword puzzles. If you cannot find people to play these games with you at home, you can play them with others on your computer through the Internet.     Join a games club--anything from card games to chess or checkers or lawn bowling.     Start a new hobby.     Go back to school.     Volunteer.     Read.   Keep up with world events.    Exercise for a Healthier Heart  You may wonder how you can improve the health of your heart. If you re thinking about exercise, you re on the right track. You don t need to become an athlete. But you do need a certain amount of brisk exercise to help strengthen your heart. If you have been diagnosed with a heart condition, your healthcare provider may advise exercise to help stabilize your condition. To help make exercise a habit, choose safe, fun activities.      Exercise with a friend. When activity is fun, you're more likely to stick with it.   Before you start  Check with your healthcare provider before starting an exercise program. This is especially important if you have not been active for a while. It's also important if you have a long-term (chronic) health problem such as heart disease, diabetes, or obesity. Or if you are at high  risk for having these problems.   Why exercise?  Exercising regularly offers many healthy rewards. It can help you do all of the following:     Improve your blood cholesterol level to help prevent further heart trouble    Lower your blood pressure to help prevent a stroke or heart attack    Control diabetes, or reduce your risk of getting this disease    Improve your heart and lung function    Reach and stay at a healthy weight    Make your muscles stronger so you can stay active    Prevent falls and fractures by slowing the loss of bone mass (osteoporosis)    Manage stress better    Reduce your blood pressure    Improve your sense of self and your body image  Exercise tips      Ease into your routine. Set small goals. Then build on them. If you are not sure what your activity level should be, talk with your healthcare provider first before starting an exercise routine.    Exercise on most days. Aim for a total of 150 minutes (2 hours and 30 minutes) or more of moderate-intensity aerobic activity each week. Or 75 minutes (1 hour and 15 minutes) or more of vigorous-intensity aerobic activity each week. Or try for a combination of both. Moderate activity means that you breathe heavier and your heart rate increases but you can still talk. Think about doing 40 minutes of moderate exercise, 3 to 4 times a week. For best results, activity should last for about 40 minutes to lower blood pressure and cholesterol. It's OK to work up to the 40-minute period over time. Examples of moderate-intensity activity are walking 1 mile in 15 minutes. Or doing 30 to 45 minutes of yard work.    Step up your daily activity level.  Along with your exercise program, try being more active the whole day. Walk instead of drive. Or park further away so that you take more steps each day. Do more household tasks or yard work. You may not be able to meet the advised mount of physical activity. But doing some moderate- or vigorous-intensity aerobic  activity can help reduce your risk for heart disease. Your healthcare provider can help you figure out what is best for you.    Choose 1 or more activities you enjoy.  Walking is one of the easiest things you can do. You can also try swimming, riding a bike, dancing, or taking an exercise class.    When to call your healthcare provider  Call your healthcare provider if you have any of these:     Chest pain or feel dizzy or lightheaded    Burning, tightness, pressure, or heaviness in your chest, neck, shoulders, back, or arms    Abnormal shortness of breath    More joint or muscle pain    A very fast or irregular heartbeat (palpitations)  [employer] last reviewed this educational content on 7/1/2019 2000-2021 [employer]. All rights reserved. This information is not intended as a substitute for professional medical care. Always follow your healthcare professional's instructions.          Understanding USDA MyPlate  The USDA has guidelines to help you make healthy food choices. These are called MyPlate. MyPlate shows the food groups that make up healthy meals using the image of a place setting. Before you eat, think about the healthiest choices for what to put on your plate or in your cup or bowl. To learn more about building a healthy plate, visit www.choosemyplate.gov.    The food groups    Fruits. Any fruit or 100% fruit juice counts as part of the Fruit Group. Fruits may be fresh, canned, frozen, or dried, and may be whole, cut-up, or pureed. Make 1/2 of your plate fruits and vegetables.    Vegetables. Any vegetable or 100% vegetable juice counts as a member of the Vegetable Group. Vegetables may be fresh, frozen, canned, or dried. They can be served raw or cooked and may be whole, cut-up, or mashed. Make 1/2 of your plate fruits and vegetables.    Grains. All foods made from grains are part of the Grains Group. These include wheat, rice, oats, cornmeal, and barley. Grains are often used to make  foods such as bread, pasta, oatmeal, cereal, tortillas, and grits. Grains should be no more than 1/4 of your plate. At least half of your grains should be whole grains.    Protein. This group includes meat, poultry, seafood, beans and peas, eggs, processed soy products (such as tofu), nuts (including nut butters), and seeds. Make protein choices no more than 1/4 of your plate. Meat and poultry choices should be lean or low fat.    Dairy. The Dairy Group includes all fluid milk products and foods made from milk that contain calcium, such as yogurt and cheese. (Foods that have little calcium, such as cream, butter, and cream cheese, are not part of this group.) Most dairy choices should be low-fat or fat-free.    Oils. Oils aren't a food group, but they do contain essential nutrients. However it's important to watch your intake of oils. These are fats that are liquid at room temperature. They include canola, corn, olive, soybean, vegetable, and sunflower oil. Foods that are mainly oil include mayonnaise, certain salad dressings, and soft margarines. You likely already get your daily oil allowance from the foods you eat.  Things to limit  Eating healthy also means limiting these things in your diet:       Salt (sodium). Many processed foods have a lot of sodium. To keep sodium intake down, eat fresh vegetables, meats, poultry, and seafood when possible. Purchase low-sodium, reduced-sodium, or no-salt-added food products at the store. And don't add salt to your meals at home. Instead, season them with herbs and spices such as dill, oregano, cumin, and paprika. Or try adding flavor with lemon or lime zest and juice.    Saturated fat. Saturated fats are most often found in animal products such as beef, pork, and chicken. They are often solid at room temperature, such as butter. To reduce your saturated fat intake, choose leaner cuts of meat and poultry. And try healthier cooking methods such as grilling, broiling,  roasting, or baking. For a simple lower-fat swap, use plain nonfat yogurt instead of mayonnaise when making potato salad or macaroni salad.    Added sugars. These are sugars added to foods. They are in foods such as ice cream, candy, soda, fruit drinks, sports drinks, energy drinks, cookies, pastries, jams, and syrups. Cut down on added sugars by sharing sweet treats with a family member or friend. You can also choose fruit for dessert, and drink water or other unsweetened beverages.     [employer] last reviewed this educational content on 6/1/2020 2000-2021 [employer]. All rights reserved. This information is not intended as a substitute for professional medical care. Always follow your healthcare professional's instructions.          Urinary Incontinence (Male)    Urinary incontinence means not being able to control the release of urine from the bladder.   Causes  Common causes of urinary incontinence in men include:    Infection    Certain medicines    Aging    Poor pelvic muscle tone    Bladder spasms    Obesity    Trouble urinating and fully emptying the bladder (urinary retention)  Other things that can cause incontinence are:     Nervous system diseases    Diabetes    Sleep apnea    Urinary tract infections    Prostate surgery    Pelvic injury  Constipation and smoking have also been identified as risk factors.   Symptoms    Urge incontinence (overactive bladder). This is a sudden urge to urinate. It occurs even though there may not be much urine in the bladder. The need to urinate often during the night is common. It's due to bladder spasms.    Stress incontinence. This is urine leakage that you can't control. It can occur with sneezing, coughing, and other actions that put stress on the bladder.    Treatment  Treatment depends on what is causing the condition. Bladder infections are treated with antibiotics. Urinary retention is treated with a bladder catheter.   Home care  Follow these  guidelines when caring for yourself at home:    Don't have any foods and drinks that may irritate the bladder. This includes:  ? Chocolate  ? Alcohol  ? Caffeine  ? Carbonated drinks  ? Acidic fruits and juices    Limit fluids to 6 to 8 cups a day.    Lose weight if you are overweight. This will reduce your symptoms.    If advised, do regular pelvic muscle-strengthening exercises such as Kegel exercises.    If needed, wear absorbent pads to catch urine. Change the pads often. This is for good hygiene and to prevent skin and bladder infections.    Bathe daily for good hygiene.    If an antibiotic was prescribed to treat a bladder infection, take it until it's finished. Keep taking it even if you are feeling better. This is to make sure your infection has cleared.    If a catheter was left in place, keep bacteria from getting into the collection bag. Don't disconnect the catheter from the collection bag.    Use a leg band to secure the catheter drainage tube, so it does not pull on the catheter. Drain the collection bag when it becomes full. To do this, use the drain spout at the bottom of the bag. Don't disconnect the bag from the catheter.    Don't pull on or try to remove a catheter. The catheter must be removed by a healthcare provider.    If you smoke, stop. Ask your provider for help if you can't do this on your own.  Follow-up care  Follow up with your healthcare provider, or as advised.  When to get medical advice  Call your healthcare provider right away if any of these occur:    Fever over 100.4 F (38 C), or as directed by your provider    Bladder pain or fullness    Belly swelling, nausea, or vomiting    Back pain    Weakness, dizziness, or fainting    If a catheter was left in place, return if:  ? The catheter falls out  ? The catheter stops draining for 6 hours  ? Your urine gets cloudy or smells bad  [employer] last reviewed this educational content on 1/1/2020 2000-2021 [employer]. All  rights reserved. This information is not intended as a substitute for professional medical care. Always follow your healthcare professional's instructions.

## 2023-01-30 NOTE — PROGRESS NOTES
The patient was provided with suggestions to help him develop a healthy physical lifestyle.  He is at risk for lack of exercise and has been provided with information to increase physical activity for the benefit of his well-being.  The patient was counseled and encouraged to consider modifying their diet and eating habits. He was provided with information on recommended healthy diet options.  Information on urinary incontinence and treatment options given to patient.

## 2023-01-30 NOTE — PROGRESS NOTES
"SUBJECTIVE:   CC: Wenceslao is an 62 year old who presents for preventative health visit.     Patient has been advised of split billing requirements and indicates understanding: Yes  Healthy Habits:     In general, how would you rate your overall health?  Fair    Frequency of exercise:  None    Do you usually eat at least 4 servings of fruit and vegetables a day, include whole grains    & fiber and avoid regularly eating high fat or \"junk\" foods?  No    Taking medications regularly:  Yes    Medication side effects:  Not applicable    Ability to successfully perform activities of daily living:  No assistance needed    Home Safety:  No safety concerns identified    Hearing Impairment:  No hearing concerns    In the past 6 months, have you been bothered by leaking of urine? Yes    In general, how would you rate your overall mental or emotional health?  Good      PHQ-2 Total Score: 0    Additional concerns today:  No          Hypertension Follow-up      Do you check your blood pressure regularly outside of the clinic? No     Are you following a low salt diet? No    Are your blood pressures ever more than 140 on the top number (systolic) OR more   than 90 on the bottom number (diastolic), for example 140/90? No    Chronic/Recurring Back Pain Follow Up      Where is your back pain located? (Select all that apply) low back bilateral    How would you describe your back pain?  dull ache    Where does your back pain spread? the right and left buttock    Since your last clinic visit for back pain, how has your pain changed? always present, but gets better and worse    Does your back pain interfere with your job? Not applicable    Since your last visit, have you tried any new treatment? Working with [facility]      Today's PHQ-2 Score:   PHQ-2 ( 1999 [employer]) 1/27/2023   Q1: Little interest or pleasure in doing things 0   Q2: Feeling down, depressed or hopeless 0   PHQ-2 Score 0   PHQ-2 Total Score (12-17 Years)- Positive if 3 or more " points; Administer PHQ-A if positive -   Q1: Little interest or pleasure in doing things Not at all   Q2: Feeling down, depressed or hopeless Not at all   PHQ-2 Score 0           Social History     Tobacco Use     Smoking status: Never     Smokeless tobacco: Never   Substance Use Topics     Alcohol use: Not Currently     Comment: very little     If you drink alcohol do you typically have >3 drinks per day or >7 drinks per week? No    Alcohol Use 1/27/2023   Prescreen: >3 drinks/day or >7 drinks/week? No   Prescreen: >3 drinks/day or >7 drinks/week? -       Last PSA:   PSA   Date Value Ref Range Status   04/30/2018 2.81 0 - 4 ug/L Final     Comment:     Assay Method:  Chemiluminescence using Siemens Vista analyzer       Reviewed orders with patient. Reviewed health maintenance and updated orders accordingly - Yes  Labs reviewed in EPIC  BP Readings from Last 3 Encounters:   01/30/23 (!) 161/91   03/21/22 128/70   01/17/22 (!) 148/84    Wt Readings from Last 3 Encounters:   01/30/23 109.6 kg (241 lb 9.6 oz)   03/21/22 106.6 kg (235 lb)   01/17/22 105.9 kg (233 lb 6 oz)                  Patient Active Problem List   Diagnosis     CHELSY (obstructive sleep apnea)     Hyperlipidemia LDL goal <130     Sciatica     Low back pain     Stenosis of lumbosacral spine     Advanced directives, counseling/discussion     Hypertension goal BP (blood pressure) < 140/90     Spinal stenosis in cervical region     Cervical spondylosis without myelopathy     Pneumonia     Morbid obesity due to excess calories (H)     Syncope     Exertional chest pain     Lumbar stenosis     S/P laminectomy     S/P lumbar laminectomy     Altered mental status     TIA (transient ischemic attack)     Meningitis, unspecified     Other chronic pain     Paresthesia of skin     Polyneuropathy, unspecified     Unspecified thoracic, thoracolumbar and lumbosacral intervertebral disc disorder     Status post total left knee replacement     Past Surgical History:    Procedure Laterality Date     BACK SURGERY       CERVICAL DISKECTOMY  8/8/13    [facility]     COLONOSCOPY  06/03/09     COLONOSCOPY N/A 9/17/2019    Procedure: COLONOSCOPY;  Surgeon: Jose F Samuels DO;  Location: [facility]     CYSTOSCOPY, DILATE URETHRA, COMBINED N/A 7/23/2018    Procedure: COMBINED CYSTOSCOPY, DILATE URETHRA;  cystosdcopy with urethral dilation and catheter placement;  Surgeon: Dakota Moore MD;  Location: [facility]     CYSTOSCOPY, DILATE URETHRA, COMBINED N/A 12/10/2018    Procedure: CYSTOSCOPY, URETHRAL DILATION;  Surgeon: Dakota Moore MD;  Location: [facility]     DIL URETHRA STRIC,MALE,SUBSEQ       HC REMOVAL HEEL SPUR, CALCANEUS  2/2005     HC REVISE MEDIAN N/CARPAL TUNNEL SURG  1993    right     HEAD & NECK SURGERY  2013    Plate in neck     INJECT EPIDURAL LUMBAR Bilateral 9/20/2019    Procedure: INJECTION, SPINE, LUMBAR 4-5, EPIDURAL;  Surgeon: Calvin Rich MD;  Location: [facility]     INJECT EPIDURAL LUMBAR N/A 1/9/2020    Procedure: Lumbar 4-5 Translaminar Epidural Injection;  Surgeon: Calvin Rich MD;  [facility]: [facility]     INJECT EPIDURAL TRANSFORAMINAL Bilateral 4/12/2019    Procedure: Inject epidural transforaminal Lumbar 3-4 bilateral;  Surgeon: Calvin Rich MD;  [facility]     INSERT CATHETER BLADDER N/A 7/23/2018    Procedure: INSERT CATHETER BLADDER;;  Surgeon: Dakota Moore MD;  [facility]     LAMINECTOMY LUMBAR TWO LEVELS N/A 5/29/2019    Procedure: LAMINECTOMIES LUMBAR 2-4;  Surgeon: Brent Calvo MD;  [facility]     LAPAROSCOPIC CHOLECYSTECTOMY  3/11/2013    Procedure: LAPAROSCOPIC CHOLECYSTECTOMY;  laparoscopic cholecystectomy;  Surgeon: Clinton Whittaker MD;  Location: [facility]     ZZC NONSPECIFIC PROCEDURE      lumbar disc surgery     ZZC NONSPECIFIC PROCEDURE      hammer toe surgery right foot     ZZC NONSPECIFIC PROCEDURE      nasal surgery       Social History     Tobacco Use     Smoking status: Never     Smokeless  tobacco: Never   Substance Use Topics     Alcohol use: Not Currently     Comment: very little     Family History   Problem Relation Age of Onset     Alcohol/Drug Brother      Prostate Cancer Brother      Arthritis Mother      Cancer Mother         Kidney - Stage 3     Other Cancer Mother         Kidney and through out her body     Heart Disease Maternal Grandmother      Heart Disease Paternal Grandmother      BOBBI No family hx of      Diabetes No family hx of      Anesthesia Reaction No family hx of         Hard to come out of     Cancer - colorectal No family hx of      Colon Cancer No family hx of          Current Outpatient Medications   Medication Sig Dispense Refill     acetaminophen (TYLENOL) 325 MG tablet Take 2 tablets (650 mg) by mouth every 4 hours as needed for mild pain  0     acetaminophen (TYLENOL) 500 MG tablet Take 1,000 mg by mouth       DULoxetine (CYMBALTA) 30 MG capsule Take 30 mg by mouth every morning       DULoxetine (CYMBALTA) 60 MG capsule 60 mg by Oral or Feeding Tube route At Bedtime       lisinopril (ZESTRIL) 10 MG tablet Take 1 tablet (10 mg) by mouth daily 90 tablet 3     Multiple Vitamin (MULTI VITAMIN MENS PO) Take 1 tablet by mouth daily.       oxyCODONE-acetaminophen (PERCOCET)  MG per tablet Take 10 tablets by mouth 2 times daily as needed for severe pain (7-10)       Allergies   Allergen Reactions     Dust Mites Hives     Morphine Nausea and Vomiting     Recent Labs   Lab Test 03/21/22  1816 01/17/22  1814 06/25/20  1131 09/12/19  1602 06/12/19  0522 06/11/19  2057 03/06/19  1540 06/07/18  0835 04/30/18  1347 01/08/18  1505 01/08/18  0530 11/09/17  1709 07/03/17  0949 01/11/17  0855 07/21/16  0822   A1C  --  5.6 5.2  --   --   --   --   --  5.3  --   --   --   --   --   --    LDL  --   --   --   --   --   --   --  77  --   --   --   --   --  100* 90   HDL  --   --   --   --   --   --   --  31*  --   --   --   --   --  36* 36*   TRIG  --   --   --   --   --   --   --  198*   "--   --   --   --   --  185* 139   ALT  --   --   --   --   --  34  --   --   --  31 35  --    < >  --   --    CR 0.71 0.72  --  0.91 0.83 0.83   < >  --   --  0.69 0.74  --    < >  --   --    GFRESTIMATED >90 >90  --  >90 >90 >90   < >  --   --  >90 >90  --    < >  --   --    GFRESTBLACK  --   --   --  >90 >90 >90   < >  --   --  >90 >90  --    < >  --   --    POTASSIUM 3.6 3.8  --  3.8 3.7 3.9   < >  --   --  3.4 3.5  --    < >  --   --    TSH  --   --  0.88  --   --   --   --   --   --   --   --  0.70  --   --   --     < > = values in this interval not displayed.        Reviewed and updated as needed this visit by clinical staff   Tobacco  Allergies  Meds  Problems  Med Hx  Surg Hx  Fam Hx          Reviewed and updated as needed this visit by Provider   Tobacco  Allergies  Meds  Problems  Med Hx  Surg Hx  Fam Hx             Review of Systems   Constitutional: Negative for chills and fever.   HENT: Negative for congestion, ear pain, hearing loss and sore throat.    Eyes: Negative for pain and visual disturbance.   Respiratory: Negative for cough and shortness of breath.    Cardiovascular: Negative for chest pain, palpitations and peripheral edema.   Gastrointestinal: Positive for constipation. Negative for abdominal pain, diarrhea, heartburn, hematochezia and nausea.   Genitourinary: Negative for dysuria, frequency, genital sores, hematuria, impotence, penile discharge and urgency.   Musculoskeletal: Positive for arthralgias. Negative for joint swelling and myalgias.   Skin: Negative for rash.   Neurological: Positive for headaches. Negative for dizziness, weakness and paresthesias.   Psychiatric/Behavioral: Negative for mood changes. The patient is not nervous/anxious.          OBJECTIVE:   BP (!) 161/91 (BP Location: Left arm, Patient Position: Sitting, Cuff Size: Adult Large)   Pulse 80   Temp 98.2  F (36.8  C)   Resp 21   Ht 1.702 m (5' 7\")   Wt 109.6 kg (241 lb 9.6 oz)   SpO2 96%   BMI " 37.84 kg/m      Physical Exam  GENERAL: healthy, alert, no distress and obese  NECK: no adenopathy, no asymmetry, masses, or scars and thyroid normal to palpation  RESP: lungs clear to auscultation - no rales, rhonchi or wheezes  CV: regular rate and rhythm, normal S1 S2, no S3 or S4, no murmur, click or rub, no peripheral edema and peripheral pulses strong  ABDOMEN: soft, nontender, no hepatosplenomegaly, no masses and bowel sounds normal  MS: no gross musculoskeletal defects noted, no edema  NEURO: Normal strength and tone, mentation intact and speech normal  PSYCH: mentation appears normal, affect normal/bright    Diagnostic Test Results:  Labs reviewed in Epic    ASSESSMENT/PLAN:       ICD-10-CM    1. Encounter for Medicare annual wellness exam  Z00.00       2. Encounter for long-term (current) use of medications  Z79.899       3. Screening for HIV (human immunodeficiency virus)  Z11.4       4. Morbid obesity due to excess calories (H)  E66.01       5. Chronic fatigue  R53.82 CBC with platelets     TSH with free T4 reflex     CBC with platelets     TSH with free T4 reflex      6. CHELSY (obstructive sleep apnea)  G47.33 zolpidem (AMBIEN) 5 MG tablet          Patient has been advised of split billing requirements and indicates understanding: Yes       5. Chronic fatigue  Chronic daytime fatigue despite now using CPAP for CHELSY. He still has difficulty with sleep initiation and frequent waking due to dog and nocturia. Check TFT and Hemoglobin.  Will trial Ambien at bedtime to improve CPAP tolerance.  - CBC with platelets; Future  - TSH with free T4 reflex; Future  - CBC with platelets  - TSH with free T4 reflex    6. CHELSY (obstructive sleep apnea)  Chronic daytime fatigue despite now using CPAP for CHELSY. He still has difficulty with sleep initiation and frequent waking due to dog and nocturia. Will trial Ambien at bedtime to improve CPAP tolerance.  - zolpidem (AMBIEN) 5 MG tablet; Take 1 tablet (5 mg) by mouth nightly  as needed for sleep  Dispense: 30 tablet; Refill: 0        COUNSELING:   Reviewed preventive health counseling, as reflected in patient instructions       Regular exercise       Healthy diet/nutrition       Vision screening       Hearing screening       HIV screeninx in teen years, 1x in adult years, and at intervals if high risk       Colorectal cancer screening        He reports that he has never smoked. He has never used smokeless tobacco.        Christopher Arreola MD  [facility]

## 2023-02-23 DIAGNOSIS — G47.33 OSA (OBSTRUCTIVE SLEEP APNEA): ICD-10-CM

## 2023-02-23 RX ORDER — ZOLPIDEM TARTRATE 5 MG/1
TABLET ORAL
Qty: 30 TABLET | Refills: 0 | Status: SHIPPED | OUTPATIENT
Start: 2023-03-01 | End: 2024-03-21

## 2023-03-03 ENCOUNTER — MYC REFILL (OUTPATIENT)
Dept: FAMILY MEDICINE | Facility: CLINIC | Age: 63
End: 2023-03-03
Payer: COMMERCIAL

## 2023-03-03 DIAGNOSIS — G47.33 OSA (OBSTRUCTIVE SLEEP APNEA): ICD-10-CM

## 2023-03-06 RX ORDER — ZOLPIDEM TARTRATE 5 MG/1
5 TABLET ORAL
Qty: 30 TABLET | Refills: 0 | OUTPATIENT
Start: 2023-03-06

## 2023-05-07 NOTE — PROGRESS NOTES
[facility]    Outpatient Physical Therapy Discharge Note  Patient: Wenceslao Vasquez  : 1960    Beginning/End Dates of Reporting Period:  22 to 22    Referring Provider: Jose Younger MD    Therapy Diagnosis: L knee pain, decreased L LE strength, decreased L knee AROM, muscle tightness, joint stiffness, balance deficits, gait deviations     Client Self Report: Feeling stiff today, he wasn't able to do his exercises earlier.  They recently got a new puppy and he's been busy with her.    Objective Measurements:  Objective Measure: Pain  Details: 0/10 L knee - start of session         Goals:  Goal Identifier 1   Goal Description Patient will demonstrate full, painfree L knee AROM in order to facilitate return to prior level of function.    Target Date 22   Date Met      Progress (detail required for progress note): 2 degrees to 111 degrees after session on 2022     Goal Identifier 2   Goal Description Patient will increase LEFS score by at least 9 points in order to demonstrate clinically significant improvement in functional performance to facilitate return to prior level of function.     Target Date 22   Date Met      Progress (detail required for progress note): Minimal improvement from 58/80 (2022)  to 61/80 (2022     Goal Identifier 3   Goal Description Patient will demonstrate ability to ascend/descend set of 10 stairs without railing with normalized, pain free reciprocal gait pattern in order to facilitate return to prior level of function.    Target Date 22   Date Met      Progress (detail required for progress note): Patient demonstrates ability to ascend/descend set of 10 stairs with use of one railing and reciprocal gait pattern.  When descending wtih R leg first there is still some minor hesitancy, and it improved with increased reps.       Plan:  Discharge  from therapy.    Patient last seen in therapy on 7/27/22 and canceled remaining appts scheduled for 8/4, 8/8, 8/10/22 stating he had seen the doctor and was told he was as good as he was going to get.      Discharge:    Reason for Discharge: Patient chooses to discontinue therapy.    Equipment Issued: N/A    Discharge Plan: Patient to continue home program.        Francesca Jacome PT, DPT, FRANCISCA-NA  [facility]  Physical Therapist     Phone: [phone]  Email: [email or web address]

## 2023-08-11 ENCOUNTER — TRANSFERRED RECORDS (OUTPATIENT)
Dept: HEALTH INFORMATION MANAGEMENT | Facility: CLINIC | Age: 63
End: 2023-08-11
Payer: COMMERCIAL

## 2023-08-25 ENCOUNTER — TRANSFERRED RECORDS (OUTPATIENT)
Dept: HEALTH INFORMATION MANAGEMENT | Facility: CLINIC | Age: 63
End: 2023-08-25
Payer: COMMERCIAL

## 2023-09-25 DIAGNOSIS — M48.07 STENOSIS OF LUMBOSACRAL SPINE: ICD-10-CM

## 2023-09-25 DIAGNOSIS — M48.02 SPINAL STENOSIS IN CERVICAL REGION: Primary | ICD-10-CM

## 2023-09-25 DIAGNOSIS — M47.812 CERVICAL SPONDYLOSIS WITHOUT MYELOPATHY: ICD-10-CM

## 2023-09-25 RX ORDER — DULOXETIN HYDROCHLORIDE 30 MG/1
30 CAPSULE, DELAYED RELEASE ORAL EVERY MORNING
Qty: 90 CAPSULE | Refills: 1 | Status: SHIPPED | OUTPATIENT
Start: 2023-09-25 | End: 2024-03-14

## 2023-10-01 DIAGNOSIS — I10 HYPERTENSION GOAL BP (BLOOD PRESSURE) < 140/90: ICD-10-CM

## 2023-10-02 RX ORDER — LISINOPRIL 10 MG/1
10 TABLET ORAL DAILY
Qty: 90 TABLET | Refills: 3 | OUTPATIENT
Start: 2023-10-02

## 2023-10-20 ENCOUNTER — TRANSFERRED RECORDS (OUTPATIENT)
Dept: HEALTH INFORMATION MANAGEMENT | Facility: CLINIC | Age: 63
End: 2023-10-20
Payer: COMMERCIAL

## 2023-11-17 ENCOUNTER — TRANSFERRED RECORDS (OUTPATIENT)
Dept: HEALTH INFORMATION MANAGEMENT | Facility: CLINIC | Age: 63
End: 2023-11-17
Payer: COMMERCIAL

## 2023-12-15 ENCOUNTER — TRANSFERRED RECORDS (OUTPATIENT)
Dept: HEALTH INFORMATION MANAGEMENT | Facility: CLINIC | Age: 63
End: 2023-12-15
Payer: COMMERCIAL

## 2023-12-27 DIAGNOSIS — I10 HYPERTENSION GOAL BP (BLOOD PRESSURE) < 140/90: ICD-10-CM

## 2023-12-27 RX ORDER — LISINOPRIL 10 MG/1
10 TABLET ORAL DAILY
Qty: 90 TABLET | Refills: 3 | Status: SHIPPED | OUTPATIENT
Start: 2023-12-27 | End: 2024-09-19

## 2024-01-04 ENCOUNTER — PATIENT OUTREACH (OUTPATIENT)
Dept: CARE COORDINATION | Facility: CLINIC | Age: 64
End: 2024-01-04
Payer: COMMERCIAL

## 2024-01-18 ENCOUNTER — PATIENT OUTREACH (OUTPATIENT)
Dept: CARE COORDINATION | Facility: CLINIC | Age: 64
End: 2024-01-18
Payer: COMMERCIAL

## 2024-02-19 ENCOUNTER — MEDICAL CORRESPONDENCE (OUTPATIENT)
Dept: HEALTH INFORMATION MANAGEMENT | Facility: CLINIC | Age: 64
End: 2024-02-19
Payer: COMMERCIAL

## 2024-02-19 ASSESSMENT — ACTIVITIES OF DAILY LIVING (ADL)
HOW_WOULD_YOU_RATE_YOUR_CURRENT_LEVEL_OF_FUNCTION_DURING_YOUR_SPORTS_RELATED_ACTIVITIES_FROM_0_TO_100_WITH_100_BEING_YOUR_LEVEL_OF_FUNCTION_PRIOR_TO_YOUR_HIP_PROBLEM_AND_0_BEING_THE_INABILITY_TO_PERFORM_ANY_OF_YOUR_USUAL_DAILY_ACTIVITIES?: 50
SPORTS_TOTAL_ITEM_SCORE: 0
TWISTING/PIVOTING_ON_INVOLVED_LEG: MODERATE DIFFICULTY
SPORTS_COUNT: 9
GETTING INTO AND OUT OF AN AVERAGE CAR: MODERATE DIFFICULTY
TWISTING/PIVOTING ON INVOLVED LEG: MODERATE DIFFICULTY
ADL_HIGHEST_POTENTIAL_SCORE: 68
WALKING_15_MINUTES_OR_GREATER: EXTREME DIFFICULTY
ADL_TOTAL_ITEM_SCORE: 0
ADL_SCORE(%): 0
HOS_ADL_ITEM_SCORE_TOTAL: 21
HEAVY_WORK: UNABLE TO DO
HOW_WOULD_YOU_RATE_YOUR_CURRENT_LEVEL_OF_FUNCTION_DURING_YOUR_USUAL_ACTIVITIES_OF_DAILY_LIVING_FROM_0_TO_100_WITH_100_BEING_YOUR_LEVEL_OF_FUNCTION_PRIOR_TO_YOUR_HIP_PROBLEM_AND_0_BEING_THE_INABILITY_TO_PERFORM_ANY_OF_YOUR_USUAL_DAILY_ACTIVITIES?: 50
WALKING_INITIALLY: MODERATE DIFFICULTY
SITTING_FOR_15_MINUTES: MODERATE DIFFICULTY
LOW_IMPACT_ACTIVITIES_LIKE_FAST_WALKING: UNABLE TO DO
DEEP_SQUATTING: UNABLE TO DO
WALKING_FOR_APPROXIMATELY_10_MINUTES: MODERATE DIFFICULTY
CUTTING/LATERAL_MOVEMENTS: UNABLE TO DO
STEPPING_UP_AND_DOWN_CURBS: MODERATE DIFFICULTY
WALKING_15_MINUTES_OR_GREATER: EXTREME DIFFICULTY
WALKING_UP_STEEP_HILLS: UNABLE TO DO
JUMPING: UNABLE TO DO
SWINGING_OBJECTS_LIKE_A_GOLF_CLUB: MODERATE DIFFICULTY
PLEASE_INDICATE_YOR_PRIMARY_REASON_FOR_REFERRAL_TO_THERAPY:: HIP
DEEP SQUATTING: UNABLE TO DO
SITTING FOR 15 MINUTES: MODERATE DIFFICULTY
GETTING_INTO_AND_OUT_OF_A_BATHTUB: MODERATE DIFFICULTY
GETTING_INTO_AND_OUT_OF_A_BATHTUB: MODERATE DIFFICULTY
ABILITY_TO_PARTICIPATE_IN_YOUR_DESIRED_SPORT_AS_LONG_AS_YOU_WOULD_LIKE: EXTREME DIFFICULTY
ADL_COUNT: 17
GOING_DOWN_1_FLIGHT_OF_STAIRS: EXTREME DIFFICULTY
LIGHT_TO_MODERATE_WORK: MODERATE DIFFICULTY
RECREATIONAL ACTIVITIES: EXTREME DIFFICULTY
GOING UP 1 FLIGHT OF STAIRS: EXTREME DIFFICULTY
HOS_ADL_HIGHEST_POTENTIAL_SCORE: 68
PUTTING_ON_SOCKS_AND_SHOES: EXTREME DIFFICULTY
STANDING_FOR_15_MINUTES: MODERATE DIFFICULTY
HOW_WOULD_YOU_RATE_YOUR_CURRENT_LEVEL_OF_FUNCTION?: ABNORMAL
RECREATIONAL_ACTIVITIES: EXTREME DIFFICULTY
STARTING_AND_STOPPING_QUICKLY: EXTREME DIFFICULTY
ROLLING OVER IN BED: EXTREME DIFFICULTY
LIGHT_TO_MODERATE_WORK: MODERATE DIFFICULTY
HOW_WOULD_YOU_RATE_YOUR_CURRENT_LEVEL_OF_FUNCTION_DURING_YOUR_USUAL_ACTIVITIES_OF_DAILY_LIVING_FROM_0_TO_100_WITH_100_BEING_YOUR_LEVEL_OF_FUNCTION_PRIOR_TO_YOUR_HIP_PROBLEM_AND_0_BEING_THE_INABILITY_TO_PERFORM_ANY_OF_YOUR_USUAL_DAILY_ACTIVITIES?: 50
PUTTING ON SOCKS AND SHOES: EXTREME DIFFICULTY
GETTING_INTO_AND_OUT_OF_AN_AVERAGE_CAR: MODERATE DIFFICULTY
GOING DOWN 1 FLIGHT OF STAIRS: EXTREME DIFFICULTY
WALKING_APPROXIMATELY_10_MINUTES: MODERATE DIFFICULTY
WALKING_INITIALLY: MODERATE DIFFICULTY
WALKING_UP_STEEP_HILLS: UNABLE TO DO
STEPPING UP AND DOWN CURBS: MODERATE DIFFICULTY
SPORTS_SCORE(%): 0
HOS_ADL_SCORE(%): 30.88
ABILITY_TO_PERFORM_ACTIVITY_WITH_YOUR_NORMAL_TECHNIQUE: EXTREME DIFFICULTY
SPORTS_HIGHEST_POTENTIAL_SCORE: 36
ROLLING_OVER_IN_BED: EXTREME DIFFICULTY
STANDING FOR 15 MINUTES: MODERATE DIFFICULTY
RUNNING_ONE_MILE: UNABLE TO DO
HEAVY_WORK: UNABLE TO DO
WALKING_DOWN_STEEP_HILLS: UNABLE TO DO
WALKING_DOWN_STEEP_HILLS: UNABLE TO DO
GOING_UP_1_FLIGHT_OF_STAIRS: EXTREME DIFFICULTY

## 2024-02-21 ENCOUNTER — THERAPY VISIT (OUTPATIENT)
Dept: PHYSICAL THERAPY | Facility: CLINIC | Age: 64
End: 2024-02-21
Attending: ORTHOPAEDIC SURGERY
Payer: COMMERCIAL

## 2024-02-21 DIAGNOSIS — Z96.641 STATUS POST RIGHT HIP REPLACEMENT: Primary | ICD-10-CM

## 2024-02-21 PROCEDURE — 97140 MANUAL THERAPY 1/> REGIONS: CPT | Mod: GP

## 2024-02-21 PROCEDURE — 97110 THERAPEUTIC EXERCISES: CPT | Mod: GP

## 2024-02-21 PROCEDURE — 97161 PT EVAL LOW COMPLEX 20 MIN: CPT | Mod: GP

## 2024-02-21 ASSESSMENT — ACTIVITIES OF DAILY LIVING (ADL)
TWISTING/PIVOTING_ON_INVOLVED_LEG: MODERATE DIFFICULTY
GOING_UP_1_FLIGHT_OF_STAIRS: MODERATE DIFFICULTY
WALKING_UP_STEEP_HILLS: UNABLE TO DO
ROLLING_OVER_IN_BED: MODERATE DIFFICULTY
LIGHT_TO_MODERATE_WORK: MODERATE DIFFICULTY
PUTTING_ON_SOCKS_AND_SHOES: EXTREME DIFFICULTY
HEAVY_WORK: UNABLE TO DO
HOW_WOULD_YOU_RATE_YOUR_CURRENT_LEVEL_OF_FUNCTION_DURING_YOUR_USUAL_ACTIVITIES_OF_DAILY_LIVING_FROM_0_TO_100_WITH_100_BEING_YOUR_LEVEL_OF_FUNCTION_PRIOR_TO_YOUR_HIP_PROBLEM_AND_0_BEING_THE_INABILITY_TO_PERFORM_ANY_OF_YOUR_USUAL_DAILY_ACTIVITIES?: 50
GOING_DOWN_1_FLIGHT_OF_STAIRS: MODERATE DIFFICULTY
STANDING_FOR_15_MINUTES: MODERATE DIFFICULTY
WALKING_APPROXIMATELY_10_MINUTES: MODERATE DIFFICULTY
SITTING_FOR_15_MINUTES: SLIGHT DIFFICULTY
HOS_ADL_HIGHEST_POTENTIAL_SCORE: 68
HOS_ADL_SCORE(%): 36.76
DEEP_SQUATTING: MODERATE DIFFICULTY
HOS_ADL_COUNT: 17
HOS_ADL_ITEM_SCORE_TOTAL: 25
GETTING_INTO_AND_OUT_OF_A_BATHTUB: MODERATE DIFFICULTY
WALKING_DOWN_STEEP_HILLS: UNABLE TO DO
STEPPING_UP_AND_DOWN_CURBS: MODERATE DIFFICULTY
RECREATIONAL_ACTIVITIES: UNABLE TO DO
WALKING_15_MINUTES_OR_GREATER: EXTREME DIFFICULTY
WALKING_INITIALLY: MODERATE DIFFICULTY
GETTING_INTO_AND_OUT_OF_AN_AVERAGE_CAR: MODERATE DIFFICULTY

## 2024-02-21 NOTE — PROGRESS NOTES
PHYSICAL THERAPY EVALUATION  Type of Visit: Evaluation    See electronic medical record for Abuse and Falls Screening details.    Subjective          Patient is a 63 year old male presenting to physical therapy 2 months s/p a right anterior total hip arthroplasty on 01/02/2024 with continued significant right hip pain and muscle tightness complaints. Patient reports his primary pain aggravators are prolonged standing, walking, and right trunk leaning sitting demands at this time. Patient reports use of OTC's, relative rest, hot pack, and cold packs as his primary pain relievers at this time. Patient reports wishing to participate in physical therapy services for reduced right hip pain and instability with his required ambulation demands. Patient will benefit from skilled physical therapy services and has sufficient social support.     Presenting condition or subjective complaint: weakness and soreness in groin area    Date of onset: 01/02/24      Relevant medical history:   Sciatica, syncope, exertional chest pain, meningitis, other chronic pain, paresthesia of skin, polyneuropathy, low back pain, pneumonia, CHELSY, obesity, hyperlipidemia, hypertension, TIA, cervical spondylosis without myelopathy, thoracolumbar and lumbosacral IVD disorder, stenosis of lumbosacral spine, spinal stenosis in cervical region, lumbar stenosis, s/p laminectomy, s/p lumbar laminectomy, altered mental status, status post left total knee replacement.    Dates & types of surgery: 1/2/2024 hip replacement (right); L4-5 injections (01/09/2020; 09/20/2019); Colonoscopy (09/17/2019; 06/03/2009); L2-4 laminectomies (05/29/2019); L3-4 bilateral transforaminal injection (04/12/2019); Cystoscopy for urethral dilation (12/10/2018; 07/23/2018 also bladder catheter insertion); Cervical discectomy (08/08/2013); Laparoscopic cholecystectomy (03/11/2013); Head and neck surgery for plate in neck (2013); Calcaneal spur removal (02/2005); Revision right  carpal tunnel surgery (); Back surgery (no date); Dil urethra stric,male,subseq (no date); Lumbar disc surgery (no date); Right hammer toe foot surgery (no date); Nasal surgery (no date).     Prior diagnostic imaging/testing results: X-ray       X-Ray Pelvis and Right Lateral Hip:  FINDINGS:  2 views. Postoperative changes of right total hip arthroplasty. No evidence of hardware complication or periprosthetic fracture. No dislocation. Stable degenerative changes in the left hip.     Prior therapy history for the same diagnosis, illness or injury: No      Prior Level of Function  Transfers: Independent  Ambulation: Independent  ADL: Independent  IADL: Driving, Finances, Housekeeping, Laundry, Meal preparation, Work (Not work)    Living Environment  Social support: With a significant other or spouse   Type of home: House; 2-story   Stairs to enter the home: Yes 6 Is there a railing: Yes   Ramp: No   Stairs inside the home: Yes 13 Is there a railing: Yes   Help at home: None  Equipment owned: Straight Cane; Walker; Walker with wheels; Grab bars; Raised toilet seat; Bath bench; Dressing equipment     Employment: No    Hobbies/Interests: hunting    Patient goals for therapy: have energy and able to walk without difficulty    Pain assessment: Pain present  Location: Right anterior hip/Ratin/10  See objective evaluation for additional pain details     Objective   HIP EVALUATION  PAIN: Pain Level at Rest: 5/10  Pain Level with Use: 5/10  Pain Location: hip  Pain Quality: Sharp  Pain Frequency: constant or daily  Pain is Exacerbated By: Prolonged standing, walking, sitting with leaning trunk to his right.  Pain is Relieved By: cold, heat, otc medications, and rest  INTEGUMENTARY (edema, incisions): WFL, no abnormal right COOPER scar healing signs upon observation today.   POSTURE: WFL  GAIT:   Weightbearing Status: WBAT, for RLE  Assistive Device(s): None  Gait Deviations: Antalgic  Stance time decreased  Stride length  "decreased  Alissa decreased; Right CKC hip drop and OKC hip hike with selected ambulation speed during today's session.   BALANCE/PROPRIOCEPTION:  Patient's reduced Wbing tolerance in his RLE and past history of falls indicate he is a mild fall risk at this time.   WEIGHTBEARING ALIGNMENT: WFL  NON-WEIGHTBEARING ALIGNMENT: WFL   ROM:   (Degrees) Left AROM Left PROM  Right AROM Right PROM   Hip Flexion WFL's  WFL's    Hip Extension WFL's  WFL's    Hip Abduction Mod deficit  Mod deficit    Hip Adduction WFL's  WFL's    Hip Internal Rotation  Mod deficit  Mod deficit   Hip External Rotation  Mod deficit  Mod deficit   Knee Flexion  Mod deficit  Mod deficit   Knee Extension WFL's  WFL's in sitting position    Lumbar Side glide     Lumbar Flexion Lumbar AROM not assessed today.    Lumbar Extension    Pain: \"++\" right hip pain with each of the above restricted RLE hip and knee ROM measures.   End feel: Firm end feels for all assessed BLE ROM measures today.   PELVIC/SI SCREEN:  Not assessed today.   STRENGTH:   Pain: - none + mild ++ moderate +++ severe  Strength Scale: 0-5/5 Left Right   Hip Flexion 4+, - (none) 4-, + (mild)   Hip Extension 4+, - (none) 4-, + (mild)   Hip Abduction 4+, - (none) 4-, + (mild)   Hip Adduction 4+, - (none) 4-, + (mild)   Hip Internal Rotation 4+, - (none) 4-, + (mild)   Hip External Rotation 4+, - (none) 4-, + (mild)   Knee Flexion 4+, - (none) 4-, + (mild)   Knee Extension 4+, - (none) 4-, + (mild)     LE FLEXIBILITY:  Significant gross RLE hip and knee muscular tightness, LLE is WFL's.  SPECIAL TESTS:  Not assessed today per s/p right COOPER status.  FUNCTIONAL TESTS:  RLE forward semi-tandem stance with CGA x1 PT for stability on even ground.  PALPATION:   + Tenderness At Location Left Right   Ischial Tuberosity - -   Greater Trochanter - +   IT Band - +   Hip Flexors - +   Piriformis - +   PSIS - -   ASIS - -   Adductors - +   Abductors - +   Iliac Crest - -   Glut Medius - +   Bursa - - "   Pubis - -     JOINT MOBILITY: Mod-max RLE hip and knee joint flexibility.   SENSATION: Patient reports chronic RLE hyposensation distal to his knees prior to his RLE anterior hip COOPER surgery; LLE is WFL's during today's session.     Assessment & Plan   CLINICAL IMPRESSIONS  Medical Diagnosis: Status post total right hip replacement (Z96.641)    Treatment Diagnosis: Status post total right hip replacement (Z96.641)   Impression/Assessment: Patient is a 63 year old male with right hip pain and gait deficit complaints.  The following significant findings have been identified: Pain, Decreased ROM/flexibility, Decreased joint mobility, Decreased strength, Impaired balance, Impaired sensation, Impaired gait, Impaired muscle performance, Decreased activity tolerance, Impaired posture, and Instability. These impairments interfere with their ability to perform self care tasks, recreational activities, household chores, driving , household mobility, and community mobility as compared to previous level of function.     Clinical Decision Making (Complexity):  Clinical Presentation: Stable/Uncomplicated  Clinical Presentation Rationale: based on medical and personal factors listed in PT evaluation  Clinical Decision Making (Complexity): Low complexity    PLAN OF CARE  Treatment Interventions:  Modalities: Cryotherapy, Hot Pack  Interventions: Gait Training, Manual Therapy, Neuromuscular Re-education, Therapeutic Activity, Therapeutic Exercise    Long Term Goals     PT Goal 1  Goal Identifier: Home Exercise Programs  Goal Description: Patient will demonstrate proper performance and good adherence to his HEP's for 10 weeks for 5 of 7 days per week to demonstrate improved long term independence with management of his right hip pain and flexibility deficits.  Rationale: to maximize safety and independence with performance of ADLs and functional tasks;to maximize safety and independence within the home;to maximize safety and  independence within the community;to maximize safety and independence with transportation  Goal Progress: Patient tolerated today's selected right hip stretches and strengthening exercises without abnormal right hip pain exacerbation during today's session.  Target Date: 05/01/24  PT Goal 2  Goal Identifier: Hip Outcome Score  Goal Description: Patient will improve his initial HOS assessment score by 10 points or greater to demonstrate reduced restriction of his left hip pain and instability with safe performance of his required upright functional mobility demands.  Rationale: to maximize safety and independence with performance of ADLs and functional tasks;to maximize safety and independence within the home;to maximize safety and independence within the community  Goal Progress: See initial HOS assessment score.  Target Date: 05/01/24  PT Goal 3  Goal Identifier: Ambulation  Goal Description: Patient will report ambulation of 30 minutes or longer with 2/10 or less right hip pain to demonstrate improved tolerance for his required household and community ambulation demands.  Rationale: to maximize safety and independence with performance of ADLs and functional tasks;to maximize safety and independence within the home;to maximize safety and independence within the community  Goal Progress: See initial evaluation note for initial gait analysis.  Target Date: 05/01/24      Frequency of Treatment: 1 visit per week  Duration of Treatment: 10 weeks    Recommended Referrals to Other Professionals:  Not at this time.   Education Assessment:   Learner/Method: Patient;Listening;Demonstration;No Barriers to Learning  Education Comments: Patient reports understanding of his future therapeutic progression.    Risks and benefits of evaluation/treatment have been explained.   Patient/Family/caregiver agrees with Plan of Care.     Evaluation Time:     PT Eval, Low Complexity Minutes (61043): 20    Signing Clinician:     Terence  AMADA Cheugn, DPT    [facility]  O: [phone]  E: [email or web address]                                                                                   OUTPATIENT PHYSICAL THERAPY      PLAN OF TREATMENT FOR OUTPATIENT REHABILITATION   Patient's Last Name, First Name, ADE VasquezJuan CarlosWenceslao  OMER YOB: 1960   Provider's Name   [facility]   Medical Record No.  [id number]     Onset Date: 01/02/24  Start of Care Date: 02/21/24     Medical Diagnosis:  Status post total right hip replacement (Z96.641)      PT Treatment Diagnosis:  Status post total right hip replacement (Z96.641) Plan of Treatment  Frequency/Duration: 1 visit per week/ 10 weeks    Certification date from 02/21/24 to 05/01/24         See note for plan of treatment details and functional goals     Terence Cheung PT, DPT    [facility]  O: [phone]  E: [email or web address]                          I CERTIFY THE NEED FOR THESE SERVICES FURNISHED UNDER        THIS PLAN OF TREATMENT AND WHILE UNDER MY CARE .      Physician Signature               Date    X_____________________________________________________                Referring Provider:  Nelson Rivas MD    Initial Assessment  See Epic Evaluation- Start of Care Date: 02/21/24

## 2024-02-28 ENCOUNTER — MYC MEDICAL ADVICE (OUTPATIENT)
Dept: FAMILY MEDICINE | Facility: CLINIC | Age: 64
End: 2024-02-28
Payer: COMMERCIAL

## 2024-03-01 ENCOUNTER — THERAPY VISIT (OUTPATIENT)
Dept: PHYSICAL THERAPY | Facility: CLINIC | Age: 64
End: 2024-03-01
Attending: ORTHOPAEDIC SURGERY
Payer: COMMERCIAL

## 2024-03-01 DIAGNOSIS — Z96.641 STATUS POST RIGHT HIP REPLACEMENT: Primary | ICD-10-CM

## 2024-03-01 PROCEDURE — 97110 THERAPEUTIC EXERCISES: CPT | Mod: GP

## 2024-03-01 PROCEDURE — 97140 MANUAL THERAPY 1/> REGIONS: CPT | Mod: GP

## 2024-03-04 ENCOUNTER — TRANSFERRED RECORDS (OUTPATIENT)
Dept: HEALTH INFORMATION MANAGEMENT | Facility: CLINIC | Age: 64
End: 2024-03-04

## 2024-03-04 DIAGNOSIS — M48.07 STENOSIS OF LUMBOSACRAL SPINE: ICD-10-CM

## 2024-03-04 DIAGNOSIS — M48.02 SPINAL STENOSIS IN CERVICAL REGION: ICD-10-CM

## 2024-03-04 DIAGNOSIS — M47.812 CERVICAL SPONDYLOSIS WITHOUT MYELOPATHY: ICD-10-CM

## 2024-03-04 LAB
ALT SERPL-CCNC: 23 U/L (ref 9–46)
AST SERPL-CCNC: 21 U/L (ref 10–35)
CHOLESTEROL (EXTERNAL): 150 MG/DL
CREATININE (EXTERNAL): 0.8 MG/DL (ref 0.7–1.35)
GFR ESTIMATED (EXTERNAL): 99 ML/MIN/1.73M2
GLUCOSE (EXTERNAL): 110 MG/DL (ref 65–99)
HBA1C MFR BLD: 5.9 %
HDLC SERPL-MCNC: 37 MG/DL
LDL CHOLESTEROL CALCULATED (EXTERNAL): 76 MG/DL
NON HDL CHOLESTEROL (EXTERNAL): 113 MG/DL
POTASSIUM (EXTERNAL): 4.2 MMOL/L (ref 3.5–5.3)
TRIGLYCERIDES (EXTERNAL): 279 MG/DL
TSH SERPL-ACNC: 0.84 MIU/L (ref 0.4–4.5)

## 2024-03-04 RX ORDER — DULOXETIN HYDROCHLORIDE 30 MG/1
30 CAPSULE, DELAYED RELEASE ORAL EVERY MORNING
Qty: 90 CAPSULE | Refills: 1 | OUTPATIENT
Start: 2024-03-04

## 2024-03-06 ENCOUNTER — THERAPY VISIT (OUTPATIENT)
Dept: PHYSICAL THERAPY | Facility: CLINIC | Age: 64
End: 2024-03-06
Attending: ORTHOPAEDIC SURGERY
Payer: COMMERCIAL

## 2024-03-06 DIAGNOSIS — Z96.641 STATUS POST RIGHT HIP REPLACEMENT: Primary | ICD-10-CM

## 2024-03-06 PROCEDURE — 97116 GAIT TRAINING THERAPY: CPT | Mod: GP

## 2024-03-06 PROCEDURE — 97140 MANUAL THERAPY 1/> REGIONS: CPT | Mod: GP

## 2024-03-06 PROCEDURE — 97110 THERAPEUTIC EXERCISES: CPT | Mod: GP

## 2024-03-14 DIAGNOSIS — M48.07 STENOSIS OF LUMBOSACRAL SPINE: ICD-10-CM

## 2024-03-14 DIAGNOSIS — M48.02 SPINAL STENOSIS IN CERVICAL REGION: ICD-10-CM

## 2024-03-14 DIAGNOSIS — M47.812 CERVICAL SPONDYLOSIS WITHOUT MYELOPATHY: ICD-10-CM

## 2024-03-14 RX ORDER — DULOXETIN HYDROCHLORIDE 30 MG/1
30 CAPSULE, DELAYED RELEASE ORAL EVERY MORNING
Qty: 90 CAPSULE | Refills: 1 | Status: SHIPPED | OUTPATIENT
Start: 2024-03-14 | End: 2024-09-19

## 2024-03-15 ENCOUNTER — THERAPY VISIT (OUTPATIENT)
Dept: PHYSICAL THERAPY | Facility: CLINIC | Age: 64
End: 2024-03-15
Attending: ORTHOPAEDIC SURGERY
Payer: COMMERCIAL

## 2024-03-15 DIAGNOSIS — Z96.641 STATUS POST RIGHT HIP REPLACEMENT: Primary | ICD-10-CM

## 2024-03-15 PROCEDURE — 97140 MANUAL THERAPY 1/> REGIONS: CPT | Mod: GP

## 2024-03-15 PROCEDURE — 97116 GAIT TRAINING THERAPY: CPT | Mod: GP

## 2024-03-20 ENCOUNTER — THERAPY VISIT (OUTPATIENT)
Dept: PHYSICAL THERAPY | Facility: CLINIC | Age: 64
End: 2024-03-20
Attending: ORTHOPAEDIC SURGERY
Payer: COMMERCIAL

## 2024-03-20 DIAGNOSIS — Z96.641 STATUS POST RIGHT HIP REPLACEMENT: Primary | ICD-10-CM

## 2024-03-20 PROCEDURE — 97140 MANUAL THERAPY 1/> REGIONS: CPT | Mod: GP

## 2024-03-20 PROCEDURE — 97116 GAIT TRAINING THERAPY: CPT | Mod: GP

## 2024-03-21 ENCOUNTER — OFFICE VISIT (OUTPATIENT)
Dept: FAMILY MEDICINE | Facility: CLINIC | Age: 64
End: 2024-03-21
Payer: COMMERCIAL

## 2024-03-21 VITALS
WEIGHT: 248 LBS | DIASTOLIC BLOOD PRESSURE: 80 MMHG | HEART RATE: 80 BPM | OXYGEN SATURATION: 95 % | SYSTOLIC BLOOD PRESSURE: 130 MMHG | HEIGHT: 67 IN | RESPIRATION RATE: 20 BRPM | BODY MASS INDEX: 38.92 KG/M2 | TEMPERATURE: 97.6 F

## 2024-03-21 DIAGNOSIS — E11.9 TYPE 2 DIABETES MELLITUS WITHOUT COMPLICATION, WITHOUT LONG-TERM CURRENT USE OF INSULIN (H): ICD-10-CM

## 2024-03-21 DIAGNOSIS — F32.1 CURRENT MODERATE EPISODE OF MAJOR DEPRESSIVE DISORDER, UNSPECIFIED WHETHER RECURRENT (H): Primary | ICD-10-CM

## 2024-03-21 DIAGNOSIS — E66.01 MORBID OBESITY DUE TO EXCESS CALORIES (H): ICD-10-CM

## 2024-03-21 DIAGNOSIS — E78.5 HYPERLIPIDEMIA LDL GOAL <130: ICD-10-CM

## 2024-03-21 DIAGNOSIS — M21.371 FOOT DROP, RIGHT: ICD-10-CM

## 2024-03-21 DIAGNOSIS — Z12.11 SPECIAL SCREENING FOR MALIGNANT NEOPLASMS, COLON: ICD-10-CM

## 2024-03-21 PROBLEM — N35.919 URETHRAL STRICTURE IN MALE: Status: ACTIVE | Noted: 2023-12-19

## 2024-03-21 PROBLEM — Z96.641 STATUS POST TOTAL REPLACEMENT OF RIGHT HIP: Status: ACTIVE | Noted: 2024-01-02

## 2024-03-21 PROCEDURE — 99214 OFFICE O/P EST MOD 30 MIN: CPT | Performed by: FAMILY MEDICINE

## 2024-03-21 RX ORDER — RESPIRATORY SYNCYTIAL VIRUS VACCINE 120MCG/0.5
0.5 KIT INTRAMUSCULAR ONCE
Qty: 1 EACH | Refills: 0 | Status: CANCELLED | OUTPATIENT
Start: 2024-03-21 | End: 2024-03-21

## 2024-03-21 RX ORDER — OXYCODONE AND ACETAMINOPHEN 10; 325 MG/1; MG/1
1 TABLET ORAL DAILY PRN
COMMUNITY
Start: 2024-03-21

## 2024-03-21 SDOH — HEALTH STABILITY: PHYSICAL HEALTH: ON AVERAGE, HOW MANY DAYS PER WEEK DO YOU ENGAGE IN MODERATE TO STRENUOUS EXERCISE (LIKE A BRISK WALK)?: 0 DAYS

## 2024-03-21 ASSESSMENT — PATIENT HEALTH QUESTIONNAIRE - PHQ9
SUM OF ALL RESPONSES TO PHQ QUESTIONS 1-9: 10
SUM OF ALL RESPONSES TO PHQ QUESTIONS 1-9: 10
10. IF YOU CHECKED OFF ANY PROBLEMS, HOW DIFFICULT HAVE THESE PROBLEMS MADE IT FOR YOU TO DO YOUR WORK, TAKE CARE OF THINGS AT HOME, OR GET ALONG WITH OTHER PEOPLE: SOMEWHAT DIFFICULT

## 2024-03-21 ASSESSMENT — PAIN SCALES - GENERAL: PAINLEVEL: EXTREME PAIN (8)

## 2024-03-21 ASSESSMENT — SOCIAL DETERMINANTS OF HEALTH (SDOH): HOW OFTEN DO YOU GET TOGETHER WITH FRIENDS OR RELATIVES?: ONCE A WEEK

## 2024-03-21 NOTE — PROGRESS NOTES
Preventive Care Visit  [facility]  Christopher Arreola MD, Family Medicine  Mar 21, 2024      Assessment & Plan     Current moderate episode of major depressive disorder, unspecified whether recurrent (H)  Wenceslao is a 63-year-old male who presents to clinic today with his wife Darcie initially anticipating a Medicare annual wellness visit.  However the Medicare annual wellness visit was completed in December 2023 at the same time he had a preop at [facility].  They were unaware that his annual wellness visit was done at that time.  His previous annual wellness visit had been done in January 2023.  His biggest concern today is ongoing right back pain and foot drop.  He is currently being followed by pain clinic for this.  He has peripheral neuropathy.  He is currently on Cymbalta 30 mg daily for both neuropathy as well as depression.  He had been on a higher dose of Cymbalta but did not tolerate that.  He had also been on gabapentin and he absolutely did not tolerate that.  His PHQ-9 today is 10 he continues to have fatigue and lack of interest in doing things.  At this time we will plan to add Zoloft 50 mg at bedtime and have him take the Cymbalta 30 mg in the morning.  Will do a follow-up virtual visit in 1 month for reevaluation.  - sertraline (ZOLOFT) 50 MG tablet; Take 1 tablet (50 mg) by mouth at bedtime    Type 2 diabetes mellitus without complication, without long-term current use of insulin (H)  Wenceslao has a history of type II non-insulin-dependent diabetes.  He recently had lab work done through his employer and his random glucose is 110 and his hemoglobin A1c is 5.9.  He is currently considered prediabetic as he is not on any diabetic medications.  Continue to work with his diet and exercise and continue to monitor his blood sugars and episodically evaluate his A1c.    Hyperlipidemia LDL goal <130  Chronic history of hyperlipidemia.  He is currently not on antibiotic lipid  "medications.  He did have fasting lipid profile done through his employer and brings those results.  At this time his LDL cholesterol is well-controlled and his HDL is excellent.  Will abstract these results into his chart continue to work on weight loss with diet and exercise.    Morbid obesity due to excess calories (H)  Chronic, stable.  His weight is up just slightly from last year.  He continues to try to be active but has a foot drop relative to persistent neuropathy which limits his exercise.  I asked him to focus more on his diet specifically limiting his processed carbohydrates as his triglycerides are running a little high.  Will continue to monitor.     Special screening for malignant neoplasms, colon   Prior history of colonoscopy 10 years ago which was normal.  Will set him up for a repeat colonoscopy  - Colonoscopy Screening  Referral; Future    Foot drop, right  Wenceslao is a peripheral neuropathy due to lumbar disc disease.  Is undergone previous disc surgery but has a persistent right foot numbness and foot drop.  He finds that this oftentimes he is stumbling on uneven ground.  Will have him see orthotics for an AFO brace.  - Orthotics, Mastectomy and Custom Compression Orders      Prescription drug management        BMI  Estimated body mass index is 38.84 kg/m  as calculated from the following:    Height as of this encounter: 1.702 m (5' 7\").    Weight as of this encounter: 112.5 kg (248 lb).   Weight management plan: Discussed healthy diet and exercise guidelines    Depression Screening Follow Up        3/21/2024     5:09 PM   PHQ   PHQ-9 Total Score 10   Q9: Thoughts of better off dead/self-harm past 2 weeks Not at all         3/21/2024     5:09 PM   Last PHQ-9   1.  Little interest or pleasure in doing things 3   2.  Feeling down, depressed, or hopeless 0   3.  Trouble falling or staying asleep, or sleeping too much 3   4.  Feeling tired or having little energy 3   5.  Poor appetite or " overeating 0   6.  Feeling bad about yourself 0   7.  Trouble concentrating 0   8.  Moving slowly or restless 1   Q9: Thoughts of better off dead/self-harm past 2 weeks 0   PHQ-9 Total Score 10         Follow Up Actions Taken  Crisis resource information provided in After Visit Summary  Referred patient back to PCP  Adjusted patient's anti-depressant medication.     Counseling  Appropriate preventive services were discussed with this patient, including applicable screening as appropriate for fall prevention, nutrition, physical activity, Tobacco-use cessation, weight loss and cognition.  Checklist reviewing preventive services available has been given to the patient.  Reviewed patient's diet, addressing concerns and/or questions.   Discussed possible causes of fatigue. The patient's PHQ-9 score is consistent with moderate depression. He was provided with information regarding depression.   I have reviewed Opioid Use Disorder and Substance Use Disorder risk factors and made any needed referrals.       Work on weight loss  Regular exercise   Follow-up Visit   Expected date:  Apr 21, 2024 (Approximate)      Follow Up Appointment Details:     Follow-up with whom?: Me    Follow-Up for what?: Chronic Disease f/u    Chronic Disease f/u: Depression    How?: Virtual             Follow-up Visit   Expected date:  Mar 28, 2025 (Approximate)      Follow Up Appointment Details:     Follow-up with whom?: PCP    Follow-Up for what?: Medicare Wellness    Welcome or Annual?: Annual Wellness    How?: In Person                       Doris Billy is a 63 year old, presenting for the following:  Physical        3/21/2024     5:12 PM   Additional Questions   Roomed by Michelle DHILLON   Accompanied by Pat, wife         3/21/2024     5:12 PM   Patient Reported Additional Medications   Patient reports taking the following new medications flexeril 10 mg 1/2  tablets prn, Percocet  mg 1 tablet prn         Health Care Directive  Patient  has a Health Care Directive on file  Advance care planning document is on file but is outdated.  Patient encouraged to updated.    HPI      Diabetes Follow-up    How often are you checking your blood sugar? Not at all  What concerns do you have today about your diabetes? None   Do you have any of these symptoms? (Select all that apply)  Numbness in feet and Burning in feet  Have you had a diabetic eye exam in the last 12 months? No        BP Readings from Last 2 Encounters:   03/21/24 130/80   01/30/23 128/70     Hemoglobin A1C (%)   Date Value   01/17/2022 5.6   06/25/2020 5.2   04/30/2018 5.3     LDL Cholesterol Calculated (mg/dL)   Date Value   06/07/2018 77   01/11/2017 100 (H)             Cerebrovascular Follow-up    Patient history: TIA  Residual symptoms: None  Worsened or new symptoms since last visit: No  Daily aspirin use: Yes  Hypertension controlled: Yes    Depression   How are you doing with your depression since your last visit? No change  Are you having other symptoms that might be associated with depression? No  Have you had a significant life event?  No   Are you feeling anxious or having panic attacks?   No  Do you have any concerns with your use of alcohol or other drugs? No    Social History     Tobacco Use    Smoking status: Never    Smokeless tobacco: Never   Vaping Use    Vaping Use: Never used   Substance Use Topics    Alcohol use: Not Currently     Comment: very little    Drug use: No         8/12/2020    11:32 AM 12/8/2020    12:33 PM 3/21/2024     5:09 PM   PHQ   PHQ-9 Total Score 4  10   Q9: Thoughts of better off dead/self-harm past 2 weeks Not at all  Not at all   PHQ-9 External Data  10           No data to display                  3/21/2024     5:09 PM   Last PHQ-9   1.  Little interest or pleasure in doing things 3   2.  Feeling down, depressed, or hopeless 0   3.  Trouble falling or staying asleep, or sleeping too much 3   4.  Feeling tired or having little energy 3   5.  Poor  appetite or overeating 0   6.  Feeling bad about yourself 0   7.  Trouble concentrating 0   8.  Moving slowly or restless 1   Q9: Thoughts of better off dead/self-harm past 2 weeks 0   PHQ-9 Total Score 10          No data to display                Suicide Assessment Five-step Evaluation and Treatment (SAFE-T)          3/21/2024   General Health   How would you rate your overall physical health? (!) FAIR   Feel stress (tense, anxious, or unable to sleep) To some extent   (!) STRESS CONCERN      3/21/2024   Nutrition   Diet: Regular (no restrictions)         3/21/2024   Exercise   Days per week of moderate/strenous exercise 0 days   (!) EXERCISE CONCERN      3/21/2024   Social Factors   Frequency of gathering with friends or relatives Once a week   Worry food won't last until get money to buy more No   Food not last or not have enough money for food? No   Do you have housing?  Yes   Are you worried about losing your housing? No   Lack of transportation? No   Unable to get utilities (heat,electricity)? No         1/17/2022   Fall Risk   Fallen 2 or more times in the past year? Yes          3/21/2024   Activities of Daily Living- Home Safety   Needs help with the following daily activites None of the above   Safety concerns in the home None of the above         3/21/2024   Dental   Dentist two times every year? Yes         3/21/2024   Hearing Screening   Hearing concerns? None of the above         3/21/2024   Driving Risk Screening   Patient/family members have concerns about driving No         3/21/2024   General Alertness/Fatigue Screening   Have you been more tired than usual lately? (!) YES         3/21/2024   Urinary Incontinence Screening   Bothered by leaking urine in past 6 months No         3/21/2024   TB Screening   Were you born outside of the [place]? Yes       Today's PHQ-9 Score:       3/21/2024     5:09 PM   PHQ-9 SCORE   PHQ-9 Total Score MyChart 10 (Moderate depression)   PHQ-9 Total Score 10          3/21/2024   Substance Use   Alcohol more than 3/day or more than 7/wk Not Applicable   Do you have a current opioid prescription? (!) YES   How severe/bad is pain from 1 to 10? 8/10   Do you use any other substances recreationally? No       Social History     Tobacco Use    Smoking status: Never    Smokeless tobacco: Never   Vaping Use    Vaping Use: Never used   Substance Use Topics    Alcohol use: Not Currently     Comment: very little    Drug use: No             3/21/2024   One time HIV Screening   Previous HIV test? I don't know   Last PSA:   PSA   Date Value Ref Range Status   04/30/2018 2.81 0 - 4 ug/L Final     Comment:     Assay Method:  Chemiluminescence using Siemens Vista analyzer     ASCVD Risk   The ASCVD Risk score (Yao MISTRY, et al., 2019) failed to calculate for the following reasons:    Cannot find a previous HDL lab    Cannot find a previous total cholesterol lab            Reviewed and updated as needed this visit by Provider                    Lab work is in process  Labs reviewed in EPIC  BP Readings from Last 3 Encounters:   03/21/24 130/80   01/30/23 128/70   03/21/22 128/70    Wt Readings from Last 3 Encounters:   03/21/24 112.5 kg (248 lb)   01/30/23 109.6 kg (241 lb 9.6 oz)   03/21/22 106.6 kg (235 lb)                  Patient Active Problem List   Diagnosis    CHELSY (obstructive sleep apnea)    Hyperlipidemia LDL goal <130    Sciatica    Low back pain    Stenosis of lumbosacral spine    Advanced directives, counseling/discussion    Hypertension goal BP (blood pressure) < 140/90    Spinal stenosis in cervical region    Cervical spondylosis without myelopathy    Pneumonia    Morbid obesity due to excess calories (H)    Syncope    Exertional chest pain    Lumbar stenosis    S/P laminectomy    S/P lumbar laminectomy    Altered mental status    TIA (transient ischemic attack)    Meningitis, unspecified    Other chronic pain    Paresthesia of skin    Polyneuropathy, unspecified     Unspecified thoracic, thoracolumbar and lumbosacral intervertebral disc disorder    Status post total left knee replacement    Type 2 diabetes mellitus without complication, without long-term current use of insulin (H)    Status post total replacement of right hip    Urethral stricture in male     Past Surgical History:   Procedure Laterality Date    BACK SURGERY      CERVICAL DISKECTOMY  8/8/13    [facility]    COLONOSCOPY  06/03/09    COLONOSCOPY N/A 9/17/2019    Procedure: COLONOSCOPY;  Surgeon: Jose F Samuels DO;  Location: [facility]    CYSTOSCOPY, DILATE URETHRA, COMBINED N/A 7/23/2018    Procedure: COMBINED CYSTOSCOPY, DILATE URETHRA;  cystosdcopy with urethral dilation and catheter placement;  Surgeon: Dakota Moore MD;  Location: [facility]    CYSTOSCOPY, DILATE URETHRA, COMBINED N/A 12/10/2018    Procedure: CYSTOSCOPY, URETHRAL DILATION;  Surgeon: Dakota Moore MD;  Location: [facility]    DIL URETHRA STRIC,MALE,SUBSEQ      HC REMOVAL HEEL SPUR, CALCANEUS  2/2005    HC REVISE MEDIAN N/CARPAL TUNNEL SURG  1993    right    HEAD & NECK SURGERY  2013    Plate in neck    INJECT EPIDURAL LUMBAR Bilateral 9/20/2019    Procedure: INJECTION, SPINE, LUMBAR 4-5, EPIDURAL;  Surgeon: Calvin Rich MD;  Location: [facility]    INJECT EPIDURAL LUMBAR N/A 1/9/2020    Procedure: Lumbar 4-5 Translaminar Epidural Injection;  Surgeon: Calvin Rich MD;  Location: [facility]    INJECT EPIDURAL TRANSFORAMINAL Bilateral 4/12/2019    Procedure: Inject epidural transforaminal Lumbar 3-4 bilateral;  Surgeon: Calvin Rich MD;  Location: [facility]    INSERT CATHETER BLADDER N/A 7/23/2018    Procedure: INSERT CATHETER BLADDER;;  Surgeon: Dakota Moore MD;  Location: [facility]    LAMINECTOMY LUMBAR TWO LEVELS N/A 5/29/2019    Procedure: LAMINECTOMIES LUMBAR 2-4;  Surgeon: Brent Calvo MD;  Location: [facility]    LAPAROSCOPIC CHOLECYSTECTOMY  3/11/2013    Procedure: LAPAROSCOPIC CHOLECYSTECTOMY;  laparoscopic  cholecystectomy;  Surgeon: Clinton Whittaker MD;  Location:  OR    Z NONSPECIFIC PROCEDURE      lumbar disc surgery    ZZC NONSPECIFIC PROCEDURE      hammer toe surgery right foot    ZZC NONSPECIFIC PROCEDURE      nasal surgery       Social History     Tobacco Use    Smoking status: Never    Smokeless tobacco: Never   Substance Use Topics    Alcohol use: Not Currently     Comment: very little     Family History   Problem Relation Age of Onset    Alcohol/Drug Brother     Prostate Cancer Brother     Arthritis Mother     Cancer Mother         Kidney - Stage 3    Other Cancer Mother         Kidney and through out her body    Heart Disease Maternal Grandmother     Heart Disease Paternal Grandmother     [email or web address] No family hx of     Diabetes No family hx of     Anesthesia Reaction No family hx of         Hard to come out of    Cancer - colorectal No family hx of     Colon Cancer No family hx of          Current Outpatient Medications   Medication Sig Dispense Refill    acetaminophen (TYLENOL) 500 MG tablet Take 1,000 mg by mouth      DULoxetine (CYMBALTA) 30 MG capsule TAKE 1 CAPSULE (30 MG) BY MOUTH EVERY MORNING 90 capsule 1    lisinopril (ZESTRIL) 10 MG tablet Take 1 tablet (10 mg) by mouth daily 90 tablet 3    Multiple Vitamin (MULTI VITAMIN MENS PO) Take 1 tablet by mouth daily.      oxyCODONE-acetaminophen (PERCOCET)  MG per tablet Take 10 tablets by mouth 2 times daily as needed for severe pain (7-10)       Allergies   Allergen Reactions    Dust Mites Hives    Morphine Nausea and Vomiting     Recent Labs   Lab Test 01/30/23  1753 03/21/22  1816 01/17/22  1814 06/25/20  1131 09/12/19  1602 06/12/19  0522 06/11/19  2057 03/06/19  1540 06/07/18  0835 04/30/18  1347 01/08/18  1505 01/08/18  0530 07/03/17  0949 01/11/17  0855 07/21/16  0822   A1C  --   --  5.6 5.2  --   --   --   --   --  5.3  --   --   --   --   --    LDL  --   --   --   --   --   --   --   --  77  --   --   --   --  100* 90   HDL  --   --    --   --   --   --   --   --  31*  --   --   --   --  36* 36*   TRIG  --   --   --   --   --   --   --   --  198*  --   --   --   --  185* 139   ALT  --   --   --   --   --   --  34  --   --   --  31 35   < >  --   --    CR  --  0.71 0.72  --  0.91 0.83 0.83   < >  --   --  0.69 0.74   < >  --   --    GFRESTIMATED  --  >90 >90  --  >90 >90 >90   < >  --   --  >90 >90   < >  --   --    GFRESTBLACK  --   --   --   --  >90 >90 >90   < >  --   --  >90 >90   < >  --   --    POTASSIUM  --  3.6 3.8  --  3.8 3.7 3.9   < >  --   --  3.4 3.5   < >  --   --    TSH 0.58  --   --  0.88  --   --   --   --   --   --   --   --    < >  --   --     < > = values in this interval not displayed.      Current providers sharing in care for this patient include:  Patient Care Team:  Christopher Arreola MD as PCP - General  Christopher Arreola MD as Assigned PCP    The following health maintenance items are reviewed in Epic and correct as of today:  Health Maintenance   Topic Date Due    URINE DRUG SCREEN  Never done    HIV SCREENING  Never done    RSV VACCINE (Pregnancy & 60+) (1 - 1-dose 60+ series) Never done    LIPID  06/07/2023    ANNUAL REVIEW OF HM ORDERS  01/30/2024    DTAP/TDAP/TD IMMUNIZATION (2 - Td or Tdap) 05/14/2024    COLORECTAL CANCER SCREENING  09/17/2024    MEDICARE ANNUAL WELLNESS VISIT  12/19/2024    GLUCOSE  03/21/2025    ADVANCE CARE PLANNING  01/17/2027    HEPATITIS C SCREENING  Completed    PHQ-2 (once per calendar year)  Completed    INFLUENZA VACCINE  Completed    ZOSTER IMMUNIZATION  Completed    COVID-19 Vaccine  Completed    Pneumococcal Vaccine: Pediatrics (0 to 5 Years) and At-Risk Patients (6 to 64 Years)  Aged Out    IPV IMMUNIZATION  Aged Out    HPV IMMUNIZATION  Aged Out    MENINGITIS IMMUNIZATION  Aged Out    RSV MONOCLONAL ANTIBODY  Aged Out         Review of Systems  CONSTITUTIONAL: NEGATIVE for fever, chills, change in weight  ENT/MOUTH: NEGATIVE for ear, mouth and throat problems  RESP: NEGATIVE for  "significant cough or SOB  CV: NEGATIVE for chest pain, palpitations or peripheral edema  MUSCULOSKELETAL: POSITIVE  for muscle weakness right foot drop, paresthesias, and radicular pain right leg with positive EMG  NEURO: POSITIVE for right foot drop  ROS otherwise negative     Objective    Exam  /80 (BP Location: Left arm, Patient Position: Chair)   Pulse 80   Temp 97.6  F (36.4  C) (Temporal)   Resp 20   Ht 1.702 m (5' 7\")   Wt 112.5 kg (248 lb)   SpO2 95%   BMI 38.84 kg/m     Estimated body mass index is 38.84 kg/m  as calculated from the following:    Height as of this encounter: 1.702 m (5' 7\").    Weight as of this encounter: 112.5 kg (248 lb).    Physical Exam  GENERAL: alert, no distress, and obese  NECK: no adenopathy, no asymmetry, masses, or scars  RESP: lungs clear to auscultation - no rales, rhonchi or wheezes  CV: regular rate and rhythm, normal S1 S2, no S3 or S4, no murmur, click or rub, no peripheral edema  ABDOMEN: soft, nontender, no hepatosplenomegaly, no masses and bowel sounds normal  MS: no gross musculoskeletal defects noted, no edema  NEURO: weakness of right foot plantar and dorsiflexion, sensory exam grossly normal, mentation intact, cranial nerves 2-12 intact, and gait abnormal: Right foot drop  PSYCH: mentation appears normal, affect flat, judgement and insight intact, and appearance well groomed          No data to display                       Signed Electronically by: Christopher Arreola MD    .undefined[^^  "

## 2024-03-21 NOTE — PATIENT INSTRUCTIONS
Preventive Care Advice   This is general advice given by our system to help you stay healthy. However, your care team may have specific advice just for you. Please talk to your care team about your preventive care needs.  Nutrition  Eat 5 or more servings of fruits and vegetables each day.  Try wheat bread, brown rice and whole grain pasta (instead of white bread, rice, and pasta).  Get enough calcium and vitamin D. Check the label on foods and aim for 100% of the RDA (recommended daily allowance).  Lifestyle  Exercise at least 150 minutes each week   (30 minutes a day, 5 days a week).  Do muscle strengthening activities 2 days a week. These help control your weight and prevent disease.  No smoking.  Wear sunscreen to prevent skin cancer.  Have a dental exam and cleaning every 6 months.  Yearly exams  See your health care team every year to talk about:  Any changes in your health.  Any medicines your care team has prescribed.  Preventive care, family planning, and ways to prevent chronic diseases.  Shots (vaccines)   HPV shots (up to age 26), if you've never had them before.  Hepatitis B shots (up to age 59), if you've never had them before.  COVID-19 shot: Get this shot when it's due.  Flu shot: Get a flu shot every year.  Tetanus shot: Get a tetanus shot every 10 years.  Pneumococcal, hepatitis A, and RSV shots: Ask your care team if you need these based on your risk.  Shingles shot (for age 50 and up).  General health tests  Diabetes screening:  Starting at age 35, Get screened for diabetes at least every 3 years.  If you are younger than age 35, ask your care team if you should be screened for diabetes.  Cholesterol test: At age 39, start having a cholesterol test every 5 years, or more often if advised.  Bone density scan (DEXA): At age 50, ask your care team if you should have this scan for osteoporosis (brittle bones).  Hepatitis C: Get tested at least once in your life.  STIs (sexually transmitted  infections)  Before age 24: Ask your care team if you should be screened for STIs.  After age 24: Get screened for STIs if you're at risk. You are at risk for STIs (including HIV) if:  You are sexually active with more than one person.  You don't use condoms every time.  You or a partner was diagnosed with a sexually transmitted infection.  If you are at risk for HIV, ask about PrEP medicine to prevent HIV.  Get tested for HIV at least once in your life, whether you are at risk for HIV or not.  Cancer screening tests  Cervical cancer screening: If you have a cervix, begin getting regular cervical cancer screening tests at age 21. Most people who have regular screenings with normal results can stop after age 65. Talk about this with your provider.  Breast cancer scan (mammogram): If you've ever had breasts, begin having regular mammograms starting at age 40. This is a scan to check for breast cancer.  Colon cancer screening: It is important to start screening for colon cancer at age 45.  Have a colonoscopy test every 10 years (or more often if you're at risk) Or, ask your provider about stool tests like a FIT test every year or Cologuard test every 3 years.  To learn more about your testing options, visit: [email or web address]://[email or web address]/[email or web address].  For help making a decision, visit: https://bit.ly/mz82588.  Prostate cancer screening test: If you have a prostate and are age 55 to 69, ask your provider if you would benefit from a yearly prostate cancer screening test.  Lung cancer screening: If you are a current or former smoker age 50 to 80, ask your care team if ongoing lung cancer screenings are right for you.  For informational purposes only. Not to replace the advice of your health care provider. Copyright   2023 [employer]. All rights reserved. Clinically reviewed by the [facility]. [employer] 235977 - REV 01/24.    Your Health Risk Assessment indicates you feel you are not in  good health    A healthy lifestyle helps keep the body fit and the mind alert. It helps protect you from disease, helps you fight disease, and helps prevent chronic disease (disease that doesn't go away) from getting worse. This is important as you get older and begin to notice twinges in muscles and joints and a decline in the strength and stamina you once took for granted. A healthy lifestyle includes good healthcare, good nutrition, weight control, recreation, and regular exercise. Avoid harmful substances and do what you can to keep safe. Another part of a healthy lifestyle is stay mentally active and socially involved.    Good healthcare   Have a wellness visit every year.   If you have new symptoms, let us know right away. Don't wait until the next checkup.   Take medicines exactly as prescribed and keep your medicines in a safe place. Tell us if your medicine causes problems.   Healthy diet and weight control   Eat 3 or 4 small, nutritious, low-fat, high-fiber meals a day. Include a variety of fruits, vegetables, and whole-grain foods.   Make sure you get enough calcium in your diet. Calcium, vitamin D, and exercise help prevent osteoporosis (bone thinning).   If you live alone, try eating with others when you can. That way you get a good meal and have company while you eat it.   Try to keep a healthy weight. If you eat more calories than your body uses for energy, it will be stored as fat and you will gain weight.     Recreation   Recreation is not limited to sports and team events. It includes any activity that provides relaxation, interest, enjoyment, and exercise. Recreation provides an outlet for physical, mental, and social energy. It can give a sense of worth and achievement. It can help you stay healthy.    Mental Exercise and Social Involvement  Mental and emotional health is as important as physical health. Keep in touch with friends and family. Stay as active as possible. Continue to learn and  challenge yourself.   Things you can do to stay mentally active are:  Learn something new, like a foreign language or musical instrument.   Play SCRABBLE or do crossword puzzles. If you cannot find people to play these games with you at home, you can play them with others on your computer through the Internet.   Join a games club--anything from card games to chess or checkers or lawn bowling.   Start a new hobby.   Go back to school.   Volunteer.   Read.   Keep up with world events.  Eating Healthy Foods: Care Instructions  With every meal, you can make healthy food choices. Try to eat a variety of fruits, vegetables, whole grains, lean proteins, and low-fat dairy products. This can help you get the right balance of nutrients, including vitamins and minerals. Small changes add up over time. You can start by adding one healthy food to your meals each day.    Try to make half your plate fruits and vegetables, one-fourth whole grains, and one-fourth lean proteins. Try including dairy with your meals.   Eat more fruits and vegetables. Try to have them with most meals and snacks.   Foods for healthy eating    Fruits    These can be fresh, frozen, canned, or dried.  Try to choose whole fruit rather than fruit juice.  Eat a variety of colors.    Vegetables    These can be fresh, frozen, canned, or dried.  Beans, peas, and lentils count too.    Whole grains    Choose whole-grain breads, cereals, and noodles.  Try brown rice.    Lean proteins    These can include lean meat, poultry, fish, and eggs.  You can also have tofu, beans, peas, lentils, nuts, and seeds.    Dairy    Try milk, yogurt, and cheese.  Choose low-fat or fat-free when you can.  If you need to, use lactose-free milk or fortified plant-based milk products, such as soy milk.    Water    Drink water when you're thirsty.  Limit sugar-sweetened drinks, including soda, fruit drinks, and sports drinks.  Where can you learn more?  Go to  "https://www.[facility].net/patiented  Enter T756 in the search box to learn more about \"Eating Healthy Foods: Care Instructions.\"  Current as of: September 20, 2023               Content Version: 14.0    6875-4796 [employer].   Care instructions adapted under license by your healthcare professional. If you have questions about a medical condition or this instruction, always ask your healthcare professional. [employer] disclaims any warranty or liability for your use of this information.      Learning About Stress  What is stress?     Stress is your body's response to a hard situation. Your body can have a physical, emotional, or mental response. Stress is a fact of life for most people, and it affects everyone differently. What causes stress for you may not be stressful for someone else.  A lot of things can cause stress. You may feel stress when you go on a job interview, take a test, or run a race. This kind of short-term stress is normal and even useful. It can help you if you need to work hard or react quickly. For example, stress can help you finish an important job on time.  Long-term stress is caused by ongoing stressful situations or events. Examples of long-term stress include long-term health problems, ongoing problems at work, or conflicts in your family. Long-term stress can harm your health.  How does stress affect your health?  When you are stressed, your body responds as though you are in danger. It makes hormones that speed up your heart, make you breathe faster, and give you a burst of energy. This is called the fight-or-flight stress response. If the stress is over quickly, your body goes back to normal and no harm is done.  But if stress happens too often or lasts too long, it can have bad effects. Long-term stress can make you more likely to get sick, and it can make symptoms of some diseases worse. If you tense up when you are stressed, you may develop neck, shoulder, " or low back pain. Stress is linked to high blood pressure and heart disease.  Stress also harms your emotional health. It can make you troy, tense, or depressed. Your relationships may suffer, and you may not do well at work or school.  What can you do to manage stress?  You can try these things to help manage stress:   Do something active. Exercise or activity can help reduce stress. Walking is a great way to get started. Even everyday activities such as housecleaning or yard work can help.  Try yoga or gustavo chi. These techniques combine exercise and meditation. You may need some training at first to learn them.  Do something you enjoy. For example, listen to music or go to a movie. Practice your hobby or do volunteer work.  Meditate. This can help you relax, because you are not worrying about what happened before or what may happen in the future.  Do guided imagery. Imagine yourself in any setting that helps you feel calm. You can use online videos, books, or a teacher to guide you.  Do breathing exercises. For example:  From a standing position, bend forward from the waist with your knees slightly bent. Let your arms dangle close to the floor.  Breathe in slowly and deeply as you return to a standing position. Roll up slowly and lift your head last.  Hold your breath for just a few seconds in the standing position.  Breathe out slowly and bend forward from the waist.  Let your feelings out. Talk, laugh, cry, and express anger when you need to. Talking with supportive friends or family, a counselor, or a victor m leader about your feelings is a healthy way to relieve stress. Avoid discussing your feelings with people who make you feel worse.  Write. It may help to write about things that are bothering you. This helps you find out how much stress you feel and what is causing it. When you know this, you can find better ways to cope.  What can you do to prevent stress?  You might try some of these things to help prevent  "stress:  Manage your time. This helps you find time to do the things you want and need to do.  Get enough sleep. Your body recovers from the stresses of the day while you are sleeping.  Get support. Your family, friends, and community can make a difference in how you experience stress.  Limit your news feed. Avoid or limit time on social media or news that may make you feel stressed.  Do something active. Exercise or activity can help reduce stress. Walking is a great way to get started.  Where can you learn more?  Go to https://www.[employer].net/patiented  Enter N032 in the search box to learn more about \"Learning About Stress.\"  Current as of: October 24, 2023               Content Version: 14.0    9080-7909 [employer].   Care instructions adapted under license by your healthcare professional. If you have questions about a medical condition or this instruction, always ask your healthcare professional. [employer] disclaims any warranty or liability for your use of this information.      Learning About Sleeping Well  What does sleeping well mean?     Sleeping well means getting enough sleep to feel good and stay healthy. How much sleep is enough varies among people.  The number of hours you sleep and how you feel when you wake up are both important. If you do not feel refreshed, you probably need more sleep. Another sign of not getting enough sleep is feeling tired during the day.  Experts recommend that adults get at least 7 or more hours of sleep per day. Children and older adults need more sleep.  Why is getting enough sleep important?  Getting enough quality sleep is a basic part of good health. When your sleep suffers, your physical health, mood, and your thoughts can suffer too. You may find yourself feeling more grumpy or stressed. Not getting enough sleep also can lead to serious problems, including injury, accidents, anxiety, and depression.  What might cause poor " "sleeping?  Many things can cause sleep problems, including:  Changes to your sleep schedule.  Stress. Stress can be caused by fear about a single event, such as giving a speech. Or you may have ongoing stress, such as worry about work or school.  Depression, anxiety, and other mental or emotional conditions.  Changes in your sleep habits or surroundings. This includes changes that happen where you sleep, such as noise, light, or sleeping in a different bed. It also includes changes in your sleep pattern, such as having jet lag or working a late shift.  Health problems, such as pain, breathing problems, and restless legs syndrome.  Lack of regular exercise.  Using alcohol, nicotine, or caffeine before bed.  How can you help yourself?  Here are some tips that may help you sleep more soundly and wake up feeling more refreshed.  Your sleeping area   Use your bedroom only for sleeping and sex. A bit of light reading may help you fall asleep. But if it doesn't, do your reading elsewhere in the house. Try not to use your TV, computer, smartphone, or tablet while you are in bed.  Be sure your bed is big enough to stretch out comfortably, especially if you have a sleep partner.  Keep your bedroom quiet, dark, and cool. Use curtains, blinds, or a sleep mask to block out light. To block out noise, use earplugs, soothing music, or a \"white noise\" machine.  Your evening and bedtime routine   Create a relaxing bedtime routine. You might want to take a warm shower or bath, or listen to soothing music.  Go to bed at the same time every night. And get up at the same time every morning, even if you feel tired.  What to avoid   Limit caffeine (coffee, tea, caffeinated sodas) during the day, and don't have any for at least 6 hours before bedtime.  Avoid drinking alcohol before bedtime. Alcohol can cause you to wake up more often during the night.  Try not to smoke or use tobacco, especially in the evening. Nicotine can keep you " "awake.  Limit naps during the day, especially close to bedtime.  Avoid lying in bed awake for too long. If you can't fall asleep or if you wake up in the middle of the night and can't get back to sleep within about 20 minutes, get out of bed and go to another room until you feel sleepy.  Avoid taking medicine right before bed that may keep you awake or make you feel hyper or energized. Your doctor can tell you if your medicine may do this and if you can take it earlier in the day.  If you can't sleep   Imagine yourself in a peaceful, pleasant scene. Focus on the details and feelings of being in a place that is relaxing.  Get up and do a quiet or boring activity until you feel sleepy.  Avoid drinking any liquids before going to bed to help prevent waking up often to use the bathroom.  Where can you learn more?  Go to https://www.[employer]/patiented  Enter J942 in the search box to learn more about \"Learning About Sleeping Well.\"  Current as of: July 10, 2023               Content Version: 14.0    4882-7709 [employer].   Care instructions adapted under license by your healthcare professional. If you have questions about a medical condition or this instruction, always ask your healthcare professional. [employer] disclaims any warranty or liability for your use of this information.      Learning About Depression Screening  What is depression screening?  Depression screening is a way to see if you have depression symptoms. It may be done by a doctor or counselor. It's often part of a routine checkup. That's because your mental health is just as important as your physical health.  Depression is a mental health condition that affects how you feel, think, and act. You may:  Have less energy.  Lose interest in your daily activities.  Feel sad and grouchy for a long time.  Depression is very common. It affects people of all ages.  Many things can lead to depression. Some people become " "depressed after they have a stroke or find out they have a major illness like cancer or heart disease. The death of a loved one or a breakup may lead to depression. It can run in families. Most experts believe that a combination of inherited genes and stressful life events can cause it.  What happens during screening?  You may be asked to fill out a form about your depression symptoms. You and the doctor will discuss your answers. The doctor may ask you more questions to learn more about how you think, act, and feel.  What happens after screening?  If you have symptoms of depression, your doctor will talk to you about your options.  Doctors usually treat depression with medicines or counseling. Often, combining the two works best. Many people don't get help because they think that they'll get over the depression on their own. But people with depression may not get better unless they get treatment.  The cause of depression is not well understood. There may be many factors involved. But if you have depression, it's not your fault.  A serious symptom of depression is thinking about death or suicide. If you or someone you care about talks about this or about feeling hopeless, get help right away.  It's important to know that depression can be treated. Medicine, counseling, and self-care may help.  Where can you learn more?  Go to https://www.[employer].net/patiented  Enter T185 in the search box to learn more about \"Learning About Depression Screening.\"  Current as of: June 24, 2023               Content Version: 14.0    7053-0137 [employer].   Care instructions adapted under license by your healthcare professional. If you have questions about a medical condition or this instruction, always ask your healthcare professional. [employer] disclaims any warranty or liability for your use of this information.      Chronic Pain: Care Instructions  Your Care Instructions     Chronic pain is pain that " lasts a long time (months or even years) and may or may not have a clear cause. It is different from acute pain, which usually does have a clear cause--like an injury or illness--and gets better over time. Chronic pain:  Lasts over time but may vary from day to day.  Does not go away despite efforts to end it.  May disrupt your sleep and lead to fatigue.  May cause depression or anxiety.  May make your muscles tense, causing more pain.  Can disrupt your work, hobbies, home life, and relationships with friends and family.  Chronic pain is a very real condition. It is not just in your head. Treatment can help and usually includes several methods used together, such as medicines, physical therapy, exercise, and other treatments. Learning how to relax and changing negative thought patterns can also help you cope.  Chronic pain is complex. Taking an active role in your treatment will help you better manage your pain. Tell your doctor if you have trouble dealing with your pain. You may have to try several things before you find what works best for you.  Follow-up care is a key part of your treatment and safety. Be sure to make and go to all appointments, and call your doctor if you are having problems. It's also a good idea to know your test results and keep a list of the medicines you take.  How can you care for yourself at home?  Pace yourself. Break up large jobs into smaller tasks. Save harder tasks for days when you have less pain, or go back and forth between hard tasks and easier ones. Take rest breaks.  Relax, and reduce stress. Relaxation techniques such as deep breathing or meditation can help.  Keep moving. Gentle, daily exercise can help reduce pain over the long run. Try low- or no-impact exercises such as walking, swimming, and stationary biking. Do stretches to stay flexible.  Try heat, cold packs, and massage.  Get enough sleep. Chronic pain can make you tired and drain your energy. Talk with your doctor  if you have trouble sleeping because of pain.  Think positive. Your thoughts can affect your pain level. Do things that you enjoy to distract yourself when you have pain instead of focusing on the pain. See a movie, read a book, listen to music, or spend time with a friend.  If you think you are depressed, talk to your doctor about treatment.  Keep a daily pain diary. Record how your moods, thoughts, sleep patterns, activities, and medicine affect your pain. You may find that your pain is worse during or after certain activities or when you are feeling a certain emotion. Having a record of your pain can help you and your doctor find the best ways to treat your pain.  Take pain medicines exactly as directed.  If the doctor gave you a prescription medicine for pain, take it as prescribed.  If you are not taking a prescription pain medicine, ask your doctor if you can take an over-the-counter medicine.  Reducing constipation caused by pain medicine  Talk to your doctor about a laxative. If a laxative doesn't work, your doctor may suggest a prescription medicine.  Include fruits, vegetables, beans, and whole grains in your diet each day. These foods are high in fiber.  If your doctor recommends it, get more exercise. Walking is a good choice. Bit by bit, increase the amount you walk every day. Try for at least 30 minutes on most days of the week.  Schedule time each day for a bowel movement. A daily routine may help. Take your time and do not strain when having a bowel movement.  When should you call for help?   Call your doctor now or seek immediate medical care if:    Your pain gets worse or is out of control.     You feel down or blue, or you do not enjoy things like you once did. You may be depressed, which is common in people with chronic pain. Depression can be treated.     You have vomiting or cramps for more than 2 hours.   Watch closely for changes in your health, and be sure to contact your doctor if:    You  "cannot sleep because of pain.     You are very worried or anxious about your pain.     You have trouble taking your pain medicine.     You have any concerns about your pain medicine.     You have trouble with bowel movements, such as:  No bowel movement in 3 days.  Blood in the anal area, in your stool, or on the toilet paper.  Diarrhea for more than 24 hours.   Where can you learn more?  Go to https://www.[employer].net/patiented  Enter N004 in the search box to learn more about \"Chronic Pain: Care Instructions.\"  Current as of: July 10, 2023               Content Version: 14.0    9398-7353 [employer].   Care instructions adapted under license by your healthcare professional. If you have questions about a medical condition or this instruction, always ask your healthcare professional. [employer] disclaims any warranty or liability for your use of this information.      "

## 2024-03-21 NOTE — COMMUNITY RESOURCES LIST (ENGLISH)
March 21, 2024           YOUR PERSONALIZED LIST OF SERVICES & PROGRAMS           & RECREATION    Sports      [facility] - Adult Enrichment  Phone: [phone]  Website: [email or web address]-[email or web address]  Language: English  Hours: Mon 7:30 AM - 4:00 PM Tue 7:30 AM - 4:00 PM Wed 7:30 AM - 4:00 PM Thu 7:30 AM - 4:00 PM Fri 7:30 AM - 4:00 PM    Classes/Groups      [employer] - Sit and Be Fit/[employer] FLEX  [address] [place] [address] (Distance: 24.6 miles)  Website: https://physicaltherapyCalm/programs/sit-and-be-fit/  Language: English  Fee: Free      [employer] [facility]CARE) - Group fitness classes  [address] [place] [address] (Distance: 20.4 miles)  Phone: [phone]  Website: https://[employer]/  Language: English  Fee: Free      [facility]  Phone: [phone]  Email: contact@"[employer]"  Website: https://"[employer]"  Language: English  Hours: Mon 9:00 AM - 6:00 PM Tue 9:00 AM - 6:00 PM Wed 9:00 AM - 6:00 PM Thu 9:00 AM - 6:00 PM Fri 9:00 AM - 6:00 PM  Fee: Free               IMPORTANT NUMBERS & WEBSITES        Emergency Services  911  .   United Way  211 http://211unitedway.org  .   Poison Control  (800) 652-4567 http://[place]poison.org http://wisconsinpoison.org  .     Suicide and Crisis Lifeline  988 http://988lifeline.org  .   Childhelp National Child Abuse Hotline  913.431.1597 http://Childhelphotline.org   .   National Sexual Assault Hotline  (976) 501-2117 (HOPE) http://Rainn.org   .     National Runaway Safeline  (289) 136-8320 (RUNAWAY) http://1800runaway.org  .   Pregnancy & Postpartum Support  Call/text 153-659-3433  [place]: http://ppsupportmn.org  [place]: http://AlpineReplay.com/wi  .   Substance Abuse National Helpline ([facility])  847-966-SYCF (0493) http://Findtreatment.gov   .                DISCLAIMER: [employer] does not endorse any service providers mentioned in this  resource list. [facility] does not guarantee that the services mentioned in this resource list will be available to you or will improve your health or wellness.    [facility]

## 2024-03-22 NOTE — PROGRESS NOTES
Left message at pharmacy to call back as fax is not working.  Dahlia Ewing RN on 3/22/2024 at 2:12 PM

## 2024-03-25 ENCOUNTER — TELEPHONE (OUTPATIENT)
Dept: GASTROENTEROLOGY | Facility: CLINIC | Age: 64
End: 2024-03-25
Payer: COMMERCIAL

## 2024-03-25 DIAGNOSIS — Z12.11 SPECIAL SCREENING FOR MALIGNANT NEOPLASMS, COLON: Primary | ICD-10-CM

## 2024-03-25 NOTE — TELEPHONE ENCOUNTER
"Endoscopy Scheduling Screen    Have you had a positive Covid test in the last 14 days?  No    What is your communication preference for Instructions and/or Bowel Prep?   MyChart    What insurance is in the chart?  Other:  [facility]    Ordering/Referring Provider:   KYLAH GLASS        (If ordering provider performs procedure, schedule with ordering provider unless otherwise instructed. )    BMI: Estimated body mass index is 38.84 kg/m  as calculated from the following:    Height as of 3/21/24: 1.702 m (5' 7\").    Weight as of 3/21/24: 112.5 kg (248 lb).     Sedation Ordered  moderate sedation.   If patient BMI > 50 do not schedule in ASC.    If patient BMI > 45 do not schedule at [facility].    Are you taking methadone or Suboxone?  No    Have you had difficulties, pain, or discomfort during past endoscopy procedures?  No    Are you taking any prescription medications for pain 3 or more times per week?   YES, RN review needed to determine if MAC is required.  (RN Review required.)     Do you have a history of malignant hyperthermia?  No    Have you been diagnosed or told you have pulmonary hypertension?   No    Do you have an LVAD?  No    Have you been told you have moderate to severe sleep apnea?  Yes (RN Review required for scheduling unless scheduling in Hospital.)    Have you been told you have COPD, asthma, or any other lung disease?  No    Do you have any heart conditions?  No     Have you ever had or are you waiting for an organ transplant?  No. Continue scheduling, no site restrictions.    Have you had a stroke or transient ischemic attack (TIA aka \"mini stroke\" in the last 6 months?   No    Have you been diagnosed with or been told you have cirrhosis of the liver?   No    Are you currently on dialysis?   No    Do you need assistance transferring?   No    BMI: Estimated body mass index is 38.84 kg/m  as calculated from the following:    Height as of 3/21/24: 1.702 m (5' 7\").    Weight as of 3/21/24: 112.5 kg (248 lb). "     Is patients BMI > 40 and scheduling location [facility]?  No    Do you take an injectable medication for weight loss or diabetes (excluding insulin)?  No    Do you take the medication Naltrexone?  No    Do you take blood thinners?  No       Prep   Are you currently on dialysis or do you have chronic kidney disease?  No    Do you have a diagnosis of diabetes?  No    Do you have a diagnosis of cystic fibrosis (CF)?  No    On a regular basis do you go 3 -5 days between bowel movements?  Yes (Extended Prep)    BMI > 40?  No    Preferred Pharmacy:    [id number] - [address] [address]  Phone: [phone] Fax: [phone]    Final Scheduling Details     Procedure scheduled  Colonoscopy    Surgeon:  MICH    Date of procedure:  05/30/2024     Pre-OP / PAC:   No - Not required for this site.    Location  [facility] - Patient preference.    Sedation   MAC/Deep Sedation  Per Location and exclusion      Patient Reminders:   You will receive a call from a Nurse to review instructions and health history.  This assessment must be completed prior to your procedure.  Failure to complete the Nurse assessment may result in the procedure being cancelled.      On the day of your procedure, please designate an adult(s) who can drive you home stay with you for the next 24 hours. The medicines used in the exam will make you sleepy. You will not be able to drive.      You cannot take public transportation, ride share services, or non-medical taxi service without a responsible caregiver.  Medical transport services are allowed with the requirement that a responsible caregiver will receive you at your destination.  We require that drivers and caregivers are confirmed prior to your procedure.

## 2024-03-27 ENCOUNTER — THERAPY VISIT (OUTPATIENT)
Dept: PHYSICAL THERAPY | Facility: CLINIC | Age: 64
End: 2024-03-27
Attending: ORTHOPAEDIC SURGERY
Payer: COMMERCIAL

## 2024-03-27 DIAGNOSIS — Z96.641 STATUS POST RIGHT HIP REPLACEMENT: Primary | ICD-10-CM

## 2024-03-27 PROCEDURE — 97116 GAIT TRAINING THERAPY: CPT | Mod: GP

## 2024-03-27 PROCEDURE — 97140 MANUAL THERAPY 1/> REGIONS: CPT | Mod: GP

## 2024-03-28 DIAGNOSIS — M47.812 CERVICAL SPONDYLOSIS WITHOUT MYELOPATHY: ICD-10-CM

## 2024-03-28 DIAGNOSIS — M48.07 STENOSIS OF LUMBOSACRAL SPINE: ICD-10-CM

## 2024-03-28 DIAGNOSIS — M48.02 SPINAL STENOSIS IN CERVICAL REGION: ICD-10-CM

## 2024-03-28 RX ORDER — DULOXETIN HYDROCHLORIDE 30 MG/1
30 CAPSULE, DELAYED RELEASE ORAL EVERY MORNING
Qty: 90 CAPSULE | Refills: 1 | OUTPATIENT
Start: 2024-03-28

## 2024-03-29 DIAGNOSIS — M47.812 CERVICAL SPONDYLOSIS WITHOUT MYELOPATHY: ICD-10-CM

## 2024-03-29 DIAGNOSIS — M48.02 SPINAL STENOSIS IN CERVICAL REGION: ICD-10-CM

## 2024-03-29 DIAGNOSIS — M48.07 STENOSIS OF LUMBOSACRAL SPINE: ICD-10-CM

## 2024-03-29 RX ORDER — DULOXETIN HYDROCHLORIDE 30 MG/1
30 CAPSULE, DELAYED RELEASE ORAL EVERY MORNING
Qty: 90 CAPSULE | Refills: 1 | OUTPATIENT
Start: 2024-03-29

## 2024-04-03 ENCOUNTER — THERAPY VISIT (OUTPATIENT)
Dept: PHYSICAL THERAPY | Facility: CLINIC | Age: 64
End: 2024-04-03
Attending: ORTHOPAEDIC SURGERY
Payer: COMMERCIAL

## 2024-04-03 DIAGNOSIS — Z96.641 STATUS POST RIGHT HIP REPLACEMENT: Primary | ICD-10-CM

## 2024-04-03 PROCEDURE — 97116 GAIT TRAINING THERAPY: CPT | Mod: GP

## 2024-04-03 PROCEDURE — 97140 MANUAL THERAPY 1/> REGIONS: CPT | Mod: GP

## 2024-04-10 ENCOUNTER — THERAPY VISIT (OUTPATIENT)
Dept: PHYSICAL THERAPY | Facility: CLINIC | Age: 64
End: 2024-04-10
Attending: ORTHOPAEDIC SURGERY
Payer: COMMERCIAL

## 2024-04-10 DIAGNOSIS — Z96.641 STATUS POST RIGHT HIP REPLACEMENT: Primary | ICD-10-CM

## 2024-04-10 PROCEDURE — 97140 MANUAL THERAPY 1/> REGIONS: CPT | Mod: GP

## 2024-04-10 PROCEDURE — 97110 THERAPEUTIC EXERCISES: CPT | Mod: GP

## 2024-04-17 ENCOUNTER — THERAPY VISIT (OUTPATIENT)
Dept: PHYSICAL THERAPY | Facility: CLINIC | Age: 64
End: 2024-04-17
Attending: ORTHOPAEDIC SURGERY
Payer: COMMERCIAL

## 2024-04-17 DIAGNOSIS — Z96.641 STATUS POST RIGHT HIP REPLACEMENT: Primary | ICD-10-CM

## 2024-04-17 PROCEDURE — 97140 MANUAL THERAPY 1/> REGIONS: CPT | Mod: GP

## 2024-04-17 PROCEDURE — 97110 THERAPEUTIC EXERCISES: CPT | Mod: GP

## 2024-04-23 ENCOUNTER — THERAPY VISIT (OUTPATIENT)
Dept: PHYSICAL THERAPY | Facility: CLINIC | Age: 64
End: 2024-04-23
Attending: ORTHOPAEDIC SURGERY
Payer: COMMERCIAL

## 2024-04-23 DIAGNOSIS — Z96.641 STATUS POST RIGHT HIP REPLACEMENT: Primary | ICD-10-CM

## 2024-04-23 PROCEDURE — 97116 GAIT TRAINING THERAPY: CPT | Mod: GP

## 2024-04-23 PROCEDURE — 97140 MANUAL THERAPY 1/> REGIONS: CPT | Mod: GP

## 2024-04-23 PROCEDURE — 97110 THERAPEUTIC EXERCISES: CPT | Mod: GP

## 2024-05-02 RX ORDER — BISACODYL 5 MG/1
TABLET, DELAYED RELEASE ORAL
Qty: 4 TABLET | Refills: 0 | Status: SHIPPED | OUTPATIENT
Start: 2024-05-02

## 2024-05-02 NOTE — TELEPHONE ENCOUNTER
Extended Golytely Bowel Prep . Instructions were sent via BuzzFeed. Bowel prep was sent 5/2/2024 to    [id number] [place] [facility] [address] [place] [address]

## 2024-05-11 NOTE — MR AVS SNAPSHOT
After Visit Summary   6/7/2018    Wenceslao Vasquez    MRN: [id number]           Patient Information     Date Of Birth          1960        Visit Information        Provider Department      6/7/2018 8:20 AM Christopher Arreola MD [facility]        Today's Diagnoses     Screening for HIV (human immunodeficiency virus)    -  1    Hyperlipidemia LDL goal <100        Hypertension goal BP (blood pressure) < 140/90          Care Instructions      Preventive Health Recommendations  Male Ages 50 - 64    Yearly exam:             See your health care provider every year in order to  o   Review health changes.   o   Discuss preventive care.    o   Review your medicines if your doctor has prescribed any.     Have a cholesterol test every 5 years, or more frequently if you are at risk for high cholesterol/heart disease.     Have a diabetes test (fasting glucose) every three years. If you are at risk for diabetes, you should have this test more often.     Have a colonoscopy at age 50, or have a yearly FIT test (stool test). These exams will check for colon cancer.      Talk with your health care provider about whether or not a prostate cancer screening test (PSA) is right for you.    You should be tested each year for STDs (sexually transmitted diseases), if you re at risk.     Shots: Get a flu shot each year. Get a tetanus shot every 10 years.     Nutrition:    Eat at least 5 servings of fruits and vegetables daily.     Eat whole-grain bread, whole-wheat pasta and brown rice instead of white grains and rice.     Talk to your provider about Calcium and Vitamin D.     Lifestyle    Exercise for at least 150 minutes a week (30 minutes a day, 5 days a week). This will help you control your weight and prevent disease.     Limit alcohol to one drink per day.     No smoking.     Wear sunscreen to prevent skin cancer.     See your dentist every six months for an exam and cleaning.     See your eye doctor every 1 to  2 years.            Follow-ups after your visit        Follow-up notes from your care team     Return in about 1 year (around 6/7/2019) for Physical Exam.      Who to contact     If you have questions or need follow up information about today's clinic visit or your schedule please contact [facility] directly at [phone].  Normal or non-critical lab and imaging results will be communicated to you by MyChart, letter or phone within 4 business days after the clinic has received the results. If you do not hear from us within 7 days, please contact the clinic through Meilishuohart or phone. If you have a critical or abnormal lab result, we will notify you by phone as soon as possible.  Submit refill requests through [employer] or call your pharmacy and they will forward the refill request to us. Please allow 3 business days for your refill to be completed.          Additional Information About Your Visit        MyChart Information     [employer] gives you secure access to your electronic health record. If you see a primary care provider, you can also send messages to your care team and make appointments. If you have questions, please call your primary care clinic.  If you do not have a primary care provider, please call [phone] and they will assist you.        Care EveryWhere ID     This is your Care EveryWhere ID. This could be used by other organizations to access your [facility] medical records  [id number]        Your Vitals Were     Pulse Temperature Respirations Pulse Oximetry BMI (Body Mass Index)       79 98.2  F (36.8  C) (Temporal) 16 96% 33.7 kg/m2        Blood Pressure from Last 3 Encounters:   06/07/18 130/76   04/30/18 134/78   03/05/18 140/82    Weight from Last 3 Encounters:   06/07/18 228 lb 3.2 oz (103.5 kg)   04/30/18 233 lb 1.6 oz (105.7 kg)   03/05/18 229 lb 14.4 oz (104.3 kg)              We Performed the Following     Lipid panel reflex to direct LDL Fasting        Primary Care Provider  Office Phone # Fax #    Christopher Arreola [phone]       [address] [facility] [address]        Equal Access to Services     BAUTISTA LU : Riana Fountain, selma borregobubbaha, kerata kaneldada mikebarb, srini vikiin hayaaroderick mckeoncorinne pulidoann moseley. So [facility] [phone].    ATENCIÓN: Si habla español, tiene a copeland disposición servicios gratuitos de asistencia lingüística. Llame al [phone].    We comply with applicable federal civil rights laws and [place] laws. We do not discriminate on the basis of race, color, national origin, age, disability, sex, sexual orientation, or gender identity.            Thank you!     Thank you for choosing [facility]  for your care. Our goal is always to provide you with excellent care. Hearing back from our patients is one way we can continue to improve our services. Please take a few minutes to complete the written survey that you may receive in the mail after your visit with us. Thank you!             Your Updated Medication List - Protect others around you: Learn how to safely use, store and throw away your medicines at www.disposemymeds.org.          This list is accurate as of 6/7/18  9:03 AM.  Always use your most recent med list.                   Brand Name Dispense Instructions for use Diagnosis    cetirizine 10 MG tablet    zyrTEC    30 tablet    Take 1 tablet (10 mg) by mouth every evening    Acute seasonal allergic rhinitis due to pollen       meloxicam 7.5 MG tablet    MOBIC    30 tablet    Take 1 tablet (7.5 mg) by mouth daily    Strain of lumbar region, initial encounter       methocarbamol 750 MG tablet    ROBAXIN    60 tablet    Take 1 tablet (750 mg) by mouth At Bedtime And 3 times daily as needed.    Strain of lumbar region, initial encounter       MULTI VITAMIN MENS PO      Take 1 tablet by mouth daily.    Low back pain, S/P lumbar discectomy       tamsulosin 0.4 MG capsule    FLOMAX     TAKE 1 CAPSULE BY MOUTH EVERY DAY  0.08

## 2024-05-30 ENCOUNTER — ANESTHESIA (OUTPATIENT)
Dept: GASTROENTEROLOGY | Facility: CLINIC | Age: 64
End: 2024-05-30
Payer: COMMERCIAL

## 2024-05-30 ENCOUNTER — HOSPITAL ENCOUNTER (OUTPATIENT)
Facility: CLINIC | Age: 64
Discharge: HOME | End: 2024-05-30
Attending: SURGERY | Admitting: SURGERY
Payer: COMMERCIAL

## 2024-05-30 ENCOUNTER — ANESTHESIA EVENT (OUTPATIENT)
Dept: GASTROENTEROLOGY | Facility: CLINIC | Age: 64
End: 2024-05-30
Payer: COMMERCIAL

## 2024-05-30 VITALS
OXYGEN SATURATION: 94 % | HEART RATE: 55 BPM | RESPIRATION RATE: 14 BRPM | SYSTOLIC BLOOD PRESSURE: 129 MMHG | DIASTOLIC BLOOD PRESSURE: 79 MMHG | TEMPERATURE: 97.6 F

## 2024-05-30 LAB — COLONOSCOPY: NORMAL

## 2024-05-30 PROCEDURE — 250N000009 HC RX 250: Performed by: NURSE ANESTHETIST, CERTIFIED REGISTERED

## 2024-05-30 PROCEDURE — 370N000017 HC ANESTHESIA TECHNICAL FEE, PER MIN: Performed by: SURGERY

## 2024-05-30 PROCEDURE — 258N000003 HC RX IP 258 OP 636: Performed by: NURSE ANESTHETIST, CERTIFIED REGISTERED

## 2024-05-30 PROCEDURE — 45378 DIAGNOSTIC COLONOSCOPY: CPT | Performed by: SURGERY

## 2024-05-30 PROCEDURE — 250N000011 HC RX IP 250 OP 636: Performed by: NURSE ANESTHETIST, CERTIFIED REGISTERED

## 2024-05-30 RX ORDER — LIDOCAINE HYDROCHLORIDE 10 MG/ML
INJECTION, SOLUTION EPIDURAL; INFILTRATION; INTRACAUDAL; PERINEURAL PRN
Status: DISCONTINUED | OUTPATIENT
Start: 2024-05-30 | End: 2024-05-30

## 2024-05-30 RX ORDER — PROCHLORPERAZINE MALEATE 5 MG
10 TABLET ORAL EVERY 6 HOURS PRN
Status: DISCONTINUED | OUTPATIENT
Start: 2024-05-30 | End: 2024-05-30 | Stop reason: HOSPADM

## 2024-05-30 RX ORDER — ONDANSETRON 2 MG/ML
4 INJECTION INTRAMUSCULAR; INTRAVENOUS EVERY 30 MIN PRN
Status: ACTIVE | OUTPATIENT
Start: 2024-05-30

## 2024-05-30 RX ORDER — ONDANSETRON 4 MG/1
4 TABLET, ORALLY DISINTEGRATING ORAL EVERY 30 MIN PRN
Status: ACTIVE | OUTPATIENT
Start: 2024-05-30

## 2024-05-30 RX ORDER — DEXAMETHASONE SODIUM PHOSPHATE 10 MG/ML
4 INJECTION, SOLUTION INTRAMUSCULAR; INTRAVENOUS
Status: ACTIVE | OUTPATIENT
Start: 2024-05-30

## 2024-05-30 RX ORDER — NALOXONE HYDROCHLORIDE 0.4 MG/ML
0.2 INJECTION, SOLUTION INTRAMUSCULAR; INTRAVENOUS; SUBCUTANEOUS
Status: DISCONTINUED | OUTPATIENT
Start: 2024-05-30 | End: 2024-05-30 | Stop reason: HOSPADM

## 2024-05-30 RX ORDER — LIDOCAINE 40 MG/G
CREAM TOPICAL
Status: DISCONTINUED | OUTPATIENT
Start: 2024-05-30 | End: 2024-05-30 | Stop reason: HOSPADM

## 2024-05-30 RX ORDER — ONDANSETRON 4 MG/1
4 TABLET, ORALLY DISINTEGRATING ORAL EVERY 6 HOURS PRN
Status: DISCONTINUED | OUTPATIENT
Start: 2024-05-30 | End: 2024-05-30 | Stop reason: HOSPADM

## 2024-05-30 RX ORDER — ONDANSETRON 2 MG/ML
4 INJECTION INTRAMUSCULAR; INTRAVENOUS EVERY 6 HOURS PRN
Status: DISCONTINUED | OUTPATIENT
Start: 2024-05-30 | End: 2024-05-30 | Stop reason: HOSPADM

## 2024-05-30 RX ORDER — NALOXONE HYDROCHLORIDE 0.4 MG/ML
0.1 INJECTION, SOLUTION INTRAMUSCULAR; INTRAVENOUS; SUBCUTANEOUS
Status: ACTIVE | OUTPATIENT
Start: 2024-05-30

## 2024-05-30 RX ORDER — FLUMAZENIL 0.1 MG/ML
0.2 INJECTION, SOLUTION INTRAVENOUS
Status: DISCONTINUED | OUTPATIENT
Start: 2024-05-30 | End: 2024-05-30 | Stop reason: HOSPADM

## 2024-05-30 RX ORDER — NALOXONE HYDROCHLORIDE 0.4 MG/ML
0.4 INJECTION, SOLUTION INTRAMUSCULAR; INTRAVENOUS; SUBCUTANEOUS
Status: DISCONTINUED | OUTPATIENT
Start: 2024-05-30 | End: 2024-05-30 | Stop reason: HOSPADM

## 2024-05-30 RX ORDER — ONDANSETRON 2 MG/ML
4 INJECTION INTRAMUSCULAR; INTRAVENOUS
Status: DISCONTINUED | OUTPATIENT
Start: 2024-05-30 | End: 2024-05-30 | Stop reason: HOSPADM

## 2024-05-30 RX ORDER — PROPOFOL 10 MG/ML
INJECTION, EMULSION INTRAVENOUS PRN
Status: DISCONTINUED | OUTPATIENT
Start: 2024-05-30 | End: 2024-05-30

## 2024-05-30 RX ORDER — SODIUM CHLORIDE, SODIUM LACTATE, POTASSIUM CHLORIDE, CALCIUM CHLORIDE 600; 310; 30; 20 MG/100ML; MG/100ML; MG/100ML; MG/100ML
INJECTION, SOLUTION INTRAVENOUS CONTINUOUS
Status: DISCONTINUED | OUTPATIENT
Start: 2024-05-30 | End: 2024-05-30 | Stop reason: HOSPADM

## 2024-05-30 RX ADMIN — PROPOFOL 200 MCG/KG/MIN: 10 INJECTION, EMULSION INTRAVENOUS at 08:52

## 2024-05-30 RX ADMIN — PROPOFOL 100 MG: 10 INJECTION, EMULSION INTRAVENOUS at 08:51

## 2024-05-30 RX ADMIN — SODIUM CHLORIDE, POTASSIUM CHLORIDE, SODIUM LACTATE AND CALCIUM CHLORIDE: 600; 310; 30; 20 INJECTION, SOLUTION INTRAVENOUS at 08:34

## 2024-05-30 RX ADMIN — LIDOCAINE HYDROCHLORIDE 50 MG: 10 INJECTION, SOLUTION EPIDURAL; INFILTRATION; INTRACAUDAL; PERINEURAL at 08:50

## 2024-05-30 ASSESSMENT — ACTIVITIES OF DAILY LIVING (ADL)
ADLS_ACUITY_SCORE: 40
ADLS_ACUITY_SCORE: 38

## 2024-05-30 NOTE — ANESTHESIA POSTPROCEDURE EVALUATION
Patient: Wenceslao Vasquez    Procedure: Procedure(s):  Colonoscopy       Anesthesia Type:  MAC    Note:  Disposition: Outpatient   Postop Pain Control: Uneventful            Sign Out: Well controlled pain   PONV: No   Neuro/Psych: Uneventful            Sign Out: Acceptable/Baseline neuro status   Airway/Respiratory: Uneventful            Sign Out: Acceptable/Baseline resp. status   CV/Hemodynamics: Uneventful            Sign Out: Acceptable CV status; No obvious hypovolemia; No obvious fluid overload   Other NRE: NONE   DID A NON-ROUTINE EVENT OCCUR? No           Last vitals:  Vitals Value Taken Time   /79 05/30/24 0950   Temp     Pulse 55 05/30/24 1000   Resp 14 05/30/24 1000   SpO2 96 % 05/30/24 0957   Vitals shown include unfiled device data.    Electronically Signed By: ANNEL Cortes CRNA  May 30, 2024  12:33 PM

## 2024-05-30 NOTE — ANESTHESIA CARE TRANSFER NOTE
Patient: Wenceslao Vasquez    Procedure: Procedure(s):  Colonoscopy       Diagnosis: Special screening for malignant neoplasms, colon [Z12.11]  Diagnosis Additional Information: No value filed.    Anesthesia Type:   MAC     Note:    Oropharynx: spontaneously breathing  Level of Consciousness: drowsy  Oxygen Supplementation: room air    Independent Airway: airway patency satisfactory and stable  Dentition: dentition unchanged  Vital Signs Stable: post-procedure vital signs reviewed and stable  Report to RN Given: handoff report given  Patient transferred to: Phase II    Handoff Report: Identifed the Patient, Identified the Reponsible Provider, Reviewed the pertinent medical history, Discussed the surgical course, Reviewed Intra-OP anesthesia mangement and issues during anesthesia, Set expectations for post-procedure period and Allowed opportunity for questions and acknowledgement of understanding      Vitals:  Vitals Value Taken Time   /74 05/30/24 0903   Temp     Pulse 77 05/30/24 0903   Resp     SpO2 97 % 05/30/24 0904   Vitals shown include unfiled device data.    Electronically Signed By: ANNEL Cortes CRNA  May 30, 2024  9:05 AM
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
## 2024-05-31 ENCOUNTER — ANESTHESIA EVENT (OUTPATIENT)
Dept: GASTROENTEROLOGY | Facility: CLINIC | Age: 64
End: 2024-05-31
Payer: COMMERCIAL

## 2024-05-31 ENCOUNTER — ANESTHESIA (OUTPATIENT)
Dept: GASTROENTEROLOGY | Facility: CLINIC | Age: 64
End: 2024-05-31
Payer: COMMERCIAL

## 2024-05-31 ENCOUNTER — HOSPITAL ENCOUNTER (OUTPATIENT)
Facility: CLINIC | Age: 64
Discharge: HOME | End: 2024-05-31
Attending: SPECIALIST | Admitting: SPECIALIST
Payer: COMMERCIAL

## 2024-05-31 VITALS
WEIGHT: 248 LBS | OXYGEN SATURATION: 94 % | DIASTOLIC BLOOD PRESSURE: 72 MMHG | HEIGHT: 67 IN | RESPIRATION RATE: 18 BRPM | TEMPERATURE: 97.9 F | HEART RATE: 61 BPM | SYSTOLIC BLOOD PRESSURE: 114 MMHG | BODY MASS INDEX: 38.92 KG/M2

## 2024-05-31 DIAGNOSIS — Z12.11 SCREENING FOR COLON CANCER: Primary | ICD-10-CM

## 2024-05-31 LAB — COLONOSCOPY: NORMAL

## 2024-05-31 PROCEDURE — 88305 TISSUE EXAM BY PATHOLOGIST: CPT | Mod: TC | Performed by: SPECIALIST

## 2024-05-31 PROCEDURE — 250N000009 HC RX 250: Performed by: NURSE ANESTHETIST, CERTIFIED REGISTERED

## 2024-05-31 PROCEDURE — 45385 COLONOSCOPY W/LESION REMOVAL: CPT | Performed by: SPECIALIST

## 2024-05-31 PROCEDURE — 370N000017 HC ANESTHESIA TECHNICAL FEE, PER MIN: Performed by: SPECIALIST

## 2024-05-31 PROCEDURE — 258N000003 HC RX IP 258 OP 636: Performed by: NURSE ANESTHETIST, CERTIFIED REGISTERED

## 2024-05-31 PROCEDURE — 250N000011 HC RX IP 250 OP 636: Performed by: NURSE ANESTHETIST, CERTIFIED REGISTERED

## 2024-05-31 RX ORDER — PROPOFOL 10 MG/ML
INJECTION, EMULSION INTRAVENOUS CONTINUOUS PRN
Status: DISCONTINUED | OUTPATIENT
Start: 2024-05-31 | End: 2024-05-31

## 2024-05-31 RX ORDER — LIDOCAINE HYDROCHLORIDE 20 MG/ML
INJECTION, SOLUTION INFILTRATION; PERINEURAL PRN
Status: DISCONTINUED | OUTPATIENT
Start: 2024-05-31 | End: 2024-05-31

## 2024-05-31 RX ORDER — SODIUM CHLORIDE, SODIUM LACTATE, POTASSIUM CHLORIDE, CALCIUM CHLORIDE 600; 310; 30; 20 MG/100ML; MG/100ML; MG/100ML; MG/100ML
INJECTION, SOLUTION INTRAVENOUS CONTINUOUS
Status: DISCONTINUED | OUTPATIENT
Start: 2024-05-31 | End: 2024-05-31 | Stop reason: HOSPADM

## 2024-05-31 RX ORDER — PROPOFOL 10 MG/ML
INJECTION, EMULSION INTRAVENOUS PRN
Status: DISCONTINUED | OUTPATIENT
Start: 2024-05-31 | End: 2024-05-31

## 2024-05-31 RX ORDER — LIDOCAINE 40 MG/G
CREAM TOPICAL
Status: DISCONTINUED | OUTPATIENT
Start: 2024-05-31 | End: 2024-05-31

## 2024-05-31 RX ORDER — LIDOCAINE 40 MG/G
CREAM TOPICAL
Status: DISCONTINUED | OUTPATIENT
Start: 2024-05-31 | End: 2024-05-31 | Stop reason: HOSPADM

## 2024-05-31 RX ADMIN — PROPOFOL 150 MCG/KG/MIN: 10 INJECTION, EMULSION INTRAVENOUS at 10:50

## 2024-05-31 RX ADMIN — LIDOCAINE HYDROCHLORIDE 50 MG: 20 INJECTION, SOLUTION INFILTRATION; PERINEURAL at 10:50

## 2024-05-31 RX ADMIN — PROPOFOL 100 MG: 10 INJECTION, EMULSION INTRAVENOUS at 10:50

## 2024-05-31 RX ADMIN — SODIUM CHLORIDE, POTASSIUM CHLORIDE, SODIUM LACTATE AND CALCIUM CHLORIDE 10 ML/HR: 600; 310; 30; 20 INJECTION, SOLUTION INTRAVENOUS at 10:27

## 2024-05-31 ASSESSMENT — ACTIVITIES OF DAILY LIVING (ADL)
ADLS_ACUITY_SCORE: 40
ADLS_ACUITY_SCORE: 40
ADLS_ACUITY_SCORE: 38

## 2024-05-31 NOTE — ANESTHESIA POSTPROCEDURE EVALUATION
Patient: Wenceslao Vasquez    Procedure: Procedure(s):  COLONOSCOPY, FLEXIBLE, WITH LESION REMOVAL USING SNARE       Anesthesia Type:  MAC    Note:  Disposition: Outpatient   Postop Pain Control: Uneventful            Sign Out: Well controlled pain   PONV: No   Neuro/Psych: Uneventful            Sign Out: Acceptable/Baseline neuro status   Airway/Respiratory: Uneventful            Sign Out: Acceptable/Baseline resp. status   CV/Hemodynamics: Uneventful            Sign Out: Acceptable CV status   Other NRE: NONE   DID A NON-ROUTINE EVENT OCCUR? No    Event details/Postop Comments:  Pt was happy with anesthesia care.  No complications.  I will follow up with the pt if needed.           Last vitals:  Vitals Value Taken Time   /72 05/31/24 1130   Temp     Pulse 61 05/31/24 1130   Resp 18 05/31/24 1122   SpO2 94 % 05/31/24 1139   Vitals shown include unfiled device data.    Electronically Signed By: ANNEL Mccrary CRNA  May 31, 2024  11:41 AM

## 2024-05-31 NOTE — DISCHARGE INSTRUCTIONS
[facility]    Home Care Following Endoscopy          Activity:  You have just undergone an endoscopic procedure under sedation.  Do not work or operate machinery (including a car) for at least 12 hours.      Diet:  Return to the diet you were on before your procedure but eat lightly for the first 12-24 hours.  Drink plenty of water.  Resume any regular medications unless otherwise advised by your physician.     If you had any biopsy or polyp removed please refrain from aspirin or aspirin products for 2 days.     Pain:  You may take Tylenol as needed for pain.  Expected Recovery:  You can expect some mild abdominal fullness and/or discomfort due to the air used to inflate your intestinal tract. I encourage you to walk and attempt to pass air as soon as possible.        Call Your Physician if You Have:    After Colonoscopy:  Worsening persisting abdominal pain which is worse with activity.  Fevers (>101 degrees F), chills or shakes.  Passage of continued blood with bowel movements.   Any questions or concerns about your recovery, please call [phone] or after hours [phone] Nurse Advice Line.    Follow-up Care:  If you had polyps/biopsy tissue sample(s) removed, the polyps/biopsy tissue sample(s) will be sent to pathology.  You should receive a letter in your My Chart with your results, within 1-2 weeks.   Please call if you have not received a notification of your results.

## 2024-05-31 NOTE — ANESTHESIA CARE TRANSFER NOTE
Patient: Wenceslao Vasquez    Procedure: Procedure(s):  COLONOSCOPY, FLEXIBLE, WITH LESION REMOVAL USING SNARE       Diagnosis: Colon cancer screening [Z12.11]  Diagnosis Additional Information: No value filed.    Anesthesia Type:   MAC     Note:    Oropharynx: oropharynx clear of all foreign objects and spontaneously breathing  Level of Consciousness: awake  Oxygen Supplementation: room air    Independent Airway: airway patency satisfactory and stable  Dentition: dentition unchanged  Vital Signs Stable: post-procedure vital signs reviewed and stable  Report to RN Given: handoff report given  Patient transferred to: Phase II    Handoff Report: Identifed the Patient, Identified the Reponsible Provider, Reviewed the pertinent medical history, Discussed the surgical course, Reviewed Intra-OP anesthesia mangement and issues during anesthesia, Set expectations for post-procedure period and Allowed opportunity for questions and acknowledgement of understanding      Vitals:  Vitals Value Taken Time   /72 05/31/24 1130   Temp     Pulse 61 05/31/24 1130   Resp 18 05/31/24 1122   SpO2 94 % 05/31/24 1139   Vitals shown include unfiled device data.    Electronically Signed By: ANNEL Mccrary CRNA  May 31, 2024  11:41 AM
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
## 2024-06-04 LAB
PATH REPORT.COMMENTS IMP SPEC: NORMAL
PATH REPORT.COMMENTS IMP SPEC: NORMAL
PATH REPORT.FINAL DX SPEC: NORMAL
PATH REPORT.GROSS SPEC: NORMAL
PATH REPORT.MICROSCOPIC SPEC OTHER STN: NORMAL
PATH REPORT.RELEVANT HX SPEC: NORMAL
PHOTO IMAGE: NORMAL

## 2024-06-04 PROCEDURE — 88305 TISSUE EXAM BY PATHOLOGIST: CPT | Mod: 26 | Performed by: PATHOLOGY

## 2024-08-03 ENCOUNTER — HEALTH MAINTENANCE LETTER (OUTPATIENT)
Age: 64
End: 2024-08-03

## 2024-09-04 ENCOUNTER — MYC MEDICAL ADVICE (OUTPATIENT)
Dept: FAMILY MEDICINE | Facility: CLINIC | Age: 64
End: 2024-09-04
Payer: COMMERCIAL

## 2024-09-04 DIAGNOSIS — M48.07 STENOSIS OF LUMBOSACRAL SPINE: Primary | ICD-10-CM

## 2024-09-17 ENCOUNTER — MYC MEDICAL ADVICE (OUTPATIENT)
Dept: FAMILY MEDICINE | Facility: CLINIC | Age: 64
End: 2024-09-17
Payer: COMMERCIAL

## 2024-09-17 DIAGNOSIS — I10 HYPERTENSION GOAL BP (BLOOD PRESSURE) < 140/90: ICD-10-CM

## 2024-09-17 DIAGNOSIS — M47.812 CERVICAL SPONDYLOSIS WITHOUT MYELOPATHY: ICD-10-CM

## 2024-09-17 DIAGNOSIS — M48.07 STENOSIS OF LUMBOSACRAL SPINE: ICD-10-CM

## 2024-09-17 DIAGNOSIS — M48.02 SPINAL STENOSIS IN CERVICAL REGION: ICD-10-CM

## 2024-09-19 NOTE — TELEPHONE ENCOUNTER
Patient requesting refill for lisinopril and Cymbalta. Requesting refill to [facility].     Pharmacy does not E-Prescribe. Please local print and fax to pharmacy.     Ivan Oliveira RN on 9/19/2024 at 4:52 PM

## 2024-09-20 RX ORDER — LISINOPRIL 10 MG/1
10 TABLET ORAL DAILY
Qty: 90 TABLET | Refills: 3 | Status: SHIPPED | OUTPATIENT
Start: 2024-09-20 | End: 2024-09-25

## 2024-09-20 RX ORDER — DULOXETIN HYDROCHLORIDE 30 MG/1
30 CAPSULE, DELAYED RELEASE ORAL EVERY MORNING
Qty: 90 CAPSULE | Refills: 1 | Status: SHIPPED | OUTPATIENT
Start: 2024-09-20 | End: 2024-09-25

## 2024-09-25 ENCOUNTER — MYC REFILL (OUTPATIENT)
Dept: FAMILY MEDICINE | Facility: CLINIC | Age: 64
End: 2024-09-25
Payer: COMMERCIAL

## 2024-09-25 DIAGNOSIS — M48.02 SPINAL STENOSIS IN CERVICAL REGION: ICD-10-CM

## 2024-09-25 DIAGNOSIS — I10 HYPERTENSION GOAL BP (BLOOD PRESSURE) < 140/90: ICD-10-CM

## 2024-09-25 DIAGNOSIS — M47.812 CERVICAL SPONDYLOSIS WITHOUT MYELOPATHY: ICD-10-CM

## 2024-09-25 DIAGNOSIS — M48.07 STENOSIS OF LUMBOSACRAL SPINE: ICD-10-CM

## 2024-09-26 RX ORDER — DULOXETIN HYDROCHLORIDE 30 MG/1
30 CAPSULE, DELAYED RELEASE ORAL EVERY MORNING
Qty: 90 CAPSULE | Refills: 1 | Status: SHIPPED | OUTPATIENT
Start: 2024-09-26

## 2024-09-26 RX ORDER — LISINOPRIL 10 MG/1
10 TABLET ORAL DAILY
Qty: 90 TABLET | Refills: 3 | Status: SHIPPED | OUTPATIENT
Start: 2024-09-26

## 2024-09-30 ENCOUNTER — MYC MEDICAL ADVICE (OUTPATIENT)
Dept: FAMILY MEDICINE | Facility: CLINIC | Age: 64
End: 2024-09-30
Payer: COMMERCIAL

## 2024-10-17 ENCOUNTER — TRANSFERRED RECORDS (OUTPATIENT)
Dept: HEALTH INFORMATION MANAGEMENT | Facility: CLINIC | Age: 64
End: 2024-10-17
Payer: COMMERCIAL

## 2024-12-05 ENCOUNTER — TELEPHONE (OUTPATIENT)
Dept: FAMILY MEDICINE | Facility: CLINIC | Age: 64
End: 2024-12-05
Payer: COMMERCIAL

## 2024-12-05 NOTE — TELEPHONE ENCOUNTER
Patient Quality Outreach    Patient is due for the following:   Diabetes -  Eye Exam, Microalbumin, and Foot Exam  Depression  -  PHQ-9 needed    Action(s) Taken:   Patient has upcoming appointment, these items will be addressed at that time.    Type of outreach:    Chart review performed, no outreach needed.    Questions for provider review:    None           Ondina Christensen MA

## 2024-12-21 ENCOUNTER — HEALTH MAINTENANCE LETTER (OUTPATIENT)
Age: 64
End: 2024-12-21

## 2025-01-06 ENCOUNTER — TELEPHONE (OUTPATIENT)
Dept: FAMILY MEDICINE | Facility: CLINIC | Age: 65
End: 2025-01-06
Payer: COMMERCIAL

## 2025-01-06 NOTE — TELEPHONE ENCOUNTER
Patient Quality Outreach    Patient is due for the following:   Diabetes -  Diabetic Follow-Up Visit    Action(s) Taken:   Patient has upcoming appointment, these items will be addressed at that time.    Type of outreach:    Chart review performed, no outreach needed.    Questions for provider review:    None           Ondina Christensen MA

## 2025-02-03 ENCOUNTER — TELEPHONE (OUTPATIENT)
Dept: FAMILY MEDICINE | Facility: CLINIC | Age: 65
End: 2025-02-03
Payer: COMMERCIAL

## 2025-02-03 NOTE — TELEPHONE ENCOUNTER
Patient Quality Outreach    Patient is due for the following:   Physical Annual Wellness Visit    Action(s) Taken:   Patient has upcoming appointment, these items will be addressed at that time.    Type of outreach:    Chart review performed, no outreach needed.    Questions for provider review:    None           Ondina Christensen MA

## 2025-02-10 ENCOUNTER — MYC MEDICAL ADVICE (OUTPATIENT)
Dept: FAMILY MEDICINE | Facility: CLINIC | Age: 65
End: 2025-02-10
Payer: COMMERCIAL

## 2025-02-10 ENCOUNTER — TRANSFERRED RECORDS (OUTPATIENT)
Dept: HEALTH INFORMATION MANAGEMENT | Facility: CLINIC | Age: 65
End: 2025-02-10
Payer: COMMERCIAL

## 2025-02-10 DIAGNOSIS — M47.819 SPONDYLOARTHROPATHY: Primary | ICD-10-CM

## 2025-02-16 ENCOUNTER — MYC REFILL (OUTPATIENT)
Dept: FAMILY MEDICINE | Facility: CLINIC | Age: 65
End: 2025-02-16
Payer: COMMERCIAL

## 2025-02-16 DIAGNOSIS — I10 HYPERTENSION GOAL BP (BLOOD PRESSURE) < 140/90: ICD-10-CM

## 2025-02-16 DIAGNOSIS — M48.07 STENOSIS OF LUMBOSACRAL SPINE: ICD-10-CM

## 2025-02-16 DIAGNOSIS — M48.02 SPINAL STENOSIS IN CERVICAL REGION: ICD-10-CM

## 2025-02-16 DIAGNOSIS — M47.812 CERVICAL SPONDYLOSIS WITHOUT MYELOPATHY: ICD-10-CM

## 2025-02-17 RX ORDER — LISINOPRIL 10 MG/1
10 TABLET ORAL DAILY
Qty: 90 TABLET | Refills: 3 | OUTPATIENT
Start: 2025-02-17

## 2025-02-17 RX ORDER — DULOXETIN HYDROCHLORIDE 30 MG/1
30 CAPSULE, DELAYED RELEASE ORAL EVERY MORNING
Qty: 90 CAPSULE | Refills: 1 | OUTPATIENT
Start: 2025-02-17

## 2025-02-26 ENCOUNTER — TRANSFERRED RECORDS (OUTPATIENT)
Dept: HEALTH INFORMATION MANAGEMENT | Facility: CLINIC | Age: 65
End: 2025-02-26
Payer: COMMERCIAL

## 2025-02-26 NOTE — TELEPHONE ENCOUNTER
Please fax a blood test order over to the [facility] at [phone]     Christopher Arreola MD

## 2025-03-03 ENCOUNTER — TRANSFERRED RECORDS (OUTPATIENT)
Dept: HEALTH INFORMATION MANAGEMENT | Facility: CLINIC | Age: 65
End: 2025-03-03

## 2025-03-03 ENCOUNTER — TELEPHONE (OUTPATIENT)
Dept: FAMILY MEDICINE | Facility: CLINIC | Age: 65
End: 2025-03-03
Payer: COMMERCIAL

## 2025-03-03 NOTE — TELEPHONE ENCOUNTER
Patient Quality Outreach    Patient is due for the following:   Diabetes -  A1C, Eye Exam, Microalbumin, and Foot Exam  Depression  -  PHQ-9 needed  Physical Annual Wellness Visit    Action(s) Taken:   Schedule a Annual Wellness Visit    Type of outreach:    Sent [email or web address] message.    Questions for provider review:    None           Ondina Christensen MA

## 2025-03-05 ENCOUNTER — TRANSFERRED RECORDS (OUTPATIENT)
Dept: HEALTH INFORMATION MANAGEMENT | Facility: CLINIC | Age: 65
End: 2025-03-05
Payer: COMMERCIAL

## 2025-03-22 ENCOUNTER — HEALTH MAINTENANCE LETTER (OUTPATIENT)
Age: 65
End: 2025-03-22

## 2025-04-11 SDOH — HEALTH STABILITY: PHYSICAL HEALTH: ON AVERAGE, HOW MANY DAYS PER WEEK DO YOU ENGAGE IN MODERATE TO STRENUOUS EXERCISE (LIKE A BRISK WALK)?: 0 DAYS

## 2025-04-11 SDOH — HEALTH STABILITY: PHYSICAL HEALTH: ON AVERAGE, HOW MANY MINUTES DO YOU ENGAGE IN EXERCISE AT THIS LEVEL?: 0 MIN

## 2025-04-11 ASSESSMENT — SOCIAL DETERMINANTS OF HEALTH (SDOH): HOW OFTEN DO YOU GET TOGETHER WITH FRIENDS OR RELATIVES?: ONCE A WEEK

## 2025-04-12 ENCOUNTER — HEALTH MAINTENANCE LETTER (OUTPATIENT)
Age: 65
End: 2025-04-12

## 2025-04-16 ENCOUNTER — OFFICE VISIT (OUTPATIENT)
Dept: FAMILY MEDICINE | Facility: CLINIC | Age: 65
End: 2025-04-16
Payer: COMMERCIAL

## 2025-04-16 VITALS
WEIGHT: 245.6 LBS | DIASTOLIC BLOOD PRESSURE: 78 MMHG | RESPIRATION RATE: 12 BRPM | TEMPERATURE: 97.7 F | BODY MASS INDEX: 38.55 KG/M2 | HEIGHT: 67 IN | OXYGEN SATURATION: 94 % | HEART RATE: 80 BPM | SYSTOLIC BLOOD PRESSURE: 136 MMHG

## 2025-04-16 DIAGNOSIS — Z13.6 SCREENING FOR CARDIOVASCULAR CONDITION: ICD-10-CM

## 2025-04-16 DIAGNOSIS — E11.9 TYPE 2 DIABETES MELLITUS WITHOUT COMPLICATION, WITHOUT LONG-TERM CURRENT USE OF INSULIN (H): ICD-10-CM

## 2025-04-16 DIAGNOSIS — Z80.42 FAMILY HX OF PROSTATE CANCER: ICD-10-CM

## 2025-04-16 DIAGNOSIS — Z86.0100 HISTORY OF COLONIC POLYPS: ICD-10-CM

## 2025-04-16 DIAGNOSIS — E78.5 HYPERLIPIDEMIA LDL GOAL <130: ICD-10-CM

## 2025-04-16 DIAGNOSIS — F32.1 CURRENT MODERATE EPISODE OF MAJOR DEPRESSIVE DISORDER, UNSPECIFIED WHETHER RECURRENT (H): ICD-10-CM

## 2025-04-16 DIAGNOSIS — M47.812 CERVICAL SPONDYLOSIS WITHOUT MYELOPATHY: ICD-10-CM

## 2025-04-16 DIAGNOSIS — I10 HYPERTENSION GOAL BP (BLOOD PRESSURE) < 140/90: Primary | ICD-10-CM

## 2025-04-16 DIAGNOSIS — M47.819 SPONDYLOARTHROPATHY: ICD-10-CM

## 2025-04-16 DIAGNOSIS — M48.07 STENOSIS OF LUMBOSACRAL SPINE: ICD-10-CM

## 2025-04-16 DIAGNOSIS — Z98.890 S/P LAMINECTOMY: ICD-10-CM

## 2025-04-16 DIAGNOSIS — G47.33 OSA (OBSTRUCTIVE SLEEP APNEA): ICD-10-CM

## 2025-04-16 DIAGNOSIS — Z98.890 S/P LUMBAR LAMINECTOMY: ICD-10-CM

## 2025-04-16 DIAGNOSIS — G47.00 INSOMNIA, UNSPECIFIED TYPE: ICD-10-CM

## 2025-04-16 DIAGNOSIS — Z96.652 STATUS POST TOTAL LEFT KNEE REPLACEMENT: ICD-10-CM

## 2025-04-16 DIAGNOSIS — Z12.5 SCREENING FOR PROSTATE CANCER: ICD-10-CM

## 2025-04-16 DIAGNOSIS — M48.02 SPINAL STENOSIS IN CERVICAL REGION: ICD-10-CM

## 2025-04-16 DIAGNOSIS — Z96.641 STATUS POST TOTAL REPLACEMENT OF RIGHT HIP: ICD-10-CM

## 2025-04-16 DIAGNOSIS — K59.00 CONSTIPATION, UNSPECIFIED CONSTIPATION TYPE: ICD-10-CM

## 2025-04-16 DIAGNOSIS — Z23 NEED FOR VACCINATION: ICD-10-CM

## 2025-04-16 DIAGNOSIS — M48.062 SPINAL STENOSIS OF LUMBAR REGION WITH NEUROGENIC CLAUDICATION: ICD-10-CM

## 2025-04-16 DIAGNOSIS — G45.9 TIA (TRANSIENT ISCHEMIC ATTACK): ICD-10-CM

## 2025-04-16 PROBLEM — E66.01 MORBID OBESITY DUE TO EXCESS CALORIES (H): Status: RESOLVED | Noted: 2017-07-03 | Resolved: 2025-04-16

## 2025-04-16 LAB
ANION GAP SERPL CALCULATED.3IONS-SCNC: 11 MMOL/L (ref 7–15)
BUN SERPL-MCNC: 22.9 MG/DL (ref 8–23)
CALCIUM SERPL-MCNC: 9.5 MG/DL (ref 8.8–10.4)
CHLORIDE SERPL-SCNC: 105 MMOL/L (ref 98–107)
CHOLEST SERPL-MCNC: 169 MG/DL
CREAT SERPL-MCNC: 0.76 MG/DL (ref 0.67–1.17)
CRP SERPL-MCNC: 3.04 MG/L
EGFRCR SERPLBLD CKD-EPI 2021: >90 ML/MIN/1.73M2
ERYTHROCYTE [SEDIMENTATION RATE] IN BLOOD BY WESTERGREN METHOD: 9 MM/HR (ref 0–20)
EST. AVERAGE GLUCOSE BLD GHB EST-MCNC: 157 MG/DL
FASTING STATUS PATIENT QL REPORTED: NO
FASTING STATUS PATIENT QL REPORTED: NO
GLUCOSE SERPL-MCNC: 241 MG/DL (ref 70–99)
HBA1C MFR BLD: 7.1 %
HCO3 SERPL-SCNC: 24 MMOL/L (ref 22–29)
HDLC SERPL-MCNC: 34 MG/DL
LDLC SERPL CALC-MCNC: ABNORMAL MG/DL
NONHDLC SERPL-MCNC: 135 MG/DL
POTASSIUM SERPL-SCNC: 4.2 MMOL/L (ref 3.4–5.3)
PSA SERPL DL<=0.01 NG/ML-MCNC: 4.69 NG/ML (ref 0–4.5)
SODIUM SERPL-SCNC: 140 MMOL/L (ref 135–145)
TRIGL SERPL-MCNC: 547 MG/DL

## 2025-04-16 PROCEDURE — 36415 COLL VENOUS BLD VENIPUNCTURE: CPT | Performed by: STUDENT IN AN ORGANIZED HEALTH CARE EDUCATION/TRAINING PROGRAM

## 2025-04-16 PROCEDURE — 80048 BASIC METABOLIC PNL TOTAL CA: CPT | Performed by: STUDENT IN AN ORGANIZED HEALTH CARE EDUCATION/TRAINING PROGRAM

## 2025-04-16 PROCEDURE — G0103 PSA SCREENING: HCPCS | Performed by: STUDENT IN AN ORGANIZED HEALTH CARE EDUCATION/TRAINING PROGRAM

## 2025-04-16 PROCEDURE — 80061 LIPID PANEL: CPT | Performed by: STUDENT IN AN ORGANIZED HEALTH CARE EDUCATION/TRAINING PROGRAM

## 2025-04-16 PROCEDURE — 83721 ASSAY OF BLOOD LIPOPROTEIN: CPT | Mod: 59 | Performed by: STUDENT IN AN ORGANIZED HEALTH CARE EDUCATION/TRAINING PROGRAM

## 2025-04-16 PROCEDURE — 83036 HEMOGLOBIN GLYCOSYLATED A1C: CPT | Performed by: STUDENT IN AN ORGANIZED HEALTH CARE EDUCATION/TRAINING PROGRAM

## 2025-04-16 PROCEDURE — 85652 RBC SED RATE AUTOMATED: CPT | Performed by: STUDENT IN AN ORGANIZED HEALTH CARE EDUCATION/TRAINING PROGRAM

## 2025-04-16 PROCEDURE — 86140 C-REACTIVE PROTEIN: CPT | Performed by: STUDENT IN AN ORGANIZED HEALTH CARE EDUCATION/TRAINING PROGRAM

## 2025-04-16 RX ORDER — LISINOPRIL 10 MG/1
10 TABLET ORAL DAILY
Qty: 90 TABLET | Refills: 3 | Status: SHIPPED | OUTPATIENT
Start: 2025-04-16

## 2025-04-16 RX ORDER — DULOXETIN HYDROCHLORIDE 30 MG/1
30 CAPSULE, DELAYED RELEASE ORAL EVERY MORNING
Qty: 90 CAPSULE | Refills: 1 | Status: SHIPPED | OUTPATIENT
Start: 2025-04-16

## 2025-04-16 RX ORDER — TRAZODONE HYDROCHLORIDE 100 MG/1
50-100 TABLET ORAL AT BEDTIME
Qty: 90 TABLET | Refills: 1 | Status: SHIPPED | OUTPATIENT
Start: 2025-04-16 | End: 2025-04-16

## 2025-04-16 RX ORDER — TRAZODONE HYDROCHLORIDE 100 MG/1
50-100 TABLET ORAL AT BEDTIME
Qty: 90 TABLET | Refills: 1 | Status: SHIPPED | OUTPATIENT
Start: 2025-04-16

## 2025-04-16 RX ORDER — TIZANIDINE 2 MG/1
2 TABLET ORAL 3 TIMES DAILY
COMMUNITY

## 2025-04-16 ASSESSMENT — PATIENT HEALTH QUESTIONNAIRE - PHQ9
SUM OF ALL RESPONSES TO PHQ QUESTIONS 1-9: 9
10. IF YOU CHECKED OFF ANY PROBLEMS, HOW DIFFICULT HAVE THESE PROBLEMS MADE IT FOR YOU TO DO YOUR WORK, TAKE CARE OF THINGS AT HOME, OR GET ALONG WITH OTHER PEOPLE: NOT DIFFICULT AT ALL
SUM OF ALL RESPONSES TO PHQ QUESTIONS 1-9: 9

## 2025-04-16 ASSESSMENT — PAIN SCALES - GENERAL: PAINLEVEL_OUTOF10: MODERATE PAIN (5)
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
## 2025-04-16 NOTE — NURSING NOTE
Prior to immunization administration, verified patients identity using patient s name and date of birth. Please see Immunization Activity for additional information.     Screening Questionnaire for Adult Immunization    Are you sick today?   No   Do you have allergies to medications, food, a vaccine component or latex?   Yes   Have you ever had a serious reaction after receiving a vaccination?   No   Do you have a long-term health problem with heart, lung, kidney, or metabolic disease (e.g., diabetes), asthma, a blood disorder, no spleen, complement component deficiency, a cochlear implant, or a spinal fluid leak?  Are you on long-term aspirin therapy?   No   Do you have cancer, leukemia, HIV/AIDS, or any other immune system problem?   No   Do you have a parent, brother, or sister with an immune system problem?   No   In the past 3 months, have you taken medications that affect  your immune system, such as prednisone, other steroids, or anticancer drugs; drugs for the treatment of rheumatoid arthritis, Crohn s disease, or psoriasis; or have you had radiation treatments?   No   Have you had a seizure, or a brain or other nervous system problem?   No   During the past year, have you received a transfusion of blood or blood    products, or been given immune (gamma) globulin or antiviral drug?   No   For women: Are you pregnant or is there a chance you could become       pregnant during the next month?   No   Have you received any vaccinations in the past 4 weeks?   No     Immunization questionnaire was positive for at least one answer.  Notified Dr. Adams.      Patient instructed to remain in clinic for 15 minutes afterwards, and to report any adverse reactions.     Screening performed by Michelle Marte LPN on 4/16/2025 at 4:23 PM.

## 2025-04-17 LAB — LDLC SERPL DIRECT ASSAY-MCNC: 88 MG/DL

## 2025-04-21 LAB
B LOCUS: NORMAL
B27TEST METHOD: NORMAL

## 2025-04-22 ENCOUNTER — MYC MEDICAL ADVICE (OUTPATIENT)
Dept: FAMILY MEDICINE | Facility: CLINIC | Age: 65
End: 2025-04-22
Payer: COMMERCIAL

## 2025-04-23 ENCOUNTER — PATIENT OUTREACH (OUTPATIENT)
Dept: CARE COORDINATION | Facility: CLINIC | Age: 65
End: 2025-04-23
Payer: COMMERCIAL

## 2025-05-16 ENCOUNTER — OFFICE VISIT (OUTPATIENT)
Dept: EDUCATION SERVICES | Facility: CLINIC | Age: 65
End: 2025-05-16
Attending: STUDENT IN AN ORGANIZED HEALTH CARE EDUCATION/TRAINING PROGRAM
Payer: COMMERCIAL

## 2025-05-16 DIAGNOSIS — E11.9 TYPE 2 DIABETES MELLITUS WITHOUT COMPLICATION, WITHOUT LONG-TERM CURRENT USE OF INSULIN (H): Primary | ICD-10-CM

## 2025-05-16 PROCEDURE — G0108 DIAB MANAGE TRN  PER INDIV: HCPCS | Performed by: DIETITIAN, REGISTERED

## 2025-05-16 RX ORDER — LANCETS
EACH MISCELLANEOUS
Qty: 100 EACH | Refills: 6 | Status: SHIPPED | OUTPATIENT
Start: 2025-05-16

## 2025-05-16 NOTE — LETTER
5/16/2025      Wenceslao Vasquez  [address] [place] [id number]      Dear Colleague,    Thank you for referring your patient, Wenceslao Vasquez, to the [facility]. Please see a copy of my visit note below.    Diabetes Self-Management Education & Support    Presents for: Initial Assessment for new diagnosis    Type of Service: In Person Visit      Assessment  Wenceslao was newly diagnosed with diabetes. He has a lot of questions today about what he can eat. We reviewed diabetes pathophysiology, healthy eating for diabetes, BG monitoring.     Patient's most recent   Lab Results   Component Value Date    A1C 7.1 04/16/2025    A1C 5.2 06/25/2020     is not meeting goal of <7.0    Diabetes knowledge and skills assessment:   Patient is knowledgeable in diabetes management concepts related to: none at this time    Based on learning assessment above, most appropriate setting for further diabetes education would be: Individual setting.    Care Plan and Education Provided:  Diabetes Pathophysiology  Healthy Eating: Balanced meals, Carbohydrate Counting, Consistency in amount and timing of carbohydrate intake, Label reading, Plate planning method, and Portion control  Being Active: Amount recommended (150 minutes moderate or 75 minutes vigorous activity and 2-3 days strength training per week), Finding a physical activity routine that works for you, and Precautions to take with exercise  Monitoring: Frequency of monitoring, Individual glucose targets, Log and interpret results, Purpose, and Proper technique    Patient verbalized understanding of diabetes self-management education concepts discussed, opportunities for ongoing education and support, and recommendations provided today.    Plan  Test BG once/day  Aim for 4-5 carbohydrates choices at each meal  Use Plate planner to set up meals      Topics to cover at upcoming visits: Healthy Eating, Being Active, Monitoring, Taking Medication, Problem Solving,  "Reducing Risks, and Healthy Coping    See Care Plan for co-developed, patient-state behavior change goals.    Education Materials Provided:  - M [facility] Healthy Living with Diabetes Book  - My Plate Planner       Subjective/Objective  Wenceslao is an 64 year old, presenting for the following diabetes education related to: Initial Assessment for new diagnosis  Diabetes education in the past 24mo: (Patient-Rptd) No  Focus of Visit: Patient Unsure  Diabetes type: (Patient-Rptd) Type 2  Disease course: (Patient-Rptd) Other (see Comments)  Please elaborate:: (Patient-Rptd) not sure  How confident are you filling out medical forms by yourself:: (Patient-Rptd) Not Assessed  Diabetes management related comments/concerns: (Patient-Rptd) what  i can eat and how much  Transportation concerns: No  Difficulty affording diabetes medication?: No  Difficulty affording diabetes testing supplies?: No  Other concerns: None  Cultural Influences/Ethnic Background:  Choose not to answer    Diabetes Symptoms & Complications:  Diabetes Related Symptoms: (Patient-Rptd) Fatigue, Neuropathy  Weight trend: (Patient-Rptd) Increasing  Symptom course: (Patient-Rptd) Worsening  Disease course: (Patient-Rptd) Other (see Comments)  Please elaborate:: (Patient-Rptd) not sure       Patient Problem List and Family Medical History reviewed for relevant medical history, current medical status, and diabetes risk factors.    Vitals:  There were no vitals taken for this visit.  Estimated body mass index is 38.18 kg/m  as calculated from the following:    Height as of 4/16/25: 1.708 m (5' 7.25\").    Weight as of 4/16/25: 111.4 kg (245 lb 9.6 oz).   Last 3 BP:   BP Readings from Last 3 Encounters:   04/16/25 136/78   05/31/24 114/72   05/30/24 129/79       History   Smoking Status     Never   Smokeless Tobacco     Never       Labs:  Lab Results   Component Value Date    A1C 7.1 04/16/2025    A1C 5.2 06/25/2020     Lab Results   Component Value Date    GLC " "241 04/16/2025     03/21/2022     09/12/2019     Lab Results   Component Value Date    LDL  04/16/2025      Comment:      Cannot estimate LDL when triglyceride exceeds 400 mg/dL    LDL 88 04/16/2025    LDL 77 06/07/2018     HDL Cholesterol   Date Value Ref Range Status   06/07/2018 31 (L) >39 mg/dL Final     Direct Measure HDL   Date Value Ref Range Status   04/16/2025 34 (L) >=40 mg/dL Final     GFR Estimate   Date Value Ref Range Status   04/16/2025 >90 >60 mL/min/1.73m2 Final     Comment:     eGFR calculated using 2021 CKD-EPI equation.   09/12/2019 >90 >60 mL/min/[1.73_m2] Final     Comment:     Non  GFR Calc  Starting 12/18/2018, serum creatinine based estimated GFR (eGFR) will be   calculated using the Chronic Kidney Disease Epidemiology Collaboration   (CKD-EPI) equation.       GFR Estimate If Black   Date Value Ref Range Status   09/12/2019 >90 >60 mL/min/[1.73_m2] Final     Comment:      GFR Calc  Starting 12/18/2018, serum creatinine based estimated GFR (eGFR) will be   calculated using the Chronic Kidney Disease Epidemiology Collaboration   (CKD-EPI) equation.       Lab Results   Component Value Date    CR 0.76 04/16/2025    CR 0.91 09/12/2019     No results found for: \"MICROL\", \"UMALCR\", \"UCRR\"        5/11/2025   Healthy Eating   Healthy Eating Assessed Today Yes   Cultural/Christianity diet restrictions? No   Do you have any food allergies or intolerances? No   Who cooks/prepares meals for you? Self   Who purchases food in  your home? Spouse;Self   How many times a week on average do you eat food made away from home (restaurant/take-out)? 0   Meals include Dinner;Breakfast;Lunch   Breakfast Rice chex OR other cereal with 2% milk occasionally with banana   Lunch sandwich with irwin , lunch meat, cheese with chips  OR leftovers   Dinner potatoes with pork chops, cream soup with cream corn OR storganoff OR mac and cheese and ring bologna -- sometimes with corn or " green beans   Snacks bowl of ice cream occasionally   Beverages Water;Soda       regular sprite- a couple cans/day   Has patient met with a dietitian in the past? No         5/11/2025   Being Active   Being Active Assessed Today Yes   Barrier to exercise Physical limitation         5/11/2025   Monitoring   Monitoring Assessed Today Yes   Times checking blood sugar at home (number) Never     Diabetes Medication(s)       Biguanides       metFORMIN (GLUCOPHAGE XR) 500 MG 24 hr tablet Take 1 tablet (500 mg) by mouth daily (with dinner).              5/11/2025   Taking Medications   Taking Medication Assessed Today Yes   Current Treatments Oral Medication (taken by mouth)   Problems taking diabetes medications regularly? No   Diabetes medication side effects? No         5/11/2025   Problem Solving   Problem Solving Assessed Today Yes   Is the patient at risk for hypoglycemia? No   Is the patient at risk for DKA? No           5/11/2025   Reducing Risks   Reducing Risks Assessed Today Yes   Has dilated eye exam at least once a year? No   Sees dentist every 6 months? Yes   Feet checked by healthcare provider in the last year? Yes         5/11/2025   Healthy Coping: Diabetes Distress Assessment   Healthy Coping Assessed Today Yes   I feel burned out by all of the attention and effort that diabetes demands of me. 2 - A Little Problem   It bothers me that diabetes seems to control my life. 2 - A Little Problem   I am frustrated that even when I do what I am supposed to for my diabetes, it doesn't seem to make a difference. 1 - Not a Problem   No matter how hard I try with my diabetes, it feels like it will never be good enough. 2 - A Little Problem   I am so tired of having to worry about diabetes all the time. 2 - A Little Problem   When it comes to my diabetes, I often feel like a failure. 2 - A Little Problem   It depresses me when I realize that my diabetes will likely never go away. 2 - A Little Problem   Living with  diabetes is overwhelming for me. 2 - A Little Problem   T2 DDAS Total Score (0 - 1.9 Little or no DD, 2.0 - 2.9 Moderate DD,  3.0+ High DD) 1.9    Informal Support system: Spouse       Patient-reported       Erika Smith RD [facility]  Diabetes Education Department  [facility], [place]  Phone: [phone]  [phone]    Time Spent: 60 minutes  Encounter Type: Individual    Any diabetes medication dose changes were made via the [facility] Standing Orders under the patient's referring provider.    Again, thank you for allowing me to participate in the care of your patient.        Sincerely,        Erika Smith RD    Electronically signed

## 2025-05-16 NOTE — PATIENT INSTRUCTIONS
Sample 4 Carb Breakfast Choices -Carbohydrate choices found in (  )  1 cup of cooked cereal (2)   8 oz skim milk (1)  1 cup berries (1)  2 tablespoons of nuts (0)   1 small whole grain (store bought) bagel (2)  1 fried egg (0)  2 oz lean ham (0)  8 oz skim milk (1)  1 orange (1) 6 oz light yogurt (1)  1 cup berries (1)  2 slices whole grain toast (2)  1-2 Tablespoons peanut butter (0) 1 medium whole grain tortilla (2)  1 medium banana (2)  1 tablespoon peanut butter (0)    cup cottage cheese (0)   8 oz skim milk (1) + 1 packet instant breakfast mix (1)   1 whole grapefruit (2)   2 slices whole grain toast (2)  1 cup skim milk (1)    cup grapes (1)   1 cup cubed sweet potatoes (2)  1 cup skim milk (1)  1 slice whole grain toast (1) with 1 tsp butter (0)  1 scrambled egg (0) 1 whole grain English muffin (2)  1 medium banana (2)  1 Tablespoon peanut butter (0)     4 Carbohydrate Choice Meal Plans for Lunch or Supper   Carbohydrate choices found in (   )  1 cup whole grain spaghetti (3) +   cup low sodium pasta sauce (1) + 3 oz lean ground turkey (0) + 2 cups lettuce/veggies (0) + 2 Tbsp dressing (0) 2 slices of whole grain bread (2)  3 oz lean turkey (0) + lettuce/tomato (0)  2 tsp irwin (0)  6 whole grain crackers (1)  1 small apple (1) 1 cup of low sodium broth based soup (1) +  2 slices of whole grain bread (2)   2 oz of low fat cheese (0) + 1 tsp butter (0)  Carrots/celery (0)    cup grapes (1) 2 cups lettuce salad (0) +   cup garbanzo beans (1) + +   cup tuna (0) + 2 Tablespoons low-fat vinaigrette salad dressing (0)  + 6 oz light yogurt (1) + 3 rye crackers (1) + 8 oz skim milk (1)    2 cups low sodium broth based soup (2) + 6 saltine crackers (1) + 1 small apple (1) + broccoli/cauliflower and low fat dip (0)   3 oz lean steak (0) + medium baked potato (2) + 1 tsp low fat sour cream (0) + 1 cup green beans (0) + 1 small dinner roll (1)   1 cup berries (1) + 1 tbsp light whipped cream (0) 2 cups lettuce salad (0)    3 oz grilled chicken (0)  1-2 Tsbp light Caesar dressing (0)  + 1 Tbps grated cheese (0)  1 cup grapes (2)  5 Triscuit crackers (1)  8 oz skim milk (1)   3 oz chicken (0)  1 cup sweet potato (2)  1 cup asparagus (0)  1 small apple (1)  16 oz sparkling water (unsweetened)  1-8 oz glass skim milk (1)   1 hamburger rojelio (0) on hamburger bun (2) + small french fries (2) + + 1 garden salad (0) with 1 package of vinaigrette (0) + 1 cup baby carrots + 1/2 cup green beans- cooked (0) 2 slices whole grain bread (2)+ 2 oz lean ham (0) + leaf lettuce/tomato/onion (0)  2 teaspoons irwin (0)  1 medium pear (2)  1 cup raw veggie sticks (0) 3 slices thin crust pizza (3) + 2 cups lettuce salad (0) + 10 cherry tomatoes (0) + 2 Tbsp light salad dressing (0) + 1 small peach (1) 3-6  whole grain tortillas (3) +   cup fat free refried beans (1) + 3 oz shredded lean beef (0) + 2 Tsbp salsa (0)+ lettuce/tomato (0) + 1 Tbsp light sour cream (0)   1 cup low sodium chicken noodle soup (1) + 2 slices whole grain bread (2) + 2 oz lean turkey (0) + 1 oz low fat cheese (0) + 1-2 teaspoons of butter or margarine    cup peaches (1) 3 oz turkey (0) + 1 cup mashed potatoes (2) + 1 tsp butter (0) + 1 cup green beans (0) +   cup unsweetened apple sauce (1)  1-8oz  cup of skim milk (1) 3 oz chicken + 3 medium pierogis (3) + 1/3 cup cabbage (0) +   cup unsweetened applesauce (1) +   cup green beans (0) I cup of beef stroganoff (0) + 1 cup egg noodles (3) + 1 cup broccoli (0) + 1 cup carrots (0) + 1 cup of watermelon (1)     CLAIRE Raymond [facility]  Diabetes Education Department  [facility], [place]  Phone: [phone]  [phone]

## 2025-05-20 NOTE — PROGRESS NOTES
Diabetes Self-Management Education & Support    Presents for: Initial Assessment for new diagnosis    Type of Service: In Person Visit      Assessment  Wenceslao was newly diagnosed with diabetes. He has a lot of questions today about what he can eat. We reviewed diabetes pathophysiology, healthy eating for diabetes, BG monitoring.     Patient's most recent   Lab Results   Component Value Date    A1C 7.1 04/16/2025    A1C 5.2 06/25/2020     is not meeting goal of <7.0    Diabetes knowledge and skills assessment:   Patient is knowledgeable in diabetes management concepts related to: none at this time    Based on learning assessment above, most appropriate setting for further diabetes education would be: Individual setting.    Care Plan and Education Provided:  Diabetes Pathophysiology  Healthy Eating: Balanced meals, Carbohydrate Counting, Consistency in amount and timing of carbohydrate intake, Label reading, Plate planning method, and Portion control  Being Active: Amount recommended (150 minutes moderate or 75 minutes vigorous activity and 2-3 days strength training per week), Finding a physical activity routine that works for you, and Precautions to take with exercise  Monitoring: Frequency of monitoring, Individual glucose targets, Log and interpret results, Purpose, and Proper technique    Patient verbalized understanding of diabetes self-management education concepts discussed, opportunities for ongoing education and support, and recommendations provided today.    Plan  Test BG once/day  Aim for 4-5 carbohydrates choices at each meal  Use Plate planner to set up meals      Topics to cover at upcoming visits: Healthy Eating, Being Active, Monitoring, Taking Medication, Problem Solving, Reducing Risks, and Healthy Coping    See Care Plan for co-developed, patient-state behavior change goals.    Education Materials Provided:  - M [facility] Healthy Living with Diabetes Book  - My Plate Planner  "      Subjective/Objective  Wenceslao is an 64 year old, presenting for the following diabetes education related to: Initial Assessment for new diagnosis  Diabetes education in the past 24mo: (Patient-Rptd) No  Focus of Visit: Patient Unsure  Diabetes type: (Patient-Rptd) Type 2  Disease course: (Patient-Rptd) Other (see Comments)  Please elaborate:: (Patient-Rptd) not sure  How confident are you filling out medical forms by yourself:: (Patient-Rptd) Not Assessed  Diabetes management related comments/concerns: (Patient-Rptd) what  i can eat and how much  Transportation concerns: No  Difficulty affording diabetes medication?: No  Difficulty affording diabetes testing supplies?: No  Other concerns: None  Cultural Influences/Ethnic Background:  Choose not to answer    Diabetes Symptoms & Complications:  Diabetes Related Symptoms: (Patient-Rptd) Fatigue, Neuropathy  Weight trend: (Patient-Rptd) Increasing  Symptom course: (Patient-Rptd) Worsening  Disease course: (Patient-Rptd) Other (see Comments)  Please elaborate:: (Patient-Rptd) not sure       Patient Problem List and Family Medical History reviewed for relevant medical history, current medical status, and diabetes risk factors.    Vitals:  There were no vitals taken for this visit.  Estimated body mass index is 38.18 kg/m  as calculated from the following:    Height as of 4/16/25: 1.708 m (5' 7.25\").    Weight as of 4/16/25: 111.4 kg (245 lb 9.6 oz).   Last 3 BP:   BP Readings from Last 3 Encounters:   04/16/25 136/78   05/31/24 114/72   05/30/24 129/79       History   Smoking Status    Never   Smokeless Tobacco    Never       Labs:  Lab Results   Component Value Date    A1C 7.1 04/16/2025    A1C 5.2 06/25/2020     Lab Results   Component Value Date     04/16/2025     03/21/2022     09/12/2019     Lab Results   Component Value Date    LDL  04/16/2025      Comment:      Cannot estimate LDL when triglyceride exceeds 400 mg/dL    LDL 88 04/16/2025    " "LDL 77 06/07/2018     HDL Cholesterol   Date Value Ref Range Status   06/07/2018 31 (L) >39 mg/dL Final     Direct Measure HDL   Date Value Ref Range Status   04/16/2025 34 (L) >=40 mg/dL Final     GFR Estimate   Date Value Ref Range Status   04/16/2025 >90 >60 mL/min/1.73m2 Final     Comment:     eGFR calculated using 2021 CKD-EPI equation.   09/12/2019 >90 >60 mL/min/[1.73_m2] Final     Comment:     Non  GFR Calc  Starting 12/18/2018, serum creatinine based estimated GFR (eGFR) will be   calculated using the Chronic Kidney Disease Epidemiology Collaboration   (CKD-EPI) equation.       GFR Estimate If Black   Date Value Ref Range Status   09/12/2019 >90 >60 mL/min/[1.73_m2] Final     Comment:      GFR Calc  Starting 12/18/2018, serum creatinine based estimated GFR (eGFR) will be   calculated using the Chronic Kidney Disease Epidemiology Collaboration   (CKD-EPI) equation.       Lab Results   Component Value Date    CR 0.76 04/16/2025    CR 0.91 09/12/2019     No results found for: \"MICROL\", \"UMALCR\", \"UCRR\"        5/11/2025   Healthy Eating   Healthy Eating Assessed Today Yes   Cultural/Latter-day diet restrictions? No   Do you have any food allergies or intolerances? No   Who cooks/prepares meals for you? Self   Who purchases food in  your home? Spouse;Self   How many times a week on average do you eat food made away from home (restaurant/take-out)? 0   Meals include Dinner;Breakfast;Lunch   Breakfast Rice chex OR other cereal with 2% milk occasionally with banana   Lunch sandwich with irwin , lunch meat, cheese with chips  OR leftovers   Dinner potatoes with pork chops, cream soup with cream corn OR storganoff OR mac and cheese and ring bologna -- sometimes with corn or green beans   Snacks bowl of ice cream occasionally   Beverages Water;Soda       regular sprite- a couple cans/day   Has patient met with a dietitian in the past? No         5/11/2025   Being Active   Being Active " Assessed Today Yes   Barrier to exercise Physical limitation         5/11/2025   Monitoring   Monitoring Assessed Today Yes   Times checking blood sugar at home (number) Never     Diabetes Medication(s)       Biguanides       metFORMIN (GLUCOPHAGE XR) 500 MG 24 hr tablet Take 1 tablet (500 mg) by mouth daily (with dinner).              5/11/2025   Taking Medications   Taking Medication Assessed Today Yes   Current Treatments Oral Medication (taken by mouth)   Problems taking diabetes medications regularly? No   Diabetes medication side effects? No         5/11/2025   Problem Solving   Problem Solving Assessed Today Yes   Is the patient at risk for hypoglycemia? No   Is the patient at risk for DKA? No           5/11/2025   Reducing Risks   Reducing Risks Assessed Today Yes   Has dilated eye exam at least once a year? No   Sees dentist every 6 months? Yes   Feet checked by healthcare provider in the last year? Yes         5/11/2025   Healthy Coping: Diabetes Distress Assessment   Healthy Coping Assessed Today Yes   I feel burned out by all of the attention and effort that diabetes demands of me. 2 - A Little Problem   It bothers me that diabetes seems to control my life. 2 - A Little Problem   I am frustrated that even when I do what I am supposed to for my diabetes, it doesn't seem to make a difference. 1 - Not a Problem   No matter how hard I try with my diabetes, it feels like it will never be good enough. 2 - A Little Problem   I am so tired of having to worry about diabetes all the time. 2 - A Little Problem   When it comes to my diabetes, I often feel like a failure. 2 - A Little Problem   It depresses me when I realize that my diabetes will likely never go away. 2 - A Little Problem   Living with diabetes is overwhelming for me. 2 - A Little Problem   T2 DDAS Total Score (0 - 1.9 Little or no DD, 2.0 - 2.9 Moderate DD,  3.0+ High DD) 1.9    Informal Support system: Spouse       Patient-reported       Erika  CLAIRE Smith [facility]  Diabetes Education Department  [facility], [place]  Phone: [phone]  [phone]    Time Spent: 60 minutes  Encounter Type: Individual    Any diabetes medication dose changes were made via the [facility] Standing Orders under the patient's referring provider.

## 2025-06-10 ENCOUNTER — NURSE TRIAGE (OUTPATIENT)
Dept: FAMILY MEDICINE | Facility: CLINIC | Age: 65
End: 2025-06-10
Payer: COMMERCIAL

## 2025-06-10 NOTE — TELEPHONE ENCOUNTER
Nurse Triage SBAR    Is this a 2nd Level Triage? NO    Situation: patient is having pain urinating for 2 days. He has also having a harder time urinating due to the pain.    Background:   Assessment: no fever, no pain or swelling in scrotum. Pain with urination is about 5 out of 10.    Protocol Recommended Disposition:   See in Office Today    Recommendation: per protocol patient advised to be seen. Appointment made.     Dahlia Ewing RN on 6/10/2025 at 4:45 PM      Reason for Disposition   All other males with painful urination, or patient wants to be seen    Additional Information   Negative: Shock suspected (e.g., cold/pale/clammy skin, too weak to stand, low BP, rapid pulse)   Negative: Sounds like a life-threatening emergency to the triager   Negative: Unable to urinate (or only a few drops) and bladder feels very full   Negative: Swollen scrotum   Negative: Pain in scrotum or testicle that persists > 1 hour   Negative: Fever > 100.4 F (38.0 C)   Negative: Vomiting   Negative: Patient sounds very sick or weak to the triager   Negative: SEVERE pain with urination   Negative: Side (flank) or lower back pain present   Negative: Taking antibiotic > 24 hours for UTI and fever persists   Negative: Taking antibiotic > 3 days for UTI and painful urination not improved   Negative: Taking treatment > 3 days for STI (e.g., penile discharge from gonorrhea, chlamydia) and painful urination not improved    Protocols used: Urination Pain - Male-A-OH

## 2025-06-11 ENCOUNTER — OFFICE VISIT (OUTPATIENT)
Dept: FAMILY MEDICINE | Facility: CLINIC | Age: 65
End: 2025-06-11
Payer: COMMERCIAL

## 2025-06-11 VITALS
OXYGEN SATURATION: 98 % | TEMPERATURE: 97.4 F | HEART RATE: 71 BPM | WEIGHT: 231 LBS | RESPIRATION RATE: 12 BRPM | SYSTOLIC BLOOD PRESSURE: 130 MMHG | DIASTOLIC BLOOD PRESSURE: 64 MMHG | BODY MASS INDEX: 35.91 KG/M2

## 2025-06-11 DIAGNOSIS — Z98.890 S/P CYSTOURETHROSCOPY WITH DILATION OF URETHRAL STRICTURE: ICD-10-CM

## 2025-06-11 DIAGNOSIS — R30.9 PAINFUL URINATION: Primary | ICD-10-CM

## 2025-06-11 DIAGNOSIS — N35.919 STRICTURE OF MALE URETHRA, UNSPECIFIED STRICTURE TYPE: ICD-10-CM

## 2025-06-11 DIAGNOSIS — R97.20 ELEVATED PROSTATE SPECIFIC ANTIGEN (PSA): ICD-10-CM

## 2025-06-11 LAB
ALBUMIN UR-MCNC: NEGATIVE MG/DL
APPEARANCE UR: CLEAR
BILIRUB UR QL STRIP: NEGATIVE
COLOR UR AUTO: YELLOW
GLUCOSE UR STRIP-MCNC: NEGATIVE MG/DL
HGB UR QL STRIP: NEGATIVE
KETONES UR STRIP-MCNC: NEGATIVE MG/DL
LEUKOCYTE ESTERASE UR QL STRIP: NEGATIVE
NITRATE UR QL: NEGATIVE
PH UR STRIP: 6 [PH] (ref 5–7)
SP GR UR STRIP: 1.02 (ref 1–1.03)
UROBILINOGEN UR STRIP-MCNC: NORMAL MG/DL

## 2025-06-11 PROCEDURE — 3075F SYST BP GE 130 - 139MM HG: CPT | Performed by: NURSE PRACTITIONER

## 2025-06-11 PROCEDURE — 99213 OFFICE O/P EST LOW 20 MIN: CPT | Performed by: NURSE PRACTITIONER

## 2025-06-11 PROCEDURE — 3078F DIAST BP <80 MM HG: CPT | Performed by: NURSE PRACTITIONER

## 2025-06-11 PROCEDURE — G2211 COMPLEX E/M VISIT ADD ON: HCPCS | Performed by: NURSE PRACTITIONER

## 2025-06-11 PROCEDURE — 81003 URINALYSIS AUTO W/O SCOPE: CPT | Performed by: NURSE PRACTITIONER

## 2025-06-11 RX ORDER — TAMSULOSIN HYDROCHLORIDE 0.4 MG/1
0.8 CAPSULE ORAL DAILY
Qty: 60 CAPSULE | Refills: 0 | Status: SHIPPED | OUTPATIENT
Start: 2025-06-11

## 2025-06-11 NOTE — PROGRESS NOTES
Assessment & Plan     Painful urination  Elevated prostate specific antigen (PSA)  Stricture of male urethra, unspecified stricture type  S/P cystourethroscopy with dilation of urethral stricture    - UA Macroscopic with reflex to Microscopic and Culture - Lab Collect; Future  - UA Macroscopic with reflex to Microscopic and Culture - Lab Collect  - tamsulosin (FLOMAX) 0.4 MG capsule; Take 2 capsules (0.8 mg) by mouth daily.  - Adult Urology  Referral; Future    Wenceslao presents to clinic today with concerns of urinary symptoms, painful urination, difficulty emptying the bladder and starting/stopping stream. He has had similar symptoms previously, this was associated with urinary stricture that required dilation. This was last performed in 2019 on chart review. Urine analysis today is negative for infection. He is scheduled for PSA recheck next month. Discussed starting Tamsulosin for his current symptoms. He notes he has been on this in the past, again last was in 2019. They are unsure that is it was overly helpful for his symptoms at that time, dosing with 0.4mg daily. Discussed restarting medication and increasing to 0.8mg daily. In addition he will have urology follow-up. Reviewed symptoms of infection and worrisome symptoms needing more emergent evaluation today. Both Wenceslao and his wife verbalized understanding. Follow-up with PCP as previously scheduled or sooner if needed.     I explained my diagnostic considerations and recommendations to the patient, who voiced understanding and agreement with the assessment and treatment plan. All questions were answered to patient's apparent satisfaction. We discussed potential side effects of any prescribed or recommended therapies, as well as expectations for response to treatments and importance of lifestyle measures that may improve symptoms. Patient was advised to contact our office if there are new symptoms or no improvement or worsening of conditions or  symptoms.    The longitudinal plan of care for the diagnosis(es)/condition(s) as documented were addressed during this visit. Due to the added complexity in care, I will continue to support Wenceslao in the subsequent management and with ongoing continuity of care.              Subjective   Wenceslao is a 64 year old, presenting for the following health issues:  Urinary Problem (Slow stream, pain in testicles,)      6/11/2025    11:11 AM   Additional Questions   Roomed by Krista     History of Present Illness       Reason for visit:  Urineary problems  Symptom onset:  1-2 weeks ago   He is taking medications regularly.      Wenceslao presents to clinic today with concerns of difficulty urinating for the past week. He reports pain from the scrotum into the penis, specifically when urinating. He does not feel as though he is emptying his bladder. He denies any redness or swelling. He does have a history of urinary stricture in the past that needed to be dilated. He feels his symptoms currently are similar. He does have a history of BPH, most recent PSA was elevated at 4.69. He is going to be having this rechecked in July with his PCP. He has not had any fever, chills, rectal pain, abdominal pain, diarrhea, chest pain or difficulty breathing.     Patient Active Problem List   Diagnosis    CHELSY (obstructive sleep apnea)    Hyperlipidemia LDL goal <130    Sciatica    Low back pain    Stenosis of lumbosacral spine    Hypertension goal BP (blood pressure) < 140/90    Spinal stenosis in cervical region    Cervical spondylosis without myelopathy    Pneumonia    Syncope    Exertional chest pain    Lumbar stenosis    S/P laminectomy    S/P lumbar laminectomy    Altered mental status    TIA (transient ischemic attack)    Meningitis, unspecified    Other chronic pain    Paresthesia of skin    Polyneuropathy, unspecified    Unspecified thoracic, thoracolumbar and lumbosacral intervertebral disc disorder    Status post total left knee  replacement    Type 2 diabetes mellitus without complication, without long-term current use of insulin (H)    Status post total replacement of right hip    Urethral stricture in male    History of colonic polyps    Current moderate episode of major depressive disorder, unspecified whether recurrent (H)     Current Outpatient Medications   Medication Sig Dispense Refill    acetaminophen (TYLENOL) 500 MG tablet Take 1,000 mg by mouth      blood glucose (NO BRAND SPECIFIED) test strip Use to test blood sugar 1 times daily or as directed. To accompany: Blood Glucose Monitor Brands: per insurance. 100 strip 6    blood glucose monitoring (NO BRAND SPECIFIED) meter device kit Use to test blood sugar 1 times daily or as directed. Preferred blood glucose meter OR supplies to accompany: Blood Glucose Monitor Brands: per insurance. 1 kit 0    DULoxetine (CYMBALTA) 30 MG capsule Take 1 capsule (30 mg) by mouth every morning. 90 capsule 1    lisinopril (ZESTRIL) 10 MG tablet Take 1 tablet (10 mg) by mouth daily. 90 tablet 3    metFORMIN (GLUCOPHAGE XR) 500 MG 24 hr tablet Take 1 tablet (500 mg) by mouth daily (with dinner). 90 tablet 3    Multiple Vitamin (MULTI VITAMIN MENS PO) Take 1 tablet by mouth daily.      oxyCODONE-acetaminophen (PERCOCET)  MG per tablet Take 1 tablet by mouth daily as needed for severe pain      tamsulosin (FLOMAX) 0.4 MG capsule Take 2 capsules (0.8 mg) by mouth daily. 60 capsule 0    thin (NO BRAND SPECIFIED) lancets Use with lancing device to check glucose 1 times daily per  direction. To accompany: Blood Glucose Monitor Brands: per insurance. 100 each 6    tiZANidine (ZANAFLEX) 2 MG tablet Take 2 mg by mouth 3 times daily.      traZODone (DESYREL) 100 MG tablet Take 0.5-1 tablets ( mg) by mouth at bedtime. 90 tablet 1     No current facility-administered medications for this visit.     Facility-Administered Medications Ordered in Other Visits   Medication Dose Route Frequency  Provider Last Rate Last Admin    dexAMETHasone PF (DECADRON) injection 4 mg  4 mg Intravenous Once PRN Wolfgang Rodney APRN CRNA        naloxone (NARCAN) injection 0.1 mg  0.1 mg Intravenous Q2 Min PRN Wolfgang Rodney APRN CRNA        ondansetron (ZOFRAN ODT) ODT tab 4 mg  4 mg Oral Q30 Min PRN Wolfgang Rodney APRN CRNA        Or    ondansetron (ZOFRAN) injection 4 mg  4 mg Intravenous Q30 Min PRN Wolfgang Rodney APRN CRNA                     Review of Systems  Constitutional, HEENT, cardiovascular, pulmonary, gi and gu systems are negative, except as otherwise noted.      Objective    /64   Pulse 71   Temp 97.4  F (36.3  C) (Temporal)   Resp 12   Wt 104.8 kg (231 lb)   SpO2 98%   BMI 35.91 kg/m    Body mass index is 35.91 kg/m .  Physical Exam  Vitals reviewed.   Constitutional:       General: He is not in acute distress.     Appearance: Normal appearance.   Eyes:      Extraocular Movements: Extraocular movements intact.      Conjunctiva/sclera: Conjunctivae normal.   Pulmonary:      Effort: Pulmonary effort is normal.   Skin:     General: Skin is warm and dry.   Neurological:      Mental Status: He is alert and oriented to person, place, and time. Mental status is at baseline.   Psychiatric:         Mood and Affect: Mood normal.         Behavior: Behavior normal.            Results for orders placed or performed in visit on 06/11/25 (from the past 24 hours)   UA Macroscopic with reflex to Microscopic and Culture - Lab Collect    Specimen: Urine, NOS   Result Value Ref Range    Color Urine Yellow Colorless, Straw, Light Yellow, Yellow    Appearance Urine Clear Clear    Glucose Urine Negative Negative mg/dL    Bilirubin Urine Negative Negative    Ketones Urine Negative Negative mg/dL    Specific Gravity Urine 1.020 1.003 - 1.035    Blood Urine Negative Negative    pH Urine 6.0 5.0 - 7.0    Protein Albumin Urine Negative Negative mg/dL    Urobilinogen Urine Normal Normal mg/dL    Nitrite Urine Negative  Negative    Leukocyte Esterase Urine Negative Negative    Narrative    Microscopic not indicated           Signed Electronically by: Anali Barnard NP

## 2025-06-23 ENCOUNTER — OFFICE VISIT (OUTPATIENT)
Dept: UROLOGY | Facility: CLINIC | Age: 65
End: 2025-06-23
Payer: COMMERCIAL

## 2025-06-23 VITALS
DIASTOLIC BLOOD PRESSURE: 72 MMHG | WEIGHT: 231 LBS | TEMPERATURE: 96.3 F | SYSTOLIC BLOOD PRESSURE: 128 MMHG | HEIGHT: 67 IN | BODY MASS INDEX: 36.26 KG/M2

## 2025-06-23 DIAGNOSIS — Z98.890 S/P CYSTOURETHROSCOPY WITH DILATION OF URETHRAL STRICTURE: ICD-10-CM

## 2025-06-23 DIAGNOSIS — R97.20 ELEVATED PROSTATE SPECIFIC ANTIGEN (PSA): Primary | ICD-10-CM

## 2025-06-23 DIAGNOSIS — N35.919 STRICTURE OF MALE URETHRA, UNSPECIFIED STRICTURE TYPE: ICD-10-CM

## 2025-06-23 PROCEDURE — 3074F SYST BP LT 130 MM HG: CPT | Performed by: UROLOGY

## 2025-06-23 PROCEDURE — 99203 OFFICE O/P NEW LOW 30 MIN: CPT | Performed by: UROLOGY

## 2025-06-23 PROCEDURE — 51798 US URINE CAPACITY MEASURE: CPT | Performed by: UROLOGY

## 2025-06-23 PROCEDURE — 3078F DIAST BP <80 MM HG: CPT | Performed by: UROLOGY

## 2025-06-23 PROCEDURE — 1126F AMNT PAIN NOTED NONE PRSNT: CPT | Performed by: UROLOGY

## 2025-06-23 RX ORDER — CELECOXIB 100 MG/1
100 CAPSULE ORAL 2 TIMES DAILY
COMMUNITY
Start: 2025-05-28 | End: 2025-06-26

## 2025-06-23 ASSESSMENT — PAIN SCALES - GENERAL: PAINLEVEL_OUTOF10: NO PAIN (0)
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
## 2025-06-23 NOTE — PATIENT INSTRUCTIONS
Please call [phone] to schedule your prostate MRI at the [facility]. We will contact you with results once Dr. Ambriz has reviewed them. Please call our office if you have questions, [phone].      Please call [phone] with any questions regarding today's visit.

## 2025-07-18 ENCOUNTER — HOSPITAL ENCOUNTER (OUTPATIENT)
Dept: GENERAL RADIOLOGY | Facility: CLINIC | Age: 65
Discharge: HOME | End: 2025-07-18
Attending: UROLOGY | Admitting: UROLOGY
Payer: COMMERCIAL

## 2025-07-18 PROCEDURE — 51610 INJECTION FOR BLADDER X-RAY: CPT

## 2025-07-18 PROCEDURE — 255N000002 HC RX 255 OP 636: Performed by: RADIOLOGY

## 2025-07-18 RX ADMIN — DIATRIZOATE MEGLUMINE 105 ML: 300 INJECTION, SOLUTION INTRAVENOUS at 14:19

## 2025-08-04 ENCOUNTER — OFFICE VISIT (OUTPATIENT)
Dept: UROLOGY | Facility: CLINIC | Age: 65
End: 2025-08-04
Payer: COMMERCIAL

## 2025-08-04 DIAGNOSIS — Z98.890 S/P CYSTOURETHROSCOPY WITH DILATION OF URETHRAL STRICTURE: Primary | ICD-10-CM

## 2025-08-04 DIAGNOSIS — N35.919 STRICTURE OF MALE URETHRA, UNSPECIFIED STRICTURE TYPE: ICD-10-CM

## 2025-08-04 PROCEDURE — 52000 CYSTOURETHROSCOPY: CPT | Performed by: UROLOGY

## 2025-08-22 ENCOUNTER — ANCILLARY PROCEDURE (OUTPATIENT)
Dept: MRI IMAGING | Facility: CLINIC | Age: 65
End: 2025-08-22
Attending: UROLOGY
Payer: COMMERCIAL

## 2025-08-22 DIAGNOSIS — R97.20 ELEVATED PROSTATE SPECIFIC ANTIGEN (PSA): ICD-10-CM

## 2025-08-22 PROCEDURE — 72197 MRI PELVIS W/O & W/DYE: CPT | Performed by: RADIOLOGY

## 2025-08-22 PROCEDURE — A9585 GADOBUTROL INJECTION: HCPCS | Mod: JZ | Performed by: RADIOLOGY

## 2025-08-22 RX ORDER — GADOBUTROL 604.72 MG/ML
10 INJECTION INTRAVENOUS ONCE
Status: COMPLETED | OUTPATIENT
Start: 2025-08-22 | End: 2025-08-22

## 2025-08-22 RX ADMIN — GADOBUTROL 10 ML: 604.72 INJECTION INTRAVENOUS at 14:57

## 2025-09-03 ENCOUNTER — OFFICE VISIT (OUTPATIENT)
Dept: UROLOGY | Facility: CLINIC | Age: 65
End: 2025-09-03
Payer: COMMERCIAL

## 2025-09-03 VITALS
DIASTOLIC BLOOD PRESSURE: 78 MMHG | BODY MASS INDEX: 34.66 KG/M2 | WEIGHT: 234 LBS | HEIGHT: 69 IN | TEMPERATURE: 97.7 F | SYSTOLIC BLOOD PRESSURE: 116 MMHG

## 2025-09-03 DIAGNOSIS — R97.20 ELEVATED PROSTATE SPECIFIC ANTIGEN (PSA): ICD-10-CM

## 2025-09-03 DIAGNOSIS — N40.1 BENIGN PROSTATIC HYPERPLASIA WITH LOWER URINARY TRACT SYMPTOMS, SYMPTOM DETAILS UNSPECIFIED: Primary | ICD-10-CM

## 2025-09-03 PROCEDURE — 99213 OFFICE O/P EST LOW 20 MIN: CPT | Performed by: UROLOGY

## 2025-09-03 PROCEDURE — 3074F SYST BP LT 130 MM HG: CPT | Performed by: UROLOGY

## 2025-09-03 PROCEDURE — 1125F AMNT PAIN NOTED PAIN PRSNT: CPT | Performed by: UROLOGY

## 2025-09-03 PROCEDURE — 3078F DIAST BP <80 MM HG: CPT | Performed by: UROLOGY

## 2025-09-03 RX ORDER — TAMSULOSIN HYDROCHLORIDE 0.4 MG/1
0.8 CAPSULE ORAL DAILY
Qty: 60 CAPSULE | Refills: 0 | Status: SHIPPED | OUTPATIENT
Start: 2025-09-03 | End: 2025-09-03

## 2025-09-03 RX ORDER — TAMSULOSIN HYDROCHLORIDE 0.4 MG/1
0.8 CAPSULE ORAL DAILY
Qty: 180 CAPSULE | Refills: 3 | Status: SHIPPED | OUTPATIENT
Start: 2025-09-03

## 2025-09-03 ASSESSMENT — PAIN SCALES - GENERAL: PAINLEVEL_OUTOF10: SEVERE PAIN (8)

## (undated) DEVICE — PREP POVIDONE IODINE SCRUB 7.5% 120ML

## (undated) DEVICE — GUIDEWIRE URO SOLO FLEX STR 0.035"X150CM HW35FS

## (undated) DEVICE — GOWN IMPERVIOUS SPECIALTY XL/XLONG 39049

## (undated) DEVICE — NDL ECLIPSE 18GA 1.5"

## (undated) DEVICE — TRAY PROCEDURE SUPPORT PAIN MANAGEMENT 332114

## (undated) DEVICE — SU VICRYL 0 CT-2 CR 8X18" J727D

## (undated) DEVICE — SYR 05ML LL W/O NDL

## (undated) DEVICE — SOL WATER IRRIG 1000ML BOTTLE 07139-09

## (undated) DEVICE — PREP CHLORAPREP 26ML TINTED ORANGE  260815

## (undated) DEVICE — DECANTER BAG 2002S

## (undated) DEVICE — SOL WATER IRRIG 1000ML BOTTLE 2F7114

## (undated) DEVICE — TUBING IV EXTENSION SET 34"

## (undated) DEVICE — PREP SKIN SCRUB TRAY 4461A

## (undated) DEVICE — GOWN XXL 9575

## (undated) DEVICE — DRSG BANDAID 1X3" FABRIC

## (undated) DEVICE — SYR 30ML LL W/O NDL

## (undated) DEVICE — BASIN SET MINOR DISP

## (undated) DEVICE — LUBRICATING JELLY 4.25OZ

## (undated) DEVICE — GLOVE PROTEXIS W/NEU-THERA 8.0  2D73TE80

## (undated) DEVICE — SOL SODIUM CHLORIDE 250ML

## (undated) DEVICE — TUBING SUCTION 12"X1/4" N612

## (undated) DEVICE — BUR MATCHSTICK 3.0MM FLUTED 15CM ANSPACH MA15-8NS

## (undated) DEVICE — SU MONOCRYL 4-0 PS-2 18" UND Y496G

## (undated) DEVICE — Device

## (undated) DEVICE — DRAPE GYN/UROLOGY FLUID POUCH TUR 29455

## (undated) DEVICE — GLOVE ESTEEM BLUE W/NEU-THERA 8.5  2D73PB85

## (undated) DEVICE — BONE WAX 2.5GM W31G

## (undated) DEVICE — GLOVE PROTEXIS W/NEU-THERA 7.5  2D73TE75

## (undated) DEVICE — DRAPE MICRO ZEISS OPMI 137X381CM 306070-0000-000

## (undated) DEVICE — DURASEAL

## (undated) DEVICE — PEN MARKING SKIN

## (undated) DEVICE — INTRODUCER CATH TAUT 2.0MMX1.6MMX7.6CM PI-63

## (undated) DEVICE — STOCKING SLEEVE COMPRESSION CALF MED

## (undated) DEVICE — ADH FLOSEAL W/HUMAN THROMBIN 5ML 1501825

## (undated) DEVICE — NDL SPINAL 18GA 3.5" 405184

## (undated) DEVICE — NDL SPINAL 22GA 5" QUINCKE 405148

## (undated) DEVICE — SYR 03ML LL W/O NDL

## (undated) DEVICE — PACK SET-UP STD 9102

## (undated) DEVICE — TUBING CYSTO/BLADDER IRRIG SET 80" 06544-01

## (undated) DEVICE — ESU ELEC BLADE 2.75" COATED/INSULATED E1455

## (undated) DEVICE — DRAPE C-ARM

## (undated) DEVICE — SYR BULB IRRIG DOVER 60 ML LATEX FREE 67000

## (undated) DEVICE — STRAP STIRRUP W/SLIP 30187-030

## (undated) DEVICE — GLOVE ESTEEM POWDER FREE 8.5  2D72PL85

## (undated) DEVICE — GLOVE ESTEEM BLUE W/NEU-THERA 8.0  2D73PB80

## (undated) DEVICE — LABEL MEDICATION SYSTEM  3304

## (undated) DEVICE — SOL WATER INJ 2000ML BAG 07118-07

## (undated) DEVICE — SYR 10ML LL W/O NDL

## (undated) DEVICE — PREP POVIDONE IODINE SOLUTION 10% 120ML

## (undated) DEVICE — SU VICRYL 2-0 CT-2 CR 8X18" J726D

## (undated) DEVICE — SOL NACL 0.9% INJ 1000ML BAG 07983-09

## (undated) DEVICE — KIT ENDO TURNOVER/PROCEDURE CARRY-ON 101822

## (undated) DEVICE — BAG URINARY DRAIN 2000ML LF 154002

## (undated) DEVICE — DRAPE MAYO STAND 23X54 8337

## (undated) DEVICE — GLOVE ESTEEM POWDER FREE 7.5  2D72PL75

## (undated) DEVICE — ADH LIQUID MASTISOL TOPICAL VIAL 2-3ML 0523-48

## (undated) DEVICE — GLOVE EXAM NITRILE LG

## (undated) DEVICE — DECANTER VIAL 2006S

## (undated) DEVICE — GOWN IMPERVIOUS BREATHABLE 2XL/XLONG

## (undated) DEVICE — CATH FOLEY COUNCIL 18FR 5ML LUBRICATH LATEX 0196L18

## (undated) DEVICE — TUBING SUCTION 6"X3/16" N56A

## (undated) RX ORDER — FENTANYL CITRATE 50 UG/ML
INJECTION, SOLUTION INTRAMUSCULAR; INTRAVENOUS
Status: DISPENSED
Start: 2019-05-29

## (undated) RX ORDER — LIDOCAINE HYDROCHLORIDE 20 MG/ML
INJECTION, SOLUTION EPIDURAL; INFILTRATION; INTRACAUDAL; PERINEURAL
Status: DISPENSED
Start: 2019-09-20

## (undated) RX ORDER — LIDOCAINE HYDROCHLORIDE 20 MG/ML
INJECTION, SOLUTION EPIDURAL; INFILTRATION; INTRACAUDAL; PERINEURAL
Status: DISPENSED
Start: 2018-12-10

## (undated) RX ORDER — ONDANSETRON 2 MG/ML
INJECTION INTRAMUSCULAR; INTRAVENOUS
Status: DISPENSED
Start: 2018-12-10

## (undated) RX ORDER — LIDOCAINE HYDROCHLORIDE 10 MG/ML
INJECTION, SOLUTION EPIDURAL; INFILTRATION; INTRACAUDAL; PERINEURAL
Status: DISPENSED
Start: 2019-04-12

## (undated) RX ORDER — ONDANSETRON 2 MG/ML
INJECTION INTRAMUSCULAR; INTRAVENOUS
Status: DISPENSED
Start: 2019-05-29

## (undated) RX ORDER — LIDOCAINE HYDROCHLORIDE 20 MG/ML
INJECTION, SOLUTION EPIDURAL; INFILTRATION; INTRACAUDAL; PERINEURAL
Status: DISPENSED
Start: 2019-05-29

## (undated) RX ORDER — PROPOFOL 10 MG/ML
INJECTION, EMULSION INTRAVENOUS
Status: DISPENSED
Start: 2018-07-23

## (undated) RX ORDER — ONDANSETRON 2 MG/ML
INJECTION INTRAMUSCULAR; INTRAVENOUS
Status: DISPENSED
Start: 2018-07-23

## (undated) RX ORDER — PROPOFOL 10 MG/ML
INJECTION, EMULSION INTRAVENOUS
Status: DISPENSED
Start: 2019-05-29

## (undated) RX ORDER — FENTANYL CITRATE 50 UG/ML
INJECTION, SOLUTION INTRAMUSCULAR; INTRAVENOUS
Status: DISPENSED
Start: 2018-07-23

## (undated) RX ORDER — PROPOFOL 10 MG/ML
INJECTION, EMULSION INTRAVENOUS
Status: DISPENSED
Start: 2018-12-10

## (undated) RX ORDER — PROPOFOL 10 MG/ML
INJECTION, EMULSION INTRAVENOUS
Status: DISPENSED
Start: 2019-09-20

## (undated) RX ORDER — DEXAMETHASONE SODIUM PHOSPHATE 10 MG/ML
INJECTION INTRAMUSCULAR; INTRAVENOUS
Status: DISPENSED
Start: 2018-12-10

## (undated) RX ORDER — LIDOCAINE HYDROCHLORIDE 20 MG/ML
INJECTION, SOLUTION EPIDURAL; INFILTRATION; INTRACAUDAL; PERINEURAL
Status: DISPENSED
Start: 2018-07-23

## (undated) RX ORDER — PROPOFOL 10 MG/ML
INJECTION, EMULSION INTRAVENOUS
Status: DISPENSED
Start: 2024-05-30

## (undated) RX ORDER — CEFAZOLIN SODIUM 1 G/3ML
INJECTION, POWDER, FOR SOLUTION INTRAMUSCULAR; INTRAVENOUS
Status: DISPENSED
Start: 2019-05-29

## (undated) RX ORDER — FENTANYL CITRATE 50 UG/ML
INJECTION, SOLUTION INTRAMUSCULAR; INTRAVENOUS
Status: DISPENSED
Start: 2018-12-10

## (undated) RX ORDER — DEXAMETHASONE SODIUM PHOSPHATE 10 MG/ML
INJECTION, SOLUTION INTRAMUSCULAR; INTRAVENOUS
Status: DISPENSED
Start: 2019-05-29

## (undated) RX ORDER — LIDOCAINE HYDROCHLORIDE AND EPINEPHRINE 10; 10 MG/ML; UG/ML
INJECTION, SOLUTION INFILTRATION; PERINEURAL
Status: DISPENSED
Start: 2019-05-29

## (undated) RX ORDER — LIDOCAINE HYDROCHLORIDE 10 MG/ML
INJECTION, SOLUTION EPIDURAL; INFILTRATION; INTRACAUDAL; PERINEURAL
Status: DISPENSED
Start: 2024-05-30

## (undated) RX ORDER — EPHEDRINE SULFATE 50 MG/ML
INJECTION, SOLUTION INTRAMUSCULAR; INTRAVENOUS; SUBCUTANEOUS
Status: DISPENSED
Start: 2019-05-29

## (undated) RX ORDER — DEXAMETHASONE SODIUM PHOSPHATE 10 MG/ML
INJECTION INTRAMUSCULAR; INTRAVENOUS
Status: DISPENSED
Start: 2018-07-23

## (undated) RX ORDER — GLYCOPYRROLATE 0.2 MG/ML
INJECTION INTRAMUSCULAR; INTRAVENOUS
Status: DISPENSED
Start: 2019-05-29